# Patient Record
Sex: MALE | Race: WHITE | NOT HISPANIC OR LATINO | Employment: FULL TIME | URBAN - METROPOLITAN AREA
[De-identification: names, ages, dates, MRNs, and addresses within clinical notes are randomized per-mention and may not be internally consistent; named-entity substitution may affect disease eponyms.]

---

## 2017-06-21 ENCOUNTER — LAB (OUTPATIENT)
Dept: LAB | Facility: HOSPITAL | Age: 52
End: 2017-06-21

## 2017-06-21 ENCOUNTER — HOSPITAL ENCOUNTER (OUTPATIENT)
Dept: CT IMAGING | Facility: HOSPITAL | Age: 52
Discharge: HOME | End: 2017-06-21
Admitting: INTERNAL MEDICINE

## 2017-06-21 ENCOUNTER — TRANSCRIBE ORDERS (OUTPATIENT)
Dept: ADMINISTRATIVE | Facility: HOSPITAL | Age: 52
End: 2017-06-21

## 2017-06-21 ENCOUNTER — TRANSCRIBE ORDERS (OUTPATIENT)
Dept: LAB | Facility: HOSPITAL | Age: 52
End: 2017-06-21

## 2017-06-21 DIAGNOSIS — R10.9 ABDOMINAL PAIN, UNSPECIFIED LOCATION: Primary | ICD-10-CM

## 2017-06-21 DIAGNOSIS — R10.32 ABDOMINAL PAIN, LEFT LOWER QUADRANT: Primary | ICD-10-CM

## 2017-06-21 DIAGNOSIS — K57.33 DIVERTICULITIS LARGE INTESTINE W/O PERFORATION OR ABSCESS W/BLEEDING: ICD-10-CM

## 2017-06-21 DIAGNOSIS — R10.9 ABDOMINAL PAIN, UNSPECIFIED LOCATION: ICD-10-CM

## 2017-06-21 DIAGNOSIS — R10.32 ABDOMINAL PAIN, LEFT LOWER QUADRANT: ICD-10-CM

## 2017-06-21 LAB
ANION GAP SERPL CALCULATED.3IONS-SCNC: 9 MMOL/L (ref 3–11)
BASOPHILS # BLD AUTO: 0.01 10*3/MM3 (ref 0–0.2)
BASOPHILS NFR BLD AUTO: 0.1 % (ref 0–1)
BUN BLD-MCNC: 30 MG/DL (ref 9–23)
BUN/CREAT SERPL: 30 (ref 7–25)
CALCIUM SPEC-SCNC: 9.8 MG/DL (ref 8.7–10.4)
CHLORIDE SERPL-SCNC: 102 MMOL/L (ref 99–109)
CO2 SERPL-SCNC: 29 MMOL/L (ref 20–31)
CREAT BLD-MCNC: 1 MG/DL (ref 0.6–1.3)
DEPRECATED RDW RBC AUTO: 47.8 FL (ref 37–54)
EOSINOPHIL # BLD AUTO: 0.11 10*3/MM3 (ref 0.1–0.3)
EOSINOPHIL NFR BLD AUTO: 0.8 % (ref 0–3)
ERYTHROCYTE [DISTWIDTH] IN BLOOD BY AUTOMATED COUNT: 13.1 % (ref 11.3–14.5)
GFR SERPL CREATININE-BSD FRML MDRD: 78 ML/MIN/1.73
GLUCOSE BLD-MCNC: 86 MG/DL (ref 70–100)
HCT VFR BLD AUTO: 38.4 % (ref 38.9–50.9)
HGB BLD-MCNC: 12.7 G/DL (ref 13.1–17.5)
IMM GRANULOCYTES # BLD: 0.02 10*3/MM3 (ref 0–0.03)
IMM GRANULOCYTES NFR BLD: 0.1 % (ref 0–0.6)
LYMPHOCYTES # BLD AUTO: 2.55 10*3/MM3 (ref 0.6–4.8)
LYMPHOCYTES NFR BLD AUTO: 18.8 % (ref 24–44)
MCH RBC QN AUTO: 33.2 PG (ref 27–31)
MCHC RBC AUTO-ENTMCNC: 33.1 G/DL (ref 32–36)
MCV RBC AUTO: 100.3 FL (ref 80–99)
MONOCYTES # BLD AUTO: 1.02 10*3/MM3 (ref 0–1)
MONOCYTES NFR BLD AUTO: 7.5 % (ref 0–12)
NEUTROPHILS # BLD AUTO: 9.83 10*3/MM3 (ref 1.5–8.3)
NEUTROPHILS NFR BLD AUTO: 72.7 % (ref 41–71)
PLATELET # BLD AUTO: 220 10*3/MM3 (ref 150–450)
PMV BLD AUTO: 9.6 FL (ref 6–12)
POTASSIUM BLD-SCNC: 4.3 MMOL/L (ref 3.5–5.5)
RBC # BLD AUTO: 3.83 10*6/MM3 (ref 4.2–5.76)
SODIUM BLD-SCNC: 140 MMOL/L (ref 132–146)
WBC NRBC COR # BLD: 13.54 10*3/MM3 (ref 3.5–10.8)

## 2017-06-21 PROCEDURE — 36415 COLL VENOUS BLD VENIPUNCTURE: CPT

## 2017-06-21 PROCEDURE — 82565 ASSAY OF CREATININE: CPT

## 2017-06-21 PROCEDURE — 80048 BASIC METABOLIC PNL TOTAL CA: CPT | Performed by: NURSE PRACTITIONER

## 2017-06-21 PROCEDURE — 85025 COMPLETE CBC W/AUTO DIFF WBC: CPT | Performed by: NURSE PRACTITIONER

## 2017-06-21 PROCEDURE — 74177 CT ABD & PELVIS W/CONTRAST: CPT

## 2017-06-21 PROCEDURE — 0 IOPAMIDOL 61 % SOLUTION: Performed by: INTERNAL MEDICINE

## 2017-06-21 RX ADMIN — IOPAMIDOL 95 ML: 612 INJECTION, SOLUTION INTRAVENOUS at 14:15

## 2017-06-21 RX ADMIN — BARIUM SULFATE 450 ML: 21 SUSPENSION ORAL at 12:30

## 2017-06-22 LAB — CREAT BLDA-MCNC: 1 MG/DL (ref 0.6–1.3)

## 2017-07-11 ENCOUNTER — HOSPITAL ENCOUNTER (OUTPATIENT)
Facility: HOSPITAL | Age: 52
Setting detail: HOSPITAL OUTPATIENT SURGERY
Discharge: HOME | End: 2017-07-11
Attending: COLON & RECTAL SURGERY | Admitting: COLON & RECTAL SURGERY

## 2017-07-11 ENCOUNTER — ANESTHESIA (OUTPATIENT)
Dept: GASTROENTEROLOGY | Facility: HOSPITAL | Age: 52
End: 2017-07-11

## 2017-07-11 ENCOUNTER — ANESTHESIA EVENT (OUTPATIENT)
Dept: GASTROENTEROLOGY | Facility: HOSPITAL | Age: 52
End: 2017-07-11

## 2017-07-11 VITALS
SYSTOLIC BLOOD PRESSURE: 104 MMHG | OXYGEN SATURATION: 97 % | BODY MASS INDEX: 33.13 KG/M2 | WEIGHT: 250 LBS | HEIGHT: 73 IN | RESPIRATION RATE: 16 BRPM | HEART RATE: 60 BPM | TEMPERATURE: 97.6 F | DIASTOLIC BLOOD PRESSURE: 77 MMHG

## 2017-07-11 DIAGNOSIS — K57.92 DIVERTICULITIS: ICD-10-CM

## 2017-07-11 LAB — POTASSIUM BLDA-SCNC: 4.16 MMOL/L (ref 3.5–5.3)

## 2017-07-11 PROCEDURE — 88305 TISSUE EXAM BY PATHOLOGIST: CPT | Performed by: COLON & RECTAL SURGERY

## 2017-07-11 PROCEDURE — 84132 ASSAY OF SERUM POTASSIUM: CPT | Performed by: ANESTHESIOLOGY

## 2017-07-11 PROCEDURE — 25010000002 PROPOFOL 10 MG/ML EMULSION: Performed by: NURSE ANESTHETIST, CERTIFIED REGISTERED

## 2017-07-11 PROCEDURE — 93005 ELECTROCARDIOGRAM TRACING: CPT | Performed by: ANESTHESIOLOGY

## 2017-07-11 RX ORDER — LIDOCAINE HYDROCHLORIDE 10 MG/ML
INJECTION, SOLUTION INFILTRATION; PERINEURAL AS NEEDED
Status: DISCONTINUED | OUTPATIENT
Start: 2017-07-11 | End: 2017-07-11 | Stop reason: SURG

## 2017-07-11 RX ORDER — PANTOPRAZOLE SODIUM 40 MG/1
40 TABLET, DELAYED RELEASE ORAL DAILY
COMMUNITY
End: 2022-03-24 | Stop reason: SDUPTHER

## 2017-07-11 RX ORDER — PROPOFOL 10 MG/ML
VIAL (ML) INTRAVENOUS AS NEEDED
Status: DISCONTINUED | OUTPATIENT
Start: 2017-07-11 | End: 2017-07-11 | Stop reason: SURG

## 2017-07-11 RX ORDER — CETIRIZINE HYDROCHLORIDE 10 MG/1
10 TABLET ORAL DAILY PRN
COMMUNITY

## 2017-07-11 RX ORDER — SODIUM CHLORIDE, SODIUM LACTATE, POTASSIUM CHLORIDE, CALCIUM CHLORIDE 600; 310; 30; 20 MG/100ML; MG/100ML; MG/100ML; MG/100ML
9 INJECTION, SOLUTION INTRAVENOUS CONTINUOUS
Status: DISCONTINUED | OUTPATIENT
Start: 2017-07-11 | End: 2017-07-12 | Stop reason: HOSPADM

## 2017-07-11 RX ORDER — MELOXICAM 7.5 MG/1
7.5 TABLET ORAL DAILY
COMMUNITY
End: 2017-12-22

## 2017-07-11 RX ORDER — PROPOFOL 10 MG/ML
VIAL (ML) INTRAVENOUS CONTINUOUS PRN
Status: DISCONTINUED | OUTPATIENT
Start: 2017-07-11 | End: 2017-07-11 | Stop reason: SURG

## 2017-07-11 RX ORDER — GEMFIBROZIL 600 MG/1
600 TABLET, FILM COATED ORAL
COMMUNITY
End: 2022-06-14 | Stop reason: SDUPTHER

## 2017-07-11 RX ORDER — FAMOTIDINE 10 MG/ML
20 INJECTION, SOLUTION INTRAVENOUS ONCE
Status: COMPLETED | OUTPATIENT
Start: 2017-07-11 | End: 2017-07-11

## 2017-07-11 RX ORDER — LIDOCAINE HYDROCHLORIDE 10 MG/ML
0.5 INJECTION, SOLUTION EPIDURAL; INFILTRATION; INTRACAUDAL; PERINEURAL ONCE AS NEEDED
Status: DISCONTINUED | OUTPATIENT
Start: 2017-07-11 | End: 2017-07-11 | Stop reason: HOSPADM

## 2017-07-11 RX ORDER — FAMOTIDINE 20 MG/1
20 TABLET, FILM COATED ORAL ONCE
Status: CANCELLED | OUTPATIENT
Start: 2017-07-11 | End: 2017-07-11

## 2017-07-11 RX ORDER — SODIUM CHLORIDE 0.9 % (FLUSH) 0.9 %
1-10 SYRINGE (ML) INJECTION AS NEEDED
Status: DISCONTINUED | OUTPATIENT
Start: 2017-07-11 | End: 2017-07-11 | Stop reason: HOSPADM

## 2017-07-11 RX ORDER — ACETAMINOPHEN 500 MG
1000 TABLET ORAL EVERY 4 HOURS PRN
COMMUNITY

## 2017-07-11 RX ORDER — VALSARTAN 160 MG/1
160 TABLET ORAL DAILY
COMMUNITY
End: 2018-09-10 | Stop reason: ALTCHOICE

## 2017-07-11 RX ADMIN — LIDOCAINE HYDROCHLORIDE 100 MG: 10 INJECTION, SOLUTION INFILTRATION; PERINEURAL at 10:44

## 2017-07-11 RX ADMIN — FAMOTIDINE 20 MG: 10 INJECTION, SOLUTION INTRAVENOUS at 09:26

## 2017-07-11 RX ADMIN — PROPOFOL 160 MCG/KG/MIN: 10 INJECTION, EMULSION INTRAVENOUS at 10:44

## 2017-07-11 RX ADMIN — SODIUM CHLORIDE, POTASSIUM CHLORIDE, SODIUM LACTATE AND CALCIUM CHLORIDE: 600; 310; 30; 20 INJECTION, SOLUTION INTRAVENOUS at 10:44

## 2017-07-11 RX ADMIN — PROPOFOL 100 MG: 10 INJECTION, EMULSION INTRAVENOUS at 10:44

## 2017-07-11 RX ADMIN — SODIUM CHLORIDE, POTASSIUM CHLORIDE, SODIUM LACTATE AND CALCIUM CHLORIDE 9 ML/HR: 600; 310; 30; 20 INJECTION, SOLUTION INTRAVENOUS at 09:26

## 2017-07-11 NOTE — OP NOTE
[facility] ([facility])    COLONOSCOPY with cold snare polypectomy   Procedure Note    Nicholas Renee  7/11/2017    Pre-op Diagnosis: abnormal CT; history of diverticulitis; 3 episodes in 2 years    Post-op Diagnosis:   Mild sigmoid fibrosis and sigmoid diverticulosis;   Sessile 2mm polyp at the rectum, 10 cm above the dentate  PLAN TO CHECK CRP AND CBC    Anesthesia: Monitor Anesthesia Care    Staff:   Circulator: Kimberlee Quigley RN  Endo Technician: Catalina Mares    Estimated Blood Loss: *No blood loss documented*    Specimens: 2mm polyp from the rectum        Drains:none    Findings: as above     Complications: none evident      Magan Blum MD     Date: 7/11/2017  Time: 11:13 AM     PROCEDURE PERFORMED: Ileocolonoscopy with cold snare polypectomy.     PREOPERATIVE DIAGNOSIS: As above.     POSTOPERATIVE DIAGNOSIS: As above.     The procedure was reviewed in the preoperative area. History and physical exam performed. There has been some left lower quadrant mild pain, but the region is nontender on exam. The other aspects of the physical exam were without interval change.      DESCRIPTION OF PROCEDURE: The patient was brought into the endoscopy suite in the left lateral decubitus position. Anesthesia was provided by anesthesia services.   The soft tissues externally were without evidence of pathology. Digital exam revealed good sphincter pressure. The colonoscope was advanced transanally.    In the sigmoid, there was mild fibrosis and moderate sigmoid diverticulosis that became less significant in the left colon.   The greatest degree of fibrosis was from 15-30 cm.   The more proximal colon was intubated with ease. The distal ileum appeared healthy.   Copious washing was performed in the cecum. We marked the cecal time to keep track of withdraw time.   As the colonoscope was withdrawn, copious washing was performed with findings of the aforementioned diverticular changes,  particularly at 30-15 cm.   At 10 cm above the dentate line, a 2 mm polyp was removed with cold snare polypectomy and retrieved.   Ultimately, retroflexion in the distal rectum revealed internal hemorrhoids. The findings were discussed with the patient’s wife and we made plans for CRP and CBC to better understand if there is chronic inflammatory process or if the inflammation is resolved.

## 2017-07-11 NOTE — ANESTHESIA PREPROCEDURE EVALUATION
Anesthesia Evaluation     Patient summary reviewed and Nursing notes reviewed   NPO Solid Status: > 8 hours  NPO Liquid Status: > 8 hours     Airway   Mallampati: I  TM distance: >3 FB  Neck ROM: full  no difficulty expected  Dental      Pulmonary     breath sounds clear to auscultation  Cardiovascular     Rhythm: regular  Rate: normal    (+) hypertension,       Neuro/Psych  GI/Hepatic/Renal/Endo      Musculoskeletal     Abdominal    Substance History      OB/GYN          Other        ROS/Med Hx Other: Denies cardiac or pulmonary disease                                  Anesthesia Plan    ASA 2     general   total IV anesthesia  intravenous induction     Plan discussed with CRNA.

## 2017-07-11 NOTE — ANESTHESIA POSTPROCEDURE EVALUATION
Patient: Nicholas Renee    Procedure Summary     Date Anesthesia Start Anesthesia Stop Room / Location    07/11/17 1039 1104  [facility]       Procedure Diagnosis Surgeon Provider    COLONOSCOPY (N/A ) No diagnosis on file. MD Joesph Barrientos MD          Anesthesia Type: general  Last vitals  BP (!) 87/58 (07/11/17 1105)    Temp 97 °F (36.1 °C) (07/11/17 1105)    Pulse 54 (07/11/17 1105)   Resp 12 (07/11/17 1105)    SpO2 97 % (07/11/17 1105)      Post Anesthesia Care and Evaluation    Patient location during evaluation: PACU  Patient participation: complete - patient participated  Level of consciousness: awake and alert  Pain score: 0  Pain management: adequate  Airway patency: patent  Anesthetic complications: No anesthetic complications  PONV Status: none  Cardiovascular status: hemodynamically stable and acceptable  Respiratory status: nonlabored ventilation, acceptable and nasal cannula  Hydration status: acceptable

## 2017-07-11 NOTE — PLAN OF CARE
Problem: GI Endoscopy (Adult)  Goal: Signs and Symptoms of Listed Potential Problems Will be Absent or Manageable (GI Endoscopy)  Outcome: Ongoing (interventions implemented as appropriate)    07/11/17 0928   GI Endoscopy   Problems Assessed (GI Endoscopy) all   Problems Present (GI Endoscopy) none

## 2017-07-12 LAB
CYTO UR: NORMAL
LAB AP CASE REPORT: NORMAL
LAB AP CLINICAL INFORMATION: NORMAL
Lab: NORMAL
PATH REPORT.FINAL DX SPEC: NORMAL
PATH REPORT.GROSS SPEC: NORMAL

## 2017-09-06 ENCOUNTER — LAB (OUTPATIENT)
Dept: LAB | Facility: HOSPITAL | Age: 52
End: 2017-09-06

## 2017-09-06 ENCOUNTER — HOSPITAL ENCOUNTER (OUTPATIENT)
Dept: CT IMAGING | Facility: HOSPITAL | Age: 52
Discharge: HOME | End: 2017-09-06
Admitting: INTERNAL MEDICINE

## 2017-09-06 ENCOUNTER — TRANSCRIBE ORDERS (OUTPATIENT)
Dept: ADMINISTRATIVE | Facility: HOSPITAL | Age: 52
End: 2017-09-06

## 2017-09-06 DIAGNOSIS — N41.0 ACUTE PROSTATITIS: ICD-10-CM

## 2017-09-06 DIAGNOSIS — K57.33 DIVERTICULITIS OF COLON WITH HEMORRHAGE: Primary | ICD-10-CM

## 2017-09-06 DIAGNOSIS — K57.20 PERFORATED DIVERTICULUM OF LARGE INTESTINE: Primary | ICD-10-CM

## 2017-09-06 DIAGNOSIS — K57.33 DIVERTICULITIS OF COLON WITH HEMORRHAGE: ICD-10-CM

## 2017-09-06 LAB
ANION GAP SERPL CALCULATED.3IONS-SCNC: 6 MMOL/L (ref 3–11)
BASOPHILS # BLD AUTO: 0.02 10*3/MM3 (ref 0–0.2)
BASOPHILS NFR BLD AUTO: 0.3 % (ref 0–1)
BUN BLD-MCNC: 27 MG/DL (ref 9–23)
BUN/CREAT SERPL: 30 (ref 7–25)
CALCIUM SPEC-SCNC: 9.4 MG/DL (ref 8.7–10.4)
CHLORIDE SERPL-SCNC: 105 MMOL/L (ref 99–109)
CO2 SERPL-SCNC: 28 MMOL/L (ref 20–31)
CREAT BLD-MCNC: 0.9 MG/DL (ref 0.6–1.3)
CRP SERPL-MCNC: 0.13 MG/DL (ref 0–1)
DEPRECATED RDW RBC AUTO: 45 FL (ref 37–54)
EOSINOPHIL # BLD AUTO: 0.28 10*3/MM3 (ref 0–0.3)
EOSINOPHIL NFR BLD AUTO: 4.5 % (ref 0–3)
ERYTHROCYTE [DISTWIDTH] IN BLOOD BY AUTOMATED COUNT: 12.9 % (ref 11.3–14.5)
ERYTHROCYTE [SEDIMENTATION RATE] IN BLOOD: 18 MM/HR (ref 0–20)
GFR SERPL CREATININE-BSD FRML MDRD: 88 ML/MIN/1.73
GLUCOSE BLD-MCNC: 80 MG/DL (ref 70–100)
HCT VFR BLD AUTO: 40 % (ref 38.9–50.9)
HGB BLD-MCNC: 13.6 G/DL (ref 13.1–17.5)
IMM GRANULOCYTES # BLD: 0.02 10*3/MM3 (ref 0–0.03)
IMM GRANULOCYTES NFR BLD: 0.3 % (ref 0–0.6)
LYMPHOCYTES # BLD AUTO: 2.34 10*3/MM3 (ref 0.6–4.8)
LYMPHOCYTES NFR BLD AUTO: 37.8 % (ref 24–44)
MCH RBC QN AUTO: 32.8 PG (ref 27–31)
MCHC RBC AUTO-ENTMCNC: 34 G/DL (ref 32–36)
MCV RBC AUTO: 96.4 FL (ref 80–99)
MONOCYTES # BLD AUTO: 0.57 10*3/MM3 (ref 0–1)
MONOCYTES NFR BLD AUTO: 9.2 % (ref 0–12)
NEUTROPHILS # BLD AUTO: 2.96 10*3/MM3 (ref 1.5–8.3)
NEUTROPHILS NFR BLD AUTO: 47.9 % (ref 41–71)
PLATELET # BLD AUTO: 228 10*3/MM3 (ref 150–450)
PMV BLD AUTO: 9.4 FL (ref 6–12)
POTASSIUM BLD-SCNC: 4.2 MMOL/L (ref 3.5–5.5)
RBC # BLD AUTO: 4.15 10*6/MM3 (ref 4.2–5.76)
SODIUM BLD-SCNC: 139 MMOL/L (ref 132–146)
WBC NRBC COR # BLD: 6.19 10*3/MM3 (ref 3.5–10.8)

## 2017-09-06 PROCEDURE — 80048 BASIC METABOLIC PNL TOTAL CA: CPT | Performed by: NURSE PRACTITIONER

## 2017-09-06 PROCEDURE — 85025 COMPLETE CBC W/AUTO DIFF WBC: CPT | Performed by: NURSE PRACTITIONER

## 2017-09-06 PROCEDURE — 85652 RBC SED RATE AUTOMATED: CPT | Performed by: NURSE PRACTITIONER

## 2017-09-06 PROCEDURE — 0 DIATRIZOATE MEGLUMINE & SODIUM PER 1 ML: Performed by: INTERNAL MEDICINE

## 2017-09-06 PROCEDURE — 36415 COLL VENOUS BLD VENIPUNCTURE: CPT

## 2017-09-06 PROCEDURE — 0 IOPAMIDOL 61 % SOLUTION: Performed by: INTERNAL MEDICINE

## 2017-09-06 PROCEDURE — 74177 CT ABD & PELVIS W/CONTRAST: CPT

## 2017-09-06 PROCEDURE — 86140 C-REACTIVE PROTEIN: CPT | Performed by: NURSE PRACTITIONER

## 2017-09-06 PROCEDURE — 82565 ASSAY OF CREATININE: CPT

## 2017-09-06 RX ADMIN — DIATRIZOATE MEGLUMINE AND DIATRIZOATE SODIUM 15 ML: 660; 100 LIQUID ORAL; RECTAL at 12:15

## 2017-09-06 RX ADMIN — IOPAMIDOL 100 ML: 612 INJECTION, SOLUTION INTRAVENOUS at 13:30

## 2017-09-07 LAB — CREAT BLDA-MCNC: 0.9 MG/DL (ref 0.6–1.3)

## 2017-12-21 ENCOUNTER — TELEPHONE (OUTPATIENT)
Dept: ORTHOPEDIC SURGERY | Facility: CLINIC | Age: 52
End: 2017-12-21

## 2017-12-22 RX ORDER — MELOXICAM 15 MG/1
TABLET ORAL
Qty: 90 TABLET | Refills: 2 | Status: SHIPPED | OUTPATIENT
Start: 2017-12-22 | End: 2018-09-26 | Stop reason: SDUPTHER

## 2017-12-22 NOTE — TELEPHONE ENCOUNTER
Patient called back, Kenyon spoke to him and let him know that the refill was at his pharmacy and to call his PCP to see if they would like him to get labs drawn. Patient understood and will call back with any further problems or questions.     Nneka

## 2018-01-15 ENCOUNTER — OFFICE VISIT (OUTPATIENT)
Dept: ORTHOPEDIC SURGERY | Facility: CLINIC | Age: 53
End: 2018-01-15

## 2018-01-15 VITALS
SYSTOLIC BLOOD PRESSURE: 136 MMHG | HEART RATE: 82 BPM | DIASTOLIC BLOOD PRESSURE: 95 MMHG | HEIGHT: 73 IN | BODY MASS INDEX: 33.57 KG/M2 | WEIGHT: 253.31 LBS

## 2018-01-15 DIAGNOSIS — M17.0 PRIMARY OSTEOARTHRITIS OF BOTH KNEES: Primary | ICD-10-CM

## 2018-01-15 PROCEDURE — 99213 OFFICE O/P EST LOW 20 MIN: CPT | Performed by: ORTHOPAEDIC SURGERY

## 2018-01-15 RX ORDER — ALUMINUM ZIRCONIUM OCTACHLOROHYDREX GLY 16 G/100G
4 GEL TOPICAL
Refills: 1 | COMMUNITY
Start: 2017-10-30 | End: 2019-06-28

## 2018-01-15 NOTE — PROGRESS NOTES
"    [facility] [place] [facility] [place]    Subjective     Chief Complaint   Patient presents with   • Follow-up     Bilateral knees - 5 month f/u        HPI    Nicholas Renee is a 52 y.o. male. He presents today for bilateral knee pain.  He has a known history of arthritis, with moderate to severe pain.  Right knee bothers him more than the left.  Pain is aching, so she was swelling, grinding and stiffness.  It worsens with walking stairs.  He has had good response with steroid injections in the past.  He is not interested in surgical intervention at this time.       There is no problem list on file for this patient.    Past Medical History:   Diagnosis Date   • Esophagitis    • Hypertension    • Osteoarthritis of both knees       Past Surgical History:   Procedure Laterality Date   • ABDOMINAL SURGERY     • COLONOSCOPY N/A 7/11/2017    Procedure: COLONOSCOPY;  Surgeon: Magan Blum MD;  Location: [facility] ENDOSCOPY;  Service:    • FOOT SURGERY     • KNEE SURGERY Bilateral     x 5      Family History   Problem Relation Age of Onset   • Diabetes Father    • Heart disease Father    • Hypertension Father    • Heart attack Father    • Rheum arthritis Other    • No Known Problems Mother      Social History     Social History   • Marital status:      Spouse name: N/A   • Number of children: N/A   • Years of education: N/A     Occupational History   • Not on file.     Social History Main Topics   • Smoking status: Former Smoker     Types: Cigarettes     Quit date: 7/11/2003   • Smokeless tobacco: Current User     Types: Snuff   • Alcohol use Yes      Comment: socially, \"rarely\"   • Drug use: Defer   • Sexual activity: Defer     Other Topics Concern   • Not on file     Social History Narrative      Current Outpatient Prescriptions on File Prior to Visit   Medication Sig Dispense Refill   • acetaminophen (TYLENOL) 500 MG tablet Take 1,000 mg by mouth 2 (Two) Times a Day.     • cetirizine (zyrTEC) 10 MG tablet Take 10 " "mg by mouth Daily.     • gemfibrozil (LOPID) 600 MG tablet Take 600 mg by mouth 2 (Two) Times a Day Before Meals.     • meloxicam (MOBIC) 15 MG tablet 1 PO Daily with food. 90 tablet 2   • Multiple Vitamins-Minerals (MULTIVITAMIN ADULT PO) Take 1 tablet by mouth Daily.     • pantoprazole (PROTONIX) 40 MG EC tablet Take 40 mg by mouth Daily.     • valsartan (DIOVAN) 160 MG tablet Take 160 mg by mouth Daily.     • Probiotic Product (ALIGN PO) Take 1 tablet by mouth Daily.       No current facility-administered medications on file prior to visit.       Allergies   Allergen Reactions   • Sulfa Antibiotics Hives        Review of Systems   Constitutional: Negative.  Negative for chills and fever.   HENT: Positive for sinus pressure and sneezing.    Eyes: Positive for itching.   Respiratory: Negative.    Cardiovascular: Negative.    Gastrointestinal: Negative.    Endocrine: Negative.    Genitourinary: Negative.    Musculoskeletal: Positive for arthralgias (Bilateral knee pain), back pain and joint swelling.   Skin: Negative.    Allergic/Immunologic: Negative.    Neurological: Negative.    Hematological: Negative.    Psychiatric/Behavioral: Negative.         Objective      Physical Exam  /95  Pulse 82  Ht 185 cm (72.84\")  Wt 115 kg (253 lb 4.9 oz)  BMI 33.57 kg/m2    Body mass index is 33.57 kg/(m^2).    General:   Mental Status:  Alert   Appearance: Cooperative, in no acute distress   Build and Nutrition:Overweight  male   Orientation: Alert and oriented to person, place and time   Posture: Normal   Gait: Normal    Integument:   Right knee: no skin lesions, no rash, no ecchymosis   Left knee: no skin lesions, no rash, no ecchymosis    Lower Extremities:   Right Knee:    Tenderness:  Medial and lateral joint line tenderness    Effusion:  2+    Swelling:  None    Crepitus:  Positive    Atrophy:  None    Range of motion:  Extension: 5°       Flexion: 120°  Instability:  No varus laxity, no valgus laxity, negative " anterior drawer  Deformities:  Varus   Left Knee:    Tenderness:  None    Effusion:  1+    Swelling:  None    Crepitus: Positive    Atrophy:  None    Range of motion:  Extension: 0°       Flexion: 120°  Instability:  No varus laxity, no valgus laxity, negative anterior drawer  Deformities:  None      Imaging/Studies  Outside radiographs reviewed, which showed arthritic changes in both knees, right greater than left, with signs of ACL reconstruction on the right.  Last radiographs were January 2017      Assessment and Plan     Nicholas was seen today for follow-up.    Diagnoses and all orders for this visit:    Primary osteoarthritis of both knees        I reviewed my findings with patient today.  He has advanced bilateral knee arthritis.  He would like to have injections today.  Please see my procedure notes for details.  I will see him back in 4 months, but sooner for any problems.  Long-term, he is a candidate for knee replacement surgery.      Of note, he had 90% pain relief in the right knee, and 80% relief in the left knee prior to leaving the office today.      Return in about 4 months (around 5/15/2018).      Medical Decision Making  Management Options : prescription/IM medicine  Data/Risk: independent visualization of imaging, lab tests, or EMG/NCV      Gage Britt MD  01/15/18  10:18 AM

## 2018-01-17 PROCEDURE — 20610 DRAIN/INJ JOINT/BURSA W/O US: CPT | Performed by: ORTHOPAEDIC SURGERY

## 2018-01-17 RX ORDER — LIDOCAINE HYDROCHLORIDE 10 MG/ML
4 INJECTION, SOLUTION INFILTRATION; PERINEURAL
Status: COMPLETED | OUTPATIENT
Start: 2018-01-17 | End: 2018-01-17

## 2018-01-17 RX ORDER — TRIAMCINOLONE ACETONIDE 40 MG/ML
40 INJECTION, SUSPENSION INTRA-ARTICULAR; INTRAMUSCULAR
Status: COMPLETED | OUTPATIENT
Start: 2018-01-17 | End: 2018-01-17

## 2018-01-17 RX ADMIN — LIDOCAINE HYDROCHLORIDE 4 ML: 10 INJECTION, SOLUTION INFILTRATION; PERINEURAL at 10:06

## 2018-01-17 RX ADMIN — TRIAMCINOLONE ACETONIDE 40 MG: 40 INJECTION, SUSPENSION INTRA-ARTICULAR; INTRAMUSCULAR at 10:06

## 2018-01-17 NOTE — PROGRESS NOTES
Procedure   Large Joint Arthrocentesis  Date/Time: 1/17/2018 10:06 AM  Consent given by: patient  Site marked: site marked  Timeout: Immediately prior to procedure a time out was called to verify the correct patient, procedure, equipment, support staff and site/side marked as required   Supporting Documentation  Indications: pain   Procedure Details  Location: knee - R knee  Preparation: Patient was prepped and draped in the usual sterile fashion  Needle size: 22 G  Approach: anterolateral  Medications administered: 4 mL lidocaine 1 %; 40 mg triamcinolone acetonide 40 MG/ML  Patient tolerance: patient tolerated the procedure well with no immediate complications

## 2018-01-19 NOTE — PROGRESS NOTES
Procedure   Large Joint Arthrocentesis  Date/Time: 1/19/2018 3:09 PM  Consent given by: patient  Site marked: site marked  Timeout: Immediately prior to procedure a time out was called to verify the correct patient, procedure, equipment, support staff and site/side marked as required   Supporting Documentation  Indications: pain   Procedure Details  Location: knee - L knee  Preparation: Patient was prepped and draped in the usual sterile fashion  Needle size: 22 G  Approach: anterolateral  Medications administered: 4 mL lidocaine 1 %; 40 mg triamcinolone acetonide 40 MG/ML  Patient tolerance: patient tolerated the procedure well with no immediate complications

## 2018-05-02 ENCOUNTER — OFFICE VISIT (OUTPATIENT)
Dept: ORTHOPEDIC SURGERY | Facility: CLINIC | Age: 53
End: 2018-05-02

## 2018-05-02 VITALS
HEIGHT: 73 IN | SYSTOLIC BLOOD PRESSURE: 140 MMHG | WEIGHT: 251.32 LBS | DIASTOLIC BLOOD PRESSURE: 92 MMHG | BODY MASS INDEX: 33.31 KG/M2 | HEART RATE: 76 BPM

## 2018-05-02 DIAGNOSIS — M17.11 PRIMARY OSTEOARTHRITIS OF RIGHT KNEE: Primary | ICD-10-CM

## 2018-05-02 DIAGNOSIS — M17.12 PRIMARY OSTEOARTHRITIS OF LEFT KNEE: ICD-10-CM

## 2018-05-02 PROCEDURE — 20610 DRAIN/INJ JOINT/BURSA W/O US: CPT | Performed by: ORTHOPAEDIC SURGERY

## 2018-05-02 PROCEDURE — 99214 OFFICE O/P EST MOD 30 MIN: CPT | Performed by: ORTHOPAEDIC SURGERY

## 2018-05-02 RX ORDER — TRIAMCINOLONE ACETONIDE 40 MG/ML
40 INJECTION, SUSPENSION INTRA-ARTICULAR; INTRAMUSCULAR
Status: COMPLETED | OUTPATIENT
Start: 2018-05-02 | End: 2018-05-02

## 2018-05-02 RX ORDER — ROPIVACAINE HYDROCHLORIDE 5 MG/ML
4 INJECTION, SOLUTION EPIDURAL; INFILTRATION; PERINEURAL
Status: COMPLETED | OUTPATIENT
Start: 2018-05-02 | End: 2018-05-02

## 2018-05-02 RX ORDER — DOXYCYCLINE HYCLATE 100 MG/1
100 CAPSULE ORAL 2 TIMES DAILY
Refills: 0 | COMMUNITY
Start: 2018-04-26 | End: 2019-06-28

## 2018-05-02 RX ADMIN — TRIAMCINOLONE ACETONIDE 40 MG: 40 INJECTION, SUSPENSION INTRA-ARTICULAR; INTRAMUSCULAR at 09:19

## 2018-05-02 RX ADMIN — TRIAMCINOLONE ACETONIDE 40 MG: 40 INJECTION, SUSPENSION INTRA-ARTICULAR; INTRAMUSCULAR at 09:18

## 2018-05-02 RX ADMIN — ROPIVACAINE HYDROCHLORIDE 4 ML: 5 INJECTION, SOLUTION EPIDURAL; INFILTRATION; PERINEURAL at 09:18

## 2018-05-02 RX ADMIN — ROPIVACAINE HYDROCHLORIDE 4 ML: 5 INJECTION, SOLUTION EPIDURAL; INFILTRATION; PERINEURAL at 09:19

## 2018-05-02 NOTE — PROGRESS NOTES
Procedure   Large Joint Arthrocentesis  Date/Time: 5/2/2018 9:18 AM  Consent given by: patient  Site marked: site marked  Timeout: Immediately prior to procedure a time out was called to verify the correct patient, procedure, equipment, support staff and site/side marked as required   Supporting Documentation  Indications: pain   Procedure Details  Location: knee - L knee  Preparation: Patient was prepped and draped in the usual sterile fashion  Needle size: 22 G  Approach: anterolateral  Medications administered: 4 mL ropivacaine 0.5 %; 40 mg triamcinolone acetonide 40 MG/ML  Patient tolerance: patient tolerated the procedure well with no immediate complications

## 2018-05-02 NOTE — PROGRESS NOTES
"    [facility] [place] [facility] [place]    Subjective     Chief Complaint   Patient presents with   • Left Knee - Follow-up     4 month follow up   • Right Knee - Follow-up     4 month follow up        HPI    Nicholas Renee is a 53 y.o. male. He presents today for evaluation of bilateral knee pain.  Pain is been present for several months, following no recent injury.  Pain is persistent, moderate in severity, aching in quality, associated with swelling, popping, grinding and stiffness.  It worsens with climbing stairs.      There is no problem list on file for this patient.    Past Medical History:   Diagnosis Date   • Esophagitis    • Hypertension    • Osteoarthritis of both knees       Past Surgical History:   Procedure Laterality Date   • ABDOMINAL SURGERY     • COLONOSCOPY N/A 7/11/2017    Procedure: COLONOSCOPY;  Surgeon: Magan Blum MD;  Location: [facility] ENDOSCOPY;  Service:    • FOOT SURGERY     • KNEE SURGERY Bilateral     x 5      Family History   Problem Relation Age of Onset   • Diabetes Father    • Heart disease Father    • Hypertension Father    • Heart attack Father    • Rheum arthritis Other    • No Known Problems Mother      Social History     Social History   • Marital status:      Spouse name: N/A   • Number of children: N/A   • Years of education: N/A     Occupational History   • Not on file.     Social History Main Topics   • Smoking status: Former Smoker     Types: Cigarettes     Quit date: 7/11/2003   • Smokeless tobacco: Current User     Types: Snuff   • Alcohol use Yes      Comment: socially, \"rarely\"   • Drug use: Unknown   • Sexual activity: Defer     Other Topics Concern   • Not on file     Social History Narrative   • No narrative on file      Current Outpatient Prescriptions on File Prior to Visit   Medication Sig Dispense Refill   • acetaminophen (TYLENOL) 500 MG tablet Take 1,000 mg by mouth 2 (Two) Times a Day.     • cetirizine (zyrTEC) 10 MG tablet Take 10 mg by mouth Daily. "     • gemfibrozil (LOPID) 600 MG tablet Take 600 mg by mouth 2 (Two) Times a Day Before Meals.     • meloxicam (MOBIC) 15 MG tablet 1 PO Daily with food. 90 tablet 2   • Multiple Vitamins-Minerals (MULTIVITAMIN ADULT PO) Take 1 tablet by mouth Daily.     • pantoprazole (PROTONIX) 40 MG EC tablet Take 40 mg by mouth Daily.     • Probiotic Product (ALIGN PO) Take 1 tablet by mouth Daily.     • Probiotic Product (ALIGN) capsule Take 4 capsules by mouth.  1   • valsartan (DIOVAN) 160 MG tablet Take 160 mg by mouth Daily.       No current facility-administered medications on file prior to visit.       Allergies   Allergen Reactions   • Sulfa Antibiotics Hives        Review of Systems   Constitutional: Positive for activity change. Negative for appetite change, chills, diaphoresis, fatigue, fever and unexpected weight change.   HENT: Positive for sinus pressure and sneezing. Negative for congestion, dental problem, drooling, ear discharge, ear pain, facial swelling, hearing loss, mouth sores, nosebleeds, postnasal drip, rhinorrhea, sore throat, tinnitus, trouble swallowing and voice change.    Eyes: Negative for photophobia, pain, discharge, redness, itching and visual disturbance.   Respiratory: Negative for apnea, cough, choking, chest tightness, shortness of breath, wheezing and stridor.    Cardiovascular: Negative for chest pain, palpitations and leg swelling.   Gastrointestinal: Negative for abdominal distention, abdominal pain, anal bleeding, blood in stool, constipation, diarrhea, nausea, rectal pain and vomiting.   Endocrine: Negative for cold intolerance, heat intolerance, polydipsia, polyphagia and polyuria.   Genitourinary: Negative for decreased urine volume, difficulty urinating, dysuria, enuresis, flank pain, frequency, genital sores, hematuria and urgency.   Musculoskeletal: Positive for arthralgias and joint swelling. Negative for back pain, gait problem, myalgias, neck pain and neck stiffness.   Skin:  "Negative for color change, pallor, rash and wound.   Allergic/Immunologic: Negative for environmental allergies, food allergies and immunocompromised state.   Neurological: Negative for dizziness, tremors, seizures, syncope, facial asymmetry, speech difficulty, weakness, light-headedness, numbness and headaches.   Hematological: Negative for adenopathy. Does not bruise/bleed easily.   Psychiatric/Behavioral: Negative for agitation, behavioral problems, confusion, decreased concentration, dysphoric mood, hallucinations, self-injury, sleep disturbance and suicidal ideas. The patient is not nervous/anxious and is not hyperactive.         Objective      Physical Exam  /92   Pulse 76   Ht 185 cm (72.84\")   Wt 114 kg (251 lb 5.2 oz)   BMI 33.31 kg/m²     Body mass index is 33.31 kg/m².    General:   Mental Status:  Alert   Appearance: Cooperative, in no acute distress   Build and Nutrition: Overweight male   Orientation: Alert and oriented to person, place and time   Posture: Normal   Gait: Normal    Integument:   Right knee: no skin lesions, no rash, no ecchymosis   Left knee: no skin lesions, no rash, no ecchymosis    Lower Extremities:   Right Knee:    Tenderness:  None    Effusion:  2+    Swelling:  None    Crepitus:  Positive    Atrophy:  None    Range of motion:  Extension: 0°       Flexion: 120°  Instability:  No varus laxity, no valgus laxity, negative anterior drawer  Deformities:  None   Left Knee:    Tenderness:  None    Effusion:  1+    Swelling:  None    Crepitus: Positive    Atrophy:  None    Range of motion:  Extension: 0°       Flexion: 120°  Instability:  No varus laxity, no valgus laxity, negative anterior drawer  Deformities:  None      Imaging/Studies  Imaging Results (last 24 hours)     Procedure Component Value Units Date/Time    XR Knee 4+ View Bilateral [[id number]] Resulted:  05/02/18 0910     Updated:  05/02/18 0914    Narrative:       Right Knee Radiographs  Indication: right knee " pain  Views: Standing AP's and skiers of both knees, with lateral and sunrise   views of the right knee    Comparison: no prior studies available    Findings:   Advanced arthritic changes are seen, with bone-on-bone contact in a   tricompartmental fashion, with retained screws consistent with his   previous ACL reconstruction.    Left Knee Radiographs  Indication: left knee pain  Views: Standing AP's and skiers of both knees, with lateral and sunrise   views of the left knee    Comparison: no prior studies available    Findings:   Advanced arthritic changes are seen, with bone-on-bone contact and   tricompartmental fashion.            Assessment and Plan     Nicholas was seen today for follow-up and follow-up.    Diagnoses and all orders for this visit:    Primary osteoarthritis of right knee  -     XR Knee 4+ View Bilateral  -     Large Joint Arthrocentesis    Primary osteoarthritis of left knee  -     XR Knee 4+ View Bilateral  -     Large Joint Arthrocentesis        I reviewed my findings with patient today.  He has advanced bilateral knee arthritis, but is not interested in surgical management at this time.  He would prefer injections today, and these were provided.  We also aspirated the right knee.    Of note, he had about 80% relief in both his knees following the injections.  I did obtain 40 cc of clear straw-colored fluid off of the right knee in particular.    Return in about 4 months (around 9/2/2018).      Medical Decision Making  Management Options : prescription/IM medicine  Data/Risk: radiology tests and independent visualization of imaging, lab tests, or EMG/NCV      Gage Britt MD  05/02/18  9:36 AM

## 2018-05-02 NOTE — PROGRESS NOTES
Procedure   Large Joint Arthrocentesis  Date/Time: 5/2/2018 9:19 AM  Consent given by: patient  Site marked: site marked  Timeout: Immediately prior to procedure a time out was called to verify the correct patient, procedure, equipment, support staff and site/side marked as required   Supporting Documentation  Indications: pain   Procedure Details  Location: knee - R knee  Preparation: Patient was prepped and draped in the usual sterile fashion  Needle size: 22 G  Approach: anterolateral  Medications administered: 4 mL ropivacaine 0.5 %; 40 mg triamcinolone acetonide 40 MG/ML  Aspirate amount: 40 mL  Aspirate: serous and yellow  Patient tolerance: patient tolerated the procedure well with no immediate complications

## 2018-09-10 ENCOUNTER — OFFICE VISIT (OUTPATIENT)
Dept: ORTHOPEDIC SURGERY | Facility: CLINIC | Age: 53
End: 2018-09-10

## 2018-09-10 VITALS — OXYGEN SATURATION: 98 % | BODY MASS INDEX: 32.72 KG/M2 | HEIGHT: 73 IN | HEART RATE: 75 BPM | WEIGHT: 246.91 LBS

## 2018-09-10 DIAGNOSIS — M17.11 PRIMARY OSTEOARTHRITIS OF RIGHT KNEE: Primary | ICD-10-CM

## 2018-09-10 DIAGNOSIS — M17.12 PRIMARY OSTEOARTHRITIS OF LEFT KNEE: ICD-10-CM

## 2018-09-10 PROCEDURE — 20610 DRAIN/INJ JOINT/BURSA W/O US: CPT | Performed by: ORTHOPAEDIC SURGERY

## 2018-09-10 RX ORDER — TRIAMCINOLONE ACETONIDE 40 MG/ML
40 INJECTION, SUSPENSION INTRA-ARTICULAR; INTRAMUSCULAR
Status: COMPLETED | OUTPATIENT
Start: 2018-09-10 | End: 2018-09-10

## 2018-09-10 RX ORDER — ROPIVACAINE HYDROCHLORIDE 5 MG/ML
4 INJECTION, SOLUTION EPIDURAL; INFILTRATION; PERINEURAL
Status: COMPLETED | OUTPATIENT
Start: 2018-09-10 | End: 2018-09-10

## 2018-09-10 RX ORDER — LOSARTAN POTASSIUM 100 MG/1
100 TABLET ORAL DAILY
Refills: 0 | COMMUNITY
Start: 2018-07-19 | End: 2022-03-24 | Stop reason: SDUPTHER

## 2018-09-10 RX ADMIN — TRIAMCINOLONE ACETONIDE 40 MG: 40 INJECTION, SUSPENSION INTRA-ARTICULAR; INTRAMUSCULAR at 10:02

## 2018-09-10 RX ADMIN — TRIAMCINOLONE ACETONIDE 40 MG: 40 INJECTION, SUSPENSION INTRA-ARTICULAR; INTRAMUSCULAR at 10:03

## 2018-09-10 RX ADMIN — ROPIVACAINE HYDROCHLORIDE 4 ML: 5 INJECTION, SOLUTION EPIDURAL; INFILTRATION; PERINEURAL at 10:02

## 2018-09-10 RX ADMIN — ROPIVACAINE HYDROCHLORIDE 4 ML: 5 INJECTION, SOLUTION EPIDURAL; INFILTRATION; PERINEURAL at 10:03

## 2018-09-10 NOTE — PROGRESS NOTES
Procedure   Large Joint Arthrocentesis  Date/Time: 9/10/2018 10:02 AM  Consent given by: patient  Site marked: site marked  Timeout: Immediately prior to procedure a time out was called to verify the correct patient, procedure, equipment, support staff and site/side marked as required   Supporting Documentation  Indications: pain   Procedure Details  Location: knee - R knee  Preparation: Patient was prepped and draped in the usual sterile fashion  Needle size: 22 G  Approach: anterolateral  Medications administered: 4 mL ropivacaine 0.5 %; 40 mg triamcinolone acetonide 40 MG/ML  Aspirate amount: 55 mL  Aspirate: yellow and serous  Patient tolerance: patient tolerated the procedure well with no immediate complications    Large Joint Arthrocentesis  Date/Time: 9/10/2018 10:03 AM  Consent given by: patient  Site marked: site marked  Timeout: Immediately prior to procedure a time out was called to verify the correct patient, procedure, equipment, support staff and site/side marked as required   Supporting Documentation  Indications: pain   Procedure Details  Location: knee - L knee  Preparation: Patient was prepped and draped in the usual sterile fashion  Needle size: 22 G  Approach: anterolateral  Medications administered: 4 mL ropivacaine 0.5 %; 40 mg triamcinolone acetonide 40 MG/ML  Patient tolerance: patient tolerated the procedure well with no immediate complications

## 2018-09-10 NOTE — PROGRESS NOTES
"    [facility] [place] [facility] Note    Subjective     Chief Complaint   Patient presents with   • Left Knee - Follow-up     Primary Osteoarthritis of Both Knees; Bilateral Knee Cortisone Injections given 5/2/18  4 month f/u   • Right Knee - Follow-up     Primary Osteoarthritis of Both Knees; Bilateral Knee Cortisone Injections given 5/2/18  4 month f/u        JOSHUA    Nicholas Renee is a 53 y.o. male.  Follows up today for both his knees.  Both knees are bothering him, with 7 out of 10 pain.  Pain is been present for at least 10 years, following no recent injury.  His pain is associated with swelling, popping, grinding and stiffness.  He is not yet interested in surgical intervention.  He is taking Mobic.  He would like to have an injection today.      There is no problem list on file for this patient.    Past Medical History:   Diagnosis Date   • Esophagitis    • Hypertension    • Osteoarthritis of both knees       Past Surgical History:   Procedure Laterality Date   • ABDOMINAL SURGERY     • COLONOSCOPY N/A 7/11/2017    Procedure: COLONOSCOPY;  Surgeon: Magan Blum MD;  Location: [facility] ENDOSCOPY;  Service:    • FOOT SURGERY     • KNEE SURGERY Bilateral     x 5      Family History   Problem Relation Age of Onset   • Diabetes Father    • Heart disease Father    • Hypertension Father    • Heart attack Father    • Rheum arthritis Other    • No Known Problems Mother      Social History     Social History   • Marital status:      Spouse name: N/A   • Number of children: N/A   • Years of education: N/A     Occupational History   • Not on file.     Social History Main Topics   • Smoking status: Former Smoker     Types: Cigarettes     Quit date: 7/11/2003   • Smokeless tobacco: Current User     Types: Snuff   • Alcohol use Yes      Comment: socially, \"rarely\"   • Drug use: Unknown   • Sexual activity: Defer     Other Topics Concern   • Not on file     Social History Narrative   • No narrative on file    "   Current Outpatient Prescriptions on File Prior to Visit   Medication Sig Dispense Refill   • acetaminophen (TYLENOL) 500 MG tablet Take 1,000 mg by mouth 2 (Two) Times a Day.     • cetirizine (zyrTEC) 10 MG tablet Take 10 mg by mouth Daily.     • doxycycline (VIBRAMYCIN) 100 MG capsule Take 100 mg by mouth 2 (Two) Times a Day.  0   • gemfibrozil (LOPID) 600 MG tablet Take 600 mg by mouth 2 (Two) Times a Day Before Meals.     • HYDROMET 5-1.5 MG/5ML syrup take 1 to 2 teaspoonfuls by mouth at bedtime  0   • meloxicam (MOBIC) 15 MG tablet 1 PO Daily with food. 90 tablet 2   • Multiple Vitamins-Minerals (MULTIVITAMIN ADULT PO) Take 1 tablet by mouth Daily.     • pantoprazole (PROTONIX) 40 MG EC tablet Take 40 mg by mouth Daily.     • Probiotic Product (ALIGN PO) Take 1 tablet by mouth Daily.     • Probiotic Product (ALIGN) capsule Take 4 capsules by mouth.  1   • [DISCONTINUED] valsartan (DIOVAN) 160 MG tablet Take 160 mg by mouth Daily.       No current facility-administered medications on file prior to visit.       Allergies   Allergen Reactions   • Sulfa Antibiotics Hives and Rash        Review of Systems   Constitutional: Negative for activity change, appetite change, chills, diaphoresis, fatigue, fever and unexpected weight change.   HENT: Negative for congestion, dental problem, drooling, ear discharge, ear pain, facial swelling, hearing loss, mouth sores, nosebleeds, postnasal drip, rhinorrhea, sinus pressure, sneezing, sore throat, tinnitus, trouble swallowing and voice change.    Eyes: Negative for photophobia, pain, discharge, redness, itching and visual disturbance.   Respiratory: Negative for apnea, cough, choking, chest tightness, shortness of breath, wheezing and stridor.    Cardiovascular: Negative for chest pain, palpitations and leg swelling.   Gastrointestinal: Negative for abdominal distention, abdominal pain, anal bleeding, blood in stool, constipation, diarrhea, nausea, rectal pain and vomiting.  "  Endocrine: Negative for cold intolerance, heat intolerance, polydipsia, polyphagia and polyuria.   Genitourinary: Negative for decreased urine volume, difficulty urinating, dysuria, enuresis, flank pain, frequency, genital sores, hematuria and urgency.   Musculoskeletal: Positive for joint swelling. Negative for arthralgias, back pain, gait problem, myalgias, neck pain and neck stiffness.   Skin: Negative for color change, pallor, rash and wound.   Allergic/Immunologic: Positive for environmental allergies. Negative for food allergies and immunocompromised state.   Neurological: Negative for dizziness, tremors, seizures, syncope, facial asymmetry, speech difficulty, weakness, light-headedness, numbness and headaches.   Hematological: Negative for adenopathy. Does not bruise/bleed easily.   Psychiatric/Behavioral: Negative for agitation, behavioral problems, confusion, decreased concentration, dysphoric mood, hallucinations, self-injury, sleep disturbance and suicidal ideas. The patient is not nervous/anxious and is not hyperactive.         Objective      Physical Exam  Pulse 75   Ht 185 cm (72.84\")   Wt 112 kg (246 lb 14.6 oz)   SpO2 98%   BMI 32.72 kg/m²     Body mass index is 32.72 kg/m².    General:   Mental Status:  Alert   Appearance: Cooperative, in no acute distress   Build and Nutrition: Overweight male   Orientation: Alert and oriented to person, place and time   Posture: Normal   Gait: Normal    Integument:   Right knee: Old well-healed wound   Left knee: no skin lesions, no rash, no ecchymosis    Lower Extremities:   Right Knee:    Tenderness:  Mild medial and lateral joint line tenderness    Effusion:  2+    Swelling:  None    Crepitus:  Positive    Atrophy:  None    Range of motion:  Extension: 0°       Flexion: 120°  Instability:  No varus laxity, no valgus laxity, negative anterior drawer  Deformities:  Varus   Left Knee:    Tenderness:  Mild medial and lateral joint line tenderness    Effusion: "  Trace    Swelling:  None    Crepitus: Positive    Atrophy:  None    Range of motion:  Extension: 0°       Flexion: 120°  Instability:  No varus laxity, no valgus laxity, negative anterior drawer  Deformities:  None          Assessment and Plan     Nicholas was seen today for follow-up and follow-up.    Diagnoses and all orders for this visit:    Primary osteoarthritis of right knee  -     Large Joint Arthrocentesis    Primary osteoarthritis of left knee  -     Large Joint Arthrocentesis        I reviewed my findings with patient today.  Both knees are bothering him.  We will aspirate the right knee and inject, and provide an injection for the left knee.  He is not yet interested in surgical intervention.  I will see him back in 4 months, but sooner for problems.  He is considering surgery perhaps early next year.    Of note, he had 85% relief just a few minutes following the injections.  I did obtained 55 cc of thick clear straw-colored fluid off of the right knee.    Return in about 4 months (around 1/10/2019).      Medical Decision Making  Management Options : prescription/IM medicine      Gage Britt MD  09/10/18  10:16 AM

## 2018-09-26 ENCOUNTER — TELEPHONE (OUTPATIENT)
Dept: ORTHOPEDIC SURGERY | Facility: CLINIC | Age: 53
End: 2018-09-26

## 2018-09-26 RX ORDER — MELOXICAM 15 MG/1
TABLET ORAL
Qty: 90 TABLET | Refills: 0 | Status: SHIPPED | OUTPATIENT
Start: 2018-09-26 | End: 2022-05-06 | Stop reason: SDUPTHER

## 2018-12-26 ENCOUNTER — TELEPHONE (OUTPATIENT)
Dept: ORTHOPEDIC SURGERY | Facility: CLINIC | Age: 53
End: 2018-12-26

## 2019-01-21 ENCOUNTER — OFFICE VISIT (OUTPATIENT)
Dept: ORTHOPEDIC SURGERY | Facility: CLINIC | Age: 54
End: 2019-01-21

## 2019-01-21 VITALS — OXYGEN SATURATION: 99 % | HEIGHT: 73 IN | HEART RATE: 98 BPM | WEIGHT: 252.21 LBS | BODY MASS INDEX: 33.43 KG/M2

## 2019-01-21 DIAGNOSIS — M17.12 PRIMARY OSTEOARTHRITIS OF LEFT KNEE: ICD-10-CM

## 2019-01-21 DIAGNOSIS — M17.11 PRIMARY OSTEOARTHRITIS OF RIGHT KNEE: Primary | ICD-10-CM

## 2019-01-21 PROCEDURE — 20610 DRAIN/INJ JOINT/BURSA W/O US: CPT | Performed by: ORTHOPAEDIC SURGERY

## 2019-01-21 RX ORDER — ROPIVACAINE HYDROCHLORIDE 5 MG/ML
4 INJECTION, SOLUTION EPIDURAL; INFILTRATION; PERINEURAL
Status: COMPLETED | OUTPATIENT
Start: 2019-01-21 | End: 2019-01-21

## 2019-01-21 RX ORDER — TRIAMCINOLONE ACETONIDE 40 MG/ML
40 INJECTION, SUSPENSION INTRA-ARTICULAR; INTRAMUSCULAR
Status: COMPLETED | OUTPATIENT
Start: 2019-01-21 | End: 2019-01-21

## 2019-01-21 RX ADMIN — TRIAMCINOLONE ACETONIDE 40 MG: 40 INJECTION, SUSPENSION INTRA-ARTICULAR; INTRAMUSCULAR at 09:33

## 2019-01-21 RX ADMIN — ROPIVACAINE HYDROCHLORIDE 4 ML: 5 INJECTION, SOLUTION EPIDURAL; INFILTRATION; PERINEURAL at 09:33

## 2019-01-21 NOTE — PROGRESS NOTES
"    [facility] [place] [facility] Note    Subjective     Chief Complaint   Patient presents with   • Follow-up     4 months - bilateral knees        HPI    Nicholas Renee is a 53 y.o. male.  He follows up today for both his knees.  He continues to have pain in both knees, 8 out of 10.  The pain is associated with swelling, grinding and stiffness.  It worsens with climbing stairs.  He does use meloxicam.  He is not interested in surgical intervention at this time.  He would prefer aspirations and injections today.      There is no problem list on file for this patient.    Past Medical History:   Diagnosis Date   • Esophagitis    • Hypertension    • Osteoarthritis of both knees       Past Surgical History:   Procedure Laterality Date   • ABDOMINAL SURGERY     • COLONOSCOPY N/A 7/11/2017    Procedure: COLONOSCOPY;  Surgeon: Magan Blum MD;  Location: [facility] ENDOSCOPY;  Service:    • FOOT SURGERY     • KNEE SURGERY Bilateral     x 5      Family History   Problem Relation Age of Onset   • Diabetes Father    • Heart disease Father    • Hypertension Father    • Heart attack Father    • Rheum arthritis Other    • No Known Problems Mother      Social History     Socioeconomic History   • Marital status:      Spouse name: Not on file   • Number of children: Not on file   • Years of education: Not on file   • Highest education level: Not on file   Social Needs   • Financial resource strain: Not on file   • Food insecurity - worry: Not on file   • Food insecurity - inability: Not on file   • Transportation needs - medical: Not on file   • Transportation needs - non-medical: Not on file   Occupational History   • Not on file   Tobacco Use   • Smoking status: Former Smoker     Types: Cigarettes     Last attempt to quit: 7/11/2003     Years since quitting: 15.5   • Smokeless tobacco: Current User     Types: Snuff   Substance and Sexual Activity   • Alcohol use: Yes     Comment: socially, \"rarely\"   • Drug use: Defer   • " Sexual activity: Defer   Other Topics Concern   • Not on file   Social History Narrative   • Not on file      Current Outpatient Medications on File Prior to Visit   Medication Sig Dispense Refill   • acetaminophen (TYLENOL) 500 MG tablet Take 1,000 mg by mouth 2 (Two) Times a Day.     • cetirizine (zyrTEC) 10 MG tablet Take 10 mg by mouth Daily.     • doxycycline (VIBRAMYCIN) 100 MG capsule Take 100 mg by mouth 2 (Two) Times a Day.  0   • gemfibrozil (LOPID) 600 MG tablet Take 600 mg by mouth 2 (Two) Times a Day Before Meals.     • HYDROMET 5-1.5 MG/5ML syrup take 1 to 2 teaspoonfuls by mouth at bedtime  0   • losartan (COZAAR) 100 MG tablet Take 100 mg by mouth Daily.  0   • meloxicam (MOBIC) 15 MG tablet 1 PO Daily with food as needed 90 tablet 0   • Multiple Vitamins-Minerals (MULTIVITAMIN ADULT PO) Take 1 tablet by mouth Daily.     • pantoprazole (PROTONIX) 40 MG EC tablet Take 40 mg by mouth Daily.     • Probiotic Product (ALIGN PO) Take 1 tablet by mouth Daily.     • Probiotic Product (ALIGN) capsule Take 4 capsules by mouth.  1     No current facility-administered medications on file prior to visit.       Allergies   Allergen Reactions   • Sulfa Antibiotics Hives and Rash        Review of Systems   Constitutional: Negative for activity change, appetite change, chills, diaphoresis, fatigue, fever and unexpected weight change.   HENT: Negative for congestion, dental problem, drooling, ear discharge, ear pain, facial swelling, hearing loss, mouth sores, nosebleeds, postnasal drip, rhinorrhea, sinus pressure, sneezing, sore throat, tinnitus, trouble swallowing and voice change.    Eyes: Negative for photophobia, pain, discharge, redness, itching and visual disturbance.   Respiratory: Negative for apnea, cough, choking, chest tightness, shortness of breath, wheezing and stridor.    Cardiovascular: Negative for chest pain, palpitations and leg swelling.   Gastrointestinal: Negative for abdominal distention,  "abdominal pain, anal bleeding, blood in stool, constipation, diarrhea, nausea, rectal pain and vomiting.   Endocrine: Negative for cold intolerance, heat intolerance, polydipsia, polyphagia and polyuria.   Genitourinary: Negative for decreased urine volume, difficulty urinating, dysuria, enuresis, flank pain, frequency, genital sores, hematuria and urgency.   Musculoskeletal: Positive for arthralgias. Negative for back pain, gait problem, joint swelling, myalgias, neck pain and neck stiffness.   Skin: Negative for color change, pallor, rash and wound.   Allergic/Immunologic: Negative for environmental allergies, food allergies and immunocompromised state.   Neurological: Negative for dizziness, tremors, seizures, syncope, facial asymmetry, speech difficulty, weakness, light-headedness, numbness and headaches.   Hematological: Negative for adenopathy. Does not bruise/bleed easily.   Psychiatric/Behavioral: Negative for agitation, behavioral problems, confusion, decreased concentration, dysphoric mood, hallucinations, self-injury, sleep disturbance and suicidal ideas. The patient is not nervous/anxious and is not hyperactive.         Objective      Physical Exam  Pulse 98   Ht 185 cm (72.84\")   Wt 114 kg (252 lb 3.3 oz)   SpO2 99%   BMI 33.43 kg/m²     Body mass index is 33.43 kg/m².    General:   Mental Status:  Alert   Appearance: Cooperative, in no acute distress   Build and Nutrition: Well-nourished and well developed male   Orientation: Alert and oriented to person, place and time   Posture: Normal   Gait: Normal    Integument:              Right knee: Old well-healed wound              Left knee: no skin lesions, no rash, no ecchymosis     Lower Extremities:              Right Knee:                          Tenderness:    Mild medial and lateral joint line tenderness                          Effusion:          2+                          Swelling:          None                          Crepitus:          " Positive                          Atrophy:           None                          Range of motion:        Extension:       0°                                                              Flexion:           120°  Instability:        No varus laxity, no valgus laxity, negative anterior drawer  Deformities:     Varus              Left Knee:                          Tenderness:     none                          Effusion:           1+                          Swelling:          None                          Crepitus:          Positive                          Atrophy:           None                          Range of motion:        Extension:       0°                                                              Flexion:           120°  Instability:        No varus laxity, no valgus laxity, negative anterior drawer  Deformities:     None      Assessment and Plan     Nicholas was seen today for follow-up.    Diagnoses and all orders for this visit:    Primary osteoarthritis of right knee  -     Large Joint Arthrocentesis: R knee    Primary osteoarthritis of left knee  -     Large Joint Arthrocentesis: L knee        I reviewed my findings with patient today.  He is interested in aspirations and injections today.  These were provided, and I will see him back in 4 months.  I will see him back sooner for any problems.  He is not yet interested in surgical intervention.    Of note, he had 80% relief just a few minutes following the aspiration and injections.  I did obtained 40 cc off of the right knee, and 23 cc of the left knee, with clear straw-colored fluid on both knees.    Return in about 4 months (around 5/21/2019).      Medical Decision Making  Management Options : prescription/IM medicine      Gage Britt MD  01/21/19  10:08 AM

## 2019-01-21 NOTE — PROGRESS NOTES
Procedure   Large Joint Arthrocentesis: R knee  Date/Time: 1/21/2019 9:33 AM  Consent given by: patient  Site marked: site marked  Timeout: Immediately prior to procedure a time out was called to verify the correct patient, procedure, equipment, support staff and site/side marked as required   Supporting Documentation  Indications: pain   Procedure Details  Location: knee - R knee  Preparation: Patient was prepped and draped in the usual sterile fashion  Needle size: 18 G  Approach: anterolateral  Medications administered: 4 mL ropivacaine 0.5 %; 40 mg triamcinolone acetonide 40 MG/ML  Aspirate amount: 23 mL  Aspirate: yellow and serous  Patient tolerance: patient tolerated the procedure well with no immediate complications    Large Joint Arthrocentesis: L knee  Date/Time: 1/21/2019 9:33 AM  Consent given by: patient  Site marked: site marked  Timeout: Immediately prior to procedure a time out was called to verify the correct patient, procedure, equipment, support staff and site/side marked as required   Supporting Documentation  Indications: pain   Procedure Details  Location: knee - L knee  Preparation: Patient was prepped and draped in the usual sterile fashion  Needle size: 18 G  Approach: anterolateral  Medications administered: 4 mL ropivacaine 0.5 %; 40 mg triamcinolone acetonide 40 MG/ML  Aspirate amount: 40 mL  Aspirate: yellow and serous  Patient tolerance: patient tolerated the procedure well with no immediate complications

## 2019-03-05 ENCOUNTER — TRANSCRIBE ORDERS (OUTPATIENT)
Dept: ADMINISTRATIVE | Facility: HOSPITAL | Age: 54
End: 2019-03-05

## 2019-03-05 DIAGNOSIS — I80.02 THROMBOPHLEBITIS OF LEG, LEFT, SUPERFICIAL: Primary | ICD-10-CM

## 2019-03-08 ENCOUNTER — HOSPITAL ENCOUNTER (OUTPATIENT)
Dept: CARDIOLOGY | Facility: HOSPITAL | Age: 54
Discharge: HOME | End: 2019-03-08
Admitting: INTERNAL MEDICINE

## 2019-03-08 DIAGNOSIS — I80.02 THROMBOPHLEBITIS OF LEG, LEFT, SUPERFICIAL: ICD-10-CM

## 2019-03-08 LAB
BH CV ECHO MEAS - BSA(HAYCOCK): 2.4 M^2
BH CV ECHO MEAS - BSA: 2.4 M^2
BH CV ECHO MEAS - BZI_BMI: 34.2 KILOGRAMS/M^2
BH CV ECHO MEAS - BZI_METRIC_HEIGHT: 182.9 CM
BH CV ECHO MEAS - BZI_METRIC_WEIGHT: 114.3 KG
BH CV LOWER VASCULAR LEFT COMMON FEMORAL AUGMENT: NORMAL
BH CV LOWER VASCULAR LEFT COMMON FEMORAL COMPRESS: NORMAL
BH CV LOWER VASCULAR LEFT COMMON FEMORAL PHASIC: NORMAL
BH CV LOWER VASCULAR LEFT COMMON FEMORAL SPONT: NORMAL
BH CV LOWER VASCULAR LEFT DISTAL FEMORAL COMPRESS: NORMAL
BH CV LOWER VASCULAR LEFT GASTRONEMIUS COMPRESS: NORMAL
BH CV LOWER VASCULAR LEFT GREATER SAPH AK COMPRESS: NORMAL
BH CV LOWER VASCULAR LEFT GREATER SAPH BK COMPRESS: NORMAL
BH CV LOWER VASCULAR LEFT LESSER SAPH COMPRESS: NORMAL
BH CV LOWER VASCULAR LEFT MID FEMORAL AUGMENT: NORMAL
BH CV LOWER VASCULAR LEFT MID FEMORAL COMPRESS: NORMAL
BH CV LOWER VASCULAR LEFT MID FEMORAL PHASIC: NORMAL
BH CV LOWER VASCULAR LEFT MID FEMORAL SPONT: NORMAL
BH CV LOWER VASCULAR LEFT PERONEAL COMPRESS: NORMAL
BH CV LOWER VASCULAR LEFT POPLITEAL AUGMENT: NORMAL
BH CV LOWER VASCULAR LEFT POPLITEAL COMPETENT: NORMAL
BH CV LOWER VASCULAR LEFT POPLITEAL COMPRESS: NORMAL
BH CV LOWER VASCULAR LEFT POPLITEAL PHASIC: NORMAL
BH CV LOWER VASCULAR LEFT POPLITEAL SPONT: NORMAL
BH CV LOWER VASCULAR LEFT POSTERIOR TIBIAL COMPRESS: NORMAL
BH CV LOWER VASCULAR LEFT PROFUNDA FEMORAL AUGMENT: NORMAL
BH CV LOWER VASCULAR LEFT PROFUNDA FEMORAL COMPRESS: NORMAL
BH CV LOWER VASCULAR LEFT PROFUNDA FEMORAL PHASIC: NORMAL
BH CV LOWER VASCULAR LEFT PROFUNDA FEMORAL SPONT: NORMAL
BH CV LOWER VASCULAR LEFT PROXIMAL FEMORAL COMPRESS: NORMAL
BH CV LOWER VASCULAR LEFT SAPHENOFEMORAL JUNCTION AUGMENT: NORMAL
BH CV LOWER VASCULAR LEFT SAPHENOFEMORAL JUNCTION COMPRESS: NORMAL
BH CV LOWER VASCULAR LEFT SAPHENOFEMORAL JUNCTION PHASIC: NORMAL
BH CV LOWER VASCULAR LEFT SAPHENOFEMORAL JUNCTION SPONT: NORMAL
BH CV LOWER VASCULAR RIGHT COMMON FEMORAL AUGMENT: NORMAL
BH CV LOWER VASCULAR RIGHT COMMON FEMORAL COMPRESS: NORMAL
BH CV LOWER VASCULAR RIGHT COMMON FEMORAL PHASIC: NORMAL
BH CV LOWER VASCULAR RIGHT COMMON FEMORAL SPONT: NORMAL
BH CV POP FLUID COLLECT LEFT: 1

## 2019-03-08 PROCEDURE — 93971 EXTREMITY STUDY: CPT

## 2019-03-08 PROCEDURE — 93971 EXTREMITY STUDY: CPT | Performed by: INTERNAL MEDICINE

## 2019-03-11 ENCOUNTER — TRANSCRIBE ORDERS (OUTPATIENT)
Dept: ADMINISTRATIVE | Facility: HOSPITAL | Age: 54
End: 2019-03-11

## 2019-03-11 ENCOUNTER — HOSPITAL ENCOUNTER (OUTPATIENT)
Dept: GENERAL RADIOLOGY | Facility: HOSPITAL | Age: 54
Discharge: HOME | End: 2019-03-11
Admitting: INTERNAL MEDICINE

## 2019-03-11 DIAGNOSIS — J11.1 FLU: ICD-10-CM

## 2019-03-11 DIAGNOSIS — J40 BRONCHITIS: Primary | ICD-10-CM

## 2019-03-11 PROCEDURE — 71046 X-RAY EXAM CHEST 2 VIEWS: CPT

## 2019-05-29 ENCOUNTER — OFFICE VISIT (OUTPATIENT)
Dept: ORTHOPEDIC SURGERY | Facility: CLINIC | Age: 54
End: 2019-05-29

## 2019-05-29 VITALS — OXYGEN SATURATION: 98 % | HEART RATE: 82 BPM | WEIGHT: 258.38 LBS | BODY MASS INDEX: 34.24 KG/M2 | HEIGHT: 73 IN

## 2019-05-29 DIAGNOSIS — M17.12 PRIMARY OSTEOARTHRITIS OF LEFT KNEE: ICD-10-CM

## 2019-05-29 DIAGNOSIS — M17.11 PRIMARY OSTEOARTHRITIS OF RIGHT KNEE: Primary | ICD-10-CM

## 2019-05-29 PROCEDURE — 20610 DRAIN/INJ JOINT/BURSA W/O US: CPT | Performed by: ORTHOPAEDIC SURGERY

## 2019-05-29 RX ORDER — TRIAMCINOLONE ACETONIDE 40 MG/ML
40 INJECTION, SUSPENSION INTRA-ARTICULAR; INTRAMUSCULAR
Status: COMPLETED | OUTPATIENT
Start: 2019-05-29 | End: 2019-05-29

## 2019-05-29 RX ORDER — ROPIVACAINE HYDROCHLORIDE 5 MG/ML
4 INJECTION, SOLUTION EPIDURAL; INFILTRATION; PERINEURAL
Status: COMPLETED | OUTPATIENT
Start: 2019-05-29 | End: 2019-05-29

## 2019-05-29 RX ADMIN — ROPIVACAINE HYDROCHLORIDE 4 ML: 5 INJECTION, SOLUTION EPIDURAL; INFILTRATION; PERINEURAL at 09:59

## 2019-05-29 RX ADMIN — TRIAMCINOLONE ACETONIDE 40 MG: 40 INJECTION, SUSPENSION INTRA-ARTICULAR; INTRAMUSCULAR at 09:59

## 2019-05-29 NOTE — PROGRESS NOTES
"    [facility] [place] [facility] Note    Subjective     Chief Complaint   Patient presents with   • Follow-up     4 MONTH FOLLOW UP, BILAT KNEE. LAST INJECTION AND ASPIRATION GIVEN ON 1/21/19        HPI    Nicholas Renee is a 54 y.o. male.  He follows up today for both of his knees.  He is not yet interested in surgical intervention, and prefers aspiration and injections, which have provided relief.  He has been dealing with pain in both knees for 5 years.  He has a known history of severely advanced arthritis.      There is no problem list on file for this patient.    Past Medical History:   Diagnosis Date   • Esophagitis    • Hypertension    • Osteoarthritis of both knees       Past Surgical History:   Procedure Laterality Date   • ABDOMINAL SURGERY     • COLONOSCOPY N/A 7/11/2017    Procedure: COLONOSCOPY;  Surgeon: Magan Blum MD;  Location: [facility] ENDOSCOPY;  Service:    • FOOT SURGERY     • KNEE SURGERY Bilateral     x 5      Family History   Problem Relation Age of Onset   • Diabetes Father    • Heart disease Father    • Hypertension Father    • Heart attack Father    • Rheum arthritis Other    • No Known Problems Mother      Social History     Socioeconomic History   • Marital status:      Spouse name: Not on file   • Number of children: Not on file   • Years of education: Not on file   • Highest education level: Not on file   Tobacco Use   • Smoking status: Former Smoker     Types: Cigarettes     Last attempt to quit: 7/11/2003     Years since quitting: 15.8   • Smokeless tobacco: Current User     Types: Snuff   Substance and Sexual Activity   • Alcohol use: Yes     Comment: socially, \"rarely\"   • Drug use: Defer   • Sexual activity: Defer      Current Outpatient Medications on File Prior to Visit   Medication Sig Dispense Refill   • acetaminophen (TYLENOL) 500 MG tablet Take 1,000 mg by mouth 2 (Two) Times a Day.     • cetirizine (zyrTEC) 10 MG tablet Take 10 mg by mouth Daily.     • doxycycline " "(VIBRAMYCIN) 100 MG capsule Take 100 mg by mouth 2 (Two) Times a Day.  0   • gemfibrozil (LOPID) 600 MG tablet Take 600 mg by mouth 2 (Two) Times a Day Before Meals.     • HYDROMET 5-1.5 MG/5ML syrup take 1 to 2 teaspoonfuls by mouth at bedtime  0   • losartan (COZAAR) 100 MG tablet Take 100 mg by mouth Daily.  0   • meloxicam (MOBIC) 15 MG tablet 1 PO Daily with food as needed 90 tablet 0   • Multiple Vitamins-Minerals (MULTIVITAMIN ADULT PO) Take 1 tablet by mouth Daily.     • pantoprazole (PROTONIX) 40 MG EC tablet Take 40 mg by mouth Daily.     • Probiotic Product (ALIGN PO) Take 1 tablet by mouth Daily.     • Probiotic Product (ALIGN) capsule Take 4 capsules by mouth.  1     No current facility-administered medications on file prior to visit.       Allergies   Allergen Reactions   • Sulfa Antibiotics Hives and Rash        Review of Systems   Constitutional: Positive for activity change.   HENT: Negative.    Eyes: Negative.    Respiratory: Negative.    Cardiovascular: Negative.    Gastrointestinal: Positive for abdominal pain.   Endocrine: Negative.    Genitourinary: Negative.    Musculoskeletal: Positive for joint swelling.   Skin: Negative.    Allergic/Immunologic: Positive for environmental allergies.   Neurological: Negative.    Hematological: Negative.    Psychiatric/Behavioral: Negative.         Objective      Physical Exam  Pulse 82   Ht 185 cm (72.84\")   Wt 117 kg (258 lb 6.1 oz)   SpO2 98%   BMI 34.24 kg/m²     Body mass index is 34.24 kg/m².    General:   Mental Status:  Alert   Appearance: Cooperative, in no acute distress   Build and Nutrition: Overweight male   Orientation: Alert and oriented to person, place and time   Posture: Normal   Gait: Normal    Integument:              Right knee: Old well-healed wound              Left knee: no skin lesions, no rash, no ecchymosis     Lower Extremities:              Right " Knee:                          Tenderness:    None                          Effusion:          2+                          Swelling:          None                          Crepitus:          Positive                          Atrophy:           None                          Range of motion:        Extension:       0°                                                              Flexion:           120°  Instability:        No varus laxity, no valgus laxity, negative anterior drawer  Deformities:     Varus              Left Knee:                          Tenderness:     None                          Effusion:           1+                          Swelling:          None                          Crepitus:          Positive                          Atrophy:           None                          Range of motion:        Extension:       0°                                                              Flexion:           120°  Instability:        No varus laxity, no valgus laxity, negative anterior drawer  Deformities:     None      Assessment and Plan     Nicholas was seen today for follow-up.    Diagnoses and all orders for this visit:    Primary osteoarthritis of right knee  -     Large Joint Arthrocentesis: R knee    Primary osteoarthritis of left knee  -     Large Joint Arthrocentesis: L knee        1. Primary osteoarthritis of right knee    2. Primary osteoarthritis of left knee        I reviewed my findings with the patient today.  He is not interested in surgical intervention, and would prefer aspiration and injections today.  Please see my procedure notes for details.  I will see him back in 4 months, but sooner for any problems.  Of note, he did have 80% relief of his symptoms just a few minutes following the aspiration and injections.    Return in about 4 months (around 9/29/2019).      Medical Decision Making  Management Options : prescription/IM medicine      Gage Britt MD  05/29/19  1:00 PM

## 2019-05-29 NOTE — PROGRESS NOTES
Procedure   Large Joint Arthrocentesis: R knee  Date/Time: 5/29/2019 9:59 AM  Consent given by: patient  Site marked: site marked  Timeout: Immediately prior to procedure a time out was called to verify the correct patient, procedure, equipment, support staff and site/side marked as required   Supporting Documentation  Indications: pain   Procedure Details  Location: knee - R knee  Preparation: Patient was prepped and draped in the usual sterile fashion  Needle size: 22 G  Medications administered: 4 mL ropivacaine 0.5 %; 40 mg triamcinolone acetonide 40 MG/ML  Aspirate amount: 55 mL  Aspirate: clear and yellow  Patient tolerance: patient tolerated the procedure well with no immediate complications    Large Joint Arthrocentesis: L knee  Date/Time: 5/29/2019 9:59 AM  Consent given by: patient  Site marked: site marked  Timeout: Immediately prior to procedure a time out was called to verify the correct patient, procedure, equipment, support staff and site/side marked as required   Supporting Documentation  Indications: pain   Procedure Details  Location: knee - L knee  Preparation: Patient was prepped and draped in the usual sterile fashion  Needle size: 22 G  Medications administered: 4 mL ropivacaine 0.5 %; 40 mg triamcinolone acetonide 40 MG/ML  Aspirate amount: 25 mL  Aspirate: clear and yellow  Patient tolerance: patient tolerated the procedure well with no immediate complications

## 2019-06-03 ENCOUNTER — TRANSCRIBE ORDERS (OUTPATIENT)
Dept: ADMINISTRATIVE | Facility: HOSPITAL | Age: 54
End: 2019-06-03

## 2019-06-03 DIAGNOSIS — R10.32 ABDOMINAL PAIN, LEFT LOWER QUADRANT: Primary | ICD-10-CM

## 2019-06-04 ENCOUNTER — HOSPITAL ENCOUNTER (OUTPATIENT)
Dept: CT IMAGING | Facility: HOSPITAL | Age: 54
Discharge: HOME | End: 2019-06-04
Admitting: INTERNAL MEDICINE

## 2019-06-04 DIAGNOSIS — R10.32 ABDOMINAL PAIN, LEFT LOWER QUADRANT: ICD-10-CM

## 2019-06-04 LAB — CREAT BLDA-MCNC: 1 MG/DL (ref 0.6–1.3)

## 2019-06-04 PROCEDURE — 74177 CT ABD & PELVIS W/CONTRAST: CPT

## 2019-06-04 PROCEDURE — 82565 ASSAY OF CREATININE: CPT

## 2019-06-04 PROCEDURE — 25010000002 IOPAMIDOL 61 % SOLUTION: Performed by: INTERNAL MEDICINE

## 2019-06-04 RX ADMIN — IOPAMIDOL 100 ML: 612 INJECTION, SOLUTION INTRAVENOUS at 12:43

## 2019-06-13 ENCOUNTER — TRANSCRIBE ORDERS (OUTPATIENT)
Dept: ADMINISTRATIVE | Facility: HOSPITAL | Age: 54
End: 2019-06-13

## 2019-06-13 ENCOUNTER — TRANSCRIBE ORDERS (OUTPATIENT)
Dept: LAB | Facility: HOSPITAL | Age: 54
End: 2019-06-13

## 2019-06-13 ENCOUNTER — LAB (OUTPATIENT)
Dept: LAB | Facility: HOSPITAL | Age: 54
End: 2019-06-13

## 2019-06-13 DIAGNOSIS — K57.32 DIVERTICULITIS OF LARGE INTESTINE, UNSPECIFIED BLEEDING STATUS, UNSPECIFIED COMPLICATION STATUS: ICD-10-CM

## 2019-06-13 DIAGNOSIS — K57.20 PERFORATED DIVERTICULUM OF LARGE INTESTINE: Primary | ICD-10-CM

## 2019-06-13 DIAGNOSIS — K57.20 PERFORATED DIVERTICULUM OF LARGE INTESTINE: ICD-10-CM

## 2019-06-13 DIAGNOSIS — Z87.19 PERSONAL HISTORY OF DIGESTIVE DISEASE: ICD-10-CM

## 2019-06-13 LAB
ALBUMIN SERPL-MCNC: 4.4 G/DL (ref 3.5–5.2)
ALBUMIN/GLOB SERPL: 1.5 G/DL
ALP SERPL-CCNC: 81 U/L (ref 39–117)
ALT SERPL W P-5'-P-CCNC: 15 U/L (ref 1–41)
ANION GAP SERPL CALCULATED.3IONS-SCNC: 12 MMOL/L
AST SERPL-CCNC: 15 U/L (ref 1–40)
BILIRUB SERPL-MCNC: 0.3 MG/DL (ref 0.2–1.2)
BUN BLD-MCNC: 20 MG/DL (ref 6–20)
BUN/CREAT SERPL: 24.1 (ref 7–25)
CALCIUM SPEC-SCNC: 9.4 MG/DL (ref 8.6–10.5)
CHLORIDE SERPL-SCNC: 102 MMOL/L (ref 98–107)
CO2 SERPL-SCNC: 28 MMOL/L (ref 22–29)
CREAT BLD-MCNC: 0.83 MG/DL (ref 0.76–1.27)
DEPRECATED RDW RBC AUTO: 43.8 FL (ref 37–54)
ERYTHROCYTE [DISTWIDTH] IN BLOOD BY AUTOMATED COUNT: 12.5 % (ref 12.3–15.4)
GFR SERPL CREATININE-BSD FRML MDRD: 96 ML/MIN/1.73
GLOBULIN UR ELPH-MCNC: 2.9 GM/DL
GLUCOSE BLD-MCNC: 84 MG/DL (ref 65–99)
HCT VFR BLD AUTO: 39.3 % (ref 37.5–51)
HGB BLD-MCNC: 13.2 G/DL (ref 13–17.7)
MCH RBC QN AUTO: 32.4 PG (ref 26.6–33)
MCHC RBC AUTO-ENTMCNC: 33.6 G/DL (ref 31.5–35.7)
MCV RBC AUTO: 96.6 FL (ref 79–97)
PLATELET # BLD AUTO: 275 10*3/MM3 (ref 140–450)
PMV BLD AUTO: 9.1 FL (ref 6–12)
POTASSIUM BLD-SCNC: 4.1 MMOL/L (ref 3.5–5.2)
PROT SERPL-MCNC: 7.3 G/DL (ref 6–8.5)
RBC # BLD AUTO: 4.07 10*6/MM3 (ref 4.14–5.8)
SODIUM BLD-SCNC: 142 MMOL/L (ref 136–145)
WBC NRBC COR # BLD: 6.63 10*3/MM3 (ref 3.4–10.8)

## 2019-06-13 PROCEDURE — 80053 COMPREHEN METABOLIC PANEL: CPT

## 2019-06-13 PROCEDURE — 85027 COMPLETE CBC AUTOMATED: CPT

## 2019-06-13 PROCEDURE — 36415 COLL VENOUS BLD VENIPUNCTURE: CPT

## 2019-06-14 ENCOUNTER — HOSPITAL ENCOUNTER (OUTPATIENT)
Dept: INFUSION THERAPY | Facility: HOSPITAL | Age: 54
Discharge: HOME | End: 2019-06-14
Admitting: INTERNAL MEDICINE

## 2019-06-14 VITALS
HEIGHT: 72 IN | OXYGEN SATURATION: 98 % | SYSTOLIC BLOOD PRESSURE: 137 MMHG | WEIGHT: 260 LBS | RESPIRATION RATE: 18 BRPM | HEART RATE: 78 BPM | BODY MASS INDEX: 35.21 KG/M2 | TEMPERATURE: 97.8 F | DIASTOLIC BLOOD PRESSURE: 93 MMHG

## 2019-06-14 DIAGNOSIS — K57.20 PERFORATED DIVERTICULUM OF LARGE INTESTINE: ICD-10-CM

## 2019-06-14 PROCEDURE — C1894 INTRO/SHEATH, NON-LASER: HCPCS

## 2019-06-14 PROCEDURE — C1751 CATH, INF, PER/CENT/MIDLINE: HCPCS

## 2019-06-14 RX ORDER — SODIUM CHLORIDE 0.9 % (FLUSH) 0.9 %
10 SYRINGE (ML) INJECTION AS NEEDED
Status: DISCONTINUED | OUTPATIENT
Start: 2019-06-14 | End: 2019-06-16 | Stop reason: HOSPADM

## 2019-06-14 RX ORDER — SODIUM CHLORIDE 0.9 % (FLUSH) 0.9 %
10 SYRINGE (ML) INJECTION EVERY 12 HOURS SCHEDULED
Status: DISCONTINUED | OUTPATIENT
Start: 2019-06-14 | End: 2019-06-16 | Stop reason: HOSPADM

## 2019-06-14 NOTE — PROGRESS NOTES
PICC placed without difficulty. Patient tolerated well. VSS. To ID office for teaching. RUE dressing dry and intact. MED history reviewed

## 2019-06-14 NOTE — ADDENDUM NOTE
Encounter addended by: Lexi Fernandez RN on: 6/14/2019 4:18 PM   Actions taken: Flowsheet accepted, Sign clinical note, Medication List reviewed

## 2019-06-19 ENCOUNTER — TRANSCRIBE ORDERS (OUTPATIENT)
Dept: ADMINISTRATIVE | Facility: HOSPITAL | Age: 54
End: 2019-06-19

## 2019-06-19 DIAGNOSIS — K57.20 PERFORATED DIVERTICULUM OF LARGE INTESTINE: Primary | ICD-10-CM

## 2019-06-24 ENCOUNTER — HOSPITAL ENCOUNTER (OUTPATIENT)
Dept: CT IMAGING | Facility: HOSPITAL | Age: 54
Discharge: HOME | End: 2019-06-24
Admitting: INTERNAL MEDICINE

## 2019-06-24 DIAGNOSIS — K57.20 PERFORATED DIVERTICULUM OF LARGE INTESTINE: ICD-10-CM

## 2019-06-24 PROCEDURE — 74176 CT ABD & PELVIS W/O CONTRAST: CPT

## 2019-06-24 PROCEDURE — 0 DIATRIZOATE MEGLUMINE & SODIUM PER 1 ML: Performed by: INTERNAL MEDICINE

## 2019-06-24 RX ADMIN — DIATRIZOATE MEGLUMINE AND DIATRIZOATE SODIUM 15 ML: 660; 100 LIQUID ORAL; RECTAL at 13:00

## 2019-06-26 ENCOUNTER — TRANSCRIBE ORDERS (OUTPATIENT)
Dept: LAB | Facility: HOSPITAL | Age: 54
End: 2019-06-26

## 2019-06-26 ENCOUNTER — LAB (OUTPATIENT)
Dept: LAB | Facility: HOSPITAL | Age: 54
End: 2019-06-26

## 2019-06-26 DIAGNOSIS — K57.32 DIVERTICULITIS OF LARGE INTESTINE, UNSPECIFIED BLEEDING STATUS, UNSPECIFIED COMPLICATION STATUS: ICD-10-CM

## 2019-06-26 DIAGNOSIS — Z87.19 PERSONAL HISTORY OF DIGESTIVE DISEASE: ICD-10-CM

## 2019-06-26 DIAGNOSIS — K57.20 PERFORATED DIVERTICULUM OF LARGE INTESTINE: ICD-10-CM

## 2019-06-26 DIAGNOSIS — K57.20 PERFORATED DIVERTICULUM OF LARGE INTESTINE: Primary | ICD-10-CM

## 2019-06-26 LAB
ALBUMIN SERPL-MCNC: 3.9 G/DL (ref 3.5–5.2)
ALBUMIN/GLOB SERPL: 1.3 G/DL
ALP SERPL-CCNC: 109 U/L (ref 39–117)
ALT SERPL W P-5'-P-CCNC: 14 U/L (ref 1–41)
ANION GAP SERPL CALCULATED.3IONS-SCNC: 9 MMOL/L (ref 5–15)
AST SERPL-CCNC: 19 U/L (ref 1–40)
BASOPHILS # BLD AUTO: 0.03 10*3/MM3 (ref 0–0.2)
BASOPHILS NFR BLD AUTO: 0.4 % (ref 0–1.5)
BILIRUB SERPL-MCNC: 0.6 MG/DL (ref 0.2–1.2)
BUN BLD-MCNC: 27 MG/DL (ref 6–20)
BUN/CREAT SERPL: 31.8 (ref 7–25)
CALCIUM SPEC-SCNC: 9.2 MG/DL (ref 8.6–10.5)
CHLORIDE SERPL-SCNC: 102 MMOL/L (ref 98–107)
CO2 SERPL-SCNC: 30 MMOL/L (ref 22–29)
CREAT BLD-MCNC: 0.85 MG/DL (ref 0.76–1.27)
CRP SERPL-MCNC: 0.2 MG/DL (ref 0–0.5)
DEPRECATED RDW RBC AUTO: 45.5 FL (ref 37–54)
EOSINOPHIL # BLD AUTO: 0.27 10*3/MM3 (ref 0–0.4)
EOSINOPHIL NFR BLD AUTO: 3.8 % (ref 0.3–6.2)
ERYTHROCYTE [DISTWIDTH] IN BLOOD BY AUTOMATED COUNT: 12.7 % (ref 12.3–15.4)
ERYTHROCYTE [SEDIMENTATION RATE] IN BLOOD: 8 MM/HR (ref 0–20)
GFR SERPL CREATININE-BSD FRML MDRD: 93 ML/MIN/1.73
GLOBULIN UR ELPH-MCNC: 3.1 GM/DL
GLUCOSE BLD-MCNC: 104 MG/DL (ref 65–99)
HCT VFR BLD AUTO: 43.4 % (ref 37.5–51)
HGB BLD-MCNC: 14.3 G/DL (ref 13–17.7)
IMM GRANULOCYTES # BLD AUTO: 0.02 10*3/MM3 (ref 0–0.05)
IMM GRANULOCYTES NFR BLD AUTO: 0.3 % (ref 0–0.5)
LYMPHOCYTES # BLD AUTO: 2.43 10*3/MM3 (ref 0.7–3.1)
LYMPHOCYTES NFR BLD AUTO: 34.4 % (ref 19.6–45.3)
MCH RBC QN AUTO: 32.2 PG (ref 26.6–33)
MCHC RBC AUTO-ENTMCNC: 32.9 G/DL (ref 31.5–35.7)
MCV RBC AUTO: 97.7 FL (ref 79–97)
MONOCYTES # BLD AUTO: 0.67 10*3/MM3 (ref 0.1–0.9)
MONOCYTES NFR BLD AUTO: 9.5 % (ref 5–12)
NEUTROPHILS # BLD AUTO: 3.64 10*3/MM3 (ref 1.7–7)
NEUTROPHILS NFR BLD AUTO: 51.6 % (ref 42.7–76)
NRBC BLD AUTO-RTO: 0 /100 WBC (ref 0–0.2)
PLATELET # BLD AUTO: 272 10*3/MM3 (ref 140–450)
PMV BLD AUTO: 9.1 FL (ref 6–12)
POTASSIUM BLD-SCNC: 4.6 MMOL/L (ref 3.5–5.2)
PROT SERPL-MCNC: 7 G/DL (ref 6–8.5)
RBC # BLD AUTO: 4.44 10*6/MM3 (ref 4.14–5.8)
SODIUM BLD-SCNC: 141 MMOL/L (ref 136–145)
WBC NRBC COR # BLD: 7.06 10*3/MM3 (ref 3.4–10.8)

## 2019-06-26 PROCEDURE — 85652 RBC SED RATE AUTOMATED: CPT

## 2019-06-26 PROCEDURE — 85025 COMPLETE CBC W/AUTO DIFF WBC: CPT

## 2019-06-26 PROCEDURE — 80053 COMPREHEN METABOLIC PANEL: CPT

## 2019-06-26 PROCEDURE — 86140 C-REACTIVE PROTEIN: CPT

## 2019-06-26 PROCEDURE — 36415 COLL VENOUS BLD VENIPUNCTURE: CPT

## 2019-06-28 ENCOUNTER — APPOINTMENT (OUTPATIENT)
Dept: PREADMISSION TESTING | Facility: HOSPITAL | Age: 54
End: 2019-06-28

## 2019-06-28 VITALS — WEIGHT: 249.56 LBS | BODY MASS INDEX: 33.08 KG/M2 | HEIGHT: 73 IN

## 2019-06-28 PROCEDURE — 93005 ELECTROCARDIOGRAM TRACING: CPT

## 2019-06-28 PROCEDURE — 93010 ELECTROCARDIOGRAM REPORT: CPT | Performed by: INTERNAL MEDICINE

## 2019-07-01 ENCOUNTER — ANESTHESIA EVENT (OUTPATIENT)
Dept: PERIOP | Facility: HOSPITAL | Age: 54
End: 2019-07-01

## 2019-07-01 RX ORDER — FAMOTIDINE 10 MG/ML
20 INJECTION, SOLUTION INTRAVENOUS ONCE
Status: CANCELLED | OUTPATIENT
Start: 2019-07-01 | End: 2019-07-01

## 2019-07-02 ENCOUNTER — ANESTHESIA (OUTPATIENT)
Dept: PERIOP | Facility: HOSPITAL | Age: 54
End: 2019-07-02

## 2019-07-02 ENCOUNTER — HOSPITAL ENCOUNTER (INPATIENT)
Facility: HOSPITAL | Age: 54
LOS: 2 days | Discharge: HOME | End: 2019-07-04
Attending: COLON & RECTAL SURGERY | Admitting: COLON & RECTAL SURGERY

## 2019-07-02 DIAGNOSIS — K57.92 DIVERTICULITIS: ICD-10-CM

## 2019-07-02 DIAGNOSIS — Z74.09 IMPAIRED FUNCTIONAL MOBILITY, BALANCE, GAIT, AND ENDURANCE: Primary | ICD-10-CM

## 2019-07-02 PROBLEM — I10 HTN (HYPERTENSION): Status: ACTIVE | Noted: 2019-07-02

## 2019-07-02 PROBLEM — Z90.49 S/P COLON RESECTION: Status: ACTIVE | Noted: 2019-07-02

## 2019-07-02 PROBLEM — R73.03 PREDIABETES: Status: ACTIVE | Noted: 2019-07-02

## 2019-07-02 LAB
ABO GROUP BLD: NORMAL
ABO GROUP BLD: NORMAL
BLD GP AB SCN SERPL QL: NEGATIVE
GLUCOSE BLDC GLUCOMTR-MCNC: 166 MG/DL (ref 70–130)
GLUCOSE BLDC GLUCOMTR-MCNC: 175 MG/DL (ref 70–130)
HBA1C MFR BLD: 5.8 % (ref 4.8–5.6)
POTASSIUM BLD-SCNC: 4.3 MMOL/L (ref 3.5–5.2)
RH BLD: POSITIVE
RH BLD: POSITIVE
T&S EXPIRATION DATE: NORMAL

## 2019-07-02 PROCEDURE — 82962 GLUCOSE BLOOD TEST: CPT

## 2019-07-02 PROCEDURE — 86900 BLOOD TYPING SEROLOGIC ABO: CPT

## 2019-07-02 PROCEDURE — 25010000002 KETOROLAC TROMETHAMINE PER 15 MG: Performed by: COLON & RECTAL SURGERY

## 2019-07-02 PROCEDURE — 25010000002 PROPOFOL 1000 MG/ML EMULSION: Performed by: NURSE ANESTHETIST, CERTIFIED REGISTERED

## 2019-07-02 PROCEDURE — 25010000002 NEOSTIGMINE 10 MG/10ML SOLUTION: Performed by: NURSE ANESTHETIST, CERTIFIED REGISTERED

## 2019-07-02 PROCEDURE — 83036 HEMOGLOBIN GLYCOSYLATED A1C: CPT | Performed by: COLON & RECTAL SURGERY

## 2019-07-02 PROCEDURE — 0DTN0ZZ RESECTION OF SIGMOID COLON, OPEN APPROACH: ICD-10-PCS | Performed by: COLON & RECTAL SURGERY

## 2019-07-02 PROCEDURE — 25010000002 DEXAMETHASONE SODIUM PHOSPHATE 10 MG/ML SOLUTION: Performed by: ANESTHESIOLOGY

## 2019-07-02 PROCEDURE — 25010000002 ONDANSETRON PER 1 MG: Performed by: COLON & RECTAL SURGERY

## 2019-07-02 PROCEDURE — 86850 RBC ANTIBODY SCREEN: CPT | Performed by: COLON & RECTAL SURGERY

## 2019-07-02 PROCEDURE — 94799 UNLISTED PULMONARY SVC/PX: CPT

## 2019-07-02 PROCEDURE — 63710000001 INSULIN LISPRO (HUMAN) PER 5 UNITS: Performed by: NURSE PRACTITIONER

## 2019-07-02 PROCEDURE — 25010000002 BUPRENORPHINE PER 0.1 MG: Performed by: ANESTHESIOLOGY

## 2019-07-02 PROCEDURE — 88307 TISSUE EXAM BY PATHOLOGIST: CPT | Performed by: COLON & RECTAL SURGERY

## 2019-07-02 PROCEDURE — 84132 ASSAY OF SERUM POTASSIUM: CPT | Performed by: ANESTHESIOLOGY

## 2019-07-02 PROCEDURE — 86900 BLOOD TYPING SEROLOGIC ABO: CPT | Performed by: COLON & RECTAL SURGERY

## 2019-07-02 PROCEDURE — 25010000002 DEXAMETHASONE PER 1 MG: Performed by: NURSE ANESTHETIST, CERTIFIED REGISTERED

## 2019-07-02 PROCEDURE — 25010000002 HEPARIN (PORCINE) PER 1000 UNITS: Performed by: COLON & RECTAL SURGERY

## 2019-07-02 PROCEDURE — 86901 BLOOD TYPING SEROLOGIC RH(D): CPT | Performed by: COLON & RECTAL SURGERY

## 2019-07-02 PROCEDURE — 25010000002 ONDANSETRON PER 1 MG: Performed by: NURSE ANESTHETIST, CERTIFIED REGISTERED

## 2019-07-02 PROCEDURE — 25010000002 PROPOFOL 10 MG/ML EMULSION: Performed by: NURSE ANESTHETIST, CERTIFIED REGISTERED

## 2019-07-02 PROCEDURE — 86901 BLOOD TYPING SEROLOGIC RH(D): CPT

## 2019-07-02 DEVICE — THE ECHELON, ECHELON ENDOPATH™ AND ECHELON FLEX™ FAMILIES OF ENDOSCOPIC LINEAR CUTTERS AND RELOADS ARE STERILE, SINGLE PATIENT USE INSTRUMENTS THAT SIMULTANEOUSLY CUT AND STAPLE TISSUE. THERE ARE SIX STAGGERED ROWS OF STAPLES, THREE ON EITHER SIDE OF THE CUT LINE. THE 45 MM INSTRUMENTS HAVE A STAPLE LINE THATIS APPROXIMATELY 45 MM LONG AND A CUT LINE THAT IS APPROXIMATELY 42 MM LONG. THE SHAFT CAN ROTATE FREELY IN BOTH DIRECTIONS AND AN ARTICULATION MECHANISM ON ARTICULATING INSTRUMENTS ENABLES BENDING THE DISTAL PORTIONOF THE SHAFT TO FACILITATE LATERAL ACCESS OF THE OPERATIVE SITE.THE INSTRUMENTS ARE SHIPPED WITHOUT A RELOAD AND MUST BE LOADED PRIOR TO USE. A STAPLE RETAINING CAP ON THE RELOAD PROTECTS THE STAPLE LEG POINTS DURING SHIPPING AND TRANSPORTATION. THE INSTRUMENTS’ LOCK-OUT FEATURE IS DESIGNED TO PREVENT A USED RELOAD FROM BEING REFIRED.
Type: IMPLANTABLE DEVICE | Site: ABDOMEN | Status: FUNCTIONAL
Brand: ECHELON ENDOPATH

## 2019-07-02 RX ORDER — ACETAMINOPHEN 500 MG
1000 TABLET ORAL ONCE
Status: COMPLETED | OUTPATIENT
Start: 2019-07-02 | End: 2019-07-02

## 2019-07-02 RX ORDER — PROMETHAZINE HYDROCHLORIDE 25 MG/1
25 SUPPOSITORY RECTAL ONCE AS NEEDED
Status: DISCONTINUED | OUTPATIENT
Start: 2019-07-02 | End: 2019-07-02 | Stop reason: HOSPADM

## 2019-07-02 RX ORDER — NEOSTIGMINE METHYLSULFATE 1 MG/ML
INJECTION, SOLUTION INTRAVENOUS AS NEEDED
Status: DISCONTINUED | OUTPATIENT
Start: 2019-07-02 | End: 2019-07-02 | Stop reason: SURG

## 2019-07-02 RX ORDER — NALOXONE HCL 0.4 MG/ML
0.4 VIAL (ML) INJECTION
Status: DISCONTINUED | OUTPATIENT
Start: 2019-07-02 | End: 2019-07-04 | Stop reason: HOSPADM

## 2019-07-02 RX ORDER — LIDOCAINE HYDROCHLORIDE 10 MG/ML
0.5 INJECTION, SOLUTION EPIDURAL; INFILTRATION; INTRACAUDAL; PERINEURAL ONCE AS NEEDED
Status: COMPLETED | OUTPATIENT
Start: 2019-07-02 | End: 2019-07-02

## 2019-07-02 RX ORDER — SODIUM CHLORIDE, SODIUM LACTATE, POTASSIUM CHLORIDE, CALCIUM CHLORIDE 600; 310; 30; 20 MG/100ML; MG/100ML; MG/100ML; MG/100ML
9 INJECTION, SOLUTION INTRAVENOUS CONTINUOUS
Status: DISCONTINUED | OUTPATIENT
Start: 2019-07-02 | End: 2019-07-03

## 2019-07-02 RX ORDER — SODIUM CHLORIDE 9 MG/ML
INJECTION, SOLUTION INTRAVENOUS AS NEEDED
Status: DISCONTINUED | OUTPATIENT
Start: 2019-07-02 | End: 2019-07-02 | Stop reason: HOSPADM

## 2019-07-02 RX ORDER — SODIUM CHLORIDE 0.9 % (FLUSH) 0.9 %
3 SYRINGE (ML) INJECTION EVERY 12 HOURS SCHEDULED
Status: DISCONTINUED | OUTPATIENT
Start: 2019-07-02 | End: 2019-07-02 | Stop reason: HOSPADM

## 2019-07-02 RX ORDER — GLYCOPYRROLATE 0.2 MG/ML
INJECTION INTRAMUSCULAR; INTRAVENOUS AS NEEDED
Status: DISCONTINUED | OUTPATIENT
Start: 2019-07-02 | End: 2019-07-02 | Stop reason: SURG

## 2019-07-02 RX ORDER — LOSARTAN POTASSIUM 25 MG/1
100 TABLET ORAL DAILY
Status: DISCONTINUED | OUTPATIENT
Start: 2019-07-02 | End: 2019-07-04 | Stop reason: HOSPADM

## 2019-07-02 RX ORDER — PROMETHAZINE HYDROCHLORIDE 25 MG/ML
6.25 INJECTION, SOLUTION INTRAMUSCULAR; INTRAVENOUS ONCE AS NEEDED
Status: DISCONTINUED | OUTPATIENT
Start: 2019-07-02 | End: 2019-07-02 | Stop reason: HOSPADM

## 2019-07-02 RX ORDER — KETOROLAC TROMETHAMINE 15 MG/ML
15 INJECTION, SOLUTION INTRAMUSCULAR; INTRAVENOUS EVERY 6 HOURS
Status: DISCONTINUED | OUTPATIENT
Start: 2019-07-02 | End: 2019-07-04 | Stop reason: HOSPADM

## 2019-07-02 RX ORDER — ONDANSETRON 2 MG/ML
4 INJECTION INTRAMUSCULAR; INTRAVENOUS EVERY 6 HOURS PRN
Status: DISCONTINUED | OUTPATIENT
Start: 2019-07-02 | End: 2019-07-04 | Stop reason: HOSPADM

## 2019-07-02 RX ORDER — HEPARIN SODIUM 5000 [USP'U]/ML
5000 INJECTION, SOLUTION INTRAVENOUS; SUBCUTANEOUS EVERY 8 HOURS SCHEDULED
Status: DISCONTINUED | OUTPATIENT
Start: 2019-07-03 | End: 2019-07-04 | Stop reason: HOSPADM

## 2019-07-02 RX ORDER — HYDROCODONE BITARTRATE AND ACETAMINOPHEN 7.5; 325 MG/1; MG/1
1 TABLET ORAL EVERY 4 HOURS PRN
Status: DISCONTINUED | OUTPATIENT
Start: 2019-07-02 | End: 2019-07-04 | Stop reason: HOSPADM

## 2019-07-02 RX ORDER — ONDANSETRON 2 MG/ML
INJECTION INTRAMUSCULAR; INTRAVENOUS AS NEEDED
Status: DISCONTINUED | OUTPATIENT
Start: 2019-07-02 | End: 2019-07-02 | Stop reason: SURG

## 2019-07-02 RX ORDER — ONDANSETRON 2 MG/ML
4 INJECTION INTRAMUSCULAR; INTRAVENOUS EVERY 6 HOURS PRN
Status: DISCONTINUED | OUTPATIENT
Start: 2019-07-02 | End: 2019-07-03

## 2019-07-02 RX ORDER — PROMETHAZINE HYDROCHLORIDE 25 MG/1
25 TABLET ORAL ONCE AS NEEDED
Status: DISCONTINUED | OUTPATIENT
Start: 2019-07-02 | End: 2019-07-02 | Stop reason: HOSPADM

## 2019-07-02 RX ORDER — BUPRENORPHINE HYDROCHLORIDE 0.32 MG/ML
INJECTION INTRAMUSCULAR; INTRAVENOUS
Status: COMPLETED | OUTPATIENT
Start: 2019-07-02 | End: 2019-07-02

## 2019-07-02 RX ORDER — GABAPENTIN 300 MG/1
300 CAPSULE ORAL 3 TIMES DAILY
Status: DISCONTINUED | OUTPATIENT
Start: 2019-07-02 | End: 2019-07-04 | Stop reason: HOSPADM

## 2019-07-02 RX ORDER — MELOXICAM 15 MG/1
15 TABLET ORAL ONCE
Status: COMPLETED | OUTPATIENT
Start: 2019-07-02 | End: 2019-07-02

## 2019-07-02 RX ORDER — DEXTROSE, SODIUM CHLORIDE, AND POTASSIUM CHLORIDE 5; .45; .15 G/100ML; G/100ML; G/100ML
100 INJECTION INTRAVENOUS CONTINUOUS
Status: DISCONTINUED | OUTPATIENT
Start: 2019-07-02 | End: 2019-07-03

## 2019-07-02 RX ORDER — ALVIMOPAN 12 MG/1
12 CAPSULE ORAL 2 TIMES DAILY
Status: DISCONTINUED | OUTPATIENT
Start: 2019-07-02 | End: 2019-07-04

## 2019-07-02 RX ORDER — PREGABALIN 75 MG/1
75 CAPSULE ORAL ONCE
Status: COMPLETED | OUTPATIENT
Start: 2019-07-02 | End: 2019-07-02

## 2019-07-02 RX ORDER — LABETALOL HYDROCHLORIDE 5 MG/ML
10 INJECTION, SOLUTION INTRAVENOUS EVERY 4 HOURS PRN
Status: DISCONTINUED | OUTPATIENT
Start: 2019-07-02 | End: 2019-07-04 | Stop reason: HOSPADM

## 2019-07-02 RX ORDER — PROPOFOL 10 MG/ML
VIAL (ML) INTRAVENOUS AS NEEDED
Status: DISCONTINUED | OUTPATIENT
Start: 2019-07-02 | End: 2019-07-02 | Stop reason: SURG

## 2019-07-02 RX ORDER — DIAZEPAM 5 MG/1
5 TABLET ORAL EVERY 6 HOURS PRN
Status: DISCONTINUED | OUTPATIENT
Start: 2019-07-02 | End: 2019-07-04 | Stop reason: HOSPADM

## 2019-07-02 RX ORDER — ALVIMOPAN 12 MG/1
12 CAPSULE ORAL ONCE
Status: COMPLETED | OUTPATIENT
Start: 2019-07-02 | End: 2019-07-02

## 2019-07-02 RX ORDER — ACETAMINOPHEN 325 MG/1
650 TABLET ORAL EVERY 4 HOURS PRN
Status: DISCONTINUED | OUTPATIENT
Start: 2019-07-02 | End: 2019-07-04 | Stop reason: HOSPADM

## 2019-07-02 RX ORDER — BUPIVACAINE HYDROCHLORIDE 2.5 MG/ML
INJECTION, SOLUTION EPIDURAL; INFILTRATION; INTRACAUDAL
Status: COMPLETED | OUTPATIENT
Start: 2019-07-02 | End: 2019-07-02

## 2019-07-02 RX ORDER — ONDANSETRON 4 MG/1
4 TABLET, FILM COATED ORAL EVERY 8 HOURS PRN
COMMUNITY
End: 2021-08-31

## 2019-07-02 RX ORDER — SODIUM CHLORIDE 0.9 % (FLUSH) 0.9 %
3-10 SYRINGE (ML) INJECTION AS NEEDED
Status: DISCONTINUED | OUTPATIENT
Start: 2019-07-02 | End: 2019-07-02 | Stop reason: HOSPADM

## 2019-07-02 RX ORDER — LIDOCAINE HYDROCHLORIDE 10 MG/ML
INJECTION, SOLUTION EPIDURAL; INFILTRATION; INTRACAUDAL; PERINEURAL AS NEEDED
Status: DISCONTINUED | OUTPATIENT
Start: 2019-07-02 | End: 2019-07-02 | Stop reason: SURG

## 2019-07-02 RX ORDER — NICOTINE POLACRILEX 4 MG
15 LOZENGE BUCCAL
Status: DISCONTINUED | OUTPATIENT
Start: 2019-07-02 | End: 2019-07-04 | Stop reason: HOSPADM

## 2019-07-02 RX ORDER — FENTANYL CITRATE 50 UG/ML
50 INJECTION, SOLUTION INTRAMUSCULAR; INTRAVENOUS
Status: DISCONTINUED | OUTPATIENT
Start: 2019-07-02 | End: 2019-07-02 | Stop reason: HOSPADM

## 2019-07-02 RX ORDER — DEXAMETHASONE SODIUM PHOSPHATE 10 MG/ML
INJECTION, SOLUTION INTRAMUSCULAR; INTRAVENOUS
Status: COMPLETED | OUTPATIENT
Start: 2019-07-02 | End: 2019-07-02

## 2019-07-02 RX ORDER — PANTOPRAZOLE SODIUM 40 MG/1
40 TABLET, DELAYED RELEASE ORAL DAILY
Status: DISCONTINUED | OUTPATIENT
Start: 2019-07-02 | End: 2019-07-04 | Stop reason: HOSPADM

## 2019-07-02 RX ORDER — HEPARIN SODIUM 5000 [USP'U]/ML
5000 INJECTION, SOLUTION INTRAVENOUS; SUBCUTANEOUS ONCE
Status: COMPLETED | OUTPATIENT
Start: 2019-07-02 | End: 2019-07-02

## 2019-07-02 RX ORDER — HYDROMORPHONE HYDROCHLORIDE 1 MG/ML
0.5 INJECTION, SOLUTION INTRAMUSCULAR; INTRAVENOUS; SUBCUTANEOUS
Status: DISCONTINUED | OUTPATIENT
Start: 2019-07-02 | End: 2019-07-04 | Stop reason: HOSPADM

## 2019-07-02 RX ORDER — MEPERIDINE HYDROCHLORIDE 25 MG/ML
12.5 INJECTION INTRAMUSCULAR; INTRAVENOUS; SUBCUTANEOUS
Status: DISCONTINUED | OUTPATIENT
Start: 2019-07-02 | End: 2019-07-02 | Stop reason: HOSPADM

## 2019-07-02 RX ORDER — FAMOTIDINE 20 MG/1
20 TABLET, FILM COATED ORAL ONCE
Status: COMPLETED | OUTPATIENT
Start: 2019-07-02 | End: 2019-07-02

## 2019-07-02 RX ORDER — DEXAMETHASONE SODIUM PHOSPHATE 4 MG/ML
INJECTION, SOLUTION INTRA-ARTICULAR; INTRALESIONAL; INTRAMUSCULAR; INTRAVENOUS; SOFT TISSUE AS NEEDED
Status: DISCONTINUED | OUTPATIENT
Start: 2019-07-02 | End: 2019-07-02 | Stop reason: SURG

## 2019-07-02 RX ORDER — DEXTROSE MONOHYDRATE 25 G/50ML
25 INJECTION, SOLUTION INTRAVENOUS
Status: DISCONTINUED | OUTPATIENT
Start: 2019-07-02 | End: 2019-07-04 | Stop reason: HOSPADM

## 2019-07-02 RX ORDER — ATRACURIUM BESYLATE 10 MG/ML
INJECTION, SOLUTION INTRAVENOUS AS NEEDED
Status: DISCONTINUED | OUTPATIENT
Start: 2019-07-02 | End: 2019-07-02 | Stop reason: SURG

## 2019-07-02 RX ORDER — HYDROMORPHONE HYDROCHLORIDE 1 MG/ML
0.5 INJECTION, SOLUTION INTRAMUSCULAR; INTRAVENOUS; SUBCUTANEOUS
Status: DISCONTINUED | OUTPATIENT
Start: 2019-07-02 | End: 2019-07-02 | Stop reason: HOSPADM

## 2019-07-02 RX ORDER — IBUPROFEN 400 MG/1
400 TABLET ORAL EVERY 6 HOURS PRN
Status: DISCONTINUED | OUTPATIENT
Start: 2019-07-02 | End: 2019-07-04 | Stop reason: HOSPADM

## 2019-07-02 RX ORDER — SCOLOPAMINE TRANSDERMAL SYSTEM 1 MG/1
1 PATCH, EXTENDED RELEASE TRANSDERMAL CONTINUOUS
Status: DISCONTINUED | OUTPATIENT
Start: 2019-07-02 | End: 2019-07-04 | Stop reason: HOSPADM

## 2019-07-02 RX ADMIN — LIDOCAINE HYDROCHLORIDE 50 MG: 10 INJECTION, SOLUTION EPIDURAL; INFILTRATION; INTRACAUDAL; PERINEURAL at 12:08

## 2019-07-02 RX ADMIN — SODIUM CHLORIDE, POTASSIUM CHLORIDE, SODIUM LACTATE AND CALCIUM CHLORIDE 9 ML/HR: 600; 310; 30; 20 INJECTION, SOLUTION INTRAVENOUS at 08:55

## 2019-07-02 RX ADMIN — LIDOCAINE HYDROCHLORIDE 0.5 ML: 10 INJECTION, SOLUTION EPIDURAL; INFILTRATION; INTRACAUDAL; PERINEURAL at 08:55

## 2019-07-02 RX ADMIN — BUPIVACAINE HYDROCHLORIDE 60 ML: 2.5 INJECTION, SOLUTION EPIDURAL; INFILTRATION; INTRACAUDAL; PERINEURAL at 12:09

## 2019-07-02 RX ADMIN — DEXAMETHASONE SODIUM PHOSPHATE 4 MG: 10 INJECTION INTRAMUSCULAR; INTRAVENOUS at 12:09

## 2019-07-02 RX ADMIN — ACETAMINOPHEN 1000 MG: 500 TABLET ORAL at 08:50

## 2019-07-02 RX ADMIN — KETOROLAC TROMETHAMINE 15 MG: 15 INJECTION, SOLUTION INTRAMUSCULAR; INTRAVENOUS at 17:11

## 2019-07-02 RX ADMIN — ONDANSETRON 4 MG: 2 INJECTION INTRAMUSCULAR; INTRAVENOUS at 16:06

## 2019-07-02 RX ADMIN — GABAPENTIN 300 MG: 300 CAPSULE ORAL at 17:11

## 2019-07-02 RX ADMIN — INSULIN LISPRO 2 UNITS: 100 INJECTION, SOLUTION INTRAVENOUS; SUBCUTANEOUS at 17:49

## 2019-07-02 RX ADMIN — GLYCOPYRROLATE 0.4 MG: 0.2 INJECTION, SOLUTION INTRAMUSCULAR; INTRAVENOUS at 14:50

## 2019-07-02 RX ADMIN — FAMOTIDINE 20 MG: 20 TABLET ORAL at 08:50

## 2019-07-02 RX ADMIN — DEXAMETHASONE SODIUM PHOSPHATE 8 MG: 4 INJECTION, SOLUTION INTRAMUSCULAR; INTRAVENOUS at 12:13

## 2019-07-02 RX ADMIN — PROPOFOL 25 MCG/KG/MIN: 10 INJECTION, EMULSION INTRAVENOUS at 12:13

## 2019-07-02 RX ADMIN — LOSARTAN POTASSIUM 100 MG: 25 TABLET, FILM COATED ORAL at 17:11

## 2019-07-02 RX ADMIN — PREGABALIN 75 MG: 75 CAPSULE ORAL at 08:50

## 2019-07-02 RX ADMIN — PANTOPRAZOLE SODIUM 40 MG: 40 TABLET, DELAYED RELEASE ORAL at 17:10

## 2019-07-02 RX ADMIN — ATRACURIUM BESYLATE 50 MG: 10 INJECTION, SOLUTION INTRAVENOUS at 12:08

## 2019-07-02 RX ADMIN — ONDANSETRON 4 MG: 2 INJECTION INTRAMUSCULAR; INTRAVENOUS at 14:50

## 2019-07-02 RX ADMIN — ALVIMOPAN 12 MG: 12 CAPSULE ORAL at 20:50

## 2019-07-02 RX ADMIN — PROPOFOL 150 MG: 10 INJECTION, EMULSION INTRAVENOUS at 12:08

## 2019-07-02 RX ADMIN — MELOXICAM 15 MG: 15 TABLET ORAL at 08:50

## 2019-07-02 RX ADMIN — ALVIMOPAN 12 MG: 12 CAPSULE ORAL at 09:17

## 2019-07-02 RX ADMIN — INSULIN LISPRO 2 UNITS: 100 INJECTION, SOLUTION INTRAVENOUS; SUBCUTANEOUS at 20:51

## 2019-07-02 RX ADMIN — HEPARIN SODIUM 5000 UNITS: 5000 INJECTION INTRAVENOUS; SUBCUTANEOUS at 08:50

## 2019-07-02 RX ADMIN — NEOSTIGMINE METHYLSULFATE 4 MG: 1 INJECTION, SOLUTION INTRAVENOUS at 14:50

## 2019-07-02 RX ADMIN — POTASSIUM CHLORIDE, DEXTROSE MONOHYDRATE AND SODIUM CHLORIDE 100 ML/HR: 150; 5; 450 INJECTION, SOLUTION INTRAVENOUS at 16:05

## 2019-07-02 RX ADMIN — BUPRENORPHINE HYDROCHLORIDE 0.3 MG: 0.32 INJECTION INTRAMUSCULAR; INTRAVENOUS at 12:09

## 2019-07-02 RX ADMIN — SCOPALAMINE 1 PATCH: 1 PATCH, EXTENDED RELEASE TRANSDERMAL at 08:50

## 2019-07-02 RX ADMIN — GABAPENTIN 300 MG: 300 CAPSULE ORAL at 20:50

## 2019-07-02 NOTE — ANESTHESIA PREPROCEDURE EVALUATION
Anesthesia Evaluation     Patient summary reviewed and Nursing notes reviewed   NPO Solid Status: > 8 hours  NPO Liquid Status: > 8 hours           Airway   Mallampati: I  TM distance: >3 FB  Neck ROM: full  No difficulty expected  Dental      Pulmonary     breath sounds clear to auscultation  Cardiovascular     ECG reviewed  Rhythm: regular  Rate: normal    (+) hypertension,       Neuro/Psych  GI/Hepatic/Renal/Endo    (+)  GERD,      Musculoskeletal     Abdominal    Substance History      OB/GYN          Other   (+) arthritis                   Anesthesia Plan    ASA 2     general with block     intravenous induction   Anesthetic plan, all risks, benefits, and alternatives have been provided, discussed and informed consent has been obtained with: patient.    Plan discussed with CRNA.

## 2019-07-02 NOTE — ANESTHESIA PROCEDURE NOTES
Airway  Urgency: elective    Airway not difficult    General Information and Staff    Patient location during procedure: OR  CRNA: Jasmina Vargas CRNA    Indications and Patient Condition  Indications for airway management: airway protection    Preoxygenated: yes  MILS not maintained throughout  Mask difficulty assessment: 1 - vent by mask    Final Airway Details  Final airway type: endotracheal airway      Successful airway: ETT  Cuffed: yes   Successful intubation technique: direct laryngoscopy  Endotracheal tube insertion site: oral  Blade: Elizabeth  Blade size: 3  ETT size (mm): 7.5  Cormack-Lehane Classification: grade I - full view of glottis  Placement verified by: chest auscultation and capnometry   Cuff volume (mL): 10  Measured from: lips  ETT to lips (cm): 20  Number of attempts at approach: 1    Additional Comments  Negative epigastric sounds, Breath sound equal bilaterally with symmetric chest rise and fall

## 2019-07-02 NOTE — ANESTHESIA PROCEDURE NOTES
Peripheral Block      Patient location during procedure: OR  Reason for block: at surgeon's request and post-op pain management  Performed by  CRNA: Susanna Amezquita CRNA  Assisted by: Catalina Vasquez RN  Preanesthetic Checklist  Completed: patient identified, site marked, surgical consent, pre-op evaluation, timeout performed, IV checked, risks and benefits discussed and monitors and equipment checked  Prep:  Pt Position: supine  Sterile barriers:cap, gloves, sterile barriers and mask  Prep: ChloraPrep  Patient monitoring: blood pressure monitoring, continuous pulse oximetry and EKG  Procedure  Sedation:yes  Performed under: general  Guidance:ultrasound guided  Images:still images obtained    Laterality:Bilateral  Block Type:TAP  Injection Technique:single-shot  Needle Type:short-bevel and echogenic  Needle Gauge:20 G      Medications Used: bupivacaine PF (MARCAINE) 0.25 % injection, 60 mL  dexamethasone sodium phosphate injection, 4 mg  buprenorphine (BUPRENEX) injection, 0.3 mg  Med admintered at 7/2/2019 12:09 PM      Medications  Comment:Block Injection:  LA dose divided between Right and Left block       Adjuncts:  Decadron 4mg PSF, Buprenex 0.3mg (Per total volume of LA)    Post Assessment  Injection Assessment: negative aspiration for heme, incremental injection and no paresthesia on injection  Patient Tolerance:comfortable throughout block  Complications:no  Additional Notes      Under Ultrasound guidance, a BBraun 4inch 360 degree needle was advanced with Normal Saline hydro dissection of tissue.  The Internal Oblique and Transversus Abdominus muscles where visualized.  At or before the aponeurosis of Internal Oblique, local anesthetic spread was visualized in the Transversus Abdominus Plane. Injection was made incrementally with aspiration every 5 mls.  There was no  intravascular injection,  injection pressure was normal, there was no neural injection, and the procedure was completed without difficulty.   Thank You.

## 2019-07-02 NOTE — ANESTHESIA POSTPROCEDURE EVALUATION
Patient: Nicholas Renee    Procedure Summary     Date:  07/02/19 [facility]    Anesthesia Start:  1204 Anesthesia Stop:  1502    Procedure:  COLON RESECTION LAPAROSCOPIC LOW ANTERIOR (N/A Abdomen) Diagnosis:      Surgeon:  Magan Blum MD Provider:  Liborio Ashton MD    Anesthesia Type:  general with block ASA Status:  2          Anesthesia Type: general with block  Last vitals  BP   128/75 (07/02/19 1457)   Temp   97.8 °F (36.6 °C) (07/02/19 1457)   Pulse   75 (07/02/19 1457)   Resp   14 (07/02/19 1457)     SpO2   93 % (07/02/19 1457)     Post Anesthesia Care and Evaluation    Patient location during evaluation: PACU  Patient participation: waiting for patient participation  Level of consciousness: awake  Pain score: 0  Pain management: adequate  Airway patency: patent  Anesthetic complications: No anesthetic complications  PONV Status: none  Cardiovascular status: hemodynamically stable and acceptable  Respiratory status: nonlabored ventilation, acceptable and nasal cannula  Hydration status: acceptable

## 2019-07-03 PROBLEM — G89.18 ACUTE POSTOPERATIVE PAIN: Status: ACTIVE | Noted: 2019-07-03

## 2019-07-03 PROBLEM — D62 ACUTE BLOOD LOSS ANEMIA: Status: ACTIVE | Noted: 2019-07-03

## 2019-07-03 LAB
ANION GAP SERPL CALCULATED.3IONS-SCNC: 13 MMOL/L (ref 5–15)
BASOPHILS # BLD AUTO: 0.01 10*3/MM3 (ref 0–0.2)
BASOPHILS NFR BLD AUTO: 0.1 % (ref 0–1.5)
BUN BLD-MCNC: 16 MG/DL (ref 6–20)
BUN/CREAT SERPL: 19.3 (ref 7–25)
CALCIUM SPEC-SCNC: 8.5 MG/DL (ref 8.6–10.5)
CHLORIDE SERPL-SCNC: 100 MMOL/L (ref 98–107)
CO2 SERPL-SCNC: 23 MMOL/L (ref 22–29)
CREAT BLD-MCNC: 0.83 MG/DL (ref 0.76–1.27)
DEPRECATED RDW RBC AUTO: 44.1 FL (ref 37–54)
EOSINOPHIL # BLD AUTO: 0 10*3/MM3 (ref 0–0.4)
EOSINOPHIL NFR BLD AUTO: 0 % (ref 0.3–6.2)
ERYTHROCYTE [DISTWIDTH] IN BLOOD BY AUTOMATED COUNT: 12.3 % (ref 12.3–15.4)
GFR SERPL CREATININE-BSD FRML MDRD: 96 ML/MIN/1.73
GLUCOSE BLD-MCNC: 115 MG/DL (ref 65–99)
GLUCOSE BLDC GLUCOMTR-MCNC: 109 MG/DL (ref 70–130)
GLUCOSE BLDC GLUCOMTR-MCNC: 112 MG/DL (ref 70–130)
GLUCOSE BLDC GLUCOMTR-MCNC: 91 MG/DL (ref 70–130)
GLUCOSE BLDC GLUCOMTR-MCNC: 91 MG/DL (ref 70–130)
HCT VFR BLD AUTO: 36.8 % (ref 37.5–51)
HGB BLD-MCNC: 12.3 G/DL (ref 13–17.7)
IMM GRANULOCYTES # BLD AUTO: 0.04 10*3/MM3 (ref 0–0.05)
IMM GRANULOCYTES NFR BLD AUTO: 0.4 % (ref 0–0.5)
LYMPHOCYTES # BLD AUTO: 1.28 10*3/MM3 (ref 0.7–3.1)
LYMPHOCYTES NFR BLD AUTO: 12.3 % (ref 19.6–45.3)
MCH RBC QN AUTO: 32.5 PG (ref 26.6–33)
MCHC RBC AUTO-ENTMCNC: 33.4 G/DL (ref 31.5–35.7)
MCV RBC AUTO: 97.1 FL (ref 79–97)
MONOCYTES # BLD AUTO: 0.79 10*3/MM3 (ref 0.1–0.9)
MONOCYTES NFR BLD AUTO: 7.6 % (ref 5–12)
NEUTROPHILS # BLD AUTO: 8.26 10*3/MM3 (ref 1.7–7)
NEUTROPHILS NFR BLD AUTO: 79.6 % (ref 42.7–76)
NRBC BLD AUTO-RTO: 0 /100 WBC (ref 0–0.2)
PLATELET # BLD AUTO: 221 10*3/MM3 (ref 140–450)
PMV BLD AUTO: 9.8 FL (ref 6–12)
POTASSIUM BLD-SCNC: 4.5 MMOL/L (ref 3.5–5.2)
RBC # BLD AUTO: 3.79 10*6/MM3 (ref 4.14–5.8)
SODIUM BLD-SCNC: 136 MMOL/L (ref 136–145)
WBC NRBC COR # BLD: 10.38 10*3/MM3 (ref 3.4–10.8)

## 2019-07-03 PROCEDURE — 97161 PT EVAL LOW COMPLEX 20 MIN: CPT | Performed by: PHYSICAL THERAPIST

## 2019-07-03 PROCEDURE — 82962 GLUCOSE BLOOD TEST: CPT

## 2019-07-03 PROCEDURE — 25010000002 HEPARIN (PORCINE) PER 1000 UNITS: Performed by: COLON & RECTAL SURGERY

## 2019-07-03 PROCEDURE — 85025 COMPLETE CBC W/AUTO DIFF WBC: CPT | Performed by: COLON & RECTAL SURGERY

## 2019-07-03 PROCEDURE — 25010000002 KETOROLAC TROMETHAMINE PER 15 MG: Performed by: COLON & RECTAL SURGERY

## 2019-07-03 PROCEDURE — 80048 BASIC METABOLIC PNL TOTAL CA: CPT | Performed by: COLON & RECTAL SURGERY

## 2019-07-03 RX ADMIN — HEPARIN SODIUM 5000 UNITS: 5000 INJECTION INTRAVENOUS; SUBCUTANEOUS at 14:57

## 2019-07-03 RX ADMIN — HEPARIN SODIUM 5000 UNITS: 5000 INJECTION INTRAVENOUS; SUBCUTANEOUS at 05:42

## 2019-07-03 RX ADMIN — ALVIMOPAN 12 MG: 12 CAPSULE ORAL at 08:23

## 2019-07-03 RX ADMIN — KETOROLAC TROMETHAMINE 15 MG: 15 INJECTION, SOLUTION INTRAMUSCULAR; INTRAVENOUS at 12:13

## 2019-07-03 RX ADMIN — KETOROLAC TROMETHAMINE 15 MG: 15 INJECTION, SOLUTION INTRAMUSCULAR; INTRAVENOUS at 00:14

## 2019-07-03 RX ADMIN — KETOROLAC TROMETHAMINE 15 MG: 15 INJECTION, SOLUTION INTRAMUSCULAR; INTRAVENOUS at 23:07

## 2019-07-03 RX ADMIN — GABAPENTIN 300 MG: 300 CAPSULE ORAL at 15:00

## 2019-07-03 RX ADMIN — LOSARTAN POTASSIUM 100 MG: 25 TABLET, FILM COATED ORAL at 08:23

## 2019-07-03 RX ADMIN — ACETAMINOPHEN 650 MG: 325 TABLET, FILM COATED ORAL at 20:33

## 2019-07-03 RX ADMIN — POTASSIUM CHLORIDE, DEXTROSE MONOHYDRATE AND SODIUM CHLORIDE 100 ML/HR: 150; 5; 450 INJECTION, SOLUTION INTRAVENOUS at 02:02

## 2019-07-03 RX ADMIN — KETOROLAC TROMETHAMINE 15 MG: 15 INJECTION, SOLUTION INTRAMUSCULAR; INTRAVENOUS at 17:12

## 2019-07-03 RX ADMIN — GABAPENTIN 300 MG: 300 CAPSULE ORAL at 20:33

## 2019-07-03 RX ADMIN — GABAPENTIN 300 MG: 300 CAPSULE ORAL at 08:23

## 2019-07-03 RX ADMIN — PANTOPRAZOLE SODIUM 40 MG: 40 TABLET, DELAYED RELEASE ORAL at 08:22

## 2019-07-03 RX ADMIN — KETOROLAC TROMETHAMINE 15 MG: 15 INJECTION, SOLUTION INTRAMUSCULAR; INTRAVENOUS at 05:42

## 2019-07-04 VITALS
SYSTOLIC BLOOD PRESSURE: 115 MMHG | RESPIRATION RATE: 18 BRPM | BODY MASS INDEX: 33.53 KG/M2 | HEART RATE: 71 BPM | TEMPERATURE: 99.3 F | OXYGEN SATURATION: 95 % | HEIGHT: 73 IN | WEIGHT: 253 LBS | DIASTOLIC BLOOD PRESSURE: 81 MMHG

## 2019-07-04 LAB — GLUCOSE BLDC GLUCOMTR-MCNC: 92 MG/DL (ref 70–130)

## 2019-07-04 PROCEDURE — 82962 GLUCOSE BLOOD TEST: CPT

## 2019-07-04 PROCEDURE — 25010000002 KETOROLAC TROMETHAMINE PER 15 MG: Performed by: COLON & RECTAL SURGERY

## 2019-07-04 RX ORDER — HYDROCODONE BITARTRATE AND ACETAMINOPHEN 7.5; 325 MG/1; MG/1
1 TABLET ORAL EVERY 4 HOURS PRN
Start: 2019-07-04 | End: 2019-07-12

## 2019-07-04 RX ADMIN — KETOROLAC TROMETHAMINE 15 MG: 15 INJECTION, SOLUTION INTRAMUSCULAR; INTRAVENOUS at 05:18

## 2019-07-04 RX ADMIN — LOSARTAN POTASSIUM 100 MG: 25 TABLET, FILM COATED ORAL at 08:32

## 2019-07-04 RX ADMIN — PANTOPRAZOLE SODIUM 40 MG: 40 TABLET, DELAYED RELEASE ORAL at 08:32

## 2019-07-04 RX ADMIN — GABAPENTIN 300 MG: 300 CAPSULE ORAL at 08:32

## 2019-07-05 LAB
CYTO UR: NORMAL
LAB AP CASE REPORT: NORMAL
LAB AP CLINICAL INFORMATION: NORMAL
PATH REPORT.FINAL DX SPEC: NORMAL
PATH REPORT.GROSS SPEC: NORMAL

## 2019-07-10 ENCOUNTER — NURSE TRIAGE (OUTPATIENT)
Dept: CALL CENTER | Facility: HOSPITAL | Age: 54
End: 2019-07-10

## 2019-07-10 NOTE — TELEPHONE ENCOUNTER
Caller has blood in stools, mainly on the tissue, no rey blood noted, will be speaking  With the surgeon, has had off and on for 3 days    Reason for Disposition  • Other post-op symptom or question    Additional Information  • Negative: Sounds like a life-threatening emergency to the triager  • Negative: Chest pain  • Negative: Difficulty breathing  • Negative: Surgical incision symptoms and questions  • Negative: [1] Discomfort (pain, burning or stinging) when passing urine AND [2] male  • Negative: [1] Discomfort (pain, burning or stinging) when passing urine AND [2] female  • Negative: Constipation  • Negative: New or worsening leg (calf, thigh) pain  • Negative: New or worsening leg swelling  • Negative: Dizziness is severe, or persists > 24 hours after surgery  • Negative: Pain, redness, swelling, or pus at IV Site  • Negative: Symptoms arising from use of a urinary catheter (Avila or Coude)  • Negative: Cast problems or questions  • Negative: Medication question  • Negative: [1] Widespread rash AND [2] bright red, sunburn-like  • Negative: [1] SEVERE headache AND [2] after spinal (epidural) anesthesia  • Negative: [1] Vomiting AND [2] persists > 4 hours  • Negative: [1] Vomiting AND [2] abdomen looks much more swollen than usual  • Negative: [1] Drinking very little AND [2] dehydration suspected (e.g., no urine > 12 hours, very dry mouth, very lightheaded)  • Negative: Patient sounds very sick or weak to the triager  • Negative: Sounds like a serious complication to the triager  • Negative: Fever > 100.5 F (38.1 C)  • Negative: [1] SEVERE post-op pain (e.g., excruciating, pain scale 8-10) AND [2] not controlled with pain medications  • Negative: [1] Caller has URGENT question AND [2] triager unable to answer question  • Negative: [1] Headache AND [2] after spinal (epidural) anesthesia AND [3] not severe  • Negative: Fever present > 3 days (72 hours)  • Negative: [1] MILD-MODERATE post-op pain (e.g., pain  "scale 1-7) AND [2] not controlled with pain medications  • Negative: [1] Caller has NON-URGENT question AND [2] triager unable to answer question  • Negative: General activity, questions about  • Negative: Resuming driving, questions about  • Negative: Resuming sexual relations, questions about  • Negative: Getting the incision wet, questions about  • Negative: [1] Vomiting AND [2] present < 4 hours    Answer Assessment - Initial Assessment Questions  1. SYMPTOM: \"What's the main symptom you're concerned about?\" (e.g., pain, fever, vomiting)      Blood in stool  2. ONSET: \"When did ________  start?\"      3 days ago  3. SURGERY: \"What surgery was performed?\"      Colon resection  4. DATE of SURGERY: \"When was surgery performed?\"       07/2  5. ANESTHESIA: \" What type of anesthesia did you have?\" (e.g., general, spinal, epidural, local)      general  6. PAIN: \"Is there any pain?\" If so, ask: \"How bad is it?\"  (Scale 1-10; or mild, moderate, severe)      no  7. FEVER: \"Do you have a fever?\" If so, ask: \"What is your temperature, how was it measured, and when did it start?\"      no  8. VOMITING: \"Is there any vomiting?\" If yes, ask: \"How many times?\"      no  9. BLEEDING: \"Is there any bleeding?\" If so, ask: \"How much?\" and \"Where?\"      no  10. OTHER SYMPTOMS: \"Do you have any other symptoms?\" (e.g., drainage from wound, painful urination, constipation)        no    Protocols used: POST-OP SYMPTOMS AND QUESTIONS-ADULT-AH      "

## 2019-09-30 ENCOUNTER — OFFICE VISIT (OUTPATIENT)
Dept: ORTHOPEDIC SURGERY | Facility: CLINIC | Age: 54
End: 2019-09-30

## 2019-09-30 VITALS — OXYGEN SATURATION: 98 % | WEIGHT: 254 LBS | HEIGHT: 73 IN | BODY MASS INDEX: 33.66 KG/M2 | HEART RATE: 84 BPM

## 2019-09-30 DIAGNOSIS — M17.0 PRIMARY OSTEOARTHRITIS OF BOTH KNEES: Primary | ICD-10-CM

## 2019-09-30 PROCEDURE — 99213 OFFICE O/P EST LOW 20 MIN: CPT | Performed by: ORTHOPAEDIC SURGERY

## 2019-09-30 PROCEDURE — 20610 DRAIN/INJ JOINT/BURSA W/O US: CPT | Performed by: ORTHOPAEDIC SURGERY

## 2019-09-30 RX ORDER — ROPIVACAINE HYDROCHLORIDE 5 MG/ML
4 INJECTION, SOLUTION EPIDURAL; INFILTRATION; PERINEURAL
Status: COMPLETED | OUTPATIENT
Start: 2019-09-30 | End: 2019-09-30

## 2019-09-30 RX ORDER — TRIAMCINOLONE ACETONIDE 40 MG/ML
40 INJECTION, SUSPENSION INTRA-ARTICULAR; INTRAMUSCULAR
Status: COMPLETED | OUTPATIENT
Start: 2019-09-30 | End: 2019-09-30

## 2019-09-30 RX ADMIN — TRIAMCINOLONE ACETONIDE 40 MG: 40 INJECTION, SUSPENSION INTRA-ARTICULAR; INTRAMUSCULAR at 10:21

## 2019-09-30 RX ADMIN — ROPIVACAINE HYDROCHLORIDE 4 ML: 5 INJECTION, SOLUTION EPIDURAL; INFILTRATION; PERINEURAL at 10:21

## 2019-09-30 RX ADMIN — TRIAMCINOLONE ACETONIDE 40 MG: 40 INJECTION, SUSPENSION INTRA-ARTICULAR; INTRAMUSCULAR at 10:20

## 2019-09-30 RX ADMIN — ROPIVACAINE HYDROCHLORIDE 4 ML: 5 INJECTION, SOLUTION EPIDURAL; INFILTRATION; PERINEURAL at 10:20

## 2019-09-30 NOTE — PROGRESS NOTES
[facility] [place]    Subjective     Chief Complaint   Patient presents with   • Follow-up     4 MONTH FOLLOW UP, BILAT KNEE. LAST INJECTION AND ASPIRATION GIVEN ON 5/29/19        HPI    Nicholas Renee is a 54 y.o. male.  He follows up today for both of his knees.  He is had pain for several years, following no recent injury.  Previous surgery on the right knee in the past.  Left knee bothers him more than the right today.  He just had surgery for diverticulitis in the past few months, and had to take a fair amount of time off from work, and is not ready to proceed with knee replacement surgery at this time.  Pain is 6 out of 10, which is dull and achy, worse with climbing stairs, associated with swelling, grinding and stiffness.      Patient Active Problem List   Diagnosis   • HTN (hypertension)   • Prediabetes   • Diverticulitis   • S/P low anterior colon resection   • Acute blood loss anemia, mild, asymptomatic   • Acute postoperative pain     Past Medical History:   Diagnosis Date   • C. difficile diarrhea    • Diverticulitis    • Esophagitis    • GERD (gastroesophageal reflux disease)    • Hypertension    • Osteoarthritis of both knees    • Wears glasses       Past Surgical History:   Procedure Laterality Date   • ABDOMINAL SURGERY      INFANT (6wk)- STOMACH BLOCKAGE    • COLON RESECTION N/A 7/2/2019    Procedure: COLON RESECTION LAPAROSCOPIC LOW ANTERIOR;  Surgeon: Magan Blum MD;  Location:  [facility];  Service: General   • COLONOSCOPY N/A 7/11/2017    Procedure: COLONOSCOPY;  Surgeon: Magan Blum MD;  Location:  [facility];  Service:    • ENDOSCOPY     • FOOT SURGERY      BILAT    • KNEE SURGERY Bilateral     x 5   • PERIPHERALLY INSERTED CENTRAL CATHETER INSERTION  06/13/2019    RIGHT ARM       Family History   Problem Relation Age of Onset   • Diabetes Father    • Heart disease Father    • Hypertension Father    • Heart attack Father    • Rheum arthritis Other    • No  "Known Problems Mother      Social History     Socioeconomic History   • Marital status:      Spouse name: Not on file   • Number of children: Not on file   • Years of education: Not on file   • Highest education level: Not on file   Tobacco Use   • Smoking status: Former Smoker     Packs/day: 0.50     Years: 20.00     Pack years: 10.00     Types: Cigarettes     Last attempt to quit: 2003     Years since quittin.2   • Smokeless tobacco: Current User     Types: Snuff   Substance and Sexual Activity   • Alcohol use: Yes     Comment: socially, \"rarely\"   • Drug use: No   • Sexual activity: Defer      Current Outpatient Medications on File Prior to Visit   Medication Sig Dispense Refill   • acetaminophen (TYLENOL) 500 MG tablet Take 1,000 mg by mouth Every 4 (Four) Hours As Needed.     • cetirizine (zyrTEC) 10 MG tablet Take 10 mg by mouth Daily As Needed.     • gemfibrozil (LOPID) 600 MG tablet Take 600 mg by mouth 2 (Two) Times a Day Before Meals.     • losartan (COZAAR) 100 MG tablet Take 100 mg by mouth Daily.  0   • meloxicam (MOBIC) 15 MG tablet 1 PO Daily with food as needed (Patient taking differently: 1 PO Daily with food as needed- HASNT HAD IN 3 WEEKS) 90 tablet 0   • Multiple Vitamins-Minerals (MULTIVITAMIN ADULT PO) Take 1 tablet by mouth Daily.     • NON FORMULARY Daily. KEFER- 8 OZ - DRINKABLE PROBIOTIC     • ondansetron (ZOFRAN) 4 MG tablet Take 4 mg by mouth Every 8 (Eight) Hours As Needed for Nausea or Vomiting.     • pantoprazole (PROTONIX) 40 MG EC tablet Take 40 mg by mouth Daily.       No current facility-administered medications on file prior to visit.       Allergies   Allergen Reactions   • Levaquin [Levofloxacin] Myalgia   • Sulfa Antibiotics Hives and Rash        Review of Systems   Constitutional: Negative.    HENT: Positive for dental problem, mouth sores and sneezing.    Eyes: Negative.    Respiratory: Negative.    Cardiovascular: Positive for leg swelling. " "  Gastrointestinal: Negative.    Endocrine: Negative.    Genitourinary: Negative.    Musculoskeletal: Positive for arthralgias and joint swelling.   Skin: Negative.    Allergic/Immunologic: Positive for environmental allergies.   Neurological: Negative.    Hematological: Negative.    Psychiatric/Behavioral: Negative.         Objective      Physical Exam  Pulse 84   Ht 185.4 cm (72.99\")   Wt 115 kg (254 lb)   SpO2 98%   BMI 33.52 kg/m²     Body mass index is 33.52 kg/m².    General:   Mental Status:  Alert   Appearance: Cooperative, in no acute distress   Build and Nutrition: Overweight male   Orientation: Alert and oriented to person, place and time   Posture: Normal   Gait: Mild limp on both lower extremities    Integument:   Right knee: Well-healed previous incisions   Left knee: no skin lesions, no rash, no ecchymosis    Lower Extremities:   Right Knee:    Tenderness:  Medial and lateral joint line tenderness    Effusion:  2+    Swelling:  None    Crepitus:  Positive    Atrophy:  None    Range of motion:  Extension: 0°       Flexion: 120°  Instability:  No varus laxity, no valgus laxity, positive anterior drawer  Deformities:  Valgus   Left Knee:    Tenderness:  None    Effusion:  1+    Swelling:  None    Crepitus: Positive    Atrophy:  None    Range of motion:  Extension: 0°       Flexion: 120°  Instability:  No varus laxity, no valgus laxity, positive anterior drawer  Deformities:  Valgus      Imaging/Studies      Imaging Results (last 24 hours)     Procedure Component Value Units Date/Time    XR Knee 4+ View Bilateral [[id number]] Resulted:  09/30/19 1000     Updated:  09/30/19 1001    Narrative:       Right Knee Radiographs  Indication: right knee pain  Views: Standing AP's and skiers of both knees, with lateral and sunrise   views of the right knee    Comparison: 5/2/2018    Findings:   Advanced arthritic changes, with retained hardware from previous ACL   reconstruction, with a bone contact and " tricompartmental fashion, valgus   alignment, with no acute bony abnormalities.  No significant changes   compared to previous films.    Left Knee Radiographs  Indication: left knee pain  Views: Standing AP's and skiers of both knees, with lateral and sunrise   views of the left knee    Comparison: 5/2/2018    Findings:   Advanced arthritic changes, with bone-on-bone contact and tricompartmental   fashion, valgus alignment with no acute bony abnormalities.  No   significant changes compared to previous films.          Assessment and Plan     Nicholas was seen today for follow-up.    Diagnoses and all orders for this visit:    Primary osteoarthritis of both knees  -     XR Knee 4+ View Bilateral  -     Large Joint Arthrocentesis: R knee  -     Large Joint Arthrocentesis: L knee        1. Primary osteoarthritis of both knees        I reviewed my findings with the patient today.  He has bilateral knee arthritis, which is significant and advanced, and he is a candidate for knee replacement surgery.  However, he recently had surgery for diverticulitis, and cannot take off work at the current time.  He would prefer aspirations and injections today, and these were provided.  I will see him back in 4 months, but sooner for any problems.    Of note, he had 80% relief in both of the knees, just a few minutes following the aspirations and injections.  I did obtain 58 cc of clear straw-colored fluid off of the right knee, and 19 cc of clear straw-colored fluid off the left knee.    Return in about 4 months (around 1/30/2020).      Medical Decision Making  Management Options : prescription/IM medicine  Data/Risk: radiology tests and independent visualization of imaging, lab tests, or EMG/NCV      Gage Britt MD  09/30/19  10:40 AM

## 2019-09-30 NOTE — PROGRESS NOTES
Procedure   Large Joint Arthrocentesis: R knee  Date/Time: 9/30/2019 10:20 AM  Consent given by: patient  Site marked: site marked  Timeout: Immediately prior to procedure a time out was called to verify the correct patient, procedure, equipment, support staff and site/side marked as required   Supporting Documentation  Indications: pain   Procedure Details  Location: knee - R knee  Preparation: Patient was prepped and draped in the usual sterile fashion  Needle size: 18 G  Approach: anterolateral  Medications administered: 4 mL ropivacaine 0.5 %; 40 mg triamcinolone acetonide 40 MG/ML  Aspirate amount: 58 mL  Aspirate: clear and yellow  Patient tolerance: patient tolerated the procedure well with no immediate complications    Large Joint Arthrocentesis: L knee  Date/Time: 9/30/2019 10:21 AM  Consent given by: patient  Site marked: site marked  Timeout: Immediately prior to procedure a time out was called to verify the correct patient, procedure, equipment, support staff and site/side marked as required   Supporting Documentation  Indications: pain   Procedure Details  Location: knee - L knee  Preparation: Patient was prepped and draped in the usual sterile fashion  Needle size: 18 G  Approach: anterolateral  Medications administered: 4 mL ropivacaine 0.5 %; 40 mg triamcinolone acetonide 40 MG/ML  Aspirate amount: 19 mL  Aspirate: clear and yellow  Patient tolerance: patient tolerated the procedure well with no immediate complications

## 2020-02-03 ENCOUNTER — OFFICE VISIT (OUTPATIENT)
Dept: ORTHOPEDIC SURGERY | Facility: CLINIC | Age: 55
End: 2020-02-03

## 2020-02-03 VITALS — WEIGHT: 264.6 LBS | BODY MASS INDEX: 35.07 KG/M2 | HEIGHT: 73 IN | HEART RATE: 86 BPM | OXYGEN SATURATION: 99 %

## 2020-02-03 DIAGNOSIS — M17.11 PRIMARY OSTEOARTHRITIS OF RIGHT KNEE: Primary | ICD-10-CM

## 2020-02-03 DIAGNOSIS — M17.12 PRIMARY OSTEOARTHRITIS OF LEFT KNEE: ICD-10-CM

## 2020-02-03 PROCEDURE — 20610 DRAIN/INJ JOINT/BURSA W/O US: CPT | Performed by: ORTHOPAEDIC SURGERY

## 2020-02-03 PROCEDURE — 99213 OFFICE O/P EST LOW 20 MIN: CPT | Performed by: ORTHOPAEDIC SURGERY

## 2020-02-03 RX ORDER — ROPIVACAINE HYDROCHLORIDE 5 MG/ML
4 INJECTION, SOLUTION EPIDURAL; INFILTRATION; PERINEURAL
Status: COMPLETED | OUTPATIENT
Start: 2020-02-03 | End: 2020-02-03

## 2020-02-03 RX ORDER — MELOXICAM 7.5 MG/1
15 TABLET ORAL ONCE
Status: CANCELLED | OUTPATIENT
Start: 2020-02-03 | End: 2020-02-03

## 2020-02-03 RX ORDER — ACETAMINOPHEN 325 MG/1
1000 TABLET ORAL ONCE
Status: CANCELLED | OUTPATIENT
Start: 2020-02-03 | End: 2020-02-03

## 2020-02-03 RX ORDER — TRIAMCINOLONE ACETONIDE 40 MG/ML
40 INJECTION, SUSPENSION INTRA-ARTICULAR; INTRAMUSCULAR
Status: COMPLETED | OUTPATIENT
Start: 2020-02-03 | End: 2020-02-03

## 2020-02-03 RX ORDER — PREGABALIN 150 MG/1
150 CAPSULE ORAL ONCE
Status: CANCELLED | OUTPATIENT
Start: 2020-02-03 | End: 2020-02-03

## 2020-02-03 RX ADMIN — TRIAMCINOLONE ACETONIDE 40 MG: 40 INJECTION, SUSPENSION INTRA-ARTICULAR; INTRAMUSCULAR at 10:18

## 2020-02-03 RX ADMIN — ROPIVACAINE HYDROCHLORIDE 4 ML: 5 INJECTION, SOLUTION EPIDURAL; INFILTRATION; PERINEURAL at 10:18

## 2020-02-03 NOTE — PROGRESS NOTES
Procedure   Large Joint Arthrocentesis: L knee  Date/Time: 2/3/2020 10:18 AM  Consent given by: patient  Site marked: site marked  Timeout: Immediately prior to procedure a time out was called to verify the correct patient, procedure, equipment, support staff and site/side marked as required   Supporting Documentation  Indications: pain   Procedure Details  Location: knee - L knee  Preparation: Patient was prepped and draped in the usual sterile fashion  Needle size: 22 G  Approach: anterolateral  Medications administered: 4 mL ropivacaine 0.5 %; 40 mg triamcinolone acetonide 40 MG/ML  Patient tolerance: patient tolerated the procedure well with no immediate complications

## 2020-02-03 NOTE — PROGRESS NOTES
[facility]    Subjective     Chief Complaint   Patient presents with   • Follow-up     4 months- Primary osteoarthritis of both knees, injections last given 09/30/2019        HPI    Nicholas Renee is a 54 y.o. male.  He follows up today for his knees.  Right knee bothers him more than left.  He would like to consider doing surgery on the right knee at this time.  He would like an injection for his left knee.    Right knee has been an ongoing problem for the at least the past 4 years, with pain, swelling, popping, grinding and stiffness.  Is worse with climbing stairs and pain at night.  Previous injections have provided brief relief.  Previous history of ligamentous reconstructive surgery in the past.  Pain is currently 8 out of 10.  No history of clots nor clotting disorders.      Patient Active Problem List   Diagnosis   • HTN (hypertension)   • Prediabetes   • Diverticulitis   • S/P low anterior colon resection   • Acute blood loss anemia, mild, asymptomatic   • Acute postoperative pain     Past Medical History:   Diagnosis Date   • C. difficile diarrhea    • Diverticulitis    • Esophagitis    • GERD (gastroesophageal reflux disease)    • Hypertension    • Osteoarthritis of both knees    • Wears glasses       Past Surgical History:   Procedure Laterality Date   • ABDOMINAL SURGERY      INFANT (6wk)- STOMACH BLOCKAGE    • COLON RESECTION N/A 7/2/2019    Procedure: COLON RESECTION LAPAROSCOPIC LOW ANTERIOR;  Surgeon: Magan Blum MD;  Location:  [facility];  Service: General   • COLONOSCOPY N/A 7/11/2017    Procedure: COLONOSCOPY;  Surgeon: Magan Blum MD;  Location:  [facility];  Service:    • ENDOSCOPY     • FOOT SURGERY      BILAT    • KNEE SURGERY Bilateral     x 5   • PERIPHERALLY INSERTED CENTRAL CATHETER INSERTION  06/13/2019    RIGHT ARM       Family History   Problem Relation Age of Onset   • Diabetes Father    • Heart disease Father    • Hypertension Father    •  "Heart attack Father    • Rheum arthritis Other    • No Known Problems Mother      Social History     Socioeconomic History   • Marital status:      Spouse name: Not on file   • Number of children: Not on file   • Years of education: Not on file   • Highest education level: Not on file   Tobacco Use   • Smoking status: Former Smoker     Packs/day: 0.50     Years: 20.00     Pack years: 10.00     Types: Cigarettes     Last attempt to quit: 2003     Years since quittin.5   • Smokeless tobacco: Current User     Types: Snuff   Substance and Sexual Activity   • Alcohol use: Yes     Comment: socially, \"rarely\"   • Drug use: No   • Sexual activity: Defer      Current Outpatient Medications on File Prior to Visit   Medication Sig Dispense Refill   • acetaminophen (TYLENOL) 500 MG tablet Take 1,000 mg by mouth Every 4 (Four) Hours As Needed.     • cetirizine (zyrTEC) 10 MG tablet Take 10 mg by mouth Daily As Needed.     • gemfibrozil (LOPID) 600 MG tablet Take 600 mg by mouth 2 (Two) Times a Day Before Meals.     • losartan (COZAAR) 100 MG tablet Take 100 mg by mouth Daily.  0   • meloxicam (MOBIC) 15 MG tablet 1 PO Daily with food as needed (Patient taking differently: 1 PO Daily with food as needed- HASNT HAD IN 3 WEEKS) 90 tablet 0   • Multiple Vitamins-Minerals (MULTIVITAMIN ADULT PO) Take 1 tablet by mouth Daily.     • NON FORMULARY Daily. KEFER- 8 OZ - DRINKABLE PROBIOTIC     • ondansetron (ZOFRAN) 4 MG tablet Take 4 mg by mouth Every 8 (Eight) Hours As Needed for Nausea or Vomiting.     • pantoprazole (PROTONIX) 40 MG EC tablet Take 40 mg by mouth Daily.       No current facility-administered medications on file prior to visit.       Allergies   Allergen Reactions   • Levaquin [Levofloxacin] Myalgia   • Sulfa Antibiotics Hives and Rash        Review of Systems   Constitutional: Negative for activity change, appetite change, chills, diaphoresis, fatigue, fever and unexpected weight change.   HENT: " "Positive for congestion. Negative for dental problem, drooling, ear discharge, ear pain, facial swelling, hearing loss, mouth sores, nosebleeds, postnasal drip, rhinorrhea, sinus pressure, sneezing, sore throat, tinnitus, trouble swallowing and voice change.    Eyes: Positive for discharge and itching. Negative for photophobia, pain, redness and visual disturbance.   Respiratory: Negative for apnea, cough, choking, chest tightness, shortness of breath, wheezing and stridor.    Cardiovascular: Negative for chest pain, palpitations and leg swelling.   Gastrointestinal: Negative for abdominal distention, abdominal pain, anal bleeding, blood in stool, constipation, diarrhea, nausea, rectal pain and vomiting.   Endocrine: Negative for cold intolerance, heat intolerance, polydipsia, polyphagia and polyuria.   Genitourinary: Positive for testicular pain. Negative for decreased urine volume, difficulty urinating, dysuria, enuresis, flank pain, frequency, genital sores, hematuria and urgency.   Musculoskeletal: Positive for arthralgias and joint swelling. Negative for back pain, gait problem, myalgias, neck pain and neck stiffness.   Skin: Negative for color change, pallor, rash and wound.   Allergic/Immunologic: Positive for environmental allergies. Negative for food allergies and immunocompromised state.   Neurological: Negative for dizziness, tremors, seizures, syncope, facial asymmetry, speech difficulty, weakness, light-headedness, numbness and headaches.   Hematological: Negative for adenopathy. Does not bruise/bleed easily.   Psychiatric/Behavioral: Negative for agitation, behavioral problems, confusion, decreased concentration, dysphoric mood, hallucinations, self-injury, sleep disturbance and suicidal ideas. The patient is not nervous/anxious and is not hyperactive.         Objective      Physical Exam  Pulse 86   Ht 185.4 cm (72.99\")   Wt 120 kg (264 lb 9.6 oz)   SpO2 99%   BMI 34.92 kg/m²     Body mass index " is 34.92 kg/m².    General:   Mental Status:  Alert   Appearance: Cooperative, in no acute distress   Build and Nutrition: Overweight male   Orientation: Alert and oriented to person, place and time   Posture: Normal   Gait: Normal    Integument:              Right knee: Well-healed previous incisions              Left knee: no skin lesions, no rash, no ecchymosis     Lower Extremities:              Right Knee:                          Tenderness:    Medial and lateral joint line tenderness                          Effusion:          2+                          Swelling:          None                          Crepitus:          Positive                          Atrophy:           None                          Range of motion:        Extension:       0°                                                              Flexion:           120°  Instability:        No varus laxity, no valgus laxity, positive anterior drawer  Deformities:     Valgus              Left Knee:                          Tenderness:    None                          Effusion:          1+                          Swelling:          None                          Crepitus:          Positive                          Atrophy:           None                          Range of motion:        Extension:       0°                                                              Flexion:           120°  Instability:        No varus laxity, no valgus laxity, positive anterior drawer  Deformities:     Valgus      Assessment and Plan     Nicholas was seen today for follow-up.    Diagnoses and all orders for this visit:    Primary osteoarthritis of right knee  -     Case Request; Standing  -     Instructions on coughing, deep breathing, and incentive spirometry.; Future  -     CBC and Differential; Future  -     Basic metabolic panel; Future  -     Protime-INR; Future  -     APTT; Future  -     Hemoglobin A1c; Future  -     Sedimentation rate; Future  -     C-reactive  protein; Future  -     Case Request    Primary osteoarthritis of left knee  -     Large Joint Arthrocentesis: L knee    Other orders  -     Follow Anesthesia Guidelines / Standing Orders; Future  -     Obtain informed consent  -     Provide instructions to patient regarding NPO status  -     Chlorhexidine Skin Prep - Educate and Review With Patient; Future  -     Provide Patient With ERAS Hydration Instructions  -     Provide Patient With Enhanced Recovery Booklet(s) or Handout  -     Provide Instructions/Handout For Benzoyl Peroxide 5% Wash If Having Shoulder/Arm Surgery (If Prescribed)  -     Provide Instructions/Handout For Bactroban And Chlorhexidine Shower (If Prescribed)  -     Perform A Memory Screening On All Hip/Knee Replacement Patients >Or Equal To 65 Years Or Older  -     Complete A PROMIS And HOOS Or KOOS Survey If Having Hip Or Knee Replacement  -     Provide Patient With Carbo Loading Instructions  -     Provide Patient With ERAS Booklet(s)/Handout  -     Chlorhexidine Gluconate 4 % solution; Shower with hibiclens solution as directed for 5 days prior to surgery  -     mupirocin (BACTROBAN NASAL) 2 % nasal ointment; Apply into the nostril(s) as directed by provider 2 (Two) Times a Day.        1. Primary osteoarthritis of right knee    2. Primary osteoarthritis of left knee        I reviewed my findings with the patient today.  His right knee has progressed to the point where he would like to consider knee replacement surgery.  He has exhausted conservative treatment options.  He is a good candidate.  Please see my counseling note for details.    Regarding his left knee, he would like to have an injection, and this was provided.    Of note, he had 80% relief just few minutes following the injection today.    Surgical Counseling     I have informed the patient of the diagnosis and the prognosis.  Exhaustive conservative treatment modalities have not resulted in long term pain relief.  The symptoms have  progressed to the point of daily pain and inability to perform activities of daily living without significant pain. The patient has reached the point of desiring to proceed with total knee arthroplasty after discussing the risks, benefits and alternatives to the procedure.  The surgical procedure itself was discussed in detail. Risks of the procedure were discussed, which included but are not limited to, bleeding, infection, damage to blood vessels and nerves, incomplete pain relief, loosening of the prosthesis, deep infection, need for further surgery, loss of limb, deep venous thrombosis, pulmonary embolus, death, heart attack, stroke, kidney failure, liver failure, and anesthetic complications.  In addition, the potential for deep infection developing in the future was discussed, which could require further surgery.  The knee would have to be re-opened, debrided, and potentially remove the prosthesis, which may or may not be replaced in the future.  Also, the possibility for loosening of the prosthesis has been mentioned.  If the prosthesis loosened, a revision arthroplasty could be performed, with results that are not as predictable compared to the original procedure.  The typical rehabilitative course has also been discussed, and full recovery may take up to a year to see the maximum benefit.  The importance of patient cooperation in the rehabilitative efforts has also been discussed.  No guarantees whatsoever were given.  The patient understands the potential risks versus the benefits and desires to proceed with total knee arthroplasty at a mutually convenient time.    Return for For surgery as planned.        Medical Decision Making  Management Options : prescription/IM medicine and major surgery with risk factors      Gage Britt MD  02/03/20  10:36 AM

## 2020-05-05 ENCOUNTER — TELEPHONE (OUTPATIENT)
Dept: ORTHOPEDIC SURGERY | Facility: CLINIC | Age: 55
End: 2020-05-05

## 2020-05-05 NOTE — TELEPHONE ENCOUNTER
CALLED PATIENT CONCERNING SCHEDULING TKA. HE HAS AN APPT W/DR JOHNSON 6/8/20 FOR AN INJECTION. HE WOULD LIKE TO WAIT UNTIL MARCH OF 2021 TO RESCHEDULE SX.

## 2020-06-08 ENCOUNTER — OFFICE VISIT (OUTPATIENT)
Dept: ORTHOPEDIC SURGERY | Facility: CLINIC | Age: 55
End: 2020-06-08

## 2020-06-08 VITALS — HEART RATE: 69 BPM | HEIGHT: 73 IN | BODY MASS INDEX: 33.13 KG/M2 | WEIGHT: 250 LBS | OXYGEN SATURATION: 98 %

## 2020-06-08 DIAGNOSIS — M17.11 PRIMARY OSTEOARTHRITIS OF RIGHT KNEE: Primary | ICD-10-CM

## 2020-06-08 DIAGNOSIS — M17.12 PRIMARY OSTEOARTHRITIS OF LEFT KNEE: ICD-10-CM

## 2020-06-08 PROCEDURE — 20610 DRAIN/INJ JOINT/BURSA W/O US: CPT | Performed by: ORTHOPAEDIC SURGERY

## 2020-06-08 RX ORDER — ROPIVACAINE HYDROCHLORIDE 5 MG/ML
4 INJECTION, SOLUTION EPIDURAL; INFILTRATION; PERINEURAL
Status: COMPLETED | OUTPATIENT
Start: 2020-06-08 | End: 2020-06-08

## 2020-06-08 RX ORDER — TRIAMCINOLONE ACETONIDE 40 MG/ML
40 INJECTION, SUSPENSION INTRA-ARTICULAR; INTRAMUSCULAR
Status: COMPLETED | OUTPATIENT
Start: 2020-06-08 | End: 2020-06-08

## 2020-06-08 RX ADMIN — TRIAMCINOLONE ACETONIDE 40 MG: 40 INJECTION, SUSPENSION INTRA-ARTICULAR; INTRAMUSCULAR at 09:24

## 2020-06-08 RX ADMIN — ROPIVACAINE HYDROCHLORIDE 4 ML: 5 INJECTION, SOLUTION EPIDURAL; INFILTRATION; PERINEURAL at 09:24

## 2020-06-08 NOTE — PROGRESS NOTES
[facility]    Subjective     Chief Complaint   Patient presents with   • Follow-up     4 month follow up; Primary osteoarthritis of both knees-last cortisone injection given to the left knee 2/3/20        HPI    It has been 4  month(s) since Mr. Renee's last visit. He returns to clinic today for follow-up of bilateral knee arthritis. He rates his pain a 8/10 on the pain scale. Previous/current treatments: NSAIDS and steroid injection (last injection 2/3/20 for left knee, 9/30/19 for right knee). Current symptoms: pain, swelling, popping, grinding, stiffness and giving way/buckling. The pain is worse with climbing stairs, working and leisure; resting and pain medication and/or NSAID improve the pain. Overall, he is doing the same.  He has decided to postpone surgery till next year, and would like to have his knees injected today.    I have reviewed the following portions of the patient's history:History of Present Illness     Patient Active Problem List   Diagnosis   • HTN (hypertension)   • Prediabetes   • Diverticulitis   • S/P low anterior colon resection   • Acute blood loss anemia, mild, asymptomatic   • Acute postoperative pain   • Primary osteoarthritis of right knee     Past Medical History:   Diagnosis Date   • C. difficile diarrhea    • Diverticulitis    • Esophagitis    • GERD (gastroesophageal reflux disease)    • Hypertension    • Osteoarthritis of both knees    • Wears glasses       Past Surgical History:   Procedure Laterality Date   • ABDOMINAL SURGERY      INFANT (6wk)- STOMACH BLOCKAGE    • COLON RESECTION N/A 7/2/2019    Procedure: COLON RESECTION LAPAROSCOPIC LOW ANTERIOR;  Surgeon: Magan Blum MD;  Location:  [facility];  Service: General   • COLONOSCOPY N/A 7/11/2017    Procedure: COLONOSCOPY;  Surgeon: Magan Blum MD;  Location:  [facility];  Service:    • ENDOSCOPY     • FOOT SURGERY      BILAT    • KNEE SURGERY Bilateral     x 5   • PERIPHERALLY INSERTED  "CENTRAL CATHETER INSERTION  2019    RIGHT ARM       Family History   Problem Relation Age of Onset   • Diabetes Father    • Heart disease Father    • Hypertension Father    • Heart attack Father    • Rheum arthritis Other    • No Known Problems Mother      Social History     Socioeconomic History   • Marital status:      Spouse name: Not on file   • Number of children: Not on file   • Years of education: Not on file   • Highest education level: Not on file   Tobacco Use   • Smoking status: Former Smoker     Packs/day: 0.50     Years: 20.00     Pack years: 10.00     Types: Cigarettes     Last attempt to quit: 2003     Years since quittin.9   • Smokeless tobacco: Current User     Types: Snuff   Substance and Sexual Activity   • Alcohol use: Yes     Comment: socially, \"rarely\"   • Drug use: No   • Sexual activity: Defer      Current Outpatient Medications on File Prior to Visit   Medication Sig Dispense Refill   • acetaminophen (TYLENOL) 500 MG tablet Take 1,000 mg by mouth Every 4 (Four) Hours As Needed.     • cetirizine (zyrTEC) 10 MG tablet Take 10 mg by mouth Daily As Needed.     • Chlorhexidine Gluconate 4 % solution Shower with solution as directed for 5 days prior to surgery 237 mL 0   • gemfibrozil (LOPID) 600 MG tablet Take 600 mg by mouth 2 (Two) Times a Day Before Meals.     • losartan (COZAAR) 100 MG tablet Take 100 mg by mouth Daily.  0   • meloxicam (MOBIC) 15 MG tablet 1 PO Daily with food as needed (Patient taking differently: 1 PO Daily with food as needed- HASNT HAD IN 3 WEEKS) 90 tablet 0   • Multiple Vitamins-Minerals (MULTIVITAMIN ADULT PO) Take 1 tablet by mouth Daily.     • mupirocin (BACTROBAN NASAL) 2 % nasal ointment Apply into the nostril(s) as directed by provider 2 (Two) Times a Day. 22 g 0   • NON FORMULARY Daily. KEFER- 8 OZ - DRINKABLE PROBIOTIC     • ondansetron (ZOFRAN) 4 MG tablet Take 4 mg by mouth Every 8 (Eight) Hours As Needed for Nausea or Vomiting.     • " "pantoprazole (PROTONIX) 40 MG EC tablet Take 40 mg by mouth Daily.       No current facility-administered medications on file prior to visit.       Allergies   Allergen Reactions   • Levaquin [Levofloxacin] Myalgia   • Sulfa Antibiotics Hives and Rash        Review of Systems   Constitutional: Negative.    HENT: Negative.    Eyes: Negative.    Respiratory: Negative.    Cardiovascular: Negative.    Gastrointestinal: Negative.    Endocrine: Negative.    Genitourinary: Negative.    Musculoskeletal: Positive for arthralgias.   Skin: Negative.    Allergic/Immunologic: Negative.    Neurological: Negative.    Hematological: Negative.    Psychiatric/Behavioral: Negative.         Objective      Physical Exam  Pulse 69   Ht 185.4 cm (72.99\")   Wt 113 kg (250 lb)   SpO2 98%   BMI 32.99 kg/m²     Body mass index is 32.99 kg/m².    General:   Mental Status:  Alert   Appearance: Cooperative, in no acute distress   Build and Nutrition: Overweight male   Orientation: Alert and oriented to person, place and time   Posture: Normal   Gait: Normal    Integument:              Right knee: Well-healed previous incisions              Left knee: no skin lesions, no rash, no ecchymosis     Lower Extremities:              Right Knee:                          Tenderness:    Medial and lateral joint line tenderness                          Effusion:          2+                          Swelling:          None                          Crepitus:          Positive                          Atrophy:           None                          Range of motion:        Extension:       0°                                                              Flexion:           120°  Instability:        No varus laxity, no valgus laxity, positive anterior drawer  Deformities:     Valgus              Left Knee:                          Tenderness:    None                          Effusion:          Trace                          Swelling: "          None                          Crepitus:          Positive                          Atrophy:           None                          Range of motion:        Extension:       0°                                                              Flexion:           120°  Instability:        No varus laxity, no valgus laxity, positive anterior drawer  Deformities:     Valgus         Assessment and Plan     Nicholas was seen today for follow-up.    Diagnoses and all orders for this visit:    Primary osteoarthritis of right knee    Primary osteoarthritis of left knee    Other orders  -     Large Joint Arthrocentesis  -     Large Joint Arthrocentesis        1. Primary osteoarthritis of right knee    2. Primary osteoarthritis of left knee        I reviewed my findings with the patient today.  He has decided to postpone surgery till next March, and would like to have his knees injected today.  He has an effusion on his right knee, we will aspirate that and inject that knee, and inject the left knee today.  I will see him back in 4 months, but sooner for any problems.    Of note, he had 85% improvement just a few minutes following the injections today.  I did obtain 60 cc of clear straw-colored fluid from the right knee before injecting.    Return in about 4 months (around 10/8/2020).    Medical Decision Making  Management Options : prescription/IM medicine      aGge Britt MD  06/08/20  09:44    Dragon disclaimer:  Much of this encounter note is an electronic transcription/translation of spoken language to printed text. The electronic translation of spoken language may permit erroneous, or at times, nonsensical words or phrases to be inadvertently transcribed; Although I have reviewed the note for such errors, some may still exist.

## 2020-06-08 NOTE — PROGRESS NOTES
Procedure   Large Joint Arthrocentesis: L knee  Date/Time: 6/8/2020 9:24 AM  Consent given by: patient  Site marked: site marked  Timeout: Immediately prior to procedure a time out was called to verify the correct patient, procedure, equipment, support staff and site/side marked as required   Supporting Documentation  Indications: pain   Procedure Details  Location: knee - L knee  Preparation: Patient was prepped and draped in the usual sterile fashion  Needle size: 22 G  Approach: anterolateral  Medications administered: 4 mL ropivacaine 0.5 %; 40 mg triamcinolone acetonide 40 MG/ML  Patient tolerance: patient tolerated the procedure well with no immediate complications    Large Joint Arthrocentesis: R knee  Date/Time: 6/8/2020 9:24 AM  Consent given by: patient  Site marked: site marked  Timeout: Immediately prior to procedure a time out was called to verify the correct patient, procedure, equipment, support staff and site/side marked as required   Supporting Documentation  Indications: pain   Procedure Details  Location: knee - R knee  Preparation: Patient was prepped and draped in the usual sterile fashion  Needle size: 22 G  Approach: anterolateral  Medications administered: 4 mL ropivacaine 0.5 %; 40 mg triamcinolone acetonide 40 MG/ML  Aspirate amount: 60 mL  Aspirate: clear and yellow  Patient tolerance: patient tolerated the procedure well with no immediate complications

## 2020-10-19 ENCOUNTER — OFFICE VISIT (OUTPATIENT)
Dept: ORTHOPEDIC SURGERY | Facility: CLINIC | Age: 55
End: 2020-10-19

## 2020-10-19 VITALS — WEIGHT: 254 LBS | HEART RATE: 77 BPM | HEIGHT: 73 IN | OXYGEN SATURATION: 98 % | BODY MASS INDEX: 33.66 KG/M2

## 2020-10-19 DIAGNOSIS — M17.0 PRIMARY OSTEOARTHRITIS OF BOTH KNEES: Primary | ICD-10-CM

## 2020-10-19 PROCEDURE — 20610 DRAIN/INJ JOINT/BURSA W/O US: CPT | Performed by: ORTHOPAEDIC SURGERY

## 2020-10-19 RX ORDER — ROPIVACAINE HYDROCHLORIDE 5 MG/ML
4 INJECTION, SOLUTION EPIDURAL; INFILTRATION; PERINEURAL
Status: COMPLETED | OUTPATIENT
Start: 2020-10-19 | End: 2020-10-19

## 2020-10-19 RX ORDER — TRIAMCINOLONE ACETONIDE 40 MG/ML
40 INJECTION, SUSPENSION INTRA-ARTICULAR; INTRAMUSCULAR
Status: COMPLETED | OUTPATIENT
Start: 2020-10-19 | End: 2020-10-19

## 2020-10-19 RX ADMIN — TRIAMCINOLONE ACETONIDE 40 MG: 40 INJECTION, SUSPENSION INTRA-ARTICULAR; INTRAMUSCULAR at 09:54

## 2020-10-19 RX ADMIN — ROPIVACAINE HYDROCHLORIDE 4 ML: 5 INJECTION, SOLUTION EPIDURAL; INFILTRATION; PERINEURAL at 09:54

## 2020-10-19 NOTE — PROGRESS NOTES
[facility] [place] [facility] Note    Subjective     Chief Complaint   Patient presents with   • Follow-up     4 months follow up Primary osteoarthritis of both knees        HPI    It has been 4  month(s) since Mr. Renee's last visit. He returns to clinic today for follow-up of bilateral knee arthritis. He rates his pain a 8/10 on the pain scale. Previous/current treatments: NSAIDS. Current symptoms: same as prior visit. The pain is worse with walking, working and leisure; resting and pain medication and/or NSAID improve the pain. Overall, he is doing the same.  Last injections provided relief.  He is still thinking about waiting for surgery until March next year.    I have reviewed the following portions of the patient's history and agree with: History of Present Illness and Review of Systems    Patient Active Problem List   Diagnosis   • HTN (hypertension)   • Prediabetes   • Diverticulitis   • S/P low anterior colon resection   • Acute blood loss anemia, mild, asymptomatic   • Acute postoperative pain   • Primary osteoarthritis of right knee     Past Medical History:   Diagnosis Date   • C. difficile diarrhea    • Diverticulitis    • Esophagitis    • GERD (gastroesophageal reflux disease)    • Hypertension    • Osteoarthritis of both knees    • Wears glasses       Past Surgical History:   Procedure Laterality Date   • ABDOMINAL SURGERY      INFANT (6wk)- STOMACH BLOCKAGE    • COLON RESECTION N/A 7/2/2019    Procedure: COLON RESECTION LAPAROSCOPIC LOW ANTERIOR;  Surgeon: Magan Blum MD;  Location: [facility] OR;  Service: General   • COLONOSCOPY N/A 7/11/2017    Procedure: COLONOSCOPY;  Surgeon: Magan Blum MD;  Location: [facility] ENDOSCOPY;  Service:    • ENDOSCOPY     • FOOT SURGERY      BILAT    • KNEE SURGERY Bilateral     x 5   • PERIPHERALLY INSERTED CENTRAL CATHETER INSERTION  06/13/2019    RIGHT ARM       Family History   Problem Relation Age of Onset   • Diabetes Father    • Heart disease Father     "  • Hypertension Father    • Heart attack Father    • Rheum arthritis Other    • No Known Problems Mother      Social History     Socioeconomic History   • Marital status:      Spouse name: Not on file   • Number of children: Not on file   • Years of education: Not on file   • Highest education level: Not on file   Tobacco Use   • Smoking status: Former Smoker     Packs/day: 0.50     Years: 20.00     Pack years: 10.00     Types: Cigarettes     Quit date: 2003     Years since quittin.2   • Smokeless tobacco: Current User     Types: Snuff   Substance and Sexual Activity   • Alcohol use: Yes     Comment: socially, \"rarely\"   • Drug use: No   • Sexual activity: Defer      Current Outpatient Medications on File Prior to Visit   Medication Sig Dispense Refill   • acetaminophen (TYLENOL) 500 MG tablet Take 1,000 mg by mouth Every 4 (Four) Hours As Needed.     • cetirizine (zyrTEC) 10 MG tablet Take 10 mg by mouth Daily As Needed.     • Chlorhexidine Gluconate 4 % solution Shower with solution as directed for 5 days prior to surgery 237 mL 0   • gemfibrozil (LOPID) 600 MG tablet Take 600 mg by mouth 2 (Two) Times a Day Before Meals.     • losartan (COZAAR) 100 MG tablet Take 100 mg by mouth Daily.  0   • meloxicam (MOBIC) 15 MG tablet 1 PO Daily with food as needed (Patient taking differently: 1 PO Daily with food as needed- HASNT HAD IN 3 WEEKS) 90 tablet 0   • Multiple Vitamins-Minerals (MULTIVITAMIN ADULT PO) Take 1 tablet by mouth Daily.     • mupirocin (BACTROBAN NASAL) 2 % nasal ointment Apply into the nostril(s) as directed by provider 2 (Two) Times a Day. 22 g 0   • NON FORMULARY Daily. KEFER- 8 OZ - DRINKABLE PROBIOTIC     • ondansetron (ZOFRAN) 4 MG tablet Take 4 mg by mouth Every 8 (Eight) Hours As Needed for Nausea or Vomiting.     • pantoprazole (PROTONIX) 40 MG EC tablet Take 40 mg by mouth Daily.       No current facility-administered medications on file prior to visit.       Allergies " "  Allergen Reactions   • Levaquin [Levofloxacin] Myalgia   • Sulfa Antibiotics Hives and Rash        Review of Systems   Constitutional: Negative.    HENT: Negative.    Eyes: Negative.    Respiratory: Negative.    Cardiovascular: Negative.    Gastrointestinal: Negative.    Endocrine: Negative.    Genitourinary: Negative.    Musculoskeletal: Positive for arthralgias.   Skin: Negative.    Allergic/Immunologic: Negative.    Neurological: Negative.    Hematological: Negative.    Psychiatric/Behavioral: Negative.         Objective      Physical Exam  Pulse 77   Ht 185.4 cm (72.99\")   Wt 115 kg (254 lb)   SpO2 98%   BMI 33.52 kg/m²     Body mass index is 33.52 kg/m².    General:   Mental Status:  Alert   Appearance: Cooperative, in no acute distress   Build and Nutrition: Overweight male   Orientation: Alert and oriented to person, place and time   Posture: Normal   Gait: Normal    Integument:              Right knee: Well-healed previous incisions              Left knee: no skin lesions, no rash, no ecchymosis     Lower Extremities:              Right Knee:                          Tenderness:    None                          Effusion:          2+                          Swelling:          None                          Crepitus:          Positive                          Atrophy:           None                          Range of motion:        Extension:       0°                                                              Flexion:           120°  Instability:        No varus laxity, no valgus laxity, positive anterior drawer  Deformities:     Valgus              Left Knee:                          Tenderness:    None                          Effusion:          Trace                          Swelling:          None                          Crepitus:          Positive                          Atrophy:           None                          Range of motion: "        Extension:       0°                                                              Flexion:           120°  Instability:        No varus laxity, no valgus laxity, positive anterior drawer  Deformities:     Valgus      Assessment and Plan     Diagnoses and all orders for this visit:    1. Primary osteoarthritis of both knees (Primary)  -     Large Joint Arthrocentesis: R knee  -     Large Joint Arthrocentesis: L knee    Other orders  -     Cancel: Nerve Block        1. Primary osteoarthritis of both knees        I reviewed my findings with the patient today.  He is delaying surgery until next year, around March timeframe.  He would like injections today, these were provided.  Aspiration injection was performed on the right knee, and injection of the left knee.  I will see him back in 3 months, but sooner for any problems.    Procedure Note:  The potential benefits of performing a therapeutic bilateral knee joint injections, as well as potential risks (including, but not limited to infection, swelling, pain, bleeding, bruising, nerve/blood vessel damage, skin color changes, transient elevation in blood glucose levels, and fat atrophy) were discussed with the patient.  After informed consent, timeout procedure was performed, and the skin on the right and left knees was prepped with chlorhexidine soap and alcohol, after which ethyl chloride was applied to the skin at the injection site.  47 cc of thick clear straw-colored fluid was aspirated from the right knee.  1ml of Kenalog 40mg/ml mixed with 4ml 0.5% ropivacaine plain was injected into the knee joints.  The patient tolerated the procedures well, experiencing 80% improvement in the right knee and 80% improvement in the left knee a few minutes following the injections.T here were no complications.  Band-Aids were applied to the injection sites. Post-procedural instructions were given to the patient and/or their caregiver.      Return in about 3 months (around  1/19/2021).    Medical Decision Making  Management Options : prescription/IM medicine        Gage Britt MD  10/19/20  10:15 EDT    Dragon disclaimer:  Much of this encounter note is an electronic transcription/translation of spoken language to printed text. The electronic translation of spoken language may permit erroneous, or at times, nonsensical words or phrases to be inadvertently transcribed; Although I have reviewed the note for such errors, some may still exist.

## 2020-10-19 NOTE — PROGRESS NOTES
Procedure   Large Joint Arthrocentesis: R knee  Date/Time: 10/19/2020 9:54 AM  Consent given by: patient  Site marked: site marked  Timeout: Immediately prior to procedure a time out was called to verify the correct patient, procedure, equipment, support staff and site/side marked as required   Supporting Documentation  Indications: pain   Procedure Details  Location: knee - R knee  Preparation: Patient was prepped and draped in the usual sterile fashion  Needle size: 18 G  Medications administered: 4 mL ropivacaine 0.5 %; 40 mg triamcinolone acetonide 40 MG/ML  Aspirate amount: 47 mL  Aspirate: clear and yellow  Patient tolerance: patient tolerated the procedure well with no immediate complications    Large Joint Arthrocentesis: L knee  Date/Time: 10/19/2020 9:54 AM  Consent given by: patient  Site marked: site marked  Timeout: Immediately prior to procedure a time out was called to verify the correct patient, procedure, equipment, support staff and site/side marked as required   Supporting Documentation  Indications: pain   Procedure Details  Location: knee - L knee  Preparation: Patient was prepped and draped in the usual sterile fashion  Needle size: 22 G  Approach: anterolateral  Medications administered: 4 mL ropivacaine 0.5 %; 40 mg triamcinolone acetonide 40 MG/ML  Patient tolerance: patient tolerated the procedure well with no immediate complications

## 2021-01-20 ENCOUNTER — OFFICE VISIT (OUTPATIENT)
Dept: ORTHOPEDIC SURGERY | Facility: CLINIC | Age: 56
End: 2021-01-20

## 2021-01-20 VITALS — HEART RATE: 91 BPM | BODY MASS INDEX: 33.27 KG/M2 | OXYGEN SATURATION: 98 % | WEIGHT: 251 LBS | HEIGHT: 73 IN

## 2021-01-20 DIAGNOSIS — M17.12 PRIMARY OSTEOARTHRITIS OF LEFT KNEE: ICD-10-CM

## 2021-01-20 DIAGNOSIS — M17.11 PRIMARY OSTEOARTHRITIS OF RIGHT KNEE: Primary | ICD-10-CM

## 2021-01-20 PROCEDURE — 99213 OFFICE O/P EST LOW 20 MIN: CPT | Performed by: ORTHOPAEDIC SURGERY

## 2021-01-20 RX ORDER — PREGABALIN 150 MG/1
150 CAPSULE ORAL ONCE
Status: CANCELLED | OUTPATIENT
Start: 2021-01-20 | End: 2021-01-20

## 2021-01-20 RX ORDER — ACETAMINOPHEN 325 MG/1
1000 TABLET ORAL ONCE
Status: CANCELLED | OUTPATIENT
Start: 2021-01-20 | End: 2021-01-20

## 2021-01-20 RX ORDER — MELOXICAM 7.5 MG/1
15 TABLET ORAL ONCE
Status: CANCELLED | OUTPATIENT
Start: 2021-01-20 | End: 2021-01-20

## 2021-01-20 NOTE — PROGRESS NOTES
[facility] [place] [place]    Subjective     Chief Complaint   Patient presents with   • Follow-up     3 months follow up Primary osteoarthritis of both knees         HPI    It has been 3  month(s) since Mr. Renee's last visit. He returns to clinic today for follow-up of bilateral knee arthritis. He rates his pain a 8/10 on the pain scale. Previous/current treatments: NSAIDS. Current symptoms: pain, swelling, grinding, stiffness and giving way/buckling. The pain is worse with walking, climbing stairs, working and leisure; pain medication and/or NSAID improve the pain. Overall, he is doing the same.  He would like to proceed with right total knee arthroplasty surgery at this time.  He has exhausted conservative treatment options.  No history of clots nor clotting disorders.    I have reviewed the following portions of the patient's history and agree with: History of Present Illness and Review of Systems    Patient Active Problem List   Diagnosis   • HTN (hypertension)   • Prediabetes   • Diverticulitis   • S/P low anterior colon resection   • Acute blood loss anemia, mild, asymptomatic   • Acute postoperative pain   • Primary osteoarthritis of right knee     Past Medical History:   Diagnosis Date   • C. difficile diarrhea    • Diverticulitis    • Esophagitis    • GERD (gastroesophageal reflux disease)    • Hypertension    • Osteoarthritis of both knees    • Wears glasses       Past Surgical History:   Procedure Laterality Date   • ABDOMINAL SURGERY      INFANT (6wk)- STOMACH BLOCKAGE    • COLON RESECTION N/A 7/2/2019    Procedure: COLON RESECTION LAPAROSCOPIC LOW ANTERIOR;  Surgeon: Magan Blum MD;  Location:  [facility] OR;  Service: General   • COLONOSCOPY N/A 7/11/2017    Procedure: COLONOSCOPY;  Surgeon: Magan Blum MD;  Location:  [facility] ENDOSCOPY;  Service:    • ENDOSCOPY     • FOOT SURGERY      BILAT    • KNEE SURGERY Bilateral     x 5   • PERIPHERALLY INSERTED CENTRAL CATHETER INSERTION   "2019    RIGHT ARM       Family History   Problem Relation Age of Onset   • Diabetes Father    • Heart disease Father    • Hypertension Father    • Heart attack Father    • Rheum arthritis Other    • No Known Problems Mother      Social History     Socioeconomic History   • Marital status:      Spouse name: Not on file   • Number of children: Not on file   • Years of education: Not on file   • Highest education level: Not on file   Tobacco Use   • Smoking status: Former Smoker     Packs/day: 0.50     Years: 20.00     Pack years: 10.00     Types: Cigarettes     Quit date: 2003     Years since quittin.5   • Smokeless tobacco: Current User     Types: Snuff   Substance and Sexual Activity   • Alcohol use: Yes     Comment: socially, \"rarely\"   • Drug use: No   • Sexual activity: Defer      Current Outpatient Medications on File Prior to Visit   Medication Sig Dispense Refill   • acetaminophen (TYLENOL) 500 MG tablet Take 1,000 mg by mouth Every 4 (Four) Hours As Needed.     • cetirizine (zyrTEC) 10 MG tablet Take 10 mg by mouth Daily As Needed.     • Chlorhexidine Gluconate 4 % solution Shower with solution as directed for 5 days prior to surgery 237 mL 0   • FIBER PO Take  by mouth.     • gemfibrozil (LOPID) 600 MG tablet Take 600 mg by mouth 2 (Two) Times a Day Before Meals.     • losartan (COZAAR) 100 MG tablet Take 100 mg by mouth Daily.  0   • meloxicam (MOBIC) 15 MG tablet 1 PO Daily with food as needed (Patient taking differently: 1 PO Daily with food as needed- HASNT HAD IN 3 WEEKS) 90 tablet 0   • Multiple Vitamins-Minerals (MULTIVITAMIN ADULT PO) Take 1 tablet by mouth Daily.     • mupirocin (BACTROBAN NASAL) 2 % nasal ointment Apply into the nostril(s) as directed by provider 2 (Two) Times a Day. 22 g 0   • NON FORMULARY Daily. KEFER- 8 OZ - DRINKABLE PROBIOTIC     • ondansetron (ZOFRAN) 4 MG tablet Take 4 mg by mouth Every 8 (Eight) Hours As Needed for Nausea or Vomiting.     • " "pantoprazole (PROTONIX) 40 MG EC tablet Take 40 mg by mouth Daily.     • Probiotic Product (PROBIOTIC PO) Take  by mouth.       No current facility-administered medications on file prior to visit.       Allergies   Allergen Reactions   • Levaquin [Levofloxacin] Myalgia   • Sulfa Antibiotics Hives and Rash        Review of Systems   Constitutional: Negative.    HENT: Negative.    Eyes: Negative.    Respiratory: Negative.    Cardiovascular: Negative.    Gastrointestinal: Negative.    Endocrine: Negative.    Genitourinary: Negative.    Musculoskeletal: Positive for arthralgias.   Skin: Negative.    Allergic/Immunologic: Negative.    Neurological: Negative.    Hematological: Negative.    Psychiatric/Behavioral: Negative.         Objective      Physical Exam  Pulse 91   Ht 185.4 cm (72.99\")   Wt 114 kg (251 lb)   SpO2 98%   BMI 33.12 kg/m²     Body mass index is 33.12 kg/m².    General:   Mental Status:  Alert   Appearance: Cooperative, in no acute distress   Build and Nutrition: Overweight male   Orientation: Alert and oriented to person, place and time   Posture: Normal   Gait: Normal    Integument:              Right knee: Well-healed previous incisions              Left knee: no skin lesions, no rash, no ecchymosis     Lower Extremities:              Right Knee:                          Tenderness:    Medial and lateral joint line tenderness                          Effusion:          2+                          Swelling:          None                          Crepitus:          Positive                          Atrophy:           None                          Range of motion:        Extension:       0°                                                              Flexion:           120°  Instability:        No varus laxity, no valgus laxity, positive anterior drawer  Deformities:     Valgus              Left Knee:                          Tenderness:    Medial and lateral joint line " tenderness                          Effusion:          Trace                          Swelling:          None                          Crepitus:          Positive                          Atrophy:           None                          Range of motion:        Extension:       0°                                                              Flexion:           120°  Instability:        No varus laxity, no valgus laxity, positive anterior drawer  Deformities:     Valgus    Imaging/Studies  Imaging Results (Last 24 Hours)     Procedure Component Value Units Date/Time    XR Knee 4+ View Bilateral [[id number]] Resulted: 01/20/21 1000     Updated: 01/20/21 1002    Narrative:      Right Knee Radiographs  Indication: right knee pain  Views: Standing AP's and skiers of both knees, with lateral and sunrise   views of the right knee    Comparison: 9/30/2019    Findings:   Advanced arthritic changes, previous evidence of ACL reconstruction, with   retained hardware, with no acute bony abnormalities.  Mild worsening   compared to previous films.    Left Knee Radiographs  Indication: left knee pain  Views: Standing AP's and skiers of both knees, with lateral and sunrise   views of the left knee    Comparison: 9/30/2019    Findings:   Advanced arthritic changes, tricompartmental osteophytes, no acute bony   abnormalities.  Relatively unchanged from the previous films.            Assessment and Plan     Diagnoses and all orders for this visit:    1. Primary osteoarthritis of right knee (Primary)  -     XR Knee 4+ View Bilateral  -     Case Request; Standing  -     Instructions on coughing, deep breathing, and incentive spirometry.; Future  -     CBC and Differential; Future  -     Basic metabolic panel; Future  -     Protime-INR; Future  -     APTT; Future  -     Hemoglobin A1c; Future  -     Sedimentation rate; Future  -     C-reactive protein; Future  -     Tranexamic Acid 1,000 mg in sodium chloride 0.9 % 100 mL  -     Tranexamic  Acid 1,000 mg in sodium chloride 0.9 % 100 mL  -     ceFAZolin (ANCEF) 2 g in sodium chloride 0.9 % 100 mL IVPB  -     acetaminophen (TYLENOL) tablet 975 mg  -     meloxicam (MOBIC) tablet 15 mg  -     pregabalin (LYRICA) capsule 150 mg  -     mupirocin (BACTROBAN) 2 % nasal ointment 1 application  -     Case Request    2. Primary osteoarthritis of left knee  -     XR Knee 4+ View Bilateral    Other orders  -     Outpatient In A Bed; Standing  -     Follow Anesthesia Guidelines / Standing Orders; Future  -     Obtain informed consent  -     Provide instructions to patient regarding NPO status  -     Chlorhexidine Skin Prep - Educate and Review With Patient; Future  -     Provide Patient With ERAS Hydration Instructions  -     Provide Patient With Enhanced Recovery Booklet(s) or Handout  -     Provide Instructions/Handout For Benzoyl Peroxide 5% Wash If Having Shoulder/Arm Surgery (If Prescribed)  -     Provide Instructions/Handout For Bactroban And Chlorhexidine Shower (If Prescribed)  -     Perform A Memory Screening On All Hip/Knee Replacement Patients >Or Equal To 65 Years Or Older  -     Complete A PROMIS And HOOS Or KOOS Survey If Having Hip Or Knee Replacement  -     Follow Anesthesia Guidelines / Standing Orders; Standing  -     Nerve Block; Standing  -     Verify NPO Status; Standing  -     Verify The Time Patient Completed ERAS Hydration Drink; Standing  -     SCD (sequential compression device)- to be placed on patient in Pre-op; Standing  -     POC Glucose Once; Standing  -     Clip operative site; Standing  -     Obtain informed consent (if not collected inpatient or PAT); Standing  -     Notify Physician - Standard; Standing  -     Provide Patient With Carbo Loading Instructions  -     Provide Patient With ERAS Booklet(s)/Handout  -     mupirocin (Bactroban Nasal) 2 % nasal ointment; into the nostril(s) as directed by provider 2 (Two) Times a Day.  Dispense: 22 g; Refill: 0  -     chlorhexidine  (HIBICLENS) 4 % external liquid; Apply  topically to the appropriate area as directed Daily. Shower with hibiclens solution as directed for 5 days prior to surgery  Dispense: 236 mL; Refill: 0        1. Primary osteoarthritis of right knee    2. Primary osteoarthritis of left knee        I reviewed my findings with the patient today.  He has reached a point where he would like to proceed with right total knee arthroplasty surgery.  He has exhausted conservative treatment options.  He was actually scheduled last year, but canceled because of COVID-19.  Please see my counseling note for details.  He would like to proceed in March of this year.    Surgical Counseling     I have informed the patient of the diagnosis and the prognosis.  Exhaustive conservative treatment modalities have not resulted in long term pain relief.  The symptoms have progressed to the point of daily pain and inability to perform activities of daily living without significant pain. The patient has reached the point of desiring to proceed with total knee arthroplasty after discussing the risks, benefits and alternatives to the procedure.  The surgical procedure itself was discussed in detail. Risks of the procedure were discussed, which included but are not limited to, bleeding, infection, damage to blood vessels and nerves, incomplete pain relief, loosening of the prosthesis (early or late), deep infection (early or late), need for further surgery, loss of limb, deep venous thrombosis, pulmonary embolus, death, heart attack, stroke, kidney failure, liver failure, and anesthetic complications.  In addition, the potential for deep infection developing in the future was discussed, which could require further surgery.  The knee would have to be re-opened, debrided, and potentially remove the prosthesis, which may or may not be replaced in the future.  Also, the possibility for loosening of the prosthesis has been mentioned.  If the prosthesis  loosened, a revision arthroplasty could be performed, with results that are not as predictable compared to the original procedure.  The typical rehabilitative course has also been discussed, and full recovery may take up to a year to see the maximum benefit.  The importance of patient cooperation in the rehabilitative efforts has also been discussed.  No guarantees were given.  The patient understands the potential risks versus the benefits and desires to proceed with total knee arthroplasty at a mutually convenient time.    Return for surgery.    Medical Decision Making  Management Options : major surgery with risk factors  Data/Risk: radiology tests and independent visualization of imaging, lab tests, or EMG/NCV    Gage Britt MD  01/20/21  10:11 BESS Pandey disclaimer:  Much of this encounter note is an electronic transcription/translation of spoken language to printed text. The electronic translation of spoken language may permit erroneous, or at times, nonsensical words or phrases to be inadvertently transcribed; Although I have reviewed the note for such errors, some may still exist.

## 2021-02-25 ENCOUNTER — APPOINTMENT (OUTPATIENT)
Dept: PREADMISSION TESTING | Facility: HOSPITAL | Age: 56
End: 2021-02-25

## 2021-03-09 ENCOUNTER — APPOINTMENT (OUTPATIENT)
Dept: PREADMISSION TESTING | Facility: HOSPITAL | Age: 56
End: 2021-03-09

## 2021-08-31 ENCOUNTER — OFFICE VISIT (OUTPATIENT)
Dept: ORTHOPEDIC SURGERY | Facility: CLINIC | Age: 56
End: 2021-08-31

## 2021-08-31 VITALS
SYSTOLIC BLOOD PRESSURE: 155 MMHG | WEIGHT: 254.2 LBS | HEIGHT: 73 IN | HEART RATE: 120 BPM | DIASTOLIC BLOOD PRESSURE: 103 MMHG | BODY MASS INDEX: 33.69 KG/M2

## 2021-08-31 DIAGNOSIS — M25.512 LEFT SHOULDER PAIN, UNSPECIFIED CHRONICITY: Primary | ICD-10-CM

## 2021-08-31 DIAGNOSIS — IMO0002 DISORDER OF ROTATOR CUFF SYNDROME OF LEFT SHOULDER AND ALLIED DISORDER: ICD-10-CM

## 2021-08-31 DIAGNOSIS — M54.2 CERVICALGIA: ICD-10-CM

## 2021-08-31 PROCEDURE — 99214 OFFICE O/P EST MOD 30 MIN: CPT | Performed by: ORTHOPAEDIC SURGERY

## 2021-08-31 NOTE — PROGRESS NOTES
[facility] [place] [facility] Note        Subjective     Pain of the Left Shoulder      HPI    Nicholas Renee is a 56 y.o. male who presents with new problem of: left shoulder pain.  Onset: atraumatic and gradual in nature. The issue has been ongoing for 5 month(s). Pain is a 6/10 on the pain scale. Pain is described as burning. Associated symptoms include pain. The pain is worse with walking, working and leisure; resting, heat and pain medication and/or NSAID improve the pain. Previous treatments have included: NSAIDS, physical therapy and oral steroids.    I have reviewed the following portions of the patient's history:History of Present Illness and review of systems.        Patient here today for new problem day regarding his left shoulder.  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  Since that time he had posterior shoulder pain.  He has burning sensation to the pain.  He has been to a chiropractor, NEYMAR KIRKLAND in [place], and has received oral steroids.  None of these have helped him.  He tried anti-inflammatories as well.  He is right-hand dominant.    Past Medical History:   Diagnosis Date   • C. difficile diarrhea    • Diverticulitis    • Esophagitis    • GERD (gastroesophageal reflux disease)    • Hypertension    • Osteoarthritis of both knees    • Wears glasses       Past Surgical History:   Procedure Laterality Date   • ABDOMINAL SURGERY      INFANT (6wk)- STOMACH BLOCKAGE    • COLON RESECTION N/A 7/2/2019    Procedure: COLON RESECTION LAPAROSCOPIC LOW ANTERIOR;  Surgeon: Magan Blum MD;  Location:  [facility];  Service: General   • COLONOSCOPY N/A 7/11/2017    Procedure: COLONOSCOPY;  Surgeon: Magan Blum MD;  Location:  [facility];  Service:    • ENDOSCOPY     • FOOT SURGERY      BILAT    • KNEE SURGERY Bilateral     x 5   • PERIPHERALLY INSERTED CENTRAL CATHETER INSERTION  06/13/2019    RIGHT ARM       Family History   Problem  "Relation Age of Onset   • Diabetes Father    • Heart disease Father    • Hypertension Father    • Heart attack Father    • Rheum arthritis Other    • No Known Problems Mother      Social History     Socioeconomic History   • Marital status:      Spouse name: Not on file   • Number of children: Not on file   • Years of education: Not on file   • Highest education level: Not on file   Tobacco Use   • Smoking status: Former Smoker     Packs/day: 0.50     Years: 20.00     Pack years: 10.00     Types: Cigarettes     Quit date: 2003     Years since quittin.1   • Smokeless tobacco: Current User     Types: Snuff   Substance and Sexual Activity   • Alcohol use: Yes     Comment: socially, \"rarely\"   • Drug use: No   • Sexual activity: Defer      Current Outpatient Medications on File Prior to Visit   Medication Sig Dispense Refill   • acetaminophen (TYLENOL) 500 MG tablet Take 1,000 mg by mouth Every 4 (Four) Hours As Needed.     • cetirizine (zyrTEC) 10 MG tablet Take 10 mg by mouth Daily As Needed.     • FIBER PO Take  by mouth.     • gemfibrozil (LOPID) 600 MG tablet Take 600 mg by mouth 2 (Two) Times a Day Before Meals.     • losartan (COZAAR) 100 MG tablet Take 100 mg by mouth Daily.  0   • meloxicam (MOBIC) 15 MG tablet 1 PO Daily with food as needed (Patient taking differently: 1 PO Daily with food as needed- HASNT HAD IN 3 WEEKS) 90 tablet 0   • Multiple Vitamins-Minerals (MULTIVITAMIN ADULT PO) Take 1 tablet by mouth Daily.     • pantoprazole (PROTONIX) 40 MG EC tablet Take 40 mg by mouth Daily.     • Probiotic Product (PROBIOTIC PO) Take  by mouth.     • [DISCONTINUED] Chlorhexidine Gluconate 4 % solution Shower with solution as directed for 5 days prior to surgery 237 mL 0   • [DISCONTINUED] Chlorhexidine Gluconate 4 % solution Shower with hibiclens solution as directed for 5 days prior to surgery 237 mL 0   • [DISCONTINUED] mupirocin (BACTROBAN NASAL) 2 % nasal ointment Apply into the nostril(s) " "as directed by provider 2 (Two) Times a Day. 22 g 0   • [DISCONTINUED] mupirocin (BACTROBAN) 2 % ointment Apply into the nostril(s) as directed by provider 2 (Two) Times a Day. 22 g 0   • [DISCONTINUED] NON FORMULARY Daily. KEFER- 8 OZ - DRINKABLE PROBIOTIC     • [DISCONTINUED] ondansetron (ZOFRAN) 4 MG tablet Take 4 mg by mouth Every 8 (Eight) Hours As Needed for Nausea or Vomiting.       No current facility-administered medications on file prior to visit.      Allergies   Allergen Reactions   • Levaquin [Levofloxacin] Myalgia   • Sulfa Antibiotics Hives and Rash          Review of Systems   Constitutional: Negative.    HENT: Negative.    Eyes: Negative.    Respiratory: Negative.    Cardiovascular: Negative.    Gastrointestinal: Negative.    Endocrine: Negative.    Genitourinary: Negative.    Musculoskeletal: Positive for arthralgias.   Skin: Negative.    Allergic/Immunologic: Negative.    Neurological: Negative.    Hematological: Negative.    Psychiatric/Behavioral: Negative.         I reviewed the patient's chief complaint, history of present illness, review of systems, past medical history, surgical history, family history, social history, medications and allergy list.        Objective      Physical Exam  BP (!) 155/103   Pulse 120   Ht 185.4 cm (72.99\")   Wt 115 kg (254 lb 3.2 oz)   BMI 33.54 kg/m²     Body mass index is 33.54 kg/m².    General  Mental Status - alert  General Appearance - cooperative, well groomed, not in acute distress  Orientation - Oriented X3  Build & Nutrition - well developed and well nourished  Posture - normal posture  Gait - normal gait       Ortho Exam  Musculoskeletal   Upper Extremity   Left Shoulder     Inspection and Palpation:     Medial Border Scapular Tenderness-none    AC Joint Tenderness -minimal    Sensation is normal    Examination reveals no ecchymosis.        Strength and Tone:    Supraspinatus - 5/5 without pain    External Rotators-5/5 without pain    Infraspinatus " - 5/5    Subscapularis - 5/5 without pain    Deltoid - 5/5     Range of Motion      Left Shoulder:    Internal Rotation: ROM - L4    External Rotation: AROM - 60 degrees    Elevation through flexion: AROM - 140 degrees        Impingement   Left shoulder    Louie-Rafa impingement test negative    Neer impingement test negative     Functional Testing   Left shoulder    AC crossover adduction test negative    Speeds test negative    Uppercut test negative    O'Briens test negative    Drop arm sign negative    Apprehension relocation negative        Imaging/Studies  Imaging Results (Last 24 Hours)     Procedure Component Value Units Date/Time    XR Shoulder 2+ View Left [[id number]] Resulted: 08/31/21 0847     Updated: 08/31/21 0849    Narrative:      Left Shoulder X-Ray    Indication: Pain    Study:  AP, axillary lateral, and scapular Y views    Comparison: None    Findings:  No acute fractures are visualized  No bony lesions are visualized.  Normal soft tissue appearance  AC joint: Moderate joint space narrowing  Glenohumeral joint: Mild joint space narrowing  Acromion type: 2      Impression:    No acute bony abnormalities noted  Type II acromion  Moderate AC joint space narrowing              Assessment    Assessment:  1. Left shoulder pain, unspecified chronicity    2. Cervicalgia    3. Disorder of rotator cuff syndrome of left shoulder and allied disorder        Plan:  1. Continue over-the-counter medication as needed for discomfort  2. Cervicalgia and left shoulder pain--we had a lengthy discussion regarding treatment options and alternatives.  At this point, he is failed fairly significant course of nonoperative treatment modalities.  I recommended that he get an MRI of his cervical spine and left shoulder.  I will see him back to review the studies.  My sense is that he has more of a cervical spine issue than a shoulder issue but we will let the MRI results dictate next steps in treatment.        Bryce  Gustavo Souza MD  08/31/21  09:06 EDT    Dragon disclaimer:  Much of this encounter note is an electronic transcription/translation of spoken language to printed text. The electronic translation of spoken language may permit erroneous, or at times, nonsensical words or phrases to be inadvertently transcribed; Although I have reviewed the note for such errors, some may still exist.

## 2021-09-21 ENCOUNTER — TELEPHONE (OUTPATIENT)
Dept: ORTHOPEDIC SURGERY | Facility: CLINIC | Age: 56
End: 2021-09-21

## 2021-09-21 NOTE — TELEPHONE ENCOUNTER
Caller: HOUSTON SIMON    Relationship: SELF    Best call back number: [phone]    What form or medical record are you requesting:   INSURANCE IS ASKING FOR A CALL TO GET A MORE DETAILED REPORT ON WHY THE MRI IS NEEDED.      PT REPORTS INSURANCE ENDS ON THE 30TH OF SEPT AND HE WANTS TO GET THIS TAKEN CARE WHILE HE HAS COVERAGE     MRI IS SCHEDULED FOR THE EVENING OF Monday THE 27       Additional notes: [phone]  MEMBER ID # [id number]

## 2021-09-27 ENCOUNTER — HOSPITAL ENCOUNTER (OUTPATIENT)
Dept: MRI IMAGING | Facility: HOSPITAL | Age: 56
Discharge: HOME | End: 2021-09-27

## 2021-09-27 DIAGNOSIS — M54.2 CERVICALGIA: ICD-10-CM

## 2021-09-27 DIAGNOSIS — IMO0002 DISORDER OF ROTATOR CUFF SYNDROME OF LEFT SHOULDER AND ALLIED DISORDER: ICD-10-CM

## 2021-09-27 DIAGNOSIS — M25.512 LEFT SHOULDER PAIN, UNSPECIFIED CHRONICITY: ICD-10-CM

## 2021-09-27 PROCEDURE — 73221 MRI JOINT UPR EXTREM W/O DYE: CPT

## 2021-09-27 PROCEDURE — 72141 MRI NECK SPINE W/O DYE: CPT

## 2021-09-28 ENCOUNTER — OFFICE VISIT (OUTPATIENT)
Dept: ORTHOPEDIC SURGERY | Facility: CLINIC | Age: 56
End: 2021-09-28

## 2021-09-28 VITALS
BODY MASS INDEX: 33.6 KG/M2 | SYSTOLIC BLOOD PRESSURE: 155 MMHG | HEIGHT: 73 IN | HEART RATE: 76 BPM | WEIGHT: 253.53 LBS | DIASTOLIC BLOOD PRESSURE: 101 MMHG

## 2021-09-28 DIAGNOSIS — M75.112 INCOMPLETE TEAR OF LEFT ROTATOR CUFF, UNSPECIFIED WHETHER TRAUMATIC: ICD-10-CM

## 2021-09-28 DIAGNOSIS — M43.02 CERVICAL SPONDYLOLYSIS: ICD-10-CM

## 2021-09-28 DIAGNOSIS — M54.2 CERVICALGIA: ICD-10-CM

## 2021-09-28 DIAGNOSIS — M25.512 LEFT SHOULDER PAIN, UNSPECIFIED CHRONICITY: Primary | ICD-10-CM

## 2021-09-28 DIAGNOSIS — M19.012 ARTHRITIS OF LEFT ACROMIOCLAVICULAR JOINT: ICD-10-CM

## 2021-09-28 PROCEDURE — 20610 DRAIN/INJ JOINT/BURSA W/O US: CPT | Performed by: ORTHOPAEDIC SURGERY

## 2021-09-28 PROCEDURE — 99214 OFFICE O/P EST MOD 30 MIN: CPT | Performed by: ORTHOPAEDIC SURGERY

## 2021-09-28 RX ORDER — TRIAMCINOLONE ACETONIDE 40 MG/ML
40 INJECTION, SUSPENSION INTRA-ARTICULAR; INTRAMUSCULAR
Status: COMPLETED | OUTPATIENT
Start: 2021-09-28 | End: 2021-09-28

## 2021-09-28 RX ORDER — LIDOCAINE HYDROCHLORIDE 10 MG/ML
3 INJECTION, SOLUTION EPIDURAL; INFILTRATION; INTRACAUDAL; PERINEURAL
Status: COMPLETED | OUTPATIENT
Start: 2021-09-28 | End: 2021-09-28

## 2021-09-28 RX ADMIN — TRIAMCINOLONE ACETONIDE 40 MG: 40 INJECTION, SUSPENSION INTRA-ARTICULAR; INTRAMUSCULAR at 11:29

## 2021-09-28 RX ADMIN — LIDOCAINE HYDROCHLORIDE 3 ML: 10 INJECTION, SOLUTION EPIDURAL; INFILTRATION; INTRACAUDAL; PERINEURAL at 11:29

## 2021-09-28 NOTE — PROGRESS NOTES
"    [facility] [place] [facility] Note        Subjective     CC: Follow-up of the Left Shoulder (4 week f/u post MRI 09/27/21)      JOSHUA Renee is a 56 y.o. male.  Patient returns for follow-up of his left shoulder MRI and cervical spine MRI.  He continues to have scapular pain primarily.  He has sharp electrical type pain when he rotates his neck in a certain direction.    Overall, patient's symptoms are as above.    ROS:    Constiutional:Pt denies fever, chills, nausea, or vomiting.  MSK:as above        Objective      Past Medical History  Past Medical History:   Diagnosis Date   • C. difficile diarrhea    • Diverticulitis    • Esophagitis    • GERD (gastroesophageal reflux disease)    • Hypertension    • Osteoarthritis of both knees    • Wears glasses          Physical Exam  BP (!) 155/101   Pulse 76   Ht 185.4 cm (72.99\")   Wt 115 kg (253 lb 8.5 oz)   BMI 33.46 kg/m²     Body mass index is 33.46 kg/m².    Patient is well nourished and well developed.        Ortho Exam  Musculoskeletal   Upper Extremity   Left Shoulder       Strength and Tone:    Supraspinatus -5-5    External Rotators-5/5    Infraspinatus - 5/5    Subscapularis - 5/5    Deltoid - 5/5     Range of Motion      Left Shoulder:    Internal Rotation: ROM - L4    External Rotation: AROM - 70 degrees    Elevation through flexion: AROM - 140 degrees     AC joint:  non tender to palpation    AC joint:  negative crossover          Imaging/Labs/EMG Reviewed:  Imaging Results (Last 24 Hours)     ** No results found for the last 24 hours. **        MRI Cervical Spine Without Contrast  Narrative: EXAMINATION: MRI CERVICAL SPINE WO CONTRAST-     INDICATION: Neck pain, chronic, degenerative changes on x-ray;  M54.2-Cervicalgia.      TECHNIQUE: Multiplanar MRI of the cervical spine without intravenous  contrast.     COMPARISON: None.     FINDINGS: Sagittal datasets reveal normal cervical lordotic curvature  and alignment without focal " subluxation or listhesis. Vertebral body  heights are preserved without compression deformity or fracture. No  aggressive bone marrow signal findings. Cervicomedullary junction is  widely patent. Grossly normal caliber and contour of the cervical cord  and spinal canal. No paraspinal soft tissue findings of acuity.     Level by level details for spinal canal and neuroforaminal patency as  below:     C2-C3: Mild disc osteophyte complex with left lateralizing features of  predominance along with mild right and mild to moderate left  uncovertebral spurring and hypertrophy producing mild left paracentral  spinal canal stenosis with mild left neuroforaminal stenosis.     C3-C4: Mild to moderate disc osteophyte complex with mild right and mild  to moderate left uncovertebral spurring and hypertrophy producing mild  anterior thecal sac effacement with mild to moderate left neuroforaminal  stenosis.     C4-C5: Mild to moderate disc osteophyte complex with mild right and  moderate left uncovertebral spurring and hypertrophy producing mild  anterior thecal sac effacement and mild spinal canal stenosis with mild  to moderate left neuroforaminal stenosis.     C5-C6: Moderate to large disc osteophyte complex with right and far  right lateralizing features of predominance along with moderate  uncovertebral spurring and hypertrophy producing moderate spinal canal  stenosis greatest in the right lateral recess region and left  paracentral region where there is minimal contact and minimal  indentation of the left hemicord without cord edema combined with  uncovertebral spurring and hypertrophy overall moderate involvement  producing moderate to severe right and moderate left neuroforaminal  stenoses.     C6-C7: Mild to moderate disc osteophyte complex with mild anterior  thecal sac effacement along with mild bilateral neuroforaminal stenoses  from combined mild to moderate uncovertebral spurring and hypertrophy  fairly symmetric.      C7-T1: No stenosis.     Impression: Multilevel spondylitic changes of the cervical spine  greatest at the C5-C6 level where there is a moderate to large disc  osteophyte complex with right and far right lateralizing features of  overall predominance however a left paracentral area of protrusion also  exists producing combination of right lateral recess moderate spinal  canal stenosis and moderate left paracentral spinal canal stenosis with  contact and minimal indentation of the left hemicord without cord edema  combined with uncovertebral spurring/hypertrophy to produce moderate to  severe right and moderate left neuroforaminal stenoses.     D:  09/27/2021  E:  09/28/2021         This report was finalized on 9/28/2021 9:48 AM by Dr. Gavino Pastrana.     MRI Shoulder Left Without Contrast  Narrative: EXAMINATION: MRI SHOULDER LEFT WO CONTRAST-     INDICATION: M25.512-Pain in left shoulder; M75.102-Unspecified rotator  cuff tear or rupture of left shoulder, not specified as traumatic.      TECHNIQUE: Multiplanar MRI of the shoulder without intravenous contrast.     COMPARISON: Shoulder x-ray dated 08/31/2021.     FINDINGS: Osseous structures demonstrate moderate to severe DJD of the  AC joint which demonstrates inflammatory findings including fluid signal  and edema greatest along the superior aspect along with minimal  osteophytosis and edema additionally noted along the inferior aspect  with subacromial/subdeltoid fluid. Along with degenerative changes of  the greater tuberosity humerus without evidence for aggressive bone  marrow signal findings or acute fracture. Moderate DJD of the  glenohumeral joint with joint space narrowing.     ROTATOR CUFF: Increased signal with minimal fraying in the midportion  articular-sided fibers supraspinatus of partial thickness tearing and/or  tendinopathy without retraction. Infraspinatus has minimal partial  thickness tearing within the superior fibers including fraying  present  without retraction. Subscapularis within normal limits. Long head of the  biceps well seated within the bicipital groove. Teres minor intact.  Minimal fluid signal within the rotator interval. Mild to moderate  inferior capsular thickening.     Impression: 1. Moderate to severe DJD of the AC joint with surrounding soft tissue  edema and inflammatory findings greatest along the superior aspect.  2. Partial thickness tearing midportion articular-sided supraspinatus  distal fibers and superior fibers infraspinatus without retraction.  3. Minimal fluid signal in the rotator interval along with mild to  moderate inferior capsular thickening.     D:  09/27/2021  E:  09/28/2021         This report was finalized on 9/28/2021 9:48 AM by Dr. Gavino Pastrana.         We reviewed images and report of the MRI as above.  Patient appears to have an articular sided partial-thickness rotator cuff tear of the supraspinatus.  Biceps is at home.  He has severe AC joint arthritis.  He has a type III acromion.  With regards to his cervical spine, he does appear to have significant disease at C5-6 that affects more the right than left side of the neuroforamen.    Assessment    Assessment:  1. Left shoulder pain, unspecified chronicity    2. Cervicalgia    3. Arthritis of left acromioclavicular joint    4. Incomplete tear of left rotator cuff, unspecified whether traumatic    5. Cervical spondylolysis        Plan:  1. Recommend over the counter anti-inflammatories for pain and/or swelling  2. Chronic left shoulder pain with partial-thickness articular sided rotator cuff tear less than 50% involvement.  Severe AC joint arthritis.  Patient symptoms are scapular in nature.  Diagnostic and therapeutic injection into the subacromial space will be given today.  We will refer him to neurosurgery for evaluation.  He has failed a course of therapy for his neck.    Procedure Note:    I discussed with the patient the potential benefits of  performing a therapeutic injections as well as potential risks including but not limited to infection, swelling, pain, bleeding, bruising, nerve/vessel damage, skin color changes, transient elevation in blood glucose levels, and fat atrophy. After informed consent and after the areas were prepped with chlorhexadine soap, ethyl chloride was used to numb the skin. Via the posterolateral approach, 3mL of 1% lidocaine followed by 40mg of Kenalog were each injected into the subacromial space of the right and left shoulder. The patient tolerated the procedure well. There were no complications. A sterile dressing was placed over the injection sites.        Bryce Souza MD  09/28/21  13:08 EDT      Dragon disclaimer:  Much of this encounter note is an electronic transcription/translation of spoken language to printed text. The electronic translation of spoken language may permit erroneous, or at times, nonsensical words or phrases to be inadvertently transcribed; Although I have reviewed the note for such errors, some may still exist.

## 2021-09-28 NOTE — PROGRESS NOTES
Procedure   Large Joint Arthrocentesis: L subacromial bursa  Date/Time: 9/28/2021 11:29 AM  Consent given by: patient  Site marked: site marked  Timeout: Immediately prior to procedure a time out was called to verify the correct patient, procedure, equipment, support staff and site/side marked as required   Supporting Documentation  Indications: pain   Procedure Details  Location: shoulder - L subacromial bursa  Preparation: Patient was prepped and draped in the usual sterile fashion  Needle size: 25 G  Approach: posterior  Medications administered: 40 mg triamcinolone acetonide 40 MG/ML; 3 mL lidocaine PF 1% 1 %  Patient tolerance: patient tolerated the procedure well with no immediate complications

## 2021-10-29 ENCOUNTER — TRANSCRIBE ORDERS (OUTPATIENT)
Dept: ADMINISTRATIVE | Facility: HOSPITAL | Age: 56
End: 2021-10-29

## 2021-10-29 DIAGNOSIS — R22.1 LOCALIZED SWELLING, MASS AND LUMP, NECK: Primary | ICD-10-CM

## 2021-11-04 ENCOUNTER — HOSPITAL ENCOUNTER (OUTPATIENT)
Dept: CT IMAGING | Facility: HOSPITAL | Age: 56
Discharge: HOME | End: 2021-11-04
Admitting: INTERNAL MEDICINE

## 2021-11-04 DIAGNOSIS — R22.1 LOCALIZED SWELLING, MASS AND LUMP, NECK: ICD-10-CM

## 2021-11-04 PROCEDURE — 82565 ASSAY OF CREATININE: CPT

## 2021-11-04 PROCEDURE — 70491 CT SOFT TISSUE NECK W/DYE: CPT

## 2021-11-04 PROCEDURE — 25010000002 IOPAMIDOL 61 % SOLUTION: Performed by: INTERNAL MEDICINE

## 2021-11-04 RX ADMIN — IOPAMIDOL 75 ML: 612 INJECTION, SOLUTION INTRAVENOUS at 15:42

## 2021-11-07 LAB — CREAT BLDA-MCNC: 0.9 MG/DL (ref 0.6–1.3)

## 2021-11-15 ENCOUNTER — TRANSCRIBE ORDERS (OUTPATIENT)
Dept: ADMINISTRATIVE | Facility: HOSPITAL | Age: 56
End: 2021-11-15

## 2021-11-15 ENCOUNTER — TRANSCRIBE ORDERS (OUTPATIENT)
Dept: LAB | Facility: HOSPITAL | Age: 56
End: 2021-11-15

## 2021-11-15 ENCOUNTER — LAB (OUTPATIENT)
Dept: LAB | Facility: HOSPITAL | Age: 56
End: 2021-11-15

## 2021-11-15 DIAGNOSIS — Z11.59 ENCOUNTER FOR SCREENING FOR VIRAL DISEASE: Primary | ICD-10-CM

## 2021-11-15 DIAGNOSIS — Z01.818 PREOP TESTING: Primary | ICD-10-CM

## 2021-11-15 DIAGNOSIS — C10.9 MALIGNANT NEOPLASM OROPHARYNX (HCC): Primary | ICD-10-CM

## 2021-11-15 DIAGNOSIS — Z01.818 PREOP TESTING: ICD-10-CM

## 2021-11-15 LAB
ANION GAP SERPL CALCULATED.3IONS-SCNC: 9 MMOL/L (ref 5–15)
BUN SERPL-MCNC: 23 MG/DL (ref 6–20)
BUN/CREAT SERPL: 27.1 (ref 7–25)
CALCIUM SPEC-SCNC: 9.2 MG/DL (ref 8.6–10.5)
CHLORIDE SERPL-SCNC: 104 MMOL/L (ref 98–107)
CO2 SERPL-SCNC: 26 MMOL/L (ref 22–29)
CREAT SERPL-MCNC: 0.85 MG/DL (ref 0.76–1.27)
GFR SERPL CREATININE-BSD FRML MDRD: 93 ML/MIN/1.73
GLUCOSE SERPL-MCNC: 102 MG/DL (ref 65–99)
HCT VFR BLD AUTO: 37.5 % (ref 37.5–51)
HGB BLD-MCNC: 13 G/DL (ref 13–17.7)
POTASSIUM SERPL-SCNC: 4 MMOL/L (ref 3.5–5.2)
SODIUM SERPL-SCNC: 139 MMOL/L (ref 136–145)

## 2021-11-15 PROCEDURE — 80048 BASIC METABOLIC PNL TOTAL CA: CPT

## 2021-11-15 PROCEDURE — 36415 COLL VENOUS BLD VENIPUNCTURE: CPT

## 2021-11-15 PROCEDURE — 85014 HEMATOCRIT: CPT

## 2021-11-15 PROCEDURE — 85018 HEMOGLOBIN: CPT

## 2021-11-18 ENCOUNTER — APPOINTMENT (OUTPATIENT)
Dept: CT IMAGING | Facility: HOSPITAL | Age: 56
End: 2021-11-18

## 2021-11-21 ENCOUNTER — APPOINTMENT (OUTPATIENT)
Dept: PREADMISSION TESTING | Facility: HOSPITAL | Age: 56
End: 2021-11-21

## 2021-11-21 ENCOUNTER — LAB (OUTPATIENT)
Dept: PREADMISSION TESTING | Facility: HOSPITAL | Age: 56
End: 2021-11-21

## 2021-11-21 DIAGNOSIS — Z11.59 ENCOUNTER FOR SCREENING FOR VIRAL DISEASE: ICD-10-CM

## 2021-11-21 LAB — SARS-COV-2 RNA PNL SPEC NAA+PROBE: NOT DETECTED

## 2021-11-21 PROCEDURE — C9803 HOPD COVID-19 SPEC COLLECT: HCPCS

## 2021-11-21 PROCEDURE — U0004 COV-19 TEST NON-CDC HGH THRU: HCPCS

## 2021-11-24 ENCOUNTER — LAB REQUISITION (OUTPATIENT)
Dept: LAB | Facility: HOSPITAL | Age: 56
End: 2021-11-24

## 2021-11-24 DIAGNOSIS — C10.9 MALIGNANT NEOPLASM OF OROPHARYNX, UNSPECIFIED (HCC): ICD-10-CM

## 2021-11-24 PROCEDURE — 88331 PATH CONSLTJ SURG 1 BLK 1SPC: CPT | Performed by: OTOLARYNGOLOGY

## 2021-11-24 PROCEDURE — 88342 IMHCHEM/IMCYTCHM 1ST ANTB: CPT | Performed by: OTOLARYNGOLOGY

## 2021-11-24 PROCEDURE — 88341 IMHCHEM/IMCYTCHM EA ADD ANTB: CPT | Performed by: OTOLARYNGOLOGY

## 2021-11-24 PROCEDURE — 88305 TISSUE EXAM BY PATHOLOGIST: CPT | Performed by: OTOLARYNGOLOGY

## 2021-11-29 LAB
CYTO UR: NORMAL
LAB AP CASE REPORT: NORMAL
LAB AP CLINICAL INFORMATION: NORMAL
LAB AP SPECIAL STAINS: NORMAL
Lab: NORMAL
PATH REPORT.FINAL DX SPEC: NORMAL
PATH REPORT.GROSS SPEC: NORMAL

## 2021-12-01 ENCOUNTER — CONSULT (OUTPATIENT)
Dept: ONCOLOGY | Facility: CLINIC | Age: 56
End: 2021-12-01

## 2021-12-01 ENCOUNTER — RESEARCH ENCOUNTER (OUTPATIENT)
Dept: OTHER | Facility: OTHER | Age: 56
End: 2021-12-01

## 2021-12-01 ENCOUNTER — LAB (OUTPATIENT)
Dept: LAB | Facility: HOSPITAL | Age: 56
End: 2021-12-01

## 2021-12-01 VITALS
WEIGHT: 264 LBS | TEMPERATURE: 97 F | HEIGHT: 73 IN | HEART RATE: 77 BPM | DIASTOLIC BLOOD PRESSURE: 90 MMHG | RESPIRATION RATE: 16 BRPM | BODY MASS INDEX: 34.99 KG/M2 | SYSTOLIC BLOOD PRESSURE: 148 MMHG

## 2021-12-01 DIAGNOSIS — C10.9 OROPHARYNX CANCER (HCC): Primary | ICD-10-CM

## 2021-12-01 DIAGNOSIS — C10.9 OROPHARYNX CANCER (HCC): ICD-10-CM

## 2021-12-01 LAB
ALBUMIN SERPL-MCNC: 4.6 G/DL (ref 3.5–5.2)
ALBUMIN/GLOB SERPL: 1.6 G/DL
ALP SERPL-CCNC: 77 U/L (ref 39–117)
ALT SERPL W P-5'-P-CCNC: 15 U/L (ref 1–41)
ANION GAP SERPL CALCULATED.3IONS-SCNC: 11 MMOL/L (ref 5–15)
AST SERPL-CCNC: 15 U/L (ref 1–40)
BILIRUB SERPL-MCNC: 0.3 MG/DL (ref 0–1.2)
BUN SERPL-MCNC: 24 MG/DL (ref 6–20)
BUN/CREAT SERPL: 23.3 (ref 7–25)
CALCIUM SPEC-SCNC: 9.1 MG/DL (ref 8.6–10.5)
CHLORIDE SERPL-SCNC: 102 MMOL/L (ref 98–107)
CO2 SERPL-SCNC: 25 MMOL/L (ref 22–29)
CREAT SERPL-MCNC: 1.03 MG/DL (ref 0.76–1.27)
ERYTHROCYTE [DISTWIDTH] IN BLOOD BY AUTOMATED COUNT: 12.6 % (ref 12.3–15.4)
GFR SERPL CREATININE-BSD FRML MDRD: 74 ML/MIN/1.73
GLOBULIN UR ELPH-MCNC: 2.9 GM/DL
GLUCOSE SERPL-MCNC: 103 MG/DL (ref 65–99)
HCT VFR BLD AUTO: 40.3 % (ref 37.5–51)
HGB BLD-MCNC: 13.2 G/DL (ref 13–17.7)
LYMPHOCYTES # BLD AUTO: 3 10*3/MM3 (ref 0.7–3.1)
LYMPHOCYTES NFR BLD AUTO: 42.8 % (ref 19.6–45.3)
MCH RBC QN AUTO: 32.5 PG (ref 26.6–33)
MCHC RBC AUTO-ENTMCNC: 32.9 G/DL (ref 31.5–35.7)
MCV RBC AUTO: 98.6 FL (ref 79–97)
MONOCYTES # BLD AUTO: 0.6 10*3/MM3 (ref 0.1–0.9)
MONOCYTES NFR BLD AUTO: 8.4 % (ref 5–12)
NEUTROPHILS NFR BLD AUTO: 3.5 10*3/MM3 (ref 1.7–7)
NEUTROPHILS NFR BLD AUTO: 48.8 % (ref 42.7–76)
PLATELET # BLD AUTO: 270 10*3/MM3 (ref 140–450)
PMV BLD AUTO: 6.4 FL (ref 6–12)
POTASSIUM SERPL-SCNC: 4 MMOL/L (ref 3.5–5.2)
PROT SERPL-MCNC: 7.5 G/DL (ref 6–8.5)
RBC # BLD AUTO: 4.08 10*6/MM3 (ref 4.14–5.8)
SODIUM SERPL-SCNC: 138 MMOL/L (ref 136–145)
WBC NRBC COR # BLD: 7.1 10*3/MM3 (ref 3.4–10.8)

## 2021-12-01 PROCEDURE — 80053 COMPREHEN METABOLIC PANEL: CPT

## 2021-12-01 PROCEDURE — 99205 OFFICE O/P NEW HI 60 MIN: CPT | Performed by: INTERNAL MEDICINE

## 2021-12-01 PROCEDURE — 85025 COMPLETE CBC W/AUTO DIFF WBC: CPT

## 2021-12-01 PROCEDURE — 36415 COLL VENOUS BLD VENIPUNCTURE: CPT

## 2021-12-01 RX ORDER — ALUMINUM ZIRCONIUM OCTACHLOROHYDREX GLY 16 G/100G
GEL TOPICAL
COMMUNITY
End: 2023-03-14

## 2021-12-01 NOTE — RESEARCH
Patient Name:  Nicholas Renee  YOB: 1965  Patient Age:  56 y.o.  Patient's Sex:  male    Date of Service:  12/01/2021    Provider:                       RESEARCH NOTE                  I met with   to review the  Ascend II study at the request of  I reviewed the study in detail with the patient. Specifically, I reviewed all 8 elements of the ICF including the purpose, risks and benefits. Patient asked questions all which were answered appropriately at this time. Patient agreed to participate and signed the ICF. Patient made selections re: study related communication. A copy of the signed ICF was provided to the patient. The patient knows to contact me with any further. questions regarding the study.  After patient signed the consent we collected research blood and I provided patient with the gift card for participating.

## 2021-12-02 ENCOUNTER — HOSPITAL ENCOUNTER (OUTPATIENT)
Dept: PET IMAGING | Facility: HOSPITAL | Age: 56
Discharge: HOME | End: 2021-12-02

## 2021-12-02 ENCOUNTER — OFFICE VISIT (OUTPATIENT)
Dept: RADIATION ONCOLOGY | Facility: HOSPITAL | Age: 56
End: 2021-12-02

## 2021-12-02 ENCOUNTER — HOSPITAL ENCOUNTER (OUTPATIENT)
Dept: RADIATION ONCOLOGY | Facility: HOSPITAL | Age: 56
Setting detail: RADIATION/ONCOLOGY SERIES
Discharge: HOME | End: 2021-12-02

## 2021-12-02 VITALS
BODY MASS INDEX: 34.88 KG/M2 | OXYGEN SATURATION: 96 % | DIASTOLIC BLOOD PRESSURE: 84 MMHG | WEIGHT: 263.2 LBS | HEART RATE: 80 BPM | RESPIRATION RATE: 18 BRPM | SYSTOLIC BLOOD PRESSURE: 140 MMHG | TEMPERATURE: 97.3 F | HEIGHT: 73 IN

## 2021-12-02 DIAGNOSIS — C10.9 MALIGNANT NEOPLASM OROPHARYNX (HCC): ICD-10-CM

## 2021-12-02 DIAGNOSIS — C10.9 OROPHARYNX CANCER (HCC): ICD-10-CM

## 2021-12-02 LAB — GLUCOSE BLDC GLUCOMTR-MCNC: 106 MG/DL (ref 70–130)

## 2021-12-02 PROCEDURE — G0463 HOSPITAL OUTPT CLINIC VISIT: HCPCS | Performed by: RADIOLOGY

## 2021-12-02 PROCEDURE — 82962 GLUCOSE BLOOD TEST: CPT

## 2021-12-02 PROCEDURE — A9552 F18 FDG: HCPCS | Performed by: OTOLARYNGOLOGY

## 2021-12-02 PROCEDURE — 0 FLUDEOXYGLUCOSE F18 SOLUTION: Performed by: OTOLARYNGOLOGY

## 2021-12-02 PROCEDURE — 78815 PET IMAGE W/CT SKULL-THIGH: CPT

## 2021-12-02 RX ORDER — ONDANSETRON 4 MG/1
TABLET, ORALLY DISINTEGRATING ORAL
COMMUNITY
End: 2021-12-13

## 2021-12-02 RX ADMIN — FLUDEOXYGLUCOSE F18 1 DOSE: 300 INJECTION INTRAVENOUS at 08:02

## 2021-12-02 NOTE — PROGRESS NOTES
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  I am seeing them in outpatient consultation regarding a diagnosis of base of tongue cancer.     BRIEF HISTORY:  The patient is a very pleasant 56 y.o. male  with past medical history significant for 10-pack-year smoking, he quit in , as well as a history of GERD and hypertension, who states that he first noticed a left sided neck lymph node a little over a month ago that was nontender, but prompted concern.  He was seen that day by his primary care physician who referred him to Dr. Manuel and on a more thorough examination he was found to have a 3 x 2.5 cm mass in the left lingual tonsil extending into the tongue base.  A biopsy of the left-sided neck mass on 2021 was consistent with a squamous cell carcinoma.  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  A full laryngoscopy was performed at the time of that procedure confirming that the only site of involvement was limited to the left tonsil and tongue base.  He has since underwent a PET/CT for staging which confirms a residual 1.5cm area in the left tongue base compatible with his primary tumor.  He has no other evidence of other regional or distant disease.  He has been staged as a T2N1M0.  From a symptomatic standpoint, he reports only the palpable mass and some mild tenderness in his throat after his procedure.  He denies any difficulty chewing or swallowing, and he also denies any choking.  He denies any  "constitutional symptoms.  He denies any weight loss.    Allergy:   Allergies   Allergen Reactions   • Levaquin [Levofloxacin] Myalgia   • Sulfa Antibiotics Hives and Rash       Social History:   Social History     Socioeconomic History   • Marital status:    Tobacco Use   • Smoking status: Former Smoker     Packs/day: 0.50     Years: 20.00     Pack years: 10.00     Types: Cigarettes     Quit date: 2003     Years since quittin.4   • Smokeless tobacco: Current User     Types: Snuff   Vaping Use   • Vaping Use: Never used   Substance and Sexual Activity   • Alcohol use: Yes     Comment: socially, \"rarely\"   • Drug use: No   • Sexual activity: Defer       Past Medical History:   Past Medical History:   Diagnosis Date   • C. difficile diarrhea    • Diverticulitis    • Esophagitis    • GERD (gastroesophageal reflux disease)    • Hypertension    • Oropharynx cancer (HCC) 2021   • Osteoarthritis of both knees    • Wears glasses        Family History: family history includes Diabetes in his father; Heart attack in his father; Heart disease in his father; Hypertension in his father; No Known Problems in his mother; Rheum arthritis in an other family member.     Surgical History:   Past Surgical History:   Procedure Laterality Date   • ABDOMINAL SURGERY      INFANT (6wk)- STOMACH BLOCKAGE    • COLON RESECTION N/A 2019    Procedure: COLON RESECTION LAPAROSCOPIC LOW ANTERIOR;  Surgeon: Magan Blum MD;  Location: [facility] OR;  Service: General   • COLONOSCOPY N/A 2017    Procedure: COLONOSCOPY;  Surgeon: Magan Blum MD;  Location: [facility] ENDOSCOPY;  Service:    • ENDOSCOPY     • FOOT SURGERY      BILAT    • KNEE SURGERY Bilateral     x 5   • PERIPHERALLY INSERTED CENTRAL CATHETER INSERTION  2019    RIGHT ARM         Review of Systems:   Review of Systems   All other systems reviewed and are negative.          Objective   VITAL SIGNS:   Vitals:    21 1302   BP: 140/84   Pulse: 80 " "  Resp: 18   Temp: 97.3 °F (36.3 °C)   TempSrc: Temporal   SpO2: 96%   Weight: 119 kg (263 lb 3.2 oz)   Height: 185.4 cm (73\")   PainSc: 0-No pain        Karnofsky score: 90       Physical Exam:   Physical Exam  Vitals and nursing note reviewed.   Constitutional:       General: He is not in acute distress.     Appearance: He is well-developed.   HENT:      Head: Normocephalic and atraumatic.      Mouth/Throat:      Comments: Fairly good dentition with evidence of previous crowns and fillings.  There are no grossly evident dental caries or areas of significant enamel erosion.  He has enlargement of his bilateral tonsils, and some postsurgical changes in the left glossotonsillar sulcus.  On manual palpation, there is fullness and induration along the most lateral portions of the left tongue base.  Tongue does protrude midline and there is no evidence of trismus.  The remainder of the oral cavity and oropharynx is normal.  Eyes:      Conjunctiva/sclera: Conjunctivae normal.      Pupils: Pupils are equal, round, and reactive to light.   Neck:      Comments: 2.5 cm left level 2 lymph node anteriorly which is readily mobile and nontender.  Cardiovascular:      Rate and Rhythm: Normal rate and regular rhythm.      Heart sounds: No murmur heard.  No friction rub.   Pulmonary:      Effort: Pulmonary effort is normal.      Breath sounds: Normal breath sounds. No wheezing.   Abdominal:      General: Bowel sounds are normal. There is no distension.      Palpations: Abdomen is soft. There is no mass.      Tenderness: There is no abdominal tenderness.   Musculoskeletal:         General: Normal range of motion.      Cervical back: Normal range of motion and neck supple.   Lymphadenopathy:      Cervical: No cervical adenopathy.   Skin:     General: Skin is warm and dry.   Neurological:      Mental Status: He is alert and oriented to person, place, and time.   Psychiatric:         Behavior: Behavior normal.         Thought Content: " Thought content normal.         Judgment: Judgment normal.     IMAGING  I have personally reviewed the relevant imaging studies, as follows:  CT Soft Tissue Neck With Contrast    Result Date: 11/5/2021  Centrally necrotic or cystic pathologic 3 cm left level IIb cervical lymph node concerning for metastatic involvement with likely primary lesion noted involving the left base of tongue, difficult to discretely measure, however appearing to extend inferiorly along the preepiglottic space. Recommend otolaryngology consultation and direct laryngoscopy.   This report was finalized on 11/5/2021 12:50 PM by John Jaramillo.      MRI Cervical Spine Without Contrast    Result Date: 9/28/2021  Multilevel spondylitic changes of the cervical spine greatest at the C5-C6 level where there is a moderate to large disc osteophyte complex with right and far right lateralizing features of overall predominance however a left paracentral area of protrusion also exists producing combination of right lateral recess moderate spinal canal stenosis and moderate left paracentral spinal canal stenosis with contact and minimal indentation of the left hemicord without cord edema combined with uncovertebral spurring/hypertrophy to produce moderate to severe right and moderate left neuroforaminal stenoses.  D:  09/27/2021 E:  09/28/2021    This report was finalized on 9/28/2021 9:48 AM by Dr. Gavino Pastrana.      MRI Shoulder Left Without Contrast    Result Date: 9/28/2021  1. Moderate to severe DJD of the AC joint with surrounding soft tissue edema and inflammatory findings greatest along the superior aspect. 2. Partial thickness tearing midportion articular-sided supraspinatus distal fibers and superior fibers infraspinatus without retraction. 3. Minimal fluid signal in the rotator interval along with mild to moderate inferior capsular thickening.  D:  09/27/2021 E:  09/28/2021    This report was finalized on 9/28/2021 9:48 AM by Dr. Mancia  Key.      NM PET/CT Skull Base to Mid Thigh    Result Date: 12/2/2021  The 3.5 cm left level 2 cervical lymph node demonstrates expected hypermetabolism, maximum SUV 3.8 compatible with metastatic involvement. The primary lesion is not well identified, likely related to reported interval excision. There is otherwise no evidence of additional contralateral hypermetabolic lymphadenopathy or distant metastasis elsewhere.  This report was finalized on 12/2/2021 10:01 AM by John Jaramillo.      PATHOLOGY  Left neck fine-needle aspirate of lymph node 11/8/2021:  Positive for squamous cell carcinoma.  Not enough tissue for p16 testing.    Excision of mass of the left lingual tonsil and tongue base 11/24/2021:  Squamous of carcinoma, p16 positive         The following portions of the patient's history were reviewed and updated as appropriate: allergies, current medications, past family history, past medical history, past social history, past surgical history and problem list.    Assessment:   Assessment  Mr. Renee is a 56 year old male with a clinical 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  He has completed staging that reveals the lymph node in the left neck to be the only other site of involvement.  I spent greater than 60 minutes today with the patient and his spouse, with more than 50% in direct face-to-face time discussing his diagnosis, reviewing relevant imaging, as well as discussing their treatment course with a full explanation of the potential side effects and toxicities.  He has already been evaluated by Dr. Buck with medical oncology who is planning to administer concurrent cisplatin with a projected start date of December 16.  We discussed the typical radiation treatment course which will target a dose of 70 Gray in 35 fractions, but considering this is p16 positive, should he demonstrate a very good early response to treatment we may be able to de-escalate the radiation  dosed to approximately 60 Hart.  We discussed all the potential side effects including dry mouth, sore swallowing, mucosa reactions, skin reactions, as well as more long-term potential complications such as osteoradionecrosis of the mandible.  I recommended to him that he be reevaluated by his dentist early next week to ensure that if there is any additional dental procedures that would need to be performed, that this can be completed in a timely fashion.  I do not anticipate based on his exam that he will require any dental extractions, but he may potentially require a recapping of one of his crowns.  After a full explanation of the risks and benefits, he was agreeable and signed informed consent.  I have scheduled him to return to my clinic tomorrow in order to undergo any radiation set up and simulation session, and I will aim to have his treatments ready to begin during the week of December 16, 2021.  Prior to beginning treatments, we will also send him to get a full swallow evaluation and he will be watched closely by our oncology dietitian    RECOMMENDATIONS:    Plan for Chemoradiation - 70 Gy in 35 fractions using IMRT    Follow Up:   No follow-ups on file.  Diagnoses and all orders for this visit:    1. Oropharynx cancer (HCC)  -     Ambulatory Referral to Speech Therapy for Head/Neck Cancer Services  -     Ambulatory Referral to OP ONC Nutrition Services  -     FL Video Swallow With Speech; Future    Thank you for allowing me to participate in the care of this individual.    Sincerely,       Brandt Zambrano MD

## 2021-12-03 ENCOUNTER — HOSPITAL ENCOUNTER (OUTPATIENT)
Dept: RADIATION ONCOLOGY | Facility: HOSPITAL | Age: 56
Discharge: HOME | End: 2021-12-03

## 2021-12-03 PROCEDURE — 77332 RADIATION TREATMENT AID(S): CPT | Performed by: RADIOLOGY

## 2021-12-03 PROCEDURE — 77290 THER RAD SIMULAJ FIELD CPLX: CPT | Performed by: RADIOLOGY

## 2021-12-03 PROCEDURE — 77334 RADIATION TREATMENT AID(S): CPT | Performed by: RADIOLOGY

## 2021-12-07 ENCOUNTER — TRANSCRIBE ORDERS (OUTPATIENT)
Dept: PREADMISSION TESTING | Facility: HOSPITAL | Age: 56
End: 2021-12-07

## 2021-12-07 DIAGNOSIS — Z11.59 ENCOUNTER FOR SPECIAL SCREENING EXAMINATION FOR VIRAL DISEASE: Primary | ICD-10-CM

## 2021-12-08 DIAGNOSIS — C10.9 OROPHARYNX CANCER (HCC): Primary | ICD-10-CM

## 2021-12-10 ENCOUNTER — LAB (OUTPATIENT)
Dept: PREADMISSION TESTING | Facility: HOSPITAL | Age: 56
End: 2021-12-10

## 2021-12-10 DIAGNOSIS — C10.9 OROPHARYNX CANCER (HCC): ICD-10-CM

## 2021-12-10 PROCEDURE — C9803 HOPD COVID-19 SPEC COLLECT: HCPCS

## 2021-12-10 PROCEDURE — U0004 COV-19 TEST NON-CDC HGH THRU: HCPCS

## 2021-12-11 LAB — SARS-COV-2 RNA PNL SPEC NAA+PROBE: NOT DETECTED

## 2021-12-12 ENCOUNTER — APPOINTMENT (OUTPATIENT)
Dept: PREADMISSION TESTING | Facility: HOSPITAL | Age: 56
End: 2021-12-12

## 2021-12-12 DIAGNOSIS — Z11.59 ENCOUNTER FOR SPECIAL SCREENING EXAMINATION FOR VIRAL DISEASE: ICD-10-CM

## 2021-12-12 LAB — SARS-COV-2 RNA PNL SPEC NAA+PROBE: NOT DETECTED

## 2021-12-12 PROCEDURE — C9803 HOPD COVID-19 SPEC COLLECT: HCPCS

## 2021-12-12 PROCEDURE — U0004 COV-19 TEST NON-CDC HGH THRU: HCPCS

## 2021-12-13 ENCOUNTER — HOSPITAL ENCOUNTER (OUTPATIENT)
Dept: GENERAL RADIOLOGY | Facility: HOSPITAL | Age: 56
Discharge: HOME | End: 2021-12-13

## 2021-12-13 ENCOUNTER — NURSE NAVIGATOR (OUTPATIENT)
Dept: ONCOLOGY | Facility: CLINIC | Age: 56
End: 2021-12-13

## 2021-12-13 ENCOUNTER — TRANSCRIBE ORDERS (OUTPATIENT)
Dept: GENERAL RADIOLOGY | Facility: HOSPITAL | Age: 56
End: 2021-12-13

## 2021-12-13 ENCOUNTER — OFFICE VISIT (OUTPATIENT)
Dept: ONCOLOGY | Facility: CLINIC | Age: 56
End: 2021-12-13

## 2021-12-13 ENCOUNTER — HOSPITAL ENCOUNTER (OUTPATIENT)
Dept: ONCOLOGY | Facility: HOSPITAL | Age: 56
Discharge: HOME | End: 2021-12-13

## 2021-12-13 ENCOUNTER — EDUCATION (OUTPATIENT)
Dept: ONCOLOGY | Facility: HOSPITAL | Age: 56
End: 2021-12-13

## 2021-12-13 ENCOUNTER — LAB (OUTPATIENT)
Dept: LAB | Facility: HOSPITAL | Age: 56
End: 2021-12-13

## 2021-12-13 VITALS
OXYGEN SATURATION: 96 % | SYSTOLIC BLOOD PRESSURE: 144 MMHG | HEART RATE: 64 BPM | WEIGHT: 266 LBS | BODY MASS INDEX: 35.25 KG/M2 | HEIGHT: 73 IN | DIASTOLIC BLOOD PRESSURE: 96 MMHG | RESPIRATION RATE: 18 BRPM | TEMPERATURE: 97.3 F

## 2021-12-13 DIAGNOSIS — R13.12 OROPHARYNGEAL DYSPHAGIA: Primary | ICD-10-CM

## 2021-12-13 DIAGNOSIS — C10.9 OROPHARYNGEAL CARCINOMA (HCC): Primary | ICD-10-CM

## 2021-12-13 DIAGNOSIS — C10.9 OROPHARYNX CANCER (HCC): ICD-10-CM

## 2021-12-13 DIAGNOSIS — C10.9 OROPHARYNX CANCER (HCC): Primary | ICD-10-CM

## 2021-12-13 LAB
ALBUMIN SERPL-MCNC: 4.8 G/DL (ref 3.5–5.2)
ALBUMIN/GLOB SERPL: 1.7 G/DL
ALP SERPL-CCNC: 74 U/L (ref 39–117)
ALT SERPL W P-5'-P-CCNC: 19 U/L (ref 1–41)
ANION GAP SERPL CALCULATED.3IONS-SCNC: 10 MMOL/L (ref 5–15)
AST SERPL-CCNC: 20 U/L (ref 1–40)
BILIRUB SERPL-MCNC: 0.5 MG/DL (ref 0–1.2)
BUN SERPL-MCNC: 21 MG/DL (ref 6–20)
BUN/CREAT SERPL: 23.1 (ref 7–25)
CALCIUM SPEC-SCNC: 10.2 MG/DL (ref 8.6–10.5)
CHLORIDE SERPL-SCNC: 102 MMOL/L (ref 98–107)
CO2 SERPL-SCNC: 27 MMOL/L (ref 22–29)
CREAT SERPL-MCNC: 0.91 MG/DL (ref 0.76–1.27)
ERYTHROCYTE [DISTWIDTH] IN BLOOD BY AUTOMATED COUNT: 12.9 % (ref 12.3–15.4)
GFR SERPL CREATININE-BSD FRML MDRD: 86 ML/MIN/1.73
GLOBULIN UR ELPH-MCNC: 2.8 GM/DL
GLUCOSE SERPL-MCNC: 76 MG/DL (ref 65–99)
HCT VFR BLD AUTO: 39.4 % (ref 37.5–51)
HGB BLD-MCNC: 13.1 G/DL (ref 13–17.7)
LYMPHOCYTES # BLD AUTO: 3.2 10*3/MM3 (ref 0.7–3.1)
LYMPHOCYTES NFR BLD AUTO: 47.6 % (ref 19.6–45.3)
MAGNESIUM SERPL-MCNC: 1.8 MG/DL (ref 1.6–2.6)
MCH RBC QN AUTO: 32.6 PG (ref 26.6–33)
MCHC RBC AUTO-ENTMCNC: 33.2 G/DL (ref 31.5–35.7)
MCV RBC AUTO: 98 FL (ref 79–97)
MONOCYTES # BLD AUTO: 0.6 10*3/MM3 (ref 0.1–0.9)
MONOCYTES NFR BLD AUTO: 8.7 % (ref 5–12)
NEUTROPHILS NFR BLD AUTO: 3 10*3/MM3 (ref 1.7–7)
NEUTROPHILS NFR BLD AUTO: 43.7 % (ref 42.7–76)
PLATELET # BLD AUTO: 314 10*3/MM3 (ref 140–450)
PMV BLD AUTO: 6.5 FL (ref 6–12)
POTASSIUM SERPL-SCNC: 4.4 MMOL/L (ref 3.5–5.2)
PROT SERPL-MCNC: 7.6 G/DL (ref 6–8.5)
RBC # BLD AUTO: 4.02 10*6/MM3 (ref 4.14–5.8)
SODIUM SERPL-SCNC: 139 MMOL/L (ref 136–145)
WBC NRBC COR # BLD: 6.8 10*3/MM3 (ref 3.4–10.8)

## 2021-12-13 PROCEDURE — 85025 COMPLETE CBC W/AUTO DIFF WBC: CPT

## 2021-12-13 PROCEDURE — 99215 OFFICE O/P EST HI 40 MIN: CPT | Performed by: NURSE PRACTITIONER

## 2021-12-13 PROCEDURE — 83735 ASSAY OF MAGNESIUM: CPT

## 2021-12-13 PROCEDURE — G0463 HOSPITAL OUTPT CLINIC VISIT: HCPCS

## 2021-12-13 PROCEDURE — 92611 MOTION FLUOROSCOPY/SWALLOW: CPT

## 2021-12-13 PROCEDURE — 36415 COLL VENOUS BLD VENIPUNCTURE: CPT

## 2021-12-13 PROCEDURE — 80053 COMPREHEN METABOLIC PANEL: CPT

## 2021-12-13 PROCEDURE — 74230 X-RAY XM SWLNG FUNCJ C+: CPT

## 2021-12-13 RX ORDER — LIDOCAINE AND PRILOCAINE 25; 25 MG/G; MG/G
1 CREAM TOPICAL AS NEEDED
Qty: 30 G | Refills: 3 | Status: SHIPPED | OUTPATIENT
Start: 2021-12-13 | End: 2022-05-06

## 2021-12-13 RX ORDER — OLANZAPINE 5 MG/1
5 TABLET ORAL ONCE
Status: CANCELLED | OUTPATIENT
Start: 2021-12-23 | End: 2021-12-16

## 2021-12-13 RX ORDER — PALONOSETRON 0.05 MG/ML
0.25 INJECTION, SOLUTION INTRAVENOUS ONCE
Status: CANCELLED | OUTPATIENT
Start: 2021-12-23

## 2021-12-13 RX ORDER — OLANZAPINE 5 MG/1
5 TABLET ORAL NIGHTLY
Qty: 3 TABLET | Refills: 5 | Status: SHIPPED | OUTPATIENT
Start: 2021-12-13 | End: 2022-05-06

## 2021-12-13 RX ORDER — SODIUM CHLORIDE 9 MG/ML
250 INJECTION, SOLUTION INTRAVENOUS ONCE
Status: CANCELLED | OUTPATIENT
Start: 2021-12-23

## 2021-12-13 RX ORDER — ONDANSETRON HYDROCHLORIDE 8 MG/1
8 TABLET, FILM COATED ORAL 3 TIMES DAILY PRN
Qty: 30 TABLET | Refills: 5 | Status: SHIPPED | OUTPATIENT
Start: 2021-12-13 | End: 2022-05-06

## 2021-12-13 RX ADMIN — BARIUM SULFATE 20 ML: 400 PASTE ORAL at 14:16

## 2021-12-13 RX ADMIN — BARIUM SULFATE 100 ML: 0.81 POWDER, FOR SUSPENSION ORAL at 14:16

## 2021-12-13 NOTE — PROGRESS NOTES
"CHEMOTHERAPY PREPARATION    Nicholas Renee  [id number]  1965    Subjective   Chief Complaint: Treatment Preparation and Needs Assessment    History of present illness:  Nicholas Renee is a 56 y.o. year old male who is here today for chemotherapy preparation and needs assessment. The patient has been diagnosed with oropharynx cancer and is scheduled to begin treatment with cisplatin and concurrent radiation.     Oncology History:    Oncology/Hematology History   Oropharynx cancer (HCC)   12/1/2021 Initial Diagnosis    Oropharynx cancer (HCC)     12/16/2021 -  Chemotherapy    OP HEAD & NECK CISplatin (Weekly) + XRT         The current medication list and allergy list were reviewed and reconciled.     Past Medical History, Past Surgical History, Social History, Family History have been reviewed and are without significant changes except as mentioned.    Review of Systems   Constitutional: Negative.    HENT: Negative.    Eyes: Negative.    Respiratory: Negative.    Cardiovascular: Negative.    Gastrointestinal: Negative.    Endocrine: Negative.    Genitourinary: Negative.    Musculoskeletal: Negative.    Skin: Negative.    Allergic/Immunologic: Negative.    Neurological: Negative.    Hematological: Negative.    Psychiatric/Behavioral: Negative.        Objective   Physical Exam  Vital Signs: /96   Pulse 64   Temp 97.3 °F (36.3 °C)   Resp 18   Ht 185.4 cm (73\")   Wt 121 kg (266 lb)   SpO2 96%   BMI 35.09 kg/m²   Vitals:    12/13/21 1051   PainSc: 0-No pain           General Appearance:  alert, cooperative, no apparent distress and appears stated age   Neurologic/Psychiatric: A&O x 3, gait steady, appropriate affect   HEENT:  Normocephalic, without obvious abnormality, mucous membranes moist   Lungs:   Clear to auscultation bilaterally; respirations regular, even, and unlabored bilaterally   Heart:  Regular rate and rhythm, no murmurs appreciated   Extremities: Normal, atraumatic; no clubbing, " cyanosis, or edema    Skin: No rashes, lesions, or abnormal coloration noted     ECOG Performance Status: 0 - Asymptomatic            NEEDS ASSESSMENTS    Genetics  The patient's new diagnosis and family history have been reviewed for genetic counseling needs. A genetic referral is not recommended.       Psychosocial  The patient has completed a PHQ-9 Depression Screening and the Distress Thermometer (DT) today.   PHQ-9 Total Score:0  . PHQ-9 results show 0 (No Depression). The patient scored their distress today as 0 on a scale of 0-10 with 0 being no distress and 10 being extreme distress.   Problems marked by the patient as being an issue for them within the last week include no problems.   Results were reviewed along with psychosocial resources offered by our cancer center. Our oncology social worker will be flagged for a DT score of 4 or above, and a same day call will be made for a score of 9 or 10. A mental health referral is not recommended at this time. The patient is not accepting of a referral to [facility].   Copies of patient's questionnaires will be scanned into EMR for details and further reference.    Barriers to care  A barriers form was also completed by the patient today. We discussed services offered by our facility to help him have adequate access to care. The patient was given the name and card for our Oncology Social Worker, Tiara Harvey. Based upon barriers assessment today, the patient will not require a follow-up call from the  to further discuss needs.   A copy of the barriers form will also be scanned into EMR for details and further reference.     VAD Assessment  The patient and I discussed planned intervenous chemotherapy as well as other IV treatments that are often needed throughout the course of treatment. These may include, but are not limited to blood transfusions, antibiotics, and IV hydration. The vasculature does not appear to be adequate for multiple  "peripheral IVs throughout their treatment course. Discussed risks and benefits of VADs. The patient would like to pursue Port-A-Cath insertion prior to initiation of treatment. Port placement scheduled for this week.     Advance Care Planning   ACP discussion was held with the patient during this visit. Patient does not have an advance directive, information provided.  The patient and I discussed advanced care planning, \"Conversations that Matter\".   This service was offered, free of charge, for development of advance directives with a certified ACP facilitator.  The patient does not have an up-to-date advanced directive. This document is not on file with our office. The patient is not interested in an appointment with one of our facilitators to create or update their advanced directives.         Palliative Care  The patient and I discussed palliative care services. Palliative care is not the same as Hospice care. This is specialized medical care for people living with serious illness with the goal of improving quality of life for the patient and their family. [facility] has partnered with [facility] to offer our patients outpatient palliative care early along with their treatment to assist in coordination of care, symptom management, pain management, and medical decision making.  Oncology criteria for palliative care referral is not met at this time. The patient is not interested in a palliative care consultation.     Additional Referral needs  none      CHEMOTHERAPY EDUCATION    Booklets Given: Chemotherapy and You [x]  Eating Hints [x]    Sexuality/Fertility Books []      Chemotherapy/Biotherapy Education Sheets: (list all that apply)  nausea management, acid reflux management, diarrhea management, Cancer resourse contacts information, skin and mouth care and vaccination information                                                                                                                          "                                        Chemotherapy Regimen:   Treatment Plans     Name Type Plan Dates Plan Provider         Active    OP HEAD & NECK CISplatin (Weekly) + XRT ONCOLOGY TREATMENT  12/15/2021 - Present Georgiana Buck MD                    DETAILED CHEMOTHERAPY TEACHING COMPLETED BY PHARMACY. CHEMOTHERAPY CONSENT COMPLETED BY PHARMACY. SEE PHARMACY EDUCATION FOR DOCUMENTATION.     Chemotherapy education comprehension reviewed. Questions answered and additional information discussed on topics including:  Anemia, Thrombocytopenia, Neutropenia, Nutrition and appetite changes and Nausea & vomiting        Assessment and Plan:    Diagnoses and all orders for this visit:    1. Oropharynx cancer (HCC) (Primary)  -     lidocaine-prilocaine (EMLA) 2.5-2.5 % cream; Apply 1 application topically to the appropriate area as directed As Needed (45-60 minutes prior to port access.  Cover with saran/plastic wrap.).  Dispense: 30 g; Refill: 3  -     OLANZapine (zyPREXA) 5 MG tablet; Take 1 tablet by mouth Every Night. Take on days 2, 3 and 4 after chemotherapy.  Dispense: 3 tablet; Refill: 5  -     ondansetron (ZOFRAN) 8 MG tablet; Take 1 tablet by mouth 3 (Three) Times a Day As Needed for Nausea or Vomiting.  Dispense: 30 tablet; Refill: 5  -     CBC and Differential; Future  -     Comprehensive metabolic panel; Future  -     Magnesium; Future  -     Lab Appointment Request; Future  -     Clinic Appointment Request; Future  -     Infusion Appointment Request 7; Future          I spent 40 minutes caring for Nicholas on this date of service. This time includes time spent by me in the following activities: preparing for the visit, reviewing tests, obtaining and/or reviewing a separately obtained history, performing a medically appropriate examination and/or evaluation, counseling and educating the patient/family/caregiver, ordering medications, tests, or procedures and documenting information in the medical record.     The  patient and I have reviewed their new cancer diagnosis and scheduled treatment plan. Needs assessment was completed including genetics, psychosocial needs, barriers to care, VAD evaluation, advanced care planning, and palliative care services. Referrals have been ordered as appropriate based upon our evaluation and patient desires.     Chemotherapy teaching was also completed today as documented above. Adequate time was given to answer all questions to his satisfaction. Patient and family are aware of their care team members and contact information if they have questions or problems throughout the treatment course. Needs assessments and education has been completed. The patient is adequately prepared to begin treatment as scheduled.       Della Kay, APRN  12/13/2021

## 2021-12-13 NOTE — PLAN OF CARE
Outpatient Infusion • [address] [facility] • [address] • [place] [id number] • [phone]      CHEMOTHERAPY EDUCATION SHEET    NAME:  Nicholas Renee      : 1965           DATE: 21    Booklets Given: Chemotherapy and You []  Eating Hints []    Sexuality/Fertility Books []     Chemotherapy/Biotherapy Education Sheets: (list all that apply)  Cisplatin, diarrhea                                                                                                                                                                Chemotherapy Regimen:  Cisplatin IV q7days x7 cycles    TOPICS EDUCATION PROVIDED EDUCATION REINFORCED COMMENTS   ANEMIA:  role of RBC, cause, s/s, ways to manage, role of transfusion [x] [] Reviewed the role of RBC and the use of transfusions if hemoglobin decreases too much.  Patient to notify us if they experience shortness of breath, dizziness, or palpitations.  Also let patient know they could feel more tired than usual and to try to stay active, but rest if they need to.   THROMBOCYTOPENIA:  role of platelet, cause, s/s, ways to prevent bleeding, things to avoid, when to seek help [x] [] Reviewed the role of platelets in blood clotting and when to call clinic (bloody nose that bleeds for 5 mins despite pressure, a cut that won't stop bleeding despite pressure, gums that bleed excessively with brushing or flossing). Recommended using an electric razor, soft bristle toothbrush, and blowing your nose gently.   NEUTROPENIA:  role of WBC, cause, infection precautions, s/s of infection, when to call MD [x] []  Reviewed the role of WBC, good infection prevention practices, and when to call the clinic (temperature 100.4F, sore throat, burning urination, etc)  Vaccines: COVID plus booster, flu   NUTRITION & APPETITE CHANGES:  importance of maintaining healthy diet & weight, ways to manage to improve intake, dietary consult, exercise regimen [x] [] Discussed the potential for decreased  appetite with cisplatin, and recommended eating smaller, more frequent meals throughout the day if experiencing loss of appetite. Also informed patient that a metallic taste has been associated with cisplatin, and recommended flavoring water and using plastic utensils if experiencing metallic taste.    DIARRHEA:  causes, s/s of dehydration, ways to manage, dietary changes, when to call MD [x] [] Provided supplementary handout with instructions for use of loperamide and other OTC therapies to manage diarrhea.  Instructed to call us if medications aren't working.   CONSTIPATION:  causes, ways to manage, dietary changes, when to call MD [] []    NAUSEA & VOMITING:  cause, use of antiemetics, dietary changes, when to call MD [x] [] Premeds: olanzapine, Aloxi, Emend, dexamethasone  PRN meds: Zofran    Instructed the patient to take a dose of the PRN medication at the first onset of nausea and if it's not working to call us for additional medications.  Also provided non-drug measures to mitigate nausea.   MOUTH SORES:  causes, oral care, ways to manage [] []    ALOPECIA:  cause, ways to manage, resources [x] [] Although uncommon with cisplatin, educated on the potential for hair loss or hair thinning. Informed patient that he could request a prescription for a wig if desired.   INFERTILITY & SEXUALITY:  causes, fertility preservation options, sexuality changes, ways to manage, importance of birth control [] []    NERVOUS SYSTEM CHANGES:  causes, s/s, neuropathies, cognitive changes, ways to manage [x] [] Discussed peripheral neuropathy and its associated signs/symptoms. Instructed patient to call if experiencing symptoms that become more frequent or worsen.   PAIN:  causes, ways to manage [] [] ????   SKIN & NAIL CHANGES:  cause, s/s, ways to manage [] []    ORGAN TOXICITIES:  cause, s/s, need for diagnostic tests, labs, when to notify MD [x] [] Discussed kidney toxicity and the need for pre- and post-hydration with  cisplatin. Informed patient that we would be monitoring renal function with labs, and instructed patient to call if experiencing peripheral edema or a 5 pound weight gain in one week. Also discussed ototoxicity from cisplatin and instructed patient to call if experiencing persistent ringing in the ears or hearing loss.    SURVIVORSHIP:  distress, distress assessment, secondary malignancies, early/late effects, follow-up, social issues, social support [] []    HOME CARE:  use of spill kits, storing of PO chemo, how to manage bodily fluids [x] [] To prevent exposure to others, recommended patient to close the toilet lid and flush twice after using the bathroom.    MISCELLANEOUS:  drug interactions, administration, vesicant, et [x] [] Discussed electrolyte abnormalities (decreased magnesium, calcium, potassium) and informed patient that these would be monitored with labs at each visit. Also informed patient that we would monitor for increased LFTs, but instructed patient to call if experiencing any RUQ abdominal pain, dark or brown urine, or yellowing of the skin or whites of the eyes.     Referrals:    N/A    Notes:   Discussed aforementioned material with patient here in clinic. All questions and concerns addressed. Provided patient with the pharmacists contact information and instructions to call should additional questions arise. Obtained consent today at this visit. Patient was given a personalized monthly treatment calendar of tentative treatment cycles, as well as an education sheet over cisplatin. Ensured patient understanding of how to take supportive care home medication olanzapine with this regimen. Patient is prescribed to take olanzapine 5 mg (1 tab) by mouth every evening on days 2, 3, and 4 of each cycle.     Thank you,  Chema Angelo  PharmD Candidate 2022 12/13/2021  10:40 EST

## 2021-12-13 NOTE — PROGRESS NOTES
Discussed patient's radiation and chemotherapy plans. Radiation plan is not ready yet and he will be kept informed of the progress. Patient understands and is agreeable with this plan. He will call with any concerns. Will follow up with patient.

## 2021-12-13 NOTE — MBS/VFSS/FEES
Outpatient Speech Language Pathology   Adult Swallow Initial Evaluation   Ena   Modified Barium Swallow Study (MBS)     Patient Name: Nicholas Renee  : 1965  MRN: [id number]  Today's Date: 2021         Visit Date: 2021   Patient Active Problem List   Diagnosis   • HTN (hypertension)   • Prediabetes   • Diverticulitis   • S/P low anterior colon resection   • Acute blood loss anemia, mild, asymptomatic   • Acute postoperative pain   • Primary osteoarthritis of right knee   • Oropharynx cancer (HCC)        Past Medical History:   Diagnosis Date   • C. difficile diarrhea    • Diverticulitis    • Esophagitis    • GERD (gastroesophageal reflux disease)    • Hypertension    • Oropharynx cancer (HCC) 2021   • Osteoarthritis of both knees    • Wears glasses         Past Surgical History:   Procedure Laterality Date   • ABDOMINAL SURGERY      INFANT (6wk)- STOMACH BLOCKAGE    • COLON RESECTION N/A 2019    Procedure: COLON RESECTION LAPAROSCOPIC LOW ANTERIOR;  Surgeon: Magan Blum MD;  Location:  [facility];  Service: General   • COLONOSCOPY N/A 2017    Procedure: COLONOSCOPY;  Surgeon: Magan Blum MD;  Location:  [facility];  Service:    • ENDOSCOPY     • FOOT SURGERY      BILAT    • KNEE SURGERY Bilateral     x 5   • PERIPHERALLY INSERTED CENTRAL CATHETER INSERTION  2019    RIGHT ARM          Visit Dx:     ICD-10-CM ICD-9-CM   1. Oropharyngeal dysphagia  R13.12 787.22   2. Oropharynx cancer (HCC)  C10.9 146.9                SLP Adult Swallow Evaluation     Row Name 21 1355       Rehab Evaluation    Document Type evaluation  -RD    Subjective Information no complaints  -RD    Patient Observations alert; cooperative; agree to therapy  -RD    Patient/Family/Caregiver Comments/Observations no family present  -RD    Patient Effort excellent  -RD       General Information    Patient Profile Reviewed yes  -RD    Pertinent History Of Current Problem Pt presents for  OP MBS for baseline swallowing study prior to H/N radiation. PMH significant for BOT & L tonsilar SCC (oropharynx cancer), osteoarthritis, GERD, esophagitis, diverticulitis. Pt reports no significant swallowing issues at this time other than occasional reflux.  -RD    Current Method of Nutrition regular textures; thin liquids  -RD    Precautions/Limitations, Vision WFL with corrective lenses; for purposes of eval  -RD    Precautions/Limitations, Hearing WFL; for purposes of eval  -RD    Prior Level of Function-Communication WFL  -RD    Prior Level of Function-Swallowing no diet consistency restrictions; esophageal concerns  -RD    Plans/Goals Discussed with patient; agreed upon  -RD    Barriers to Rehab none identified  -RD    Patient's Goals for Discharge patient did not state  -RD       Pain    Additional Documentation Pain Scale: Numbers Pre/Post-Treatment (Group)  -RD       Pain Scale: Numbers Pre/Post-Treatment    Pretreatment Pain Rating 0/10 - no pain  -RD    Posttreatment Pain Rating 0/10 - no pain  -RD       MBS/VFSS    Utensils Used spoon; cup; straw  -RD    Consistencies Trialed thin liquids; pudding thick; regular textures  -RD       MBS/VFSS Interpretation    Oral Phase, Comment Minimal oral dysphagia. Adequate mastication & oral clearance of regular solids. Premature spillage of thin liquids 2' reduced back of tongue control (although current anatomy d/t oropharyngeal cancer may be impacting as well). No other significant oral residue or weakness identified.  -RD       Initiation of Pharyngeal Swallow    Initiation of Pharyngeal Swallow bolus in pyriform sinuses  -RD    Rosenbek's Scale thin:; 2--->level 2; pudding/puree:; regular textures:; 1--->level 1  -RD    Pharyngeal Phase, Comment Minimal pharyngeal dysphagia. Penetration during the swallow w/ thin liquids 2' delayed initiation. Penetration was high & cleared upon completion of the swallow. No aspiration w/ any trial or consistency during exam,  even when pushed w/ multiple large consecutive drinks of thin liquids. Mild base of tongue (BOT) residue 2' reduced BOT retraction. Pt able to clear residue w/ spontaneous subsequent swallow. No other significant pharyngeal residue or weakness identified. Education provided on radiation effects & swallowing. Pt plans to f/u w/ OP SLP for prophylactic dysphagia treatment.  -RD       Esophageal Phase    Esophageal Phase other (see comments)  no immediate retrograde flow observed  -RD       Clinical Impression    SLP Swallowing Diagnosis oral dysphagia; pharyngeal dysphagia; other (see comments)  minimal  -RD    Functional Impact no impact on function  -RD    Rehab Potential/Prognosis, Swallowing good, to achieve stated therapy goals  -RD    Swallow Criteria for Skilled Therapeutic Interventions Met demonstrates skilled criteria  -RD       Recommendations    Therapy Frequency (Swallow) evaluation only  -RD    Predicted Duration Therapy Intervention (Days) 2 weeks; 4 weeks; other (see comments)  w/ OP SLP for prophylactic dysphagia treatment  -RD    SLP Diet Recommendation regular textures; thin liquids  -RD    Recommended Precautions and Strategies upright posture during/after eating; general aspiration precautions; reflux precautions  -RD    Oral Care Recommendations Oral Care BID/PRN  -RD    SLP Rec. for Method of Medication Administration meds whole; with thin liquids; with pudding or applesauce; as tolerated  -RD    Monitor for Signs of Aspiration yes; notify SLP if any concerns  -RD    Anticipated Discharge Disposition (SLP) home with OP services  -RD          User Key  (r) = Recorded By, (t) = Taken By, (c) = Cosigned By    Initials Name Provider Type    RD Heydi Rios MS CCC-SLP Speech and Language Pathologist                               OP SLP Education     Row Name 12/13/21 3046       Education    Barriers to Learning No barriers identified  -RD    Assessed Learning needs; Learning motivation;  Learning preferences; Learning readiness  -RD    Learning Motivation Strong  -RD    Learning Method Explanation; Teach back  -RD    Teaching Response Verbalized understanding  -RD          User Key  (r) = Recorded By, (t) = Taken By, (c) = Cosigned By    Initials Name Effective Dates    Heydi Naqvi MS CCC-SLP 06/16/21 -                          Time Calculation:   SLP Start Time: 1355  Untimed Charges  SLP Eval/Re-eval : ST Motion Fluoro Eval Swallow - 35032  32458-[facility] Fluoro Eval Swallow Minutes: 53  Total Minutes  Untimed Charges Total Minutes: 53   Total Minutes: 53    Therapy Charges for Today     Code Description Service Date Service Provider Modifiers Qty    98029658068 HC ST MOTION FLUORO EVAL SWALLOW 4 12/13/2021 Heydi Rios MS CCC-SLP GN 1            Patient was not wearing a face mask and did not exhibit coughing during this therapy encounter.  Procedure performed was aerosolizing, involved close contact (within 6 feet for at least 15 minutes or longer), and did not involve contact with infectious secretions or specimens.  Therapist used appropriate personal protective equipment including gloves, standard procedure mask and eye protection.  Appropriate PPE was worn during the entire therapy session.  Hand hygiene was completed before and after therapy session.         Heydi Rios MS CCC-SLP  12/13/2021

## 2021-12-14 ENCOUNTER — HOSPITAL ENCOUNTER (OUTPATIENT)
Dept: GENERAL RADIOLOGY | Facility: HOSPITAL | Age: 56
Discharge: HOME | End: 2021-12-14

## 2021-12-14 DIAGNOSIS — C10.9 OROPHARYNGEAL CARCINOMA (HCC): ICD-10-CM

## 2021-12-14 PROCEDURE — 71045 X-RAY EXAM CHEST 1 VIEW: CPT

## 2021-12-15 ENCOUNTER — DOCUMENTATION (OUTPATIENT)
Dept: NUTRITION | Facility: HOSPITAL | Age: 56
End: 2021-12-15

## 2021-12-15 PROCEDURE — 77301 RADIOTHERAPY DOSE PLAN IMRT: CPT | Performed by: RADIOLOGY

## 2021-12-15 NOTE — PROGRESS NOTES
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    Surgery:  Fine-needle aspiration of the left neck mass     Chemotherapy:  OP HEAD & NECK CISplatin (Weekly) + XRT - start date 21    Radiation:  Target a dose of 70 Gray in 35 fractions, but considering this is p16 positive, should he demonstrate a very good early response to treatment we may be able to de-escalate the radiation dosed to approximately 60 Hart    Patient Active Problem List   Diagnosis   • HTN (hypertension)   • Prediabetes   • Diverticulitis   • S/P low anterior colon resection   • Acute blood loss anemia, mild, asymptomatic   • Acute postoperative pain   • Primary osteoarthritis of right knee   • Oropharynx cancer (HCC)       Food / Nutrition Related History       Hydration Status     Goal:  3 liters + per day    Enteral Feeding   NA    Anthropometric Measurements     Height:    Ht Readings from Last 1 Encounters:   21 185.4 cm (73\")       Weight:    Wt Readings from Last 1 Encounters:   21 121 kg (266 lb)       BMI: 35.09 / Obese    Weight Change: No weight loss noted / 15 lbs weight gain past 14 months    Review of Lab Data (Time Frame - 1 month / 2 month)   Reviewed 12/15/21    Medication Review   Reviewed noting probiotic, protonix, MVI, fiber supplements    Nutrition Focused Physical Findings       Nutrition Impact Symptoms   Pt denies    Physical Activity      Normal with no limitations    Current Nutritional Intake     Oral diet:  Regular    Malnutrition Risk Assessment     Recent weight loss over the past 6 months:  0 = No    How much weight loss:      Eating poorly because of a decreased appetite:  0 = No    Malnutrition Screening " Score:     MST = 0 or 1 Patient not at risk for malnutrition however patient is at potential with diagnosis H&N cancer    Nutrition Diagnosis     Problem    Etiology    Signs / Symptoms      Nutrition Intervention   Chemo start scheduled for 12/23/21; completion of radiation treatment plan pending.  Will follow closely as patient begins treatment.    Goal       Monitoring / Evaluation

## 2021-12-16 ENCOUNTER — APPOINTMENT (OUTPATIENT)
Dept: ONCOLOGY | Facility: HOSPITAL | Age: 56
End: 2021-12-16

## 2021-12-16 PROCEDURE — 77300 RADIATION THERAPY DOSE PLAN: CPT | Performed by: RADIOLOGY

## 2021-12-16 PROCEDURE — 77338 DESIGN MLC DEVICE FOR IMRT: CPT | Performed by: RADIOLOGY

## 2021-12-21 ENCOUNTER — HOSPITAL ENCOUNTER (OUTPATIENT)
Dept: RADIATION ONCOLOGY | Facility: HOSPITAL | Age: 56
Discharge: HOME | End: 2021-12-21

## 2021-12-21 ENCOUNTER — HOSPITAL ENCOUNTER (OUTPATIENT)
Dept: SPEECH THERAPY | Facility: HOSPITAL | Age: 56
Setting detail: THERAPIES SERIES
Discharge: HOME | End: 2021-12-21

## 2021-12-21 ENCOUNTER — DOCUMENTATION (OUTPATIENT)
Dept: NUTRITION | Facility: HOSPITAL | Age: 56
End: 2021-12-21

## 2021-12-21 VITALS — BODY MASS INDEX: 35.74 KG/M2 | WEIGHT: 270.9 LBS

## 2021-12-21 DIAGNOSIS — C10.9 OROPHARYNX CANCER (HCC): Primary | ICD-10-CM

## 2021-12-21 PROCEDURE — 92610 EVALUATE SWALLOWING FUNCTION: CPT

## 2021-12-21 NOTE — THERAPY EVALUATION
Outpatient Speech Language Pathology   Adult Swallow Initial Evaluation   [place]     Patient Name: Nicholas Renee  : 1965  MRN: [id number]  Today's Date: 2021         Visit Date: 2021   Patient Active Problem List   Diagnosis   • HTN (hypertension)   • Prediabetes   • Diverticulitis   • S/P low anterior colon resection   • Acute blood loss anemia, mild, asymptomatic   • Acute postoperative pain   • Primary osteoarthritis of right knee   • Oropharynx cancer (HCC)        Past Medical History:   Diagnosis Date   • C. difficile diarrhea    • Diverticulitis    • Esophagitis    • GERD (gastroesophageal reflux disease)    • Hypertension    • Oropharynx cancer (HCC) 2021   • Osteoarthritis of both knees    • Wears glasses         Past Surgical History:   Procedure Laterality Date   • ABDOMINAL SURGERY      INFANT (6wk)- STOMACH BLOCKAGE    • COLON RESECTION N/A 2019    Procedure: COLON RESECTION LAPAROSCOPIC LOW ANTERIOR;  Surgeon: Magan Blum MD;  Location: [facility] OR;  Service: General   • COLONOSCOPY N/A 2017    Procedure: COLONOSCOPY;  Surgeon: Magan Blum MD;  Location: [facility] ENDOSCOPY;  Service:    • ENDOSCOPY     • FOOT SURGERY      BILAT    • KNEE SURGERY Bilateral     x 5   • PERIPHERALLY INSERTED CENTRAL CATHETER INSERTION  2019    RIGHT ARM          Visit Dx:     ICD-10-CM ICD-9-CM   1. Oropharynx cancer (HCC)  C10.9 146.9            OP SLP Assessment/Plan - 21 0800        SLP Assessment    Functional Problems Swallowing  -HG    Impact on Function: Swallowing Risk of aspiration; Risk of pneumonia  -HG    Clinical Impression: Swallowing Minimal:; oral phase dysphagia  -HG    Functional Problems Comment Pt reports no difficulty at this time.  -HG    Clinical Impression Comments MASA score of 200/200.  -HG    Please refer to paper survey for additional self-reported information Yes  -HG    Please refer to items scanned into chart for additional diagnostic  informaiton and handouts as provided by clinician Yes  -HG    SLP Diagnosis Minimal oral phase dysphagia  -HG    Prognosis Excellent (comment)  -HG    Patient/caregiver participated in establishment of treatment plan and goals Yes  -HG    Patient would benefit from skilled therapy intervention Yes  -HG       SLP Plan    Frequency 1x/week every 2 weeks  -HG    Duration 12 weeks  -HG    Planned CPT's? SLP CLINICAL SWALLOW EVAL: 15009; SLP SWALLOW THERAPY: 83381  -HG    Expected Duration of Therapy Session (SLP Eval) 30  -HG    Plan Comments Initiate Prophylactic Swallowing tx.  -HG          User Key  (r) = Recorded By, (t) = Taken By, (c) = Cosigned By    Initials Name Provider Type     Ariella Anthony MS CCC-SLP Speech and Language Pathologist                 SLP Adult Swallow Evaluation     Row Name 12/21/21 0800       Rehab Evaluation    Document Type evaluation  -HG    Subjective Information no complaints  -HG    Patient Observations alert; cooperative; agree to therapy  -HG    Patient Effort excellent  -HG    Symptoms Noted During/After Treatment none  -HG       General Information    Patient Profile Reviewed yes  -HG    Pertinent History Of Current Problem Pt is a 56 year old male with recent dx of 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 Pt will undergo chemo and radiation tx. Pt with a  recent MBS on 12/13/21 with recs of regular/thin diet. Please see formal report for more details.  -HG    Current Method of Nutrition regular textures; thin liquids  -HG    Precautions/Limitations, Vision WFL with corrective lenses  -HG    Precautions/Limitations, Hearing WFL; for purposes of eval  -HG    Prior Level of Function-Communication WFL  -HG    Prior Level of Function-Swallowing no diet consistency restrictions  -HG    Plans/Goals Discussed with patient  -HG    Barriers to Rehab none identified  -HG       Oral Motor Structure and Function    Dentition Assessment natural, present and  adequate  -HG    Secretion Management WNL/WFL  -HG    Mucosal Quality moist, healthy  -HG    Volitional Swallow WFL  -HG    Volitional Cough WFL  -HG       Oral Musculature and Cranial Nerve Assessment    Oral Motor General Assessment WFL  -HG       General Eating/Swallowing Observations    Respiratory Support Currently in Use room air  -HG    Eating/Swallowing Skills self-fed  -HG    Positioning During Eating upright in chair  -HG    Utensils Used spoon; cup; straw  -HG    Consistencies Trialed regular textures; mixed consistency; pureed; thin liquids  -HG       Respiratory    Respiratory Status WFL; room air  -HG       Clinical Swallow Eval    Oral Prep Phase WFL  -HG    Oral Transit WFL  -HG    Oral Residue WFL  -HG    Pharyngeal Phase no overt signs/symptoms of pharyngeal impairment  -HG    Esophageal Phase unremarkable  -HG    Clinical Swallow Evaluation Summary CSE completed this date. Pt with no reports of swallowing difficulty at this time. Pt tolerated all consistencies with no s/s of aspiration: water via cup and straw, applesauce, mixed fruit and dot cracker. Recent MBS on 12/13/21 exhibited Minimal pharyngeal dysphagia. Penetration during the swallow w/ thin liquids 2' delayed initiation. Penetration was high & cleared upon completion of the swallow. No aspiration w/ any trial or consistency during exam, even when pushed w/ multiple large consecutive drinks of thin liquids. Mild base of tongue (BOT) residue 2' reduced BOT retraction. Please see formal report for more details.  -          User Key  (r) = Recorded By, (t) = Taken By, (c) = Cosigned By    Initials Name Provider Type     Ariella Anthony MS CCC-SLP Speech and Language Pathologist                               OP SLP Education     Row Name 12/21/21 0800       Education    Barriers to Learning No barriers identified  -    Education Provided Described results of evaluation; Patient expressed understanding of evaluation; Patient  participated in establishing goals and treatment plan; Patient demonstrated recommended strategies; Patient requires further education on strategies, risks  -    Assessed Learning needs; Learning motivation; Learning preferences; Learning readiness  -    Learning Motivation Strong  -    Learning Method Explanation; Demonstration; Teach back; Written materials  -    Teaching Response Verbalized understanding; Demonstrated understanding; Reinforcement needed  -    Education Comments Education for swallowing exercises. Demonstration with teachback and handout provided.  -          User Key  (r) = Recorded By, (t) = Taken By, (c) = Cosigned By    Initials Name Effective Dates     Ariella Anthony MS CCC-SLP 06/16/21 -                SLP OP Goals     Row Name 12/21/21 0800          Goal Type Needed    Goal Type Needed Dysphagia; Other Adult Goals  -            Subjective Comments    Subjective Comments Pt alert, cooperative and without complaint.  -            Dysphagia Goals    Patient will safely consume the recommended diet without complications such as aspiration pneumonia regular/thin  -HG     Status: Patient will safely consume the recommended diet without complications such as aspiration pneumonia New  -HG     Status: Patient will improve oral skills to enhance safety and increase eating efficiency by increasing accuracy of back of tongue control Discontinued  -HG     Patient will increase laryngeal elevation to reduce residue that might fall into airway by completing Mendelsohn maneuver; super-supraglottic swallow; with cues; falsetto/pitch glide  -HG     Status: Patient will increase laryngeal elevation to reduce residue that might fall into airway by completing New  -HG     Patient will increase closure of larynx to keep food from falling into the airway by completing super-supraglottic swallow; with cues  -HG     Status: Patient will increase closure of larynx to keep food from falling into  the airway by completing New  -HG     Patient will increase strength of tongue base and posterior pharyngeal walls to reduce residue that might fall into airway by completing effotful swallow; Maya (tongue hold); with cues  -HG     Status: Patient will increase strength of tongue base and posterior pharyngeal walls to reduce residue that might fall into airway by completing New  -HG     Patient will improve hyolaryngeal elevation via completing Ulisses (head lift) sustained lift (comment #/duration of lifts); repetitive lift (comment #/duration of lifts); with cues  CTAR  -HG     Status: Patient will improve hyolaryngeal elevation via completing Ulisses (head lift) New  -HG            Other Goals    Other Adult Goal- 1 Pt will complete stretches of the strap muscles to improve range of motion and flexiblity.  -HG     Status: Other Adult Goal- 1 New  -HG            SLP Time Calculation    SLP Goal Re-Cert Due Date 01/20/22  -           User Key  (r) = Recorded By, (t) = Taken By, (c) = Cosigned By    Initials Name Provider Type     Ariella Anthony MS CCC-SLP Speech and Language Pathologist                       Time Calculation:   SLP Start Time: 0800  Untimed Charges  34545-FJ Eval Oral Pharyng Swallow Minutes: 45  Total Minutes  Untimed Charges Total Minutes: 45   Total Minutes: 45    Therapy Charges for Today     Code Description Service Date Service Provider Modifiers Qty    [id number] HC ST EVAL ORAL PHARYNG SWALLOW 3 12/21/2021 Ariella Anthony MS CCC-SLP GN 1                   Ariella Anthony MS CCC-SLP  12/21/2021

## 2021-12-21 NOTE — PROGRESS NOTES
ONC Nutrition    Diagnosis: Stage 1 p16+ squamous cell carcinoma left tonsil and tonsil base / > 3 cm cervical LN     Surgery:  Fine-needle aspiration of the left neck mass      Chemotherapy:  OP HEAD & NECK CISplatin (Weekly) + XRT - start date 12/23/21     Radiation:  Target a dose of 70 Gray in 35 fractions, but considering this is p16 positive, should he demonstrate a very good early response to treatment we may be able to de-escalate the radiation dosed to approximately 60 Hart / port films today    Weight 270.9 lbs    Initial consultation with patient during RAD ONC status checks.  Post operatively, patient has returned to baseline with oral food intake and hydration.  He denies swallowing difficulties at this time, eating all consistencies and textures without issues.  He states that his appetite has increased significantly since he stopped tobacco dipping in October, as evidenced by 15-20 lbs weight gain.    Discussed the importance of nutrition with diagnosis with focus on healthy nutrient dense choices, high calorie to prevent additional weight loss, high protein for repletion / preservation of LBM and adequate hydration. Encouraged small frequent meals and snacks with inclusion of protein at each.      Discussed what to expect with progression of treatment and how that would affect his oral intake and indication for modification of consistencies and textures.  Discussed indication for placement of PEG in the event he is unable to sustain nutritional and hydration needs with oral intake.      Discussed mouth care and possible side effects of dry mouth, taste changes.  Encouraged patient to use the baking soda, salt and water mouth rinses numerous times during the day / written patient education provided.  Discussed retail products available for dry mouth and types of toothpaste that may improve mouth hygiene with progression of treatment.    Will continue to follow.

## 2021-12-22 ENCOUNTER — HOSPITAL ENCOUNTER (OUTPATIENT)
Dept: RADIATION ONCOLOGY | Facility: HOSPITAL | Age: 56
Discharge: HOME | End: 2021-12-22

## 2021-12-22 PROCEDURE — 77386: CPT | Performed by: RADIOLOGY

## 2021-12-23 ENCOUNTER — HOSPITAL ENCOUNTER (OUTPATIENT)
Dept: RADIATION ONCOLOGY | Facility: HOSPITAL | Age: 56
Discharge: HOME | End: 2021-12-23

## 2021-12-23 ENCOUNTER — HOSPITAL ENCOUNTER (OUTPATIENT)
Dept: ONCOLOGY | Facility: HOSPITAL | Age: 56
Setting detail: INFUSION SERIES
Discharge: HOME | End: 2021-12-23

## 2021-12-23 ENCOUNTER — OFFICE VISIT (OUTPATIENT)
Dept: ONCOLOGY | Facility: CLINIC | Age: 56
End: 2021-12-23

## 2021-12-23 VITALS
SYSTOLIC BLOOD PRESSURE: 135 MMHG | DIASTOLIC BLOOD PRESSURE: 84 MMHG | HEART RATE: 82 BPM | WEIGHT: 265 LBS | RESPIRATION RATE: 18 BRPM | TEMPERATURE: 97.7 F | BODY MASS INDEX: 35.12 KG/M2 | HEIGHT: 73 IN | OXYGEN SATURATION: 98 %

## 2021-12-23 DIAGNOSIS — C10.9 OROPHARYNX CANCER (HCC): Primary | ICD-10-CM

## 2021-12-23 LAB
ANION GAP SERPL CALCULATED.3IONS-SCNC: 11 MMOL/L (ref 5–15)
BUN SERPL-MCNC: 25 MG/DL (ref 6–20)
BUN/CREAT SERPL: 32.5 (ref 7–25)
CALCIUM SPEC-SCNC: 9.4 MG/DL (ref 8.6–10.5)
CHLORIDE SERPL-SCNC: 101 MMOL/L (ref 98–107)
CO2 SERPL-SCNC: 26 MMOL/L (ref 22–29)
CREAT BLDA-MCNC: 0.8 MG/DL (ref 0.6–1.3)
CREAT SERPL-MCNC: 0.77 MG/DL (ref 0.76–1.27)
ERYTHROCYTE [DISTWIDTH] IN BLOOD BY AUTOMATED COUNT: 12.9 % (ref 12.3–15.4)
GFR SERPL CREATININE-BSD FRML MDRD: 104 ML/MIN/1.73
GLUCOSE SERPL-MCNC: 93 MG/DL (ref 65–99)
HCT VFR BLD AUTO: 35.8 % (ref 37.5–51)
HGB BLD-MCNC: 12.2 G/DL (ref 13–17.7)
LYMPHOCYTES # BLD AUTO: 2 10*3/MM3 (ref 0.7–3.1)
LYMPHOCYTES NFR BLD AUTO: 40.3 % (ref 19.6–45.3)
MAGNESIUM SERPL-MCNC: 2.1 MG/DL (ref 1.6–2.6)
MCH RBC QN AUTO: 33.4 PG (ref 26.6–33)
MCHC RBC AUTO-ENTMCNC: 34.2 G/DL (ref 31.5–35.7)
MCV RBC AUTO: 97.6 FL (ref 79–97)
MONOCYTES # BLD AUTO: 0.4 10*3/MM3 (ref 0.1–0.9)
MONOCYTES NFR BLD AUTO: 8.8 % (ref 5–12)
NEUTROPHILS NFR BLD AUTO: 2.5 10*3/MM3 (ref 1.7–7)
NEUTROPHILS NFR BLD AUTO: 50.9 % (ref 42.7–76)
PLATELET # BLD AUTO: 243 10*3/MM3 (ref 140–450)
PMV BLD AUTO: 6.4 FL (ref 6–12)
POTASSIUM SERPL-SCNC: 4.2 MMOL/L (ref 3.5–5.2)
RBC # BLD AUTO: 3.67 10*6/MM3 (ref 4.14–5.8)
SODIUM SERPL-SCNC: 138 MMOL/L (ref 136–145)
WBC NRBC COR # BLD: 5 10*3/MM3 (ref 3.4–10.8)

## 2021-12-23 PROCEDURE — 77386: CPT | Performed by: RADIOLOGY

## 2021-12-23 PROCEDURE — 96360 HYDRATION IV INFUSION INIT: CPT

## 2021-12-23 PROCEDURE — 77336 RADIATION PHYSICS CONSULT: CPT | Performed by: RADIOLOGY

## 2021-12-23 PROCEDURE — 25010000002 MAGNESIUM SULFATE PER 500 MG OF MAGNESIUM: Performed by: NURSE PRACTITIONER

## 2021-12-23 PROCEDURE — 99213 OFFICE O/P EST LOW 20 MIN: CPT | Performed by: INTERNAL MEDICINE

## 2021-12-23 PROCEDURE — 25010000002 DEXAMETHASONE SODIUM PHOSPHATE 100 MG/10ML SOLUTION: Performed by: NURSE PRACTITIONER

## 2021-12-23 PROCEDURE — 83735 ASSAY OF MAGNESIUM: CPT | Performed by: INTERNAL MEDICINE

## 2021-12-23 PROCEDURE — 80048 BASIC METABOLIC PNL TOTAL CA: CPT | Performed by: INTERNAL MEDICINE

## 2021-12-23 PROCEDURE — 25010000002 POTASSIUM CHLORIDE PER 2 MEQ OF POTASSIUM: Performed by: NURSE PRACTITIONER

## 2021-12-23 PROCEDURE — 96367 TX/PROPH/DG ADDL SEQ IV INF: CPT

## 2021-12-23 PROCEDURE — 25010000002 CISPLATIN PER 50 MG: Performed by: NURSE PRACTITIONER

## 2021-12-23 PROCEDURE — 25010000002 HEPARIN LOCK FLUSH PER 10 UNITS: Performed by: INTERNAL MEDICINE

## 2021-12-23 PROCEDURE — 25010000002 FOSAPREPITANT PER 1 MG: Performed by: NURSE PRACTITIONER

## 2021-12-23 PROCEDURE — 96415 CHEMO IV INFUSION ADDL HR: CPT

## 2021-12-23 PROCEDURE — 96413 CHEMO IV INFUSION 1 HR: CPT

## 2021-12-23 PROCEDURE — 96374 THER/PROPH/DIAG INJ IV PUSH: CPT

## 2021-12-23 PROCEDURE — 96375 TX/PRO/DX INJ NEW DRUG ADDON: CPT

## 2021-12-23 PROCEDURE — 85025 COMPLETE CBC W/AUTO DIFF WBC: CPT | Performed by: INTERNAL MEDICINE

## 2021-12-23 PROCEDURE — 25010000002 PALONOSETRON 0.25 MG/5ML SOLUTION PREFILLED SYRINGE: Performed by: NURSE PRACTITIONER

## 2021-12-23 PROCEDURE — 82565 ASSAY OF CREATININE: CPT

## 2021-12-23 PROCEDURE — 36591 DRAW BLOOD OFF VENOUS DEVICE: CPT

## 2021-12-23 PROCEDURE — 96361 HYDRATE IV INFUSION ADD-ON: CPT

## 2021-12-23 RX ORDER — SODIUM CHLORIDE 9 MG/ML
250 INJECTION, SOLUTION INTRAVENOUS ONCE
Status: COMPLETED | OUTPATIENT
Start: 2021-12-23 | End: 2021-12-23

## 2021-12-23 RX ORDER — HEPARIN SODIUM (PORCINE) LOCK FLUSH IV SOLN 100 UNIT/ML 100 UNIT/ML
500 SOLUTION INTRAVENOUS ONCE
Status: COMPLETED | OUTPATIENT
Start: 2021-12-23 | End: 2021-12-23

## 2021-12-23 RX ORDER — OLANZAPINE 5 MG/1
5 TABLET ORAL ONCE
Status: COMPLETED | OUTPATIENT
Start: 2021-12-23 | End: 2021-12-23

## 2021-12-23 RX ORDER — SODIUM CHLORIDE 9 MG/ML
250 INJECTION, SOLUTION INTRAVENOUS ONCE
Status: CANCELLED | OUTPATIENT
Start: 2021-12-30

## 2021-12-23 RX ORDER — OLANZAPINE 5 MG/1
5 TABLET ORAL ONCE
Status: CANCELLED | OUTPATIENT
Start: 2021-12-30 | End: 2021-12-30

## 2021-12-23 RX ORDER — PALONOSETRON 0.05 MG/ML
0.25 INJECTION, SOLUTION INTRAVENOUS ONCE
Status: COMPLETED | OUTPATIENT
Start: 2021-12-23 | End: 2021-12-23

## 2021-12-23 RX ORDER — PALONOSETRON 0.05 MG/ML
0.25 INJECTION, SOLUTION INTRAVENOUS ONCE
Status: CANCELLED | OUTPATIENT
Start: 2021-12-30

## 2021-12-23 RX ADMIN — SODIUM CHLORIDE 150 MG: 9 INJECTION, SOLUTION INTRAVENOUS at 09:20

## 2021-12-23 RX ADMIN — SODIUM CHLORIDE 250 ML: 9 INJECTION, SOLUTION INTRAVENOUS at 10:43

## 2021-12-23 RX ADMIN — OLANZAPINE 5 MG: 5 TABLET, FILM COATED ORAL at 09:24

## 2021-12-23 RX ADMIN — PALONOSETRON 0.25 MG: 0.25 INJECTION, SOLUTION INTRAVENOUS at 09:44

## 2021-12-23 RX ADMIN — MAGNESIUM SULFATE HEPTAHYDRATE 500 ML: 500 INJECTION, SOLUTION INTRAMUSCULAR; INTRAVENOUS at 09:45

## 2021-12-23 RX ADMIN — MAGNESIUM SULFATE HEPTAHYDRATE 500 ML: 500 INJECTION, SOLUTION INTRAMUSCULAR; INTRAVENOUS at 12:01

## 2021-12-23 RX ADMIN — DEXAMETHASONE SODIUM PHOSPHATE 12 MG: 10 INJECTION, SOLUTION INTRAMUSCULAR; INTRAVENOUS at 09:24

## 2021-12-23 RX ADMIN — HEPARIN SODIUM (PORCINE) LOCK FLUSH IV SOLN 100 UNIT/ML 500 UNITS: 100 SOLUTION at 13:06

## 2021-12-23 RX ADMIN — CISPLATIN 96 MG: 1 INJECTION INTRAVENOUS at 10:48

## 2021-12-23 NOTE — PROGRESS NOTES
"      PROBLEM LIST:  1. [id number] Squamous cell carcinoma of the left base of tongue  A) CT neck on 11/4/2021 showed a 3.5 cystic lymph node level 2 on the left, and irregularity at the left base of tongue.  Biopsy of left neck mass on 11/8/2021 showed squamous cell carcinoma.  P16 stain inconclusive.  Repeat biopsy done 11/24/21 - excision of left lingual tonsil, showed SCC, p16+  2. Hypertension  3. GERd  4. Hyperlipidemia    Subjective     CHIEF COMPLAINT: p16+ oropharynx cancer    HISTORY OF PRESENT ILLNESS:   Nicholas Renee returns for follow-up.   He has started radiation this week and will receive his first dose of chemotherapy today.  He is feeling well.  No trouble swallowing and and no pain.      Objective      /84   Pulse 82   Temp 97.7 °F (36.5 °C) (Temporal)   Resp 18   Ht 185.4 cm (72.99\")   Wt 120 kg (265 lb)   SpO2 98%   BMI 34.97 kg/m²      Performance Status:  ECOG score: 0           General: well appearing male in no acute distress  Neuro: alert and oriented  HEENT: sclerae anicteric  Lymphatics: Left cervical lymph node, firm and movable, approximately 3 cm  Cardiovascular: regular rate and rhythm, no murmurs  Lungs: clear to auscultation bilaterally  Abdomen: soft, nontender, nondistended.  No palpable organomegaly  Extremities: no lower extremity edema  Skin: no rashes, lesions, bruising, or petechiae  Psych: mood and affect appropriate      RECENT LABS:  Lab Results   Component Value Date    WBC 6.80 12/13/2021    HGB 13.1 12/13/2021    HCT 39.4 12/13/2021    MCV 98.0 (H) 12/13/2021     12/13/2021       Lab Results   Component Value Date    GLUCOSE 76 12/13/2021    BUN 21 (H) 12/13/2021    CREATININE 0.91 12/13/2021    EGFRIFNONA 86 12/13/2021    BCR 23.1 12/13/2021    K 4.4 12/13/2021    CO2 27.0 12/13/2021    CALCIUM 10.2 12/13/2021    ALBUMIN 4.80 12/13/2021    AST 20 12/13/2021    ALT 19 12/13/2021                   Assessment/Plan   Nicholas Renee is a 56 y.o. male " with a cT1-2N1M0 p16+ SCC of the left base of tongue, with > 3 cm cervical LN.    We will proceed with chemoradiation with weekly cisplatin.  We reviewed nausea medications.  He should not hesitate to call with any uncontrolled symptoms.  We discussed the importance of hydrating well over the next 48 hours.    We will see him weekly during chemoradiation.    F/u 1 week.                        Georgiana Buck MD  [facility] Hematology and Oncology    12/23/2021          CC:

## 2021-12-24 PROCEDURE — 77386: CPT | Performed by: RADIOLOGY

## 2021-12-27 ENCOUNTER — HOSPITAL ENCOUNTER (OUTPATIENT)
Dept: RADIATION ONCOLOGY | Facility: HOSPITAL | Age: 56
Discharge: HOME | End: 2021-12-27

## 2021-12-27 VITALS — WEIGHT: 268.3 LBS | BODY MASS INDEX: 35.41 KG/M2

## 2021-12-27 PROCEDURE — 77386: CPT | Performed by: RADIOLOGY

## 2021-12-28 ENCOUNTER — HOSPITAL ENCOUNTER (OUTPATIENT)
Dept: RADIATION ONCOLOGY | Facility: HOSPITAL | Age: 56
Discharge: HOME | End: 2021-12-28

## 2021-12-28 PROCEDURE — 77386: CPT | Performed by: RADIOLOGY

## 2021-12-29 ENCOUNTER — HOSPITAL ENCOUNTER (OUTPATIENT)
Dept: RADIATION ONCOLOGY | Facility: HOSPITAL | Age: 56
Discharge: HOME | End: 2021-12-29

## 2021-12-29 PROCEDURE — 77386: CPT | Performed by: RADIOLOGY

## 2021-12-30 ENCOUNTER — OFFICE VISIT (OUTPATIENT)
Dept: ONCOLOGY | Facility: CLINIC | Age: 56
End: 2021-12-30

## 2021-12-30 ENCOUNTER — TELEPHONE (OUTPATIENT)
Dept: ONCOLOGY | Facility: CLINIC | Age: 56
End: 2021-12-30

## 2021-12-30 ENCOUNTER — HOSPITAL ENCOUNTER (OUTPATIENT)
Dept: RADIATION ONCOLOGY | Facility: HOSPITAL | Age: 56
Discharge: HOME | End: 2021-12-30

## 2021-12-30 ENCOUNTER — HOSPITAL ENCOUNTER (OUTPATIENT)
Dept: ONCOLOGY | Facility: HOSPITAL | Age: 56
Setting detail: INFUSION SERIES
Discharge: HOME | End: 2021-12-30

## 2021-12-30 VITALS
WEIGHT: 264 LBS | HEART RATE: 94 BPM | RESPIRATION RATE: 16 BRPM | DIASTOLIC BLOOD PRESSURE: 90 MMHG | BODY MASS INDEX: 34.99 KG/M2 | SYSTOLIC BLOOD PRESSURE: 137 MMHG | TEMPERATURE: 98.2 F | HEIGHT: 73 IN

## 2021-12-30 DIAGNOSIS — K12.30 MUCOSITIS: Primary | ICD-10-CM

## 2021-12-30 DIAGNOSIS — C10.9 OROPHARYNX CANCER (HCC): ICD-10-CM

## 2021-12-30 DIAGNOSIS — C10.9 OROPHARYNX CANCER (HCC): Primary | ICD-10-CM

## 2021-12-30 LAB
ALBUMIN SERPL-MCNC: 4.3 G/DL (ref 3.5–5.2)
ALBUMIN/GLOB SERPL: 1.5 G/DL
ALP SERPL-CCNC: 78 U/L (ref 39–117)
ALT SERPL W P-5'-P-CCNC: 17 U/L (ref 1–41)
ANION GAP SERPL CALCULATED.3IONS-SCNC: 11 MMOL/L (ref 5–15)
AST SERPL-CCNC: 16 U/L (ref 1–40)
BILIRUB SERPL-MCNC: 0.4 MG/DL (ref 0–1.2)
BUN SERPL-MCNC: 33 MG/DL (ref 6–20)
BUN/CREAT SERPL: 33.3 (ref 7–25)
CALCIUM SPEC-SCNC: 9.7 MG/DL (ref 8.6–10.5)
CHLORIDE SERPL-SCNC: 101 MMOL/L (ref 98–107)
CO2 SERPL-SCNC: 26 MMOL/L (ref 22–29)
CREAT BLDA-MCNC: 1 MG/DL (ref 0.6–1.3)
CREAT SERPL-MCNC: 0.99 MG/DL (ref 0.76–1.27)
ERYTHROCYTE [DISTWIDTH] IN BLOOD BY AUTOMATED COUNT: 12.9 % (ref 12.3–15.4)
GFR SERPL CREATININE-BSD FRML MDRD: 78 ML/MIN/1.73
GLOBULIN UR ELPH-MCNC: 2.9 GM/DL
GLUCOSE SERPL-MCNC: 110 MG/DL (ref 65–99)
HCT VFR BLD AUTO: 36.9 % (ref 37.5–51)
HGB BLD-MCNC: 12.4 G/DL (ref 13–17.7)
LYMPHOCYTES # BLD AUTO: 1.6 10*3/MM3 (ref 0.7–3.1)
LYMPHOCYTES NFR BLD AUTO: 31.5 % (ref 19.6–45.3)
MAGNESIUM SERPL-MCNC: 1.7 MG/DL (ref 1.6–2.6)
MCH RBC QN AUTO: 32.8 PG (ref 26.6–33)
MCHC RBC AUTO-ENTMCNC: 33.5 G/DL (ref 31.5–35.7)
MCV RBC AUTO: 97.9 FL (ref 79–97)
MONOCYTES # BLD AUTO: 0.1 10*3/MM3 (ref 0.1–0.9)
MONOCYTES NFR BLD AUTO: 2.7 % (ref 5–12)
NEUTROPHILS NFR BLD AUTO: 3.4 10*3/MM3 (ref 1.7–7)
NEUTROPHILS NFR BLD AUTO: 65.8 % (ref 42.7–76)
PLATELET # BLD AUTO: 269 10*3/MM3 (ref 140–450)
PMV BLD AUTO: 6.4 FL (ref 6–12)
POTASSIUM SERPL-SCNC: 4.3 MMOL/L (ref 3.5–5.2)
PROT SERPL-MCNC: 7.2 G/DL (ref 6–8.5)
RBC # BLD AUTO: 3.77 10*6/MM3 (ref 4.14–5.8)
SODIUM SERPL-SCNC: 138 MMOL/L (ref 136–145)
WBC NRBC COR # BLD: 5.1 10*3/MM3 (ref 3.4–10.8)

## 2021-12-30 PROCEDURE — 96375 TX/PRO/DX INJ NEW DRUG ADDON: CPT

## 2021-12-30 PROCEDURE — 96368 THER/DIAG CONCURRENT INF: CPT

## 2021-12-30 PROCEDURE — 80053 COMPREHEN METABOLIC PANEL: CPT | Performed by: INTERNAL MEDICINE

## 2021-12-30 PROCEDURE — 25010000002 CISPLATIN PER 50 MG: Performed by: INTERNAL MEDICINE

## 2021-12-30 PROCEDURE — 25010000002 PALONOSETRON PER 25 MCG: Performed by: INTERNAL MEDICINE

## 2021-12-30 PROCEDURE — 82565 ASSAY OF CREATININE: CPT

## 2021-12-30 PROCEDURE — 96367 TX/PROPH/DG ADDL SEQ IV INF: CPT

## 2021-12-30 PROCEDURE — 25010000002 FOSAPREPITANT PER 1 MG: Performed by: INTERNAL MEDICINE

## 2021-12-30 PROCEDURE — 25010000002 POTASSIUM CHLORIDE PER 2 MEQ OF POTASSIUM: Performed by: INTERNAL MEDICINE

## 2021-12-30 PROCEDURE — 96366 THER/PROPH/DIAG IV INF ADDON: CPT

## 2021-12-30 PROCEDURE — 25010000002 MAGNESIUM SULFATE PER 500 MG OF MAGNESIUM: Performed by: INTERNAL MEDICINE

## 2021-12-30 PROCEDURE — 83735 ASSAY OF MAGNESIUM: CPT | Performed by: INTERNAL MEDICINE

## 2021-12-30 PROCEDURE — 85025 COMPLETE CBC W/AUTO DIFF WBC: CPT | Performed by: INTERNAL MEDICINE

## 2021-12-30 PROCEDURE — 96413 CHEMO IV INFUSION 1 HR: CPT

## 2021-12-30 PROCEDURE — 25010000002 DEXAMETHASONE SODIUM PHOSPHATE 100 MG/10ML SOLUTION: Performed by: INTERNAL MEDICINE

## 2021-12-30 PROCEDURE — 96365 THER/PROPH/DIAG IV INF INIT: CPT

## 2021-12-30 PROCEDURE — 77336 RADIATION PHYSICS CONSULT: CPT | Performed by: RADIOLOGY

## 2021-12-30 PROCEDURE — 77386: CPT | Performed by: RADIOLOGY

## 2021-12-30 PROCEDURE — 99214 OFFICE O/P EST MOD 30 MIN: CPT | Performed by: NURSE PRACTITIONER

## 2021-12-30 PROCEDURE — 25010000002 HEPARIN LOCK FLUSH PER 10 UNITS: Performed by: INTERNAL MEDICINE

## 2021-12-30 RX ORDER — PALONOSETRON 0.05 MG/ML
0.25 INJECTION, SOLUTION INTRAVENOUS ONCE
Status: COMPLETED | OUTPATIENT
Start: 2021-12-30 | End: 2021-12-30

## 2021-12-30 RX ORDER — SODIUM CHLORIDE 0.9 % (FLUSH) 0.9 %
10 SYRINGE (ML) INJECTION AS NEEDED
Status: CANCELLED | OUTPATIENT
Start: 2021-12-30

## 2021-12-30 RX ORDER — HEPARIN SODIUM (PORCINE) LOCK FLUSH IV SOLN 100 UNIT/ML 100 UNIT/ML
500 SOLUTION INTRAVENOUS AS NEEDED
Status: CANCELLED | OUTPATIENT
Start: 2021-12-30

## 2021-12-30 RX ORDER — HEPARIN SODIUM (PORCINE) LOCK FLUSH IV SOLN 100 UNIT/ML 100 UNIT/ML
500 SOLUTION INTRAVENOUS AS NEEDED
Status: DISCONTINUED | OUTPATIENT
Start: 2021-12-30 | End: 2021-12-31 | Stop reason: HOSPADM

## 2021-12-30 RX ORDER — SODIUM CHLORIDE 9 MG/ML
250 INJECTION, SOLUTION INTRAVENOUS ONCE
Status: COMPLETED | OUTPATIENT
Start: 2021-12-30 | End: 2021-12-30

## 2021-12-30 RX ORDER — OLANZAPINE 5 MG/1
5 TABLET ORAL ONCE
Status: COMPLETED | OUTPATIENT
Start: 2021-12-30 | End: 2021-12-30

## 2021-12-30 RX ADMIN — HEPARIN SODIUM (PORCINE) LOCK FLUSH IV SOLN 100 UNIT/ML 500 UNITS: 100 SOLUTION at 13:12

## 2021-12-30 RX ADMIN — MAGNESIUM SULFATE HEPTAHYDRATE 500 ML: 500 INJECTION, SOLUTION INTRAMUSCULAR; INTRAVENOUS at 12:09

## 2021-12-30 RX ADMIN — CISPLATIN 96 MG: 1 INJECTION INTRAVENOUS at 11:04

## 2021-12-30 RX ADMIN — MAGNESIUM SULFATE HEPTAHYDRATE 500 ML: 500 INJECTION, SOLUTION INTRAMUSCULAR; INTRAVENOUS at 08:51

## 2021-12-30 RX ADMIN — DEXAMETHASONE SODIUM PHOSPHATE 12 MG: 10 INJECTION, SOLUTION INTRAMUSCULAR; INTRAVENOUS at 09:56

## 2021-12-30 RX ADMIN — PALONOSETRON HYDROCHLORIDE 0.25 MG: 0.25 INJECTION, SOLUTION INTRAVENOUS at 09:55

## 2021-12-30 RX ADMIN — OLANZAPINE 5 MG: 5 TABLET, FILM COATED ORAL at 10:00

## 2021-12-30 RX ADMIN — SODIUM CHLORIDE 150 MG: 9 INJECTION, SOLUTION INTRAVENOUS at 09:56

## 2021-12-30 RX ADMIN — SODIUM CHLORIDE 250 ML: 9 INJECTION, SOLUTION INTRAVENOUS at 09:55

## 2021-12-30 NOTE — TELEPHONE ENCOUNTER
I called and told patient that he should have 5 refills on that script because that is how it was sent in.  He verbalized understanding

## 2021-12-30 NOTE — TELEPHONE ENCOUNTER
Patient calling about a refill on olanzapine 5 mg. He states he only has 3 pills left. Advised ptTami would follow up on this.

## 2021-12-30 NOTE — PROGRESS NOTES
"      PROBLEM LIST:  1. [id number] Squamous cell carcinoma of the left base of tongue  A) CT neck on 11/4/2021 showed a 3.5 cystic lymph node level 2 on the left, and irregularity at the left base of tongue.  Biopsy of left neck mass on 11/8/2021 showed squamous cell carcinoma.  P16 stain inconclusive.  Repeat biopsy done 11/24/21 - excision of left lingual tonsil, showed SCC, p16+  2. Hypertension  3. GERd  4. Hyperlipidemia    Subjective     CHIEF COMPLAINT: p16+ oropharynx cancer    HISTORY OF PRESENT ILLNESS:   Nicholas Renee returns for follow-up.  He tolerated his first dose of chemotherapy well.  He had a couple days of increased fatigue.  He denies nausea or vomiting.  No trouble swallowing but reports sore throat last evening.  He states it feels okay this morning.  He had 1 day of diarrhea but otherwise is having issues with mild constipation.  He had bowel movement yesterday but reports it was very firm.  He is taking Metamucil every evening.        Objective        12/30/2021  Day 1   Temp 98.2 °F (36.8 °C)   Pulse 94   Resp 16   /90   Height 185.4 cm (73\")   Weight 120 kg (264 lb)   BSA (Calculated - sq m) 2.42 sq meters   BMI (Calculated) 34.8        Performance Status: 1      General: well appearing male in no acute distress  Neuro: alert and oriented  HEENT: sclerae anicteric  Lymphatics: Left cervical lymph node, firm and movable, approximately 3 cm  Cardiovascular: regular rate and rhythm, no murmurs  Lungs: clear to auscultation bilaterally  Abdomen: soft, nontender, nondistended.  No palpable organomegaly  Extremities: no lower extremity edema  Skin: no rashes, lesions, bruising, or petechiae  Psych: mood and affect appropriate      RECENT LABS:  Lab Results   Component Value Date    WBC 5.10 12/30/2021    HGB 12.4 (L) 12/30/2021    HCT 36.9 (L) 12/30/2021    MCV 97.9 (H) 12/30/2021     12/30/2021       Lab Results   Component Value Date    GLUCOSE 93 12/23/2021    BUN 25 (H) " 12/23/2021    CREATININE 1.00 12/30/2021    EGFRIFNONA 104 12/23/2021    BCR 32.5 (H) 12/23/2021    K 4.2 12/23/2021    CO2 26.0 12/23/2021    CALCIUM 9.4 12/23/2021    ALBUMIN 4.80 12/13/2021    AST 20 12/13/2021    ALT 19 12/13/2021                 Assessment/Plan   Nicholas Renee is a 56 y.o. male with a cT1-2N1M0 p16+ SCC of the left base of tongue, with > 3 cm cervical LN.    We will continue with chemoradiation with weekly cisplatin.  I reviewed labs today that are stable.  He will receive cycle 2 today.  I will send a prescription for Magic mouthwash for him to begin using as needed.  I also discussed the importance of hydrating well.  I encouraged him to stop taking the Metamucil and take stool softeners daily with MiraLAX as needed.  Educated to stop stool softeners if bowels become loose.  He will notify us with any uncontrolled symptoms.      We will see him weekly during chemoradiation.    F/u 1 week.    Elena Ferris, REBEKAH  [facility] Hematology and Oncology    12/30/2021

## 2022-01-03 ENCOUNTER — HOSPITAL ENCOUNTER (OUTPATIENT)
Dept: RADIATION ONCOLOGY | Facility: HOSPITAL | Age: 57
Setting detail: RADIATION/ONCOLOGY SERIES
Discharge: HOME | End: 2022-01-03

## 2022-01-03 ENCOUNTER — HOSPITAL ENCOUNTER (OUTPATIENT)
Dept: RADIATION ONCOLOGY | Facility: HOSPITAL | Age: 57
Discharge: HOME | End: 2022-01-03

## 2022-01-03 PROCEDURE — 77387 GUIDANCE FOR RADJ TX DLVR: CPT | Performed by: RADIOLOGY

## 2022-01-03 PROCEDURE — 77386: CPT | Performed by: RADIOLOGY

## 2022-01-04 ENCOUNTER — HOSPITAL ENCOUNTER (OUTPATIENT)
Dept: RADIATION ONCOLOGY | Facility: HOSPITAL | Age: 57
Discharge: HOME | End: 2022-01-04

## 2022-01-04 VITALS — BODY MASS INDEX: 34.99 KG/M2 | WEIGHT: 265.2 LBS

## 2022-01-04 PROCEDURE — 77386: CPT | Performed by: RADIOLOGY

## 2022-01-05 ENCOUNTER — HOSPITAL ENCOUNTER (OUTPATIENT)
Dept: RADIATION ONCOLOGY | Facility: HOSPITAL | Age: 57
Discharge: HOME | End: 2022-01-05

## 2022-01-05 PROCEDURE — 77386: CPT | Performed by: RADIOLOGY

## 2022-01-06 ENCOUNTER — HOSPITAL ENCOUNTER (OUTPATIENT)
Dept: ONCOLOGY | Facility: HOSPITAL | Age: 57
Setting detail: INFUSION SERIES
Discharge: HOME | End: 2022-01-06

## 2022-01-06 ENCOUNTER — HOSPITAL ENCOUNTER (OUTPATIENT)
Dept: RADIATION ONCOLOGY | Facility: HOSPITAL | Age: 57
Discharge: HOME | End: 2022-01-06

## 2022-01-06 ENCOUNTER — OFFICE VISIT (OUTPATIENT)
Dept: ONCOLOGY | Facility: CLINIC | Age: 57
End: 2022-01-06

## 2022-01-06 VITALS
RESPIRATION RATE: 18 BRPM | TEMPERATURE: 97.5 F | WEIGHT: 260 LBS | DIASTOLIC BLOOD PRESSURE: 89 MMHG | BODY MASS INDEX: 34.46 KG/M2 | HEART RATE: 107 BPM | OXYGEN SATURATION: 96 % | SYSTOLIC BLOOD PRESSURE: 141 MMHG | HEIGHT: 73 IN

## 2022-01-06 DIAGNOSIS — C10.9 OROPHARYNX CANCER: Primary | ICD-10-CM

## 2022-01-06 DIAGNOSIS — C10.9 OROPHARYNX CANCER: ICD-10-CM

## 2022-01-06 LAB
ANION GAP SERPL CALCULATED.3IONS-SCNC: 11 MMOL/L (ref 5–15)
BUN SERPL-MCNC: 29 MG/DL (ref 6–20)
BUN/CREAT SERPL: 27.6 (ref 7–25)
CALCIUM SPEC-SCNC: 9.8 MG/DL (ref 8.6–10.5)
CHLORIDE SERPL-SCNC: 98 MMOL/L (ref 98–107)
CO2 SERPL-SCNC: 28 MMOL/L (ref 22–29)
CREAT SERPL-MCNC: 1.05 MG/DL (ref 0.76–1.27)
ERYTHROCYTE [DISTWIDTH] IN BLOOD BY AUTOMATED COUNT: 12.6 % (ref 12.3–15.4)
GFR SERPL CREATININE-BSD FRML MDRD: 73 ML/MIN/1.73
GLUCOSE SERPL-MCNC: 185 MG/DL (ref 65–99)
HCT VFR BLD AUTO: 37 % (ref 37.5–51)
HGB BLD-MCNC: 12.5 G/DL (ref 13–17.7)
LYMPHOCYTES # BLD AUTO: 1.2 10*3/MM3 (ref 0.7–3.1)
LYMPHOCYTES NFR BLD AUTO: 22 % (ref 19.6–45.3)
MAGNESIUM SERPL-MCNC: 1.8 MG/DL (ref 1.6–2.6)
MCH RBC QN AUTO: 33.3 PG (ref 26.6–33)
MCHC RBC AUTO-ENTMCNC: 33.9 G/DL (ref 31.5–35.7)
MCV RBC AUTO: 98.4 FL (ref 79–97)
MONOCYTES # BLD AUTO: 0.2 10*3/MM3 (ref 0.1–0.9)
MONOCYTES NFR BLD AUTO: 3.4 % (ref 5–12)
NEUTROPHILS NFR BLD AUTO: 4 10*3/MM3 (ref 1.7–7)
NEUTROPHILS NFR BLD AUTO: 74.6 % (ref 42.7–76)
PLATELET # BLD AUTO: 265 10*3/MM3 (ref 140–450)
PMV BLD AUTO: 6.4 FL (ref 6–12)
POTASSIUM SERPL-SCNC: 4.1 MMOL/L (ref 3.5–5.2)
RBC # BLD AUTO: 3.76 10*6/MM3 (ref 4.14–5.8)
SODIUM SERPL-SCNC: 137 MMOL/L (ref 136–145)
WBC NRBC COR # BLD: 5.4 10*3/MM3 (ref 3.4–10.8)

## 2022-01-06 PROCEDURE — 96367 TX/PROPH/DG ADDL SEQ IV INF: CPT

## 2022-01-06 PROCEDURE — 25010000002 MAGNESIUM SULFATE PER 500 MG OF MAGNESIUM: Performed by: NURSE PRACTITIONER

## 2022-01-06 PROCEDURE — 96365 THER/PROPH/DIAG IV INF INIT: CPT

## 2022-01-06 PROCEDURE — 99213 OFFICE O/P EST LOW 20 MIN: CPT | Performed by: NURSE PRACTITIONER

## 2022-01-06 PROCEDURE — 25010000002 HEPARIN LOCK FLUSH PER 10 UNITS: Performed by: INTERNAL MEDICINE

## 2022-01-06 PROCEDURE — 96366 THER/PROPH/DIAG IV INF ADDON: CPT

## 2022-01-06 PROCEDURE — 96368 THER/DIAG CONCURRENT INF: CPT

## 2022-01-06 PROCEDURE — 96413 CHEMO IV INFUSION 1 HR: CPT

## 2022-01-06 PROCEDURE — 25010000002 DEXAMETHASONE SODIUM PHOSPHATE 100 MG/10ML SOLUTION: Performed by: NURSE PRACTITIONER

## 2022-01-06 PROCEDURE — 25010000002 POTASSIUM CHLORIDE PER 2 MEQ OF POTASSIUM: Performed by: NURSE PRACTITIONER

## 2022-01-06 PROCEDURE — 25010000002 CISPLATIN PER 50 MG: Performed by: NURSE PRACTITIONER

## 2022-01-06 PROCEDURE — 80048 BASIC METABOLIC PNL TOTAL CA: CPT | Performed by: NURSE PRACTITIONER

## 2022-01-06 PROCEDURE — 25010000002 PALONOSETRON PER 25 MCG: Performed by: NURSE PRACTITIONER

## 2022-01-06 PROCEDURE — 83735 ASSAY OF MAGNESIUM: CPT | Performed by: NURSE PRACTITIONER

## 2022-01-06 PROCEDURE — 85025 COMPLETE CBC W/AUTO DIFF WBC: CPT | Performed by: NURSE PRACTITIONER

## 2022-01-06 PROCEDURE — 77386: CPT | Performed by: RADIOLOGY

## 2022-01-06 PROCEDURE — 25010000002 FOSAPREPITANT PER 1 MG: Performed by: NURSE PRACTITIONER

## 2022-01-06 PROCEDURE — 96375 TX/PRO/DX INJ NEW DRUG ADDON: CPT

## 2022-01-06 RX ORDER — SODIUM CHLORIDE 0.9 % (FLUSH) 0.9 %
10 SYRINGE (ML) INJECTION AS NEEDED
Status: CANCELLED | OUTPATIENT
Start: 2022-01-06

## 2022-01-06 RX ORDER — OLANZAPINE 5 MG/1
5 TABLET ORAL ONCE
Status: COMPLETED | OUTPATIENT
Start: 2022-01-06 | End: 2022-01-06

## 2022-01-06 RX ORDER — PALONOSETRON 0.05 MG/ML
0.25 INJECTION, SOLUTION INTRAVENOUS ONCE
Status: CANCELLED | OUTPATIENT
Start: 2022-01-20

## 2022-01-06 RX ORDER — HEPARIN SODIUM (PORCINE) LOCK FLUSH IV SOLN 100 UNIT/ML 100 UNIT/ML
500 SOLUTION INTRAVENOUS AS NEEDED
Status: DISCONTINUED | OUTPATIENT
Start: 2022-01-06 | End: 2022-01-07 | Stop reason: HOSPADM

## 2022-01-06 RX ORDER — PALONOSETRON 0.05 MG/ML
0.25 INJECTION, SOLUTION INTRAVENOUS ONCE
Status: CANCELLED | OUTPATIENT
Start: 2022-01-06

## 2022-01-06 RX ORDER — OLANZAPINE 5 MG/1
5 TABLET ORAL ONCE
Status: CANCELLED | OUTPATIENT
Start: 2022-01-06 | End: 2022-01-06

## 2022-01-06 RX ORDER — SODIUM CHLORIDE 9 MG/ML
250 INJECTION, SOLUTION INTRAVENOUS ONCE
Status: CANCELLED | OUTPATIENT
Start: 2022-01-20

## 2022-01-06 RX ORDER — PALONOSETRON 0.05 MG/ML
0.25 INJECTION, SOLUTION INTRAVENOUS ONCE
Status: COMPLETED | OUTPATIENT
Start: 2022-01-06 | End: 2022-01-06

## 2022-01-06 RX ORDER — HEPARIN SODIUM (PORCINE) LOCK FLUSH IV SOLN 100 UNIT/ML 100 UNIT/ML
500 SOLUTION INTRAVENOUS AS NEEDED
Status: CANCELLED | OUTPATIENT
Start: 2022-01-06

## 2022-01-06 RX ORDER — SODIUM CHLORIDE 9 MG/ML
250 INJECTION, SOLUTION INTRAVENOUS ONCE
Status: CANCELLED | OUTPATIENT
Start: 2022-01-06

## 2022-01-06 RX ORDER — OXYCODONE HCL 5 MG/5 ML
5 SOLUTION, ORAL ORAL EVERY 4 HOURS PRN
Qty: 450 ML | Refills: 0 | Status: SHIPPED | OUTPATIENT
Start: 2022-01-06 | End: 2022-01-27 | Stop reason: SDUPTHER

## 2022-01-06 RX ORDER — SODIUM CHLORIDE 9 MG/ML
250 INJECTION, SOLUTION INTRAVENOUS ONCE
Status: COMPLETED | OUTPATIENT
Start: 2022-01-06 | End: 2022-01-06

## 2022-01-06 RX ORDER — OLANZAPINE 5 MG/1
5 TABLET ORAL ONCE
Status: CANCELLED | OUTPATIENT
Start: 2022-01-20 | End: 2022-01-13

## 2022-01-06 RX ADMIN — SODIUM CHLORIDE 250 ML: 9 INJECTION, SOLUTION INTRAVENOUS at 09:12

## 2022-01-06 RX ADMIN — Medication 500 UNITS: at 13:30

## 2022-01-06 RX ADMIN — PALONOSETRON 0.25 MG: 0.05 INJECTION, SOLUTION INTRAVENOUS at 09:19

## 2022-01-06 RX ADMIN — POTASSIUM CHLORIDE 500 ML: 2 INJECTION, SOLUTION, CONCENTRATE INTRAVENOUS at 12:17

## 2022-01-06 RX ADMIN — SODIUM CHLORIDE 150 MG: 9 INJECTION, SOLUTION INTRAVENOUS at 09:21

## 2022-01-06 RX ADMIN — DEXAMETHASONE SODIUM PHOSPHATE 12 MG: 10 INJECTION, SOLUTION INTRAMUSCULAR; INTRAVENOUS at 10:20

## 2022-01-06 RX ADMIN — OLANZAPINE 5 MG: 5 TABLET, FILM COATED ORAL at 09:21

## 2022-01-06 RX ADMIN — POTASSIUM CHLORIDE 500 ML: 2 INJECTION, SOLUTION, CONCENTRATE INTRAVENOUS at 09:15

## 2022-01-06 RX ADMIN — CISPLATIN 96 MG: 1 INJECTION INTRAVENOUS at 11:07

## 2022-01-06 NOTE — PROGRESS NOTES
"      PROBLEM LIST:  1. [id number] Squamous cell carcinoma of the left base of tongue  A) CT neck on 11/4/2021 showed a 3.5 cystic lymph node level 2 on the left, and irregularity at the left base of tongue.  Biopsy of left neck mass on 11/8/2021 showed squamous cell carcinoma.  P16 stain inconclusive.  Repeat biopsy done 11/24/21 - excision of left lingual tonsil, showed SCC, p16+  2. Hypertension  3. GERd  4. Hyperlipidemia    Subjective     CHIEF COMPLAINT: p16+ oropharynx cancer    HISTORY OF PRESENT ILLNESS:   Nicholas Renee returns for follow-up.  He tolerated his first dose of chemotherapy well.  He had a couple days of increased fatigue.  He denies nausea or vomiting.  No trouble swallowing but reports sore throat last evening.  He states it feels okay this morning.  He had 1 day of diarrhea but otherwise is having issues with mild constipation.  He had bowel movement yesterday but reports it was very firm.  He is taking Metamucil every evening.        Objective       /89   Pulse 107   Temp 97.5 °F (36.4 °C)   Resp 18   Ht 185.4 cm (73\")   Wt 118 kg (260 lb)   SpO2 96%   BMI 34.30 kg/m²    Vitals:    01/06/22 0821   PainSc: 8  Comment: throat pain from radiation               Performance Status: 0  ECOG score: 0   General: well appearing male in no acute distress  Neuro: alert and oriented  HEENT: sclerae anicteric  Lymphatics: Left cervical lymph node, firm and movable, approximately 3 cm  Cardiovascular: regular rate and rhythm, no murmurs  Lungs: clear to auscultation bilaterally  Abdomen: soft, nontender, nondistended.  No palpable organomegaly  Extremities: no lower extremity edema  Skin: no rashes, lesions, bruising, or petechiae  Psych: mood and affect appropriate      RECENT LABS:  Lab Results   Component Value Date    WBC 5.40 01/06/2022    HGB 12.5 (L) 01/06/2022    HCT 37.0 (L) 01/06/2022    MCV 98.4 (H) 01/06/2022     01/06/2022       Lab Results   Component Value Date    " GLUCOSE 110 (H) 12/30/2021    BUN 33 (H) 12/30/2021    CREATININE 1.00 12/30/2021    EGFRIFNONA 78 12/30/2021    BCR 33.3 (H) 12/30/2021    K 4.3 12/30/2021    CO2 26.0 12/30/2021    CALCIUM 9.7 12/30/2021    ALBUMIN 4.30 12/30/2021    AST 16 12/30/2021    ALT 17 12/30/2021                 Assessment/Plan   Nicholas Renee is a 56 y.o. male with a cT1-2N1M0 p16+ SCC of the left base of tongue, with > 3 cm cervical LN.    We will continue with chemoradiation with weekly cisplatin.  Hemoglobin and white count are stable today.  CMP is pending.  Will review when available.  We will proceed with cycle 3 today of cisplatin with dose unchanged.  He has lost 10 pounds in the past 2 weeks.  I have reinforced the importance of staying well-hydrated and changing diet to soft foods.     Pain related to cancer and treatment: I will send him in a prescription for Percocet liquid 5/325 mg every 4 hours as needed for pain.  Hopefully if we can get his pain controlled he will be able to eat and continue to drink fluids.  we reviewed it will be essential for him to take MiraLAX daily as well as stool softener to avoid constipation.       We will see him weekly during chemoradiation.    F/u 1 week.      I spent 26 minutes caring for Nicholas on this date of service. This time includes time spent by me in the following activities: preparing for the visit, reviewing tests, obtaining and/or reviewing a separately obtained history, performing a medically appropriate examination and/or evaluation, counseling and educating the patient/family/caregiver, ordering medications, tests, or procedures and documenting information in the medical record          Della Kay APRN  [facility] Hematology and Oncology    1/6/2022

## 2022-01-07 ENCOUNTER — HOSPITAL ENCOUNTER (OUTPATIENT)
Dept: SPEECH THERAPY | Facility: HOSPITAL | Age: 57
Setting detail: THERAPIES SERIES
Discharge: HOME | End: 2022-01-07

## 2022-01-07 ENCOUNTER — HOSPITAL ENCOUNTER (OUTPATIENT)
Dept: RADIATION ONCOLOGY | Facility: HOSPITAL | Age: 57
Discharge: HOME | End: 2022-01-07

## 2022-01-07 DIAGNOSIS — C10.9 OROPHARYNX CANCER: Primary | ICD-10-CM

## 2022-01-07 PROCEDURE — 77386: CPT | Performed by: RADIOLOGY

## 2022-01-07 PROCEDURE — 77336 RADIATION PHYSICS CONSULT: CPT | Performed by: RADIOLOGY

## 2022-01-07 PROCEDURE — 92526 ORAL FUNCTION THERAPY: CPT

## 2022-01-07 NOTE — THERAPY TREATMENT NOTE
Outpatient Speech Language Pathology   Adult Swallow Treatment Note  [facility]     Patient Name: Nicholas Renee  : 1965  MRN: [id number]  Today's Date: 2022         Visit Date: 2022   Patient Active Problem List   Diagnosis   • HTN (hypertension)   • Prediabetes   • Diverticulitis   • S/P low anterior colon resection   • Acute blood loss anemia, mild, asymptomatic   • Acute postoperative pain   • Primary osteoarthritis of right knee   • Oropharynx cancer (HCC)        Visit Dx:    ICD-10-CM ICD-9-CM   1. Oropharynx cancer (HCC)  C10.9 146.9        OP SLP Assessment/Plan - 22 0830        SLP Plan    Plan Comments Continue with prophylactic swallowing tx.  -HG          User Key  (r) = Recorded By, (t) = Taken By, (c) = Cosigned By    Initials Name Provider Type    Ariella Whitten MS CCC-SLP Speech and Language Pathologist                                   SLP OP Goals     Row Name 22          Goal Type Needed    Goal Type Needed Dysphagia; Other Adult Goals  -HG            Subjective Comments    Subjective Comments Pt alert, cooperative, complaints of pain in his throat, burning sensation.  -HG            Dysphagia Goals    Patient will safely consume the recommended diet without complications such as aspiration pneumonia regular/thin  -HG     Status: Patient will safely consume the recommended diet without complications such as aspiration pneumonia Progressing as expected  -HG     Comments: Patient will safely consume the recommended diet without complications such as aspiration pneumonia 22: Pt tolerating a continued oral diet. Pt eating softer foods: scrambled eggs, baked potato with butter and sour cream. Pt stated he would be adding protein shakes to his diet soon. Pt has lost 10 lbs.  -HG     Status: Patient will improve oral skills to enhance safety and increase eating efficiency by increasing accuracy of back of tongue control Discontinued  -HG     Patient will  increase laryngeal elevation to reduce residue that might fall into airway by completing Mendelsohn maneuver; super-supraglottic swallow; with cues; falsetto/pitch glide  -HG     Status: Patient will increase laryngeal elevation to reduce residue that might fall into airway by completing Progressing as expected  -HG     Comments: Patient will increase laryngeal elevation to reduce residue that might fall into airway by completing 1/7/22: Pt demonstrated this date with accuracy and reports completing exercises while driving.  -HG     Patient will increase closure of larynx to keep food from falling into the airway by completing super-supraglottic swallow; with cues  -HG     Status: Patient will increase closure of larynx to keep food from falling into the airway by completing Progressing as expected  -HG     Comments: Patient will increase closure of larynx to keep food from falling into the airway by completing 1/7/22: Pt demonstrated this date with accuracy and reports completing exercises while driving.  -HG     Patient will increase strength of tongue base and posterior pharyngeal walls to reduce residue that might fall into airway by completing effotful swallow; Maya (tongue hold); with cues  -HG     Status: Patient will increase strength of tongue base and posterior pharyngeal walls to reduce residue that might fall into airway by completing Progressing as expected  -HG     Comments: Patient will increase strength of tongue base and posterior pharyngeal walls to reduce residue that might fall into airway by completing 1/7/22: Pt demonstrated this date with accuracy and reports completing exercises while driving.  -HG     Patient will improve hyolaryngeal elevation via completing Ulisses (head lift) sustained lift (comment #/duration of lifts); repetitive lift (comment #/duration of lifts); with cues  CTAR  -HG     Status: Patient will improve hyolaryngeal elevation via completing Ulisses (head lift) Progressing  as expected  -     Comments: Patient will improve hyolaryngeal elevation via completing Ulisses (head lift) 1/7/22: Pt demonstrated this date with accuracy. Pt completing the CTAR.  -HG            Other Goals    Other Adult Goal- 1 Pt will complete stretches of the strap muscles to improve range of motion and flexiblity.  -HG     Status: Other Adult Goal- 1 Progressing as expected  -     Comments: Other Adult Goal- 1 1/7/22: Pt demonstrated this date with accuracy.  -HG            SLP Time Calculation    SLP Goal Re-Cert Due Date 01/20/22  -           User Key  (r) = Recorded By, (t) = Taken By, (c) = Cosigned By    Initials Name Provider Type    Ariella Whitten MS CCC-SLP Speech and Language Pathologist               OP SLP Education     Row Name 01/07/22 0830       Education    Education Comments Education for continued completion of exercises.  -          User Key  (r) = Recorded By, (t) = Taken By, (c) = Cosigned By    Initials Name Effective Dates    Ariella Whitten MS CCC-SLP 06/16/21 -                     Time Calculation:   SLP Start Time: 0830  Untimed Charges  57036-KA Treatment Swallow Minutes: 15  Total Minutes  Untimed Charges Total Minutes: 15   Total Minutes: 15    Therapy Charges for Today     Code Description Service Date Service Provider Modifiers Qty    [id number] HC ST TREATMENT SWALLOW 1 1/7/2022 Ariella Anthony MS CCC-SLP GN 1                   Ariella Anthony MS CCC-SLP  1/7/2022

## 2022-01-10 ENCOUNTER — HOSPITAL ENCOUNTER (OUTPATIENT)
Dept: RADIATION ONCOLOGY | Facility: HOSPITAL | Age: 57
Discharge: HOME | End: 2022-01-10

## 2022-01-10 DIAGNOSIS — C10.9 OROPHARYNX CANCER: Primary | ICD-10-CM

## 2022-01-10 PROCEDURE — 77386: CPT | Performed by: RADIOLOGY

## 2022-01-11 ENCOUNTER — HOSPITAL ENCOUNTER (OUTPATIENT)
Dept: RADIATION ONCOLOGY | Facility: HOSPITAL | Age: 57
Discharge: HOME | End: 2022-01-11

## 2022-01-11 ENCOUNTER — HOSPITAL ENCOUNTER (OUTPATIENT)
Dept: ONCOLOGY | Facility: HOSPITAL | Age: 57
Discharge: HOME | End: 2022-01-11
Admitting: NURSE PRACTITIONER

## 2022-01-11 VITALS
WEIGHT: 255 LBS | TEMPERATURE: 97.3 F | RESPIRATION RATE: 16 BRPM | HEART RATE: 106 BPM | BODY MASS INDEX: 33.8 KG/M2 | DIASTOLIC BLOOD PRESSURE: 91 MMHG | SYSTOLIC BLOOD PRESSURE: 130 MMHG | HEIGHT: 73 IN

## 2022-01-11 VITALS — WEIGHT: 256 LBS | BODY MASS INDEX: 33.78 KG/M2

## 2022-01-11 DIAGNOSIS — B37.0 THRUSH: ICD-10-CM

## 2022-01-11 DIAGNOSIS — C10.9 OROPHARYNX CANCER: Primary | ICD-10-CM

## 2022-01-11 LAB
ALBUMIN SERPL-MCNC: 4.8 G/DL (ref 3.5–5.2)
ALBUMIN/GLOB SERPL: 1.5 G/DL
ALP SERPL-CCNC: 93 U/L (ref 39–117)
ALT SERPL W P-5'-P-CCNC: 17 U/L (ref 1–41)
ANION GAP SERPL CALCULATED.3IONS-SCNC: 12 MMOL/L (ref 5–15)
AST SERPL-CCNC: 19 U/L (ref 1–40)
BILIRUB SERPL-MCNC: 0.6 MG/DL (ref 0–1.2)
BUN SERPL-MCNC: 31 MG/DL (ref 6–20)
BUN/CREAT SERPL: 28.2 (ref 7–25)
CALCIUM SPEC-SCNC: 9.7 MG/DL (ref 8.6–10.5)
CHLORIDE SERPL-SCNC: 96 MMOL/L (ref 98–107)
CO2 SERPL-SCNC: 28 MMOL/L (ref 22–29)
CREAT SERPL-MCNC: 1.1 MG/DL (ref 0.76–1.27)
ERYTHROCYTE [DISTWIDTH] IN BLOOD BY AUTOMATED COUNT: 12.7 % (ref 12.3–15.4)
GFR SERPL CREATININE-BSD FRML MDRD: 69 ML/MIN/1.73
GLOBULIN UR ELPH-MCNC: 3.3 GM/DL
GLUCOSE SERPL-MCNC: 97 MG/DL (ref 65–99)
HCT VFR BLD AUTO: 34.4 % (ref 37.5–51)
HGB BLD-MCNC: 11.6 G/DL (ref 13–17.7)
LYMPHOCYTES # BLD AUTO: 1 10*3/MM3 (ref 0.7–3.1)
LYMPHOCYTES NFR BLD AUTO: 19.7 % (ref 19.6–45.3)
MAGNESIUM SERPL-MCNC: 2.2 MG/DL (ref 1.6–2.6)
MCH RBC QN AUTO: 33 PG (ref 26.6–33)
MCHC RBC AUTO-ENTMCNC: 33.7 G/DL (ref 31.5–35.7)
MCV RBC AUTO: 97.8 FL (ref 79–97)
MONOCYTES # BLD AUTO: 0.4 10*3/MM3 (ref 0.1–0.9)
MONOCYTES NFR BLD AUTO: 6.7 % (ref 5–12)
NEUTROPHILS NFR BLD AUTO: 3.9 10*3/MM3 (ref 1.7–7)
NEUTROPHILS NFR BLD AUTO: 73.6 % (ref 42.7–76)
PLATELET # BLD AUTO: 257 10*3/MM3 (ref 140–450)
PMV BLD AUTO: 6.1 FL (ref 6–12)
POTASSIUM SERPL-SCNC: 4.5 MMOL/L (ref 3.5–5.2)
PROT SERPL-MCNC: 8.1 G/DL (ref 6–8.5)
RBC # BLD AUTO: 3.51 10*6/MM3 (ref 4.14–5.8)
SODIUM SERPL-SCNC: 136 MMOL/L (ref 136–145)
WBC NRBC COR # BLD: 5.3 10*3/MM3 (ref 3.4–10.8)

## 2022-01-11 PROCEDURE — 77386: CPT | Performed by: RADIOLOGY

## 2022-01-11 PROCEDURE — 36591 DRAW BLOOD OFF VENOUS DEVICE: CPT

## 2022-01-11 PROCEDURE — 83735 ASSAY OF MAGNESIUM: CPT | Performed by: NURSE PRACTITIONER

## 2022-01-11 PROCEDURE — 85025 COMPLETE CBC W/AUTO DIFF WBC: CPT | Performed by: NURSE PRACTITIONER

## 2022-01-11 PROCEDURE — 80053 COMPREHEN METABOLIC PANEL: CPT | Performed by: INTERNAL MEDICINE

## 2022-01-11 PROCEDURE — 25010000002 HEPARIN LOCK FLUSH PER 10 UNITS: Performed by: INTERNAL MEDICINE

## 2022-01-11 RX ORDER — SODIUM CHLORIDE 0.9 % (FLUSH) 0.9 %
10 SYRINGE (ML) INJECTION AS NEEDED
Status: CANCELLED | OUTPATIENT
Start: 2022-01-11

## 2022-01-11 RX ORDER — HEPARIN SODIUM (PORCINE) LOCK FLUSH IV SOLN 100 UNIT/ML 100 UNIT/ML
500 SOLUTION INTRAVENOUS AS NEEDED
Status: DISCONTINUED | OUTPATIENT
Start: 2022-01-11 | End: 2022-01-12 | Stop reason: HOSPADM

## 2022-01-11 RX ORDER — SODIUM CHLORIDE 0.9 % (FLUSH) 0.9 %
10 SYRINGE (ML) INJECTION AS NEEDED
Status: DISCONTINUED | OUTPATIENT
Start: 2022-01-11 | End: 2022-01-12 | Stop reason: HOSPADM

## 2022-01-11 RX ORDER — FLUCONAZOLE 100 MG/1
100 TABLET ORAL DAILY
Qty: 5 TABLET | Refills: 1 | Status: SHIPPED | OUTPATIENT
Start: 2022-01-11 | End: 2022-03-23

## 2022-01-11 RX ORDER — HEPARIN SODIUM (PORCINE) LOCK FLUSH IV SOLN 100 UNIT/ML 100 UNIT/ML
500 SOLUTION INTRAVENOUS AS NEEDED
Status: CANCELLED | OUTPATIENT
Start: 2022-01-11

## 2022-01-11 RX ADMIN — Medication 10 ML: at 09:57

## 2022-01-11 RX ADMIN — Medication 500 UNITS: at 09:57

## 2022-01-12 ENCOUNTER — HOSPITAL ENCOUNTER (OUTPATIENT)
Dept: RADIATION ONCOLOGY | Facility: HOSPITAL | Age: 57
Discharge: HOME | End: 2022-01-12

## 2022-01-12 PROCEDURE — 77386: CPT | Performed by: RADIOLOGY

## 2022-01-13 ENCOUNTER — OFFICE VISIT (OUTPATIENT)
Dept: ONCOLOGY | Facility: CLINIC | Age: 57
End: 2022-01-13

## 2022-01-13 ENCOUNTER — HOSPITAL ENCOUNTER (OUTPATIENT)
Dept: ONCOLOGY | Facility: HOSPITAL | Age: 57
Setting detail: INFUSION SERIES
Discharge: HOME | End: 2022-01-13

## 2022-01-13 ENCOUNTER — HOSPITAL ENCOUNTER (OUTPATIENT)
Dept: RADIATION ONCOLOGY | Facility: HOSPITAL | Age: 57
Discharge: HOME | End: 2022-01-13

## 2022-01-13 VITALS
TEMPERATURE: 97.5 F | OXYGEN SATURATION: 94 % | RESPIRATION RATE: 18 BRPM | HEIGHT: 73 IN | SYSTOLIC BLOOD PRESSURE: 153 MMHG | DIASTOLIC BLOOD PRESSURE: 95 MMHG | HEART RATE: 88 BPM | WEIGHT: 254 LBS | BODY MASS INDEX: 33.66 KG/M2

## 2022-01-13 DIAGNOSIS — C10.9 OROPHARYNX CANCER: Primary | ICD-10-CM

## 2022-01-13 LAB
ALBUMIN SERPL-MCNC: 4.8 G/DL (ref 3.5–5.2)
ALBUMIN/GLOB SERPL: 1.5 G/DL
ALP SERPL-CCNC: 99 U/L (ref 39–117)
ALT SERPL W P-5'-P-CCNC: 15 U/L (ref 1–41)
ANION GAP SERPL CALCULATED.3IONS-SCNC: 12 MMOL/L (ref 5–15)
AST SERPL-CCNC: 18 U/L (ref 1–40)
BILIRUB SERPL-MCNC: 0.5 MG/DL (ref 0–1.2)
BUN SERPL-MCNC: 31 MG/DL (ref 6–20)
BUN/CREAT SERPL: 26.7 (ref 7–25)
CALCIUM SPEC-SCNC: 9.6 MG/DL (ref 8.6–10.5)
CHLORIDE SERPL-SCNC: 96 MMOL/L (ref 98–107)
CO2 SERPL-SCNC: 27 MMOL/L (ref 22–29)
CREAT SERPL-MCNC: 1.16 MG/DL (ref 0.76–1.27)
GFR SERPL CREATININE-BSD FRML MDRD: 65 ML/MIN/1.73
GLOBULIN UR ELPH-MCNC: 3.2 GM/DL
GLUCOSE SERPL-MCNC: 97 MG/DL (ref 65–99)
POTASSIUM SERPL-SCNC: 4.3 MMOL/L (ref 3.5–5.2)
PROT SERPL-MCNC: 8 G/DL (ref 6–8.5)
SODIUM SERPL-SCNC: 135 MMOL/L (ref 136–145)

## 2022-01-13 PROCEDURE — 99215 OFFICE O/P EST HI 40 MIN: CPT | Performed by: NURSE PRACTITIONER

## 2022-01-13 PROCEDURE — 96365 THER/PROPH/DIAG IV INF INIT: CPT

## 2022-01-13 PROCEDURE — 96374 THER/PROPH/DIAG INJ IV PUSH: CPT

## 2022-01-13 PROCEDURE — 25010000002 HEPARIN LOCK FLUSH PER 10 UNITS: Performed by: INTERNAL MEDICINE

## 2022-01-13 PROCEDURE — 77336 RADIATION PHYSICS CONSULT: CPT | Performed by: RADIOLOGY

## 2022-01-13 PROCEDURE — 25010000002 DEXAMETHASONE PER 1 MG: Performed by: NURSE PRACTITIONER

## 2022-01-13 PROCEDURE — 25010000002 ONDANSETRON PER 1 MG: Performed by: NURSE PRACTITIONER

## 2022-01-13 PROCEDURE — 96375 TX/PRO/DX INJ NEW DRUG ADDON: CPT

## 2022-01-13 PROCEDURE — 77386: CPT | Performed by: RADIOLOGY

## 2022-01-13 PROCEDURE — 80053 COMPREHEN METABOLIC PANEL: CPT | Performed by: NURSE PRACTITIONER

## 2022-01-13 PROCEDURE — 36591 DRAW BLOOD OFF VENOUS DEVICE: CPT

## 2022-01-13 RX ORDER — SODIUM CHLORIDE 9 MG/ML
1000 INJECTION, SOLUTION INTRAVENOUS CONTINUOUS
Status: DISCONTINUED | OUTPATIENT
Start: 2022-01-13 | End: 2022-01-14 | Stop reason: HOSPADM

## 2022-01-13 RX ORDER — SODIUM CHLORIDE 9 MG/ML
1000 INJECTION, SOLUTION INTRAVENOUS CONTINUOUS
Status: CANCELLED
Start: 2022-01-13

## 2022-01-13 RX ORDER — PALONOSETRON 0.05 MG/ML
0.25 INJECTION, SOLUTION INTRAVENOUS ONCE
Status: CANCELLED | OUTPATIENT
Start: 2022-01-28

## 2022-01-13 RX ORDER — ONDANSETRON 2 MG/ML
8 INJECTION INTRAMUSCULAR; INTRAVENOUS EVERY 6 HOURS PRN
Status: CANCELLED | OUTPATIENT
Start: 2022-01-20

## 2022-01-13 RX ORDER — HEPARIN SODIUM (PORCINE) LOCK FLUSH IV SOLN 100 UNIT/ML 100 UNIT/ML
500 SOLUTION INTRAVENOUS AS NEEDED
Status: CANCELLED | OUTPATIENT
Start: 2022-01-13

## 2022-01-13 RX ORDER — ONDANSETRON 2 MG/ML
8 INJECTION INTRAMUSCULAR; INTRAVENOUS EVERY 6 HOURS PRN
Status: CANCELLED
Start: 2022-01-13

## 2022-01-13 RX ORDER — SODIUM CHLORIDE 0.9 % (FLUSH) 0.9 %
10 SYRINGE (ML) INJECTION AS NEEDED
Status: CANCELLED | OUTPATIENT
Start: 2022-01-13

## 2022-01-13 RX ORDER — ONDANSETRON 2 MG/ML
8 INJECTION INTRAMUSCULAR; INTRAVENOUS EVERY 6 HOURS PRN
Status: DISCONTINUED | OUTPATIENT
Start: 2022-01-13 | End: 2022-01-14 | Stop reason: HOSPADM

## 2022-01-13 RX ORDER — SODIUM CHLORIDE 9 MG/ML
250 INJECTION, SOLUTION INTRAVENOUS ONCE
Status: CANCELLED | OUTPATIENT
Start: 2022-01-28

## 2022-01-13 RX ORDER — OLANZAPINE 5 MG/1
5 TABLET ORAL ONCE
Status: CANCELLED | OUTPATIENT
Start: 2022-01-28 | End: 2022-01-20

## 2022-01-13 RX ORDER — HEPARIN SODIUM (PORCINE) LOCK FLUSH IV SOLN 100 UNIT/ML 100 UNIT/ML
500 SOLUTION INTRAVENOUS AS NEEDED
Status: DISCONTINUED | OUTPATIENT
Start: 2022-01-13 | End: 2022-01-14 | Stop reason: HOSPADM

## 2022-01-13 RX ORDER — SODIUM CHLORIDE 9 MG/ML
1000 INJECTION, SOLUTION INTRAVENOUS CONTINUOUS
Status: CANCELLED | OUTPATIENT
Start: 2022-01-20

## 2022-01-13 RX ADMIN — SODIUM CHLORIDE 1000 ML: 9 INJECTION, SOLUTION INTRAVENOUS at 09:30

## 2022-01-13 RX ADMIN — ONDANSETRON 8 MG: 2 INJECTION INTRAMUSCULAR; INTRAVENOUS at 09:55

## 2022-01-13 RX ADMIN — DEXAMETHASONE SODIUM PHOSPHATE 8 MG: 10 INJECTION INTRAMUSCULAR; INTRAVENOUS at 09:55

## 2022-01-13 RX ADMIN — Medication 500 UNITS: at 10:29

## 2022-01-13 NOTE — PROGRESS NOTES
Pt was down in radiation and became very dizzy, BP dropped, and he felt nauseated.  Pt stated he had 4-6 episodes of diarrhea yesterday.  Treatment was held today per Dr Buck.  Pt was given IVF's, Dex 12mg, and Zofran.  Pt will return tomorrow for more fluids and we will evaluate treatment then.

## 2022-01-13 NOTE — PROGRESS NOTES
"      PROBLEM LIST:  1. 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  Biopsy of left neck mass on 11/8/2021 showed squamous cell carcinoma.  P16 stain inconclusive.  Repeat biopsy done 11/24/21 - excision of left lingual tonsil, showed SCC, p16+  B) begin weekly cisplatin with radiation 12/23/2021.  2. Hypertension  3. GERd  4. Hyperlipidemia    Subjective     CHIEF COMPLAINT: p16+ oropharynx cancer    HISTORY OF PRESENT ILLNESS:   Nicholas Renee returns for follow-up.  He has completed 3 cycles of weekly cisplatin with radiation.  He has increased fatigue and weakness.  His mouth and throat have been hurting over the past week.  He is using small amounts of the liquid oxycodone for pain.  He has lost 6 pounds in the past week.  He is no longer eating soft or solid foods,  he is just drinking liquids.  He is taking Diflucan for oral thrush.  He denies any nausea over the past week until today.  This morning he has felt nauseous and dizzy.  The dizziness is worse when going from lying to sitting or sitting to standing.  Yesterday he had 4 loose watery stools.  He did not take any Imodium.  He said he is dealt with more constipation than diarrhea and is trying to balance his bowels.        Objective       /93 Comment: Sitting  Pulse 92   Temp 97.5 °F (36.4 °C)   Resp 18   Ht 185.4 cm (73\")   Wt 115 kg (254 lb)   SpO2 96%   BMI 33.51 kg/m²    Vitals:    01/13/22 0819   PainSc: 0-No pain               Performance Status: 0  ECOG score: 0   General: well appearing male in no acute distress  Neuro: alert and oriented  HEENT: sclerae anicteric  Lymphatics: Left cervical lymph node, firm and movable, approximately 3 cm  Cardiovascular: regular rate and rhythm, no murmurs  Lungs: clear to auscultation bilaterally  Abdomen: soft, nontender, nondistended.  No palpable " organomegaly  Extremities: no lower extremity edema  Skin: no rashes, lesions, bruising, or petechiae  Psych: mood and affect appropriate      RECENT LABS:  Lab Results   Component Value Date    WBC 5.30 01/11/2022    HGB 11.6 (L) 01/11/2022    HCT 34.4 (L) 01/11/2022    MCV 97.8 (H) 01/11/2022     01/11/2022       Lab Results   Component Value Date    GLUCOSE 97 01/11/2022    BUN 31 (H) 01/11/2022    CREATININE 1.10 01/11/2022    EGFRIFNONA 69 01/11/2022    BCR 28.2 (H) 01/11/2022    K 4.5 01/11/2022    CO2 28.0 01/11/2022    CALCIUM 9.7 01/11/2022    ALBUMIN 4.80 01/11/2022    AST 19 01/11/2022    ALT 17 01/11/2022                 Assessment/Plan   Nicholas Renee is a 56 y.o. male with a cT1-2N1M0 p16+ SCC of the left base of tongue, with > 3 cm cervical LN.    We will continue with chemo radiation with cycle 4 of weekly cisplatin today.  We will treat him with an extra 1 L of normal saline and antiemetics.  If his nausea is not controlled we will delay his cisplatin until tomorrow.  He has lost 6 pounds over the past week.  He is having orthostatic hypotension with dizziness and nausea.  I will set him up for IV fluids for tomorrow, Monday, 1/17/2022 and Wednesday, 1/19/2022.  We can give him IV fluids with nausea medication and steroids daily if needed.  I have asked him to contact us.    Pain related to cancer and treatment: He is using very little oxycodone liquid 5/325.  Usually 1 dose a day.  I have encouraged him to use it every 4 hours as needed for pain.  My goal is to control his pain so that he can eat and drink fluids.  I reinforced it will be essential for him to take MiraLAX daily if he is constipated.  He will need to back off the MiraLAX if he does have diarrhea.       We will see him weekly during chemoradiation.    F/u 1 week.      I spent 41 minutes caring for Nicholas on this date of service. This time includes time spent by me in the following activities: preparing for the visit,  obtaining and/or reviewing a separately obtained history, performing a medically appropriate examination and/or evaluation, counseling and educating the patient/family/caregiver, ordering medications, tests, or procedures and documenting information in the medical record            Della Kay APRN  [facility] Hematology and Oncology    1/13/2022

## 2022-01-14 ENCOUNTER — HOSPITAL ENCOUNTER (OUTPATIENT)
Dept: RADIATION ONCOLOGY | Facility: HOSPITAL | Age: 57
Discharge: HOME | End: 2022-01-14

## 2022-01-14 ENCOUNTER — HOSPITAL ENCOUNTER (OUTPATIENT)
Dept: ONCOLOGY | Facility: HOSPITAL | Age: 57
Setting detail: INFUSION SERIES
Discharge: HOME | End: 2022-01-14

## 2022-01-14 ENCOUNTER — DOCUMENTATION (OUTPATIENT)
Dept: RADIATION ONCOLOGY | Facility: HOSPITAL | Age: 57
End: 2022-01-14

## 2022-01-14 ENCOUNTER — TELEPHONE (OUTPATIENT)
Dept: RADIATION ONCOLOGY | Facility: HOSPITAL | Age: 57
End: 2022-01-14

## 2022-01-14 VITALS
WEIGHT: 253 LBS | SYSTOLIC BLOOD PRESSURE: 147 MMHG | DIASTOLIC BLOOD PRESSURE: 88 MMHG | BODY MASS INDEX: 33.53 KG/M2 | HEIGHT: 73 IN | RESPIRATION RATE: 16 BRPM | HEART RATE: 91 BPM | TEMPERATURE: 97.5 F

## 2022-01-14 DIAGNOSIS — C10.9 OROPHARYNX CANCER: Primary | ICD-10-CM

## 2022-01-14 DIAGNOSIS — R11.2 CINV (CHEMOTHERAPY-INDUCED NAUSEA AND VOMITING): ICD-10-CM

## 2022-01-14 DIAGNOSIS — T45.1X5A CINV (CHEMOTHERAPY-INDUCED NAUSEA AND VOMITING): ICD-10-CM

## 2022-01-14 PROCEDURE — 77386: CPT | Performed by: RADIOLOGY

## 2022-01-14 PROCEDURE — 96376 TX/PRO/DX INJ SAME DRUG ADON: CPT

## 2022-01-14 PROCEDURE — 96375 TX/PRO/DX INJ NEW DRUG ADDON: CPT

## 2022-01-14 PROCEDURE — 25010000002 HEPARIN LOCK FLUSH PER 10 UNITS: Performed by: INTERNAL MEDICINE

## 2022-01-14 PROCEDURE — 96360 HYDRATION IV INFUSION INIT: CPT

## 2022-01-14 PROCEDURE — 25010000002 ONDANSETRON PER 1 MG: Performed by: INTERNAL MEDICINE

## 2022-01-14 PROCEDURE — 96361 HYDRATE IV INFUSION ADD-ON: CPT

## 2022-01-14 PROCEDURE — 96374 THER/PROPH/DIAG INJ IV PUSH: CPT

## 2022-01-14 PROCEDURE — 25010000002 DEXAMETHASONE SODIUM PHOSPHATE 100 MG/10ML SOLUTION 10 ML VIAL: Performed by: INTERNAL MEDICINE

## 2022-01-14 RX ORDER — HEPARIN SODIUM (PORCINE) LOCK FLUSH IV SOLN 100 UNIT/ML 100 UNIT/ML
500 SOLUTION INTRAVENOUS AS NEEDED
Status: CANCELLED | OUTPATIENT
Start: 2022-01-14

## 2022-01-14 RX ORDER — PROCHLORPERAZINE MALEATE 10 MG
10 TABLET ORAL EVERY 6 HOURS PRN
Qty: 30 TABLET | Refills: 2 | Status: SHIPPED | OUTPATIENT
Start: 2022-01-14 | End: 2022-05-06

## 2022-01-14 RX ORDER — SODIUM CHLORIDE 0.9 % (FLUSH) 0.9 %
10 SYRINGE (ML) INJECTION AS NEEDED
Status: CANCELLED | OUTPATIENT
Start: 2022-01-14

## 2022-01-14 RX ORDER — HEPARIN SODIUM (PORCINE) LOCK FLUSH IV SOLN 100 UNIT/ML 100 UNIT/ML
500 SOLUTION INTRAVENOUS AS NEEDED
Status: DISCONTINUED | OUTPATIENT
Start: 2022-01-14 | End: 2022-01-15 | Stop reason: HOSPADM

## 2022-01-14 RX ORDER — SODIUM CHLORIDE 9 MG/ML
1000 INJECTION, SOLUTION INTRAVENOUS ONCE
Status: CANCELLED
Start: 2022-01-14 | End: 2022-01-14

## 2022-01-14 RX ORDER — SODIUM CHLORIDE 9 MG/ML
1000 INJECTION, SOLUTION INTRAVENOUS ONCE
Status: COMPLETED | OUTPATIENT
Start: 2022-01-14 | End: 2022-01-14

## 2022-01-14 RX ADMIN — Medication 500 UNITS: at 12:30

## 2022-01-14 RX ADMIN — SODIUM CHLORIDE 1000 ML: 9 INJECTION, SOLUTION INTRAVENOUS at 10:05

## 2022-01-14 RX ADMIN — SODIUM CHLORIDE 1000 ML: 9 INJECTION, SOLUTION INTRAVENOUS at 11:22

## 2022-01-14 RX ADMIN — ONDANSETRON 8 MG: 2 INJECTION INTRAMUSCULAR; INTRAVENOUS at 10:15

## 2022-01-14 RX ADMIN — DEXAMETHASONE SODIUM PHOSPHATE 8 MG: 10 INJECTION, SOLUTION INTRAMUSCULAR; INTRAVENOUS at 10:30

## 2022-01-14 NOTE — PROGRESS NOTES
RADIATION ONCOLOGY PROGRESS NOTE  01/14/22    There were no vitals filed for this visit.  Pt brought to clinic today with complaints of dizziness, BP sitting 175/109, standing 181/105, p 94, o2 sats 93. No fever chills, pain in throat. Pt is scheduled to receive fluids today. Evaluated by Dr. Zambrano.

## 2022-01-17 ENCOUNTER — HOSPITAL ENCOUNTER (OUTPATIENT)
Dept: ONCOLOGY | Facility: HOSPITAL | Age: 57
Setting detail: INFUSION SERIES
Discharge: HOME | End: 2022-01-17

## 2022-01-17 ENCOUNTER — HOSPITAL ENCOUNTER (OUTPATIENT)
Dept: RADIATION ONCOLOGY | Facility: HOSPITAL | Age: 57
Discharge: HOME | End: 2022-01-17

## 2022-01-17 VITALS
SYSTOLIC BLOOD PRESSURE: 166 MMHG | DIASTOLIC BLOOD PRESSURE: 103 MMHG | RESPIRATION RATE: 18 BRPM | WEIGHT: 257.4 LBS | HEART RATE: 116 BPM | HEIGHT: 73 IN | TEMPERATURE: 97.2 F | BODY MASS INDEX: 34.11 KG/M2

## 2022-01-17 DIAGNOSIS — C10.9 OROPHARYNX CANCER: Primary | ICD-10-CM

## 2022-01-17 PROCEDURE — 25010000002 ONDANSETRON PER 1 MG: Performed by: INTERNAL MEDICINE

## 2022-01-17 PROCEDURE — 96375 TX/PRO/DX INJ NEW DRUG ADDON: CPT

## 2022-01-17 PROCEDURE — 25010000002 HEPARIN LOCK FLUSH PER 10 UNITS: Performed by: INTERNAL MEDICINE

## 2022-01-17 PROCEDURE — 96366 THER/PROPH/DIAG IV INF ADDON: CPT

## 2022-01-17 PROCEDURE — 25010000002 DEXAMETHASONE SODIUM PHOSPHATE 100 MG/10ML SOLUTION 10 ML VIAL: Performed by: INTERNAL MEDICINE

## 2022-01-17 PROCEDURE — 96365 THER/PROPH/DIAG IV INF INIT: CPT

## 2022-01-17 PROCEDURE — 77386: CPT | Performed by: RADIOLOGY

## 2022-01-17 RX ORDER — HEPARIN SODIUM (PORCINE) LOCK FLUSH IV SOLN 100 UNIT/ML 100 UNIT/ML
500 SOLUTION INTRAVENOUS AS NEEDED
Status: DISCONTINUED | OUTPATIENT
Start: 2022-01-17 | End: 2022-01-18 | Stop reason: HOSPADM

## 2022-01-17 RX ORDER — SODIUM CHLORIDE 0.9 % (FLUSH) 0.9 %
10 SYRINGE (ML) INJECTION AS NEEDED
Status: CANCELLED | OUTPATIENT
Start: 2022-01-17

## 2022-01-17 RX ORDER — HEPARIN SODIUM (PORCINE) LOCK FLUSH IV SOLN 100 UNIT/ML 100 UNIT/ML
500 SOLUTION INTRAVENOUS AS NEEDED
Status: CANCELLED | OUTPATIENT
Start: 2022-01-17

## 2022-01-17 RX ADMIN — ONDANSETRON 8 MG: 2 INJECTION INTRAMUSCULAR; INTRAVENOUS at 08:21

## 2022-01-17 RX ADMIN — HEPARIN 500 UNITS: 100 SYRINGE at 09:25

## 2022-01-17 RX ADMIN — SODIUM CHLORIDE 1000 ML: 9 INJECTION, SOLUTION INTRAVENOUS at 08:04

## 2022-01-17 RX ADMIN — DEXAMETHASONE SODIUM PHOSPHATE 8 MG: 10 INJECTION, SOLUTION INTRAMUSCULAR; INTRAVENOUS at 08:21

## 2022-01-18 ENCOUNTER — HOSPITAL ENCOUNTER (OUTPATIENT)
Dept: RADIATION ONCOLOGY | Facility: HOSPITAL | Age: 57
Discharge: HOME | End: 2022-01-18

## 2022-01-18 ENCOUNTER — DOCUMENTATION (OUTPATIENT)
Dept: NUTRITION | Facility: HOSPITAL | Age: 57
End: 2022-01-18

## 2022-01-18 VITALS — WEIGHT: 252.3 LBS | BODY MASS INDEX: 33.29 KG/M2

## 2022-01-18 PROCEDURE — 77387 GUIDANCE FOR RADJ TX DLVR: CPT | Performed by: RADIOLOGY

## 2022-01-18 PROCEDURE — 77386: CPT | Performed by: RADIOLOGY

## 2022-01-18 NOTE — PROGRESS NOTES
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 / > 3 cm cervical LN     Surgery:  Fine-needle aspiration of the left neck mass      Chemotherapy:  OP HEAD & NECK CISplatin (Weekly) + XRT - start date 12/23/21     Radiation:  Target a dose of 70 Gray in 35 fractions, but considering this is p16 positive, should he demonstrate a very good early response to treatment we may be able to de-escalate the radiation dosed to approximately 60 Hart / port films today     Weight 252.3 lbs / 18 lbs weight loss past month (6.6% weight loss)    Patient meets criteria for non-severe malnutrition in context of chronic illness as characterized by weight loss over the past month and recall of oral intake indicating inadequacy in meeting nutritional needs.    Follow up with patient last week via phone and today in RAD ONC status checks.  Patient is doing  better this week with oral intake; drinking at least 7 bottles water per day and in addition, also receiving IVFs 3x per week for hydration.      He is consuming limited amounts of modified food consistencies / most of nutritional intake is contributed by intake of 3 Ensure / Boost Plus per day, 2 of which are made into shakes with ice cream.  Calorie consumption estimated to be approximately 2000 calories per day / estimated calorie requirements = >3,000 calories per day and 150 grams protein.  Stressed importance of striving to achieve nutritional goals and preventing additional weight loss.    Will continue to follow.

## 2022-01-19 ENCOUNTER — HOSPITAL ENCOUNTER (OUTPATIENT)
Dept: RADIATION ONCOLOGY | Facility: HOSPITAL | Age: 57
Discharge: HOME | End: 2022-01-19

## 2022-01-19 ENCOUNTER — HOSPITAL ENCOUNTER (OUTPATIENT)
Dept: ONCOLOGY | Facility: HOSPITAL | Age: 57
Setting detail: INFUSION SERIES
Discharge: HOME | End: 2022-01-19

## 2022-01-19 VITALS
DIASTOLIC BLOOD PRESSURE: 79 MMHG | BODY MASS INDEX: 33.4 KG/M2 | WEIGHT: 252 LBS | HEIGHT: 73 IN | RESPIRATION RATE: 16 BRPM | SYSTOLIC BLOOD PRESSURE: 118 MMHG | TEMPERATURE: 97.8 F | HEART RATE: 113 BPM

## 2022-01-19 DIAGNOSIS — C10.9 OROPHARYNX CANCER: Primary | ICD-10-CM

## 2022-01-19 LAB
ALBUMIN SERPL-MCNC: 4.4 G/DL (ref 3.5–5.2)
ALBUMIN/GLOB SERPL: 1.4 G/DL
ALP SERPL-CCNC: 98 U/L (ref 39–117)
ALT SERPL W P-5'-P-CCNC: 12 U/L (ref 1–41)
ANION GAP SERPL CALCULATED.3IONS-SCNC: 10 MMOL/L (ref 5–15)
AST SERPL-CCNC: 12 U/L (ref 1–40)
BILIRUB SERPL-MCNC: 0.4 MG/DL (ref 0–1.2)
BUN SERPL-MCNC: 25 MG/DL (ref 6–20)
BUN/CREAT SERPL: 21 (ref 7–25)
CALCIUM SPEC-SCNC: 9.6 MG/DL (ref 8.6–10.5)
CHLORIDE SERPL-SCNC: 99 MMOL/L (ref 98–107)
CO2 SERPL-SCNC: 28 MMOL/L (ref 22–29)
CREAT SERPL-MCNC: 1.19 MG/DL (ref 0.76–1.27)
ERYTHROCYTE [DISTWIDTH] IN BLOOD BY AUTOMATED COUNT: 13.4 % (ref 12.3–15.4)
GFR SERPL CREATININE-BSD FRML MDRD: 63 ML/MIN/1.73
GLOBULIN UR ELPH-MCNC: 3.1 GM/DL
GLUCOSE SERPL-MCNC: 128 MG/DL (ref 65–99)
HCT VFR BLD AUTO: 29.2 % (ref 37.5–51)
HGB BLD-MCNC: 10.4 G/DL (ref 13–17.7)
LYMPHOCYTES # BLD AUTO: 0.7 10*3/MM3 (ref 0.7–3.1)
LYMPHOCYTES NFR BLD AUTO: 17.1 % (ref 19.6–45.3)
MAGNESIUM SERPL-MCNC: 1.9 MG/DL (ref 1.6–2.6)
MCH RBC QN AUTO: 35.8 PG (ref 26.6–33)
MCHC RBC AUTO-ENTMCNC: 35.8 G/DL (ref 31.5–35.7)
MCV RBC AUTO: 100.2 FL (ref 79–97)
MONOCYTES # BLD AUTO: 0.3 10*3/MM3 (ref 0.1–0.9)
MONOCYTES NFR BLD AUTO: 6.6 % (ref 5–12)
NEUTROPHILS NFR BLD AUTO: 3.2 10*3/MM3 (ref 1.7–7)
NEUTROPHILS NFR BLD AUTO: 76.3 % (ref 42.7–76)
PLATELET # BLD AUTO: 195 10*3/MM3 (ref 140–450)
PMV BLD AUTO: 6.3 FL (ref 6–12)
POTASSIUM SERPL-SCNC: 4.6 MMOL/L (ref 3.5–5.2)
PROT SERPL-MCNC: 7.5 G/DL (ref 6–8.5)
RBC # BLD AUTO: 2.91 10*6/MM3 (ref 4.14–5.8)
SODIUM SERPL-SCNC: 137 MMOL/L (ref 136–145)
WBC NRBC COR # BLD: 4.2 10*3/MM3 (ref 3.4–10.8)

## 2022-01-19 PROCEDURE — 25010000002 HEPARIN LOCK FLUSH PER 10 UNITS: Performed by: INTERNAL MEDICINE

## 2022-01-19 PROCEDURE — 77386: CPT | Performed by: RADIOLOGY

## 2022-01-19 PROCEDURE — 96360 HYDRATION IV INFUSION INIT: CPT

## 2022-01-19 PROCEDURE — 25010000002 DEXAMETHASONE PER 1 MG: Performed by: INTERNAL MEDICINE

## 2022-01-19 PROCEDURE — 96374 THER/PROPH/DIAG INJ IV PUSH: CPT

## 2022-01-19 PROCEDURE — 25010000002 ONDANSETRON PER 1 MG: Performed by: INTERNAL MEDICINE

## 2022-01-19 PROCEDURE — 80053 COMPREHEN METABOLIC PANEL: CPT | Performed by: INTERNAL MEDICINE

## 2022-01-19 PROCEDURE — 83735 ASSAY OF MAGNESIUM: CPT | Performed by: NURSE PRACTITIONER

## 2022-01-19 PROCEDURE — 96375 TX/PRO/DX INJ NEW DRUG ADDON: CPT

## 2022-01-19 PROCEDURE — 85025 COMPLETE CBC W/AUTO DIFF WBC: CPT | Performed by: NURSE PRACTITIONER

## 2022-01-19 RX ORDER — HEPARIN SODIUM (PORCINE) LOCK FLUSH IV SOLN 100 UNIT/ML 100 UNIT/ML
500 SOLUTION INTRAVENOUS AS NEEDED
Status: CANCELLED | OUTPATIENT
Start: 2022-01-19

## 2022-01-19 RX ORDER — HEPARIN SODIUM (PORCINE) LOCK FLUSH IV SOLN 100 UNIT/ML 100 UNIT/ML
500 SOLUTION INTRAVENOUS AS NEEDED
Status: DISCONTINUED | OUTPATIENT
Start: 2022-01-19 | End: 2022-01-20 | Stop reason: HOSPADM

## 2022-01-19 RX ORDER — SODIUM CHLORIDE 0.9 % (FLUSH) 0.9 %
10 SYRINGE (ML) INJECTION AS NEEDED
Status: CANCELLED | OUTPATIENT
Start: 2022-01-19

## 2022-01-19 RX ADMIN — SODIUM CHLORIDE 1000 ML: 9 INJECTION, SOLUTION INTRAVENOUS at 09:53

## 2022-01-19 RX ADMIN — DEXAMETHASONE SODIUM PHOSPHATE 8 MG: 4 INJECTION, SOLUTION INTRAMUSCULAR; INTRAVENOUS at 10:05

## 2022-01-19 RX ADMIN — ONDANSETRON 8 MG: 2 INJECTION INTRAMUSCULAR; INTRAVENOUS at 10:05

## 2022-01-19 RX ADMIN — Medication 500 UNITS: at 11:01

## 2022-01-20 ENCOUNTER — HOSPITAL ENCOUNTER (OUTPATIENT)
Dept: ONCOLOGY | Facility: HOSPITAL | Age: 57
Setting detail: INFUSION SERIES
Discharge: HOME | End: 2022-01-20

## 2022-01-20 ENCOUNTER — HOSPITAL ENCOUNTER (OUTPATIENT)
Dept: RADIATION ONCOLOGY | Facility: HOSPITAL | Age: 57
Discharge: HOME | End: 2022-01-20

## 2022-01-20 ENCOUNTER — OFFICE VISIT (OUTPATIENT)
Dept: ONCOLOGY | Facility: CLINIC | Age: 57
End: 2022-01-20

## 2022-01-20 VITALS
TEMPERATURE: 97.3 F | HEIGHT: 73 IN | SYSTOLIC BLOOD PRESSURE: 125 MMHG | WEIGHT: 255 LBS | BODY MASS INDEX: 33.8 KG/M2 | HEART RATE: 104 BPM | RESPIRATION RATE: 18 BRPM | DIASTOLIC BLOOD PRESSURE: 77 MMHG

## 2022-01-20 DIAGNOSIS — C10.9 OROPHARYNX CANCER: Primary | ICD-10-CM

## 2022-01-20 DIAGNOSIS — K12.30 MUCOSITIS: ICD-10-CM

## 2022-01-20 PROCEDURE — 25010000002 POTASSIUM CHLORIDE PER 2 MEQ OF POTASSIUM: Performed by: NURSE PRACTITIONER

## 2022-01-20 PROCEDURE — 25010000002 CISPLATIN PER 50 MG: Performed by: NURSE PRACTITIONER

## 2022-01-20 PROCEDURE — 25010000002 DEXAMETHASONE SODIUM PHOSPHATE 100 MG/10ML SOLUTION: Performed by: NURSE PRACTITIONER

## 2022-01-20 PROCEDURE — 96361 HYDRATE IV INFUSION ADD-ON: CPT

## 2022-01-20 PROCEDURE — 25010000002 FOSAPREPITANT PER 1 MG: Performed by: NURSE PRACTITIONER

## 2022-01-20 PROCEDURE — 99214 OFFICE O/P EST MOD 30 MIN: CPT | Performed by: INTERNAL MEDICINE

## 2022-01-20 PROCEDURE — 77336 RADIATION PHYSICS CONSULT: CPT | Performed by: RADIOLOGY

## 2022-01-20 PROCEDURE — 77386: CPT | Performed by: RADIOLOGY

## 2022-01-20 PROCEDURE — 96367 TX/PROPH/DG ADDL SEQ IV INF: CPT

## 2022-01-20 PROCEDURE — 25010000002 PALONOSETRON 0.25 MG/5ML SOLUTION PREFILLED SYRINGE: Performed by: NURSE PRACTITIONER

## 2022-01-20 PROCEDURE — 96413 CHEMO IV INFUSION 1 HR: CPT

## 2022-01-20 PROCEDURE — 96375 TX/PRO/DX INJ NEW DRUG ADDON: CPT

## 2022-01-20 PROCEDURE — 25010000002 HEPARIN LOCK FLUSH PER 10 UNITS: Performed by: INTERNAL MEDICINE

## 2022-01-20 PROCEDURE — 96366 THER/PROPH/DIAG IV INF ADDON: CPT

## 2022-01-20 PROCEDURE — 25010000002 MAGNESIUM SULFATE PER 500 MG OF MAGNESIUM: Performed by: NURSE PRACTITIONER

## 2022-01-20 PROCEDURE — 96360 HYDRATION IV INFUSION INIT: CPT

## 2022-01-20 RX ORDER — SODIUM CHLORIDE 9 MG/ML
250 INJECTION, SOLUTION INTRAVENOUS ONCE
Status: COMPLETED | OUTPATIENT
Start: 2022-01-20 | End: 2022-01-20

## 2022-01-20 RX ORDER — SODIUM CHLORIDE 0.9 % (FLUSH) 0.9 %
10 SYRINGE (ML) INJECTION AS NEEDED
Status: CANCELLED | OUTPATIENT
Start: 2022-01-20

## 2022-01-20 RX ORDER — HEPARIN SODIUM (PORCINE) LOCK FLUSH IV SOLN 100 UNIT/ML 100 UNIT/ML
500 SOLUTION INTRAVENOUS AS NEEDED
Status: DISCONTINUED | OUTPATIENT
Start: 2022-01-20 | End: 2022-01-21 | Stop reason: HOSPADM

## 2022-01-20 RX ORDER — SODIUM CHLORIDE 0.9 % (FLUSH) 0.9 %
10 SYRINGE (ML) INJECTION AS NEEDED
Status: DISCONTINUED | OUTPATIENT
Start: 2022-01-20 | End: 2022-01-21 | Stop reason: HOSPADM

## 2022-01-20 RX ORDER — OLANZAPINE 5 MG/1
5 TABLET ORAL ONCE
Status: COMPLETED | OUTPATIENT
Start: 2022-01-20 | End: 2022-01-20

## 2022-01-20 RX ORDER — OLANZAPINE 5 MG/1
5 TABLET ORAL ONCE
Status: CANCELLED | OUTPATIENT
Start: 2022-02-09 | End: 2022-02-03

## 2022-01-20 RX ORDER — HEPARIN SODIUM (PORCINE) LOCK FLUSH IV SOLN 100 UNIT/ML 100 UNIT/ML
500 SOLUTION INTRAVENOUS AS NEEDED
Status: CANCELLED | OUTPATIENT
Start: 2022-01-20

## 2022-01-20 RX ORDER — SODIUM CHLORIDE 9 MG/ML
250 INJECTION, SOLUTION INTRAVENOUS ONCE
Status: CANCELLED | OUTPATIENT
Start: 2022-02-09

## 2022-01-20 RX ORDER — PALONOSETRON 0.05 MG/ML
0.25 INJECTION, SOLUTION INTRAVENOUS ONCE
Status: CANCELLED | OUTPATIENT
Start: 2022-02-09

## 2022-01-20 RX ORDER — PALONOSETRON 0.05 MG/ML
0.25 INJECTION, SOLUTION INTRAVENOUS ONCE
Status: COMPLETED | OUTPATIENT
Start: 2022-01-20 | End: 2022-01-20

## 2022-01-20 RX ADMIN — POTASSIUM CHLORIDE 500 ML: 2 INJECTION, SOLUTION, CONCENTRATE INTRAVENOUS at 09:22

## 2022-01-20 RX ADMIN — SODIUM CHLORIDE 150 MG: 9 INJECTION, SOLUTION INTRAVENOUS at 10:31

## 2022-01-20 RX ADMIN — SODIUM CHLORIDE, PRESERVATIVE FREE 10 ML: 5 INJECTION INTRAVENOUS at 13:57

## 2022-01-20 RX ADMIN — CISPLATIN 96 MG: 1 INJECTION INTRAVENOUS at 11:35

## 2022-01-20 RX ADMIN — SODIUM CHLORIDE 250 ML: 9 INJECTION, SOLUTION INTRAVENOUS at 10:30

## 2022-01-20 RX ADMIN — POTASSIUM CHLORIDE 500 ML: 2 INJECTION, SOLUTION, CONCENTRATE INTRAVENOUS at 12:45

## 2022-01-20 RX ADMIN — Medication 500 UNITS: at 13:57

## 2022-01-20 RX ADMIN — DEXAMETHASONE SODIUM PHOSPHATE 12 MG: 10 INJECTION, SOLUTION INTRAMUSCULAR; INTRAVENOUS at 10:31

## 2022-01-20 RX ADMIN — OLANZAPINE 5 MG: 5 TABLET, FILM COATED ORAL at 10:29

## 2022-01-20 RX ADMIN — PALONOSETRON 0.25 MG: 0.25 INJECTION, SOLUTION INTRAVENOUS at 10:29

## 2022-01-20 NOTE — PROGRESS NOTES
PROBLEM LIST:  1. 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  Biopsy of left neck mass on 11/8/2021 showed squamous cell carcinoma.  P16 stain inconclusive.  Repeat biopsy done 11/24/21 - excision of left lingual tonsil, showed SCC, p16+  B) begin weekly cisplatin with radiation 12/23/2021.  2. Hypertension  3. GERd  4. Hyperlipidemia    Subjective     CHIEF COMPLAINT: p16+ oropharynx cancer    HISTORY OF PRESENT ILLNESS:   Nicholas Renee returns for follow-up.  He says he is feeling better.  He is taking the pain medicine about twice a day.  The IV fluids has been helpful.  He no longer feels dizzy and lightheaded.  He does have pain on the left side of his mouth.  He has been working from home, mostly in the afternoons.        Objective       There were no vitals taken for this visit.   There were no vitals filed for this visit.            Performance Status: 0      General: well appearing male in no acute distress  Neuro: alert and oriented  HEENT: sclerae anicteric  Lymphatics: Left cervical lymph node, firm and movable, approximately 3 cm  Cardiovascular: regular rate and rhythm, no murmurs  Lungs: clear to auscultation bilaterally  Abdomen: soft, nontender, nondistended.  No palpable organomegaly  Extremities: no lower extremity edema  Skin: no rashes, lesions, bruising, or petechiae  Psych: mood and affect appropriate      RECENT LABS:  Lab Results   Component Value Date    WBC 4.20 01/19/2022    HGB 10.4 (L) 01/19/2022    HCT 29.2 (L) 01/19/2022    .2 (H) 01/19/2022     01/19/2022       Lab Results   Component Value Date    GLUCOSE 128 (H) 01/19/2022    BUN 25 (H) 01/19/2022    CREATININE 1.19 01/19/2022    EGFRIFNONA 63 01/19/2022    BCR 21.0 01/19/2022    K 4.6 01/19/2022    CO2 28.0 01/19/2022    CALCIUM 9.6 01/19/2022    ALBUMIN 4.40 01/19/2022     AST 12 01/19/2022    ALT 12 01/19/2022                 Assessment/Plan   Nicholas Renee is a 56 y.o. male with a cT1-2N1M0 p16+ SCC of the left base of tongue, with > 3 cm cervical LN.    The cervical lymph node has not decreased in size significantly.  We discussed that sometimes the tumor can become necrotic without a significant decrease in size.  However if there is residual tumor after completion of chemoradiation, surgical excision may be required.    We will continue with chemo radiation with cycle 5 of weekly cisplatin today.  He has not developed any neuropathy symptoms.  We will continue with IV fluids 3 days a week for the remainder of his treatment.    Pain related to cancer and treatment: Continue liquid oxycodone as needed.  He is currently using this about twice a day.       We will see him weekly during chemoradiation.    F/u 1 week.                Georgiana Buck MD  [facility] Hematology and Oncology    1/20/2022

## 2022-01-21 ENCOUNTER — HOSPITAL ENCOUNTER (OUTPATIENT)
Dept: RADIATION ONCOLOGY | Facility: HOSPITAL | Age: 57
Discharge: HOME | End: 2022-01-21

## 2022-01-21 ENCOUNTER — HOSPITAL ENCOUNTER (OUTPATIENT)
Dept: ONCOLOGY | Facility: HOSPITAL | Age: 57
Setting detail: INFUSION SERIES
Discharge: HOME | End: 2022-01-21

## 2022-01-21 VITALS
HEART RATE: 110 BPM | WEIGHT: 258 LBS | TEMPERATURE: 97.8 F | HEIGHT: 73 IN | DIASTOLIC BLOOD PRESSURE: 74 MMHG | BODY MASS INDEX: 34.19 KG/M2 | RESPIRATION RATE: 18 BRPM | SYSTOLIC BLOOD PRESSURE: 118 MMHG

## 2022-01-21 DIAGNOSIS — C10.9 OROPHARYNX CANCER: Primary | ICD-10-CM

## 2022-01-21 PROCEDURE — 25010000002 DEXAMETHASONE PER 1 MG: Performed by: INTERNAL MEDICINE

## 2022-01-21 PROCEDURE — 77386: CPT | Performed by: RADIOLOGY

## 2022-01-21 PROCEDURE — 25010000002 ONDANSETRON PER 1 MG: Performed by: INTERNAL MEDICINE

## 2022-01-21 PROCEDURE — 96365 THER/PROPH/DIAG IV INF INIT: CPT

## 2022-01-21 PROCEDURE — 96374 THER/PROPH/DIAG INJ IV PUSH: CPT

## 2022-01-21 PROCEDURE — 25010000002 HEPARIN LOCK FLUSH PER 10 UNITS: Performed by: INTERNAL MEDICINE

## 2022-01-21 PROCEDURE — 96375 TX/PRO/DX INJ NEW DRUG ADDON: CPT

## 2022-01-21 RX ORDER — HEPARIN SODIUM (PORCINE) LOCK FLUSH IV SOLN 100 UNIT/ML 100 UNIT/ML
500 SOLUTION INTRAVENOUS AS NEEDED
Status: DISCONTINUED | OUTPATIENT
Start: 2022-01-21 | End: 2022-01-22 | Stop reason: HOSPADM

## 2022-01-21 RX ORDER — SODIUM CHLORIDE 0.9 % (FLUSH) 0.9 %
10 SYRINGE (ML) INJECTION AS NEEDED
Status: CANCELLED | OUTPATIENT
Start: 2022-01-21

## 2022-01-21 RX ORDER — HEPARIN SODIUM (PORCINE) LOCK FLUSH IV SOLN 100 UNIT/ML 100 UNIT/ML
500 SOLUTION INTRAVENOUS AS NEEDED
Status: CANCELLED | OUTPATIENT
Start: 2022-01-21

## 2022-01-21 RX ADMIN — Medication 500 UNITS: at 10:28

## 2022-01-21 RX ADMIN — ONDANSETRON 8 MG: 2 INJECTION INTRAMUSCULAR; INTRAVENOUS at 09:20

## 2022-01-21 RX ADMIN — SODIUM CHLORIDE 1000 ML: 9 INJECTION, SOLUTION INTRAVENOUS at 09:12

## 2022-01-21 RX ADMIN — DEXAMETHASONE SODIUM PHOSPHATE 8 MG: 4 INJECTION, SOLUTION INTRA-ARTICULAR; INTRALESIONAL; INTRAMUSCULAR; INTRAVENOUS; SOFT TISSUE at 09:30

## 2022-01-24 ENCOUNTER — HOSPITAL ENCOUNTER (OUTPATIENT)
Dept: RADIATION ONCOLOGY | Facility: HOSPITAL | Age: 57
Discharge: HOME | End: 2022-01-24

## 2022-01-24 ENCOUNTER — HOSPITAL ENCOUNTER (OUTPATIENT)
Dept: ONCOLOGY | Facility: HOSPITAL | Age: 57
Setting detail: INFUSION SERIES
Discharge: HOME | End: 2022-01-24

## 2022-01-24 VITALS
HEART RATE: 129 BPM | BODY MASS INDEX: 33.27 KG/M2 | RESPIRATION RATE: 18 BRPM | SYSTOLIC BLOOD PRESSURE: 117 MMHG | DIASTOLIC BLOOD PRESSURE: 76 MMHG | WEIGHT: 251 LBS | HEIGHT: 73 IN | TEMPERATURE: 97.5 F

## 2022-01-24 DIAGNOSIS — C10.9 OROPHARYNX CANCER: Primary | ICD-10-CM

## 2022-01-24 PROCEDURE — 96360 HYDRATION IV INFUSION INIT: CPT

## 2022-01-24 PROCEDURE — 77386: CPT | Performed by: RADIOLOGY

## 2022-01-24 PROCEDURE — 25010000002 HEPARIN LOCK FLUSH PER 10 UNITS: Performed by: INTERNAL MEDICINE

## 2022-01-24 PROCEDURE — 96375 TX/PRO/DX INJ NEW DRUG ADDON: CPT

## 2022-01-24 PROCEDURE — 25010000002 ONDANSETRON PER 1 MG: Performed by: INTERNAL MEDICINE

## 2022-01-24 PROCEDURE — 96374 THER/PROPH/DIAG INJ IV PUSH: CPT

## 2022-01-24 PROCEDURE — 25010000002 DEXAMETHASONE PER 1 MG: Performed by: INTERNAL MEDICINE

## 2022-01-24 PROCEDURE — 96376 TX/PRO/DX INJ SAME DRUG ADON: CPT

## 2022-01-24 RX ORDER — SODIUM CHLORIDE 0.9 % (FLUSH) 0.9 %
10 SYRINGE (ML) INJECTION AS NEEDED
Status: CANCELLED | OUTPATIENT
Start: 2022-01-24

## 2022-01-24 RX ORDER — HEPARIN SODIUM (PORCINE) LOCK FLUSH IV SOLN 100 UNIT/ML 100 UNIT/ML
500 SOLUTION INTRAVENOUS AS NEEDED
Status: DISCONTINUED | OUTPATIENT
Start: 2022-01-24 | End: 2022-01-25 | Stop reason: HOSPADM

## 2022-01-24 RX ORDER — SODIUM CHLORIDE 9 MG/ML
1000 INJECTION, SOLUTION INTRAVENOUS ONCE
Status: COMPLETED | OUTPATIENT
Start: 2022-01-24 | End: 2022-01-24

## 2022-01-24 RX ORDER — HEPARIN SODIUM (PORCINE) LOCK FLUSH IV SOLN 100 UNIT/ML 100 UNIT/ML
500 SOLUTION INTRAVENOUS AS NEEDED
Status: CANCELLED | OUTPATIENT
Start: 2022-01-24

## 2022-01-24 RX ADMIN — SODIUM CHLORIDE 999 ML/HR: 9 INJECTION, SOLUTION INTRAVENOUS at 09:35

## 2022-01-24 RX ADMIN — ONDANSETRON 8 MG: 2 INJECTION INTRAMUSCULAR; INTRAVENOUS at 10:17

## 2022-01-24 RX ADMIN — DEXAMETHASONE SODIUM PHOSPHATE 8 MG: 4 INJECTION, SOLUTION INTRA-ARTICULAR; INTRALESIONAL; INTRAMUSCULAR; INTRAVENOUS; SOFT TISSUE at 10:01

## 2022-01-24 RX ADMIN — Medication 500 UNITS: at 10:55

## 2022-01-24 NOTE — ADDENDUM NOTE
Encounter addended by: Delmi Waller RN on: 1/24/2022 9:35 AM   Actions taken: Diagnosis association updated, Order list changed

## 2022-01-25 ENCOUNTER — HOSPITAL ENCOUNTER (OUTPATIENT)
Dept: RADIATION ONCOLOGY | Facility: HOSPITAL | Age: 57
Discharge: HOME | End: 2022-01-25

## 2022-01-25 ENCOUNTER — DOCUMENTATION (OUTPATIENT)
Dept: NUTRITION | Facility: HOSPITAL | Age: 57
End: 2022-01-25

## 2022-01-25 VITALS — WEIGHT: 251.7 LBS | BODY MASS INDEX: 33.21 KG/M2

## 2022-01-25 DIAGNOSIS — L02.11 CELLULITIS AND ABSCESS OF NECK: ICD-10-CM

## 2022-01-25 DIAGNOSIS — C10.9 OROPHARYNX CANCER: Primary | ICD-10-CM

## 2022-01-25 DIAGNOSIS — L03.221 CELLULITIS AND ABSCESS OF NECK: ICD-10-CM

## 2022-01-25 PROCEDURE — 77386: CPT | Performed by: RADIOLOGY

## 2022-01-25 RX ORDER — LIDOCAINE HYDROCHLORIDE 20 MG/ML
5 SOLUTION OROPHARYNGEAL AS NEEDED
Qty: 100 ML | Refills: 2 | Status: SHIPPED | OUTPATIENT
Start: 2022-01-25 | End: 2022-03-15

## 2022-01-25 RX ORDER — AMOXICILLIN AND CLAVULANATE POTASSIUM 500; 125 MG/1; MG/1
1 TABLET, FILM COATED ORAL 2 TIMES DAILY
Qty: 14 TABLET | Refills: 0 | Status: SHIPPED | OUTPATIENT
Start: 2022-01-25 | End: 2022-02-03

## 2022-01-25 NOTE — PROGRESS NOTES
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 / > 3 cm cervical LN     Surgery:  Fine-needle aspiration of the left neck mass      Chemotherapy:  OP HEAD & NECK CISplatin (Weekly) + XRT - start date 12/23/21     Radiation:  Target a dose of 70 Gray in 35 fractions, but considering this is p16 positive, should he demonstrate a very good early response to treatment we may be able to de-escalate the radiation dosed to approximately 60 Hart / port films today - patient has completed 24/35 fractions     Weight 251.7 lbs / 19 lbs weight loss past month (7% weight loss) / less than 1# lost over the past week     Patient meets criteria for non-severe malnutrition in context of chronic illness as characterized by weight loss over the past month and recall of oral intake indicating inadequacy in meeting nutritional needs.    Patient continues with IVF 3x per week to aid with hydration as oral intake continues to be compromised.  He states that over the past few days, bloody phlegm has increased and swallowing has become more intense in soreness and pain discomfort.  He is trying to consume 2-3 high protein shakes per day, has started [employer]   Breakfast made with whole milk 1-2 servings per day.  He was able to eat 2 eggs yesterday, but that was the only solid food he has been able to consume for the past several days.    Provided additional suggestions for \"power packing\" shakes to boost protein intake; provided sample of Boost Breeze.    Encouraged patient to try liquid Mucinex for loosing phlegm, use a humidifer in bedroom at night, continue baking soda, salt and water mouth rinses.  He was prescribed lidocaine today to aid in swallowing.       "

## 2022-01-26 ENCOUNTER — HOSPITAL ENCOUNTER (OUTPATIENT)
Dept: RADIATION ONCOLOGY | Facility: HOSPITAL | Age: 57
Discharge: HOME | End: 2022-01-26

## 2022-01-26 ENCOUNTER — HOSPITAL ENCOUNTER (OUTPATIENT)
Dept: ONCOLOGY | Facility: HOSPITAL | Age: 57
Setting detail: INFUSION SERIES
Discharge: HOME | End: 2022-01-26

## 2022-01-26 VITALS
WEIGHT: 250 LBS | RESPIRATION RATE: 18 BRPM | HEART RATE: 109 BPM | TEMPERATURE: 97.5 F | SYSTOLIC BLOOD PRESSURE: 110 MMHG | BODY MASS INDEX: 33.13 KG/M2 | DIASTOLIC BLOOD PRESSURE: 73 MMHG | HEIGHT: 73 IN

## 2022-01-26 DIAGNOSIS — C10.9 OROPHARYNX CANCER: Primary | ICD-10-CM

## 2022-01-26 LAB
ALBUMIN SERPL-MCNC: 4.4 G/DL (ref 3.5–5.2)
ALBUMIN/GLOB SERPL: 1.6 G/DL
ALP SERPL-CCNC: 115 U/L (ref 39–117)
ALT SERPL W P-5'-P-CCNC: 10 U/L (ref 1–41)
ANION GAP SERPL CALCULATED.3IONS-SCNC: 12 MMOL/L (ref 5–15)
AST SERPL-CCNC: 13 U/L (ref 1–40)
BILIRUB SERPL-MCNC: 0.6 MG/DL (ref 0–1.2)
BUN SERPL-MCNC: 37 MG/DL (ref 6–20)
BUN/CREAT SERPL: 29.1 (ref 7–25)
CALCIUM SPEC-SCNC: 9.6 MG/DL (ref 8.6–10.5)
CHLORIDE SERPL-SCNC: 99 MMOL/L (ref 98–107)
CO2 SERPL-SCNC: 26 MMOL/L (ref 22–29)
CREAT SERPL-MCNC: 1.27 MG/DL (ref 0.76–1.27)
ERYTHROCYTE [DISTWIDTH] IN BLOOD BY AUTOMATED COUNT: 14 % (ref 12.3–15.4)
GFR SERPL CREATININE-BSD FRML MDRD: 58 ML/MIN/1.73
GLOBULIN UR ELPH-MCNC: 2.7 GM/DL
GLUCOSE SERPL-MCNC: 97 MG/DL (ref 65–99)
HCT VFR BLD AUTO: 26.4 % (ref 37.5–51)
HGB BLD-MCNC: 9.1 G/DL (ref 13–17.7)
LYMPHOCYTES # BLD AUTO: 0.5 10*3/MM3 (ref 0.7–3.1)
LYMPHOCYTES NFR BLD AUTO: 14.5 % (ref 19.6–45.3)
MAGNESIUM SERPL-MCNC: 2.1 MG/DL (ref 1.6–2.6)
MCH RBC QN AUTO: 33.9 PG (ref 26.6–33)
MCHC RBC AUTO-ENTMCNC: 34.4 G/DL (ref 31.5–35.7)
MCV RBC AUTO: 98.5 FL (ref 79–97)
MONOCYTES # BLD AUTO: 0.2 10*3/MM3 (ref 0.1–0.9)
MONOCYTES NFR BLD AUTO: 6.8 % (ref 5–12)
NEUTROPHILS NFR BLD AUTO: 2.8 10*3/MM3 (ref 1.7–7)
NEUTROPHILS NFR BLD AUTO: 78.7 % (ref 42.7–76)
PLATELET # BLD AUTO: 230 10*3/MM3 (ref 140–450)
PMV BLD AUTO: 6.1 FL (ref 6–12)
POTASSIUM SERPL-SCNC: 4.3 MMOL/L (ref 3.5–5.2)
PROT SERPL-MCNC: 7.1 G/DL (ref 6–8.5)
RBC # BLD AUTO: 2.68 10*6/MM3 (ref 4.14–5.8)
SODIUM SERPL-SCNC: 137 MMOL/L (ref 136–145)
WBC NRBC COR # BLD: 3.6 10*3/MM3 (ref 3.4–10.8)

## 2022-01-26 PROCEDURE — 25010000002 DEXAMETHASONE PER 1 MG: Performed by: INTERNAL MEDICINE

## 2022-01-26 PROCEDURE — 85025 COMPLETE CBC W/AUTO DIFF WBC: CPT | Performed by: NURSE PRACTITIONER

## 2022-01-26 PROCEDURE — 96375 TX/PRO/DX INJ NEW DRUG ADDON: CPT

## 2022-01-26 PROCEDURE — 25010000002 ONDANSETRON PER 1 MG: Performed by: INTERNAL MEDICINE

## 2022-01-26 PROCEDURE — 96361 HYDRATE IV INFUSION ADD-ON: CPT

## 2022-01-26 PROCEDURE — 96360 HYDRATION IV INFUSION INIT: CPT

## 2022-01-26 PROCEDURE — 25010000002 HEPARIN LOCK FLUSH PER 10 UNITS: Performed by: INTERNAL MEDICINE

## 2022-01-26 PROCEDURE — 77386: CPT | Performed by: RADIOLOGY

## 2022-01-26 PROCEDURE — 96374 THER/PROPH/DIAG INJ IV PUSH: CPT

## 2022-01-26 PROCEDURE — 80053 COMPREHEN METABOLIC PANEL: CPT | Performed by: NURSE PRACTITIONER

## 2022-01-26 PROCEDURE — 83735 ASSAY OF MAGNESIUM: CPT | Performed by: NURSE PRACTITIONER

## 2022-01-26 RX ORDER — SODIUM CHLORIDE 0.9 % (FLUSH) 0.9 %
10 SYRINGE (ML) INJECTION AS NEEDED
Status: CANCELLED | OUTPATIENT
Start: 2022-01-26

## 2022-01-26 RX ORDER — HEPARIN SODIUM (PORCINE) LOCK FLUSH IV SOLN 100 UNIT/ML 100 UNIT/ML
500 SOLUTION INTRAVENOUS AS NEEDED
Status: CANCELLED | OUTPATIENT
Start: 2022-01-26

## 2022-01-26 RX ORDER — HEPARIN SODIUM (PORCINE) LOCK FLUSH IV SOLN 100 UNIT/ML 100 UNIT/ML
500 SOLUTION INTRAVENOUS AS NEEDED
Status: DISCONTINUED | OUTPATIENT
Start: 2022-01-26 | End: 2022-01-27 | Stop reason: HOSPADM

## 2022-01-26 RX ADMIN — Medication 500 UNITS: at 10:59

## 2022-01-26 RX ADMIN — DEXAMETHASONE SODIUM PHOSPHATE 8 MG: 4 INJECTION, SOLUTION INTRA-ARTICULAR; INTRALESIONAL; INTRAMUSCULAR; INTRAVENOUS; SOFT TISSUE at 10:08

## 2022-01-26 RX ADMIN — ONDANSETRON 8 MG: 2 INJECTION INTRAMUSCULAR; INTRAVENOUS at 10:08

## 2022-01-26 RX ADMIN — SODIUM CHLORIDE 1000 ML: 9 INJECTION, SOLUTION INTRAVENOUS at 09:50

## 2022-01-27 ENCOUNTER — OFFICE VISIT (OUTPATIENT)
Dept: ONCOLOGY | Facility: CLINIC | Age: 57
End: 2022-01-27

## 2022-01-27 ENCOUNTER — HOSPITAL ENCOUNTER (OUTPATIENT)
Dept: CT IMAGING | Facility: HOSPITAL | Age: 57
Discharge: HOME | End: 2022-01-27
Admitting: NURSE PRACTITIONER

## 2022-01-27 ENCOUNTER — APPOINTMENT (OUTPATIENT)
Dept: ONCOLOGY | Facility: HOSPITAL | Age: 57
End: 2022-01-27

## 2022-01-27 ENCOUNTER — HOSPITAL ENCOUNTER (OUTPATIENT)
Dept: RADIATION ONCOLOGY | Facility: HOSPITAL | Age: 57
Discharge: HOME | End: 2022-01-27

## 2022-01-27 ENCOUNTER — TELEPHONE (OUTPATIENT)
Dept: ONCOLOGY | Facility: CLINIC | Age: 57
End: 2022-01-27

## 2022-01-27 VITALS
WEIGHT: 249.4 LBS | SYSTOLIC BLOOD PRESSURE: 106 MMHG | RESPIRATION RATE: 18 BRPM | OXYGEN SATURATION: 97 % | DIASTOLIC BLOOD PRESSURE: 73 MMHG | BODY MASS INDEX: 32.9 KG/M2 | HEART RATE: 88 BPM | TEMPERATURE: 96.9 F

## 2022-01-27 DIAGNOSIS — C10.9 OROPHARYNX CANCER: Primary | ICD-10-CM

## 2022-01-27 DIAGNOSIS — C10.9 OROPHARYNX CANCER: ICD-10-CM

## 2022-01-27 PROCEDURE — 77386: CPT | Performed by: RADIOLOGY

## 2022-01-27 PROCEDURE — 25010000002 IOPAMIDOL 61 % SOLUTION: Performed by: NURSE PRACTITIONER

## 2022-01-27 PROCEDURE — 70492 CT SFT TSUE NCK W/O & W/DYE: CPT

## 2022-01-27 PROCEDURE — 99215 OFFICE O/P EST HI 40 MIN: CPT | Performed by: NURSE PRACTITIONER

## 2022-01-27 PROCEDURE — 77336 RADIATION PHYSICS CONSULT: CPT | Performed by: RADIOLOGY

## 2022-01-27 RX ORDER — OXYCODONE HCL 5 MG/5 ML
5 SOLUTION, ORAL ORAL EVERY 4 HOURS PRN
Qty: 473 ML | Refills: 0 | Status: SHIPPED | OUTPATIENT
Start: 2022-01-27 | End: 2022-02-23 | Stop reason: SDUPTHER

## 2022-01-27 RX ADMIN — IOPAMIDOL 99 ML: 612 INJECTION, SOLUTION INTRAVENOUS at 11:04

## 2022-01-27 NOTE — TELEPHONE ENCOUNTER
Reviewed CT scan results with patient.  Lymph node is slightly larger on imaging, but appears necrotic.  Patient instructed to complete his current course of antibiotics.  We will proceed with his cisplatin tomorrow.  He needs to be at the infusion center at 7:30 AM on 1/28/2022.  Dr. Zambrano will hold radiation tomorrow because of the bleeding.

## 2022-01-27 NOTE — PROGRESS NOTES
PROBLEM LIST:  1. 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  Biopsy of left neck mass on 11/8/2021 showed squamous cell carcinoma.  P16 stain inconclusive.  Repeat biopsy done 11/24/21 - excision of left lingual tonsil, showed SCC, p16+  B) begin weekly cisplatin with radiation 12/23/2021.  2. Hypertension  3. GERd  4. Hyperlipidemia    Subjective     CHIEF COMPLAINT: p16+ oropharynx cancer    HISTORY OF PRESENT ILLNESS:   Nicholas Renee returns for follow-up. Monday evening January 24, 2022 the patient began spitting up streaks of blood, had a extremely bad taste in his mouth and increased pain in the mouth. He denied any fevers. He saw Dr. Zambrano Tuesday, January 25 and was started on Augmentin. Yesterday during the day he did not notice any more blood-tinged sputum. Last night he woke up spitting up blood. He had approximately a teaspoon of blood that has now slowed down and is just bright red streaked blood in the sputum. He still denies any fevers or chills. His cervical lymph node is much bigger today. He is having significant pain in the mouth on the left side particularly at the gumline. He rates his pain an 8 out of 10. He woke up late this morning and did not get a chance to take his pain medication. The IV fluids are helping and he has had no more dizziness or lightheadedness.          Objective       /73   Pulse 88   Temp 96.9 °F (36.1 °C) (Temporal)   Resp 18   Wt 113 kg (249 lb 6.4 oz)   SpO2 97%   BMI 32.90 kg/m²    Vitals:    01/27/22 0931   PainSc:   8   PainLoc: Mouth               Performance Status: 0  ECOG score: 0   General: well appearing male in no acute distress  Neuro: alert and oriented  HEENT: sclerae anicteric  Lymphatics: Left cervical lymph node, firm and movable, approximately 6 x 5 cm  Cardiovascular: regular rate and rhythm,  no murmurs  Lungs: clear to auscultation bilaterally  Abdomen: soft, nontender, nondistended.  No palpable organomegaly  Extremities: no lower extremity edema  Skin: no rashes, lesions, bruising, or petechiae  Psych: mood and affect appropriate      RECENT LABS:  Lab Results   Component Value Date    WBC 3.60 01/26/2022    HGB 9.1 (L) 01/26/2022    HCT 26.4 (L) 01/26/2022    MCV 98.5 (H) 01/26/2022     01/26/2022       Lab Results   Component Value Date    GLUCOSE 97 01/26/2022    BUN 37 (H) 01/26/2022    CREATININE 1.27 01/26/2022    EGFRIFNONA 58 (L) 01/26/2022    BCR 29.1 (H) 01/26/2022    K 4.3 01/26/2022    CO2 26.0 01/26/2022    CALCIUM 9.6 01/26/2022    ALBUMIN 4.40 01/26/2022    AST 13 01/26/2022    ALT 10 01/26/2022                 Assessment/Plan                                                                                             Nicholas Renee is a 56 y.o. male with a cT1-2N1M0 p16+ SCC of the left base of tongue, with > 3 cm cervical LN.    The cervical lymph node has actually increased in size. It has increased from approximately 3 cm to 6 cm. There were concerns that he had infection in the cervical node and he was started on Augmentin 1/25/2022.  He is spitting up some bright red blood.  After consultation with Dr. Georgiana Buck we will hold his cisplatin cycle 5 today and do a stat CT scan of the neck to evaluate for abscess.  We will contact the patient with results of the CT scan when available and make a decision of when to resume cisplatin at that time.  He will still be set up for IV fluids Monday,  Wednesday, Friday weekly.    Pain related to cancer and treatment.  I will have Dr. Georgiana Buck send a new prescription in for liquid oxycodone that he may use as needed.  He is currently using this about twice a day.     We will see him weekly during chemoradiation.    F/u 1 week.          I spent 41 minutes caring for Nicholas on this date of service. This time includes time spent by me  in the following activities: preparing for the visit, reviewing tests, obtaining and/or reviewing a separately obtained history, performing a medically appropriate examination and/or evaluation, counseling and educating the patient/family/caregiver, ordering medications, tests, or procedures, referring and communicating with other health care professionals and documenting information in the medical record      Della Kay APRN  [facility] Hematology and Oncology    1/27/2022

## 2022-01-28 ENCOUNTER — HOSPITAL ENCOUNTER (OUTPATIENT)
Dept: ONCOLOGY | Facility: HOSPITAL | Age: 57
Setting detail: INFUSION SERIES
Discharge: HOME | End: 2022-01-28

## 2022-01-28 ENCOUNTER — APPOINTMENT (OUTPATIENT)
Dept: ONCOLOGY | Facility: HOSPITAL | Age: 57
End: 2022-01-28

## 2022-01-28 VITALS
HEART RATE: 113 BPM | DIASTOLIC BLOOD PRESSURE: 90 MMHG | RESPIRATION RATE: 18 BRPM | BODY MASS INDEX: 32.73 KG/M2 | SYSTOLIC BLOOD PRESSURE: 139 MMHG | WEIGHT: 248.06 LBS | TEMPERATURE: 97.4 F

## 2022-01-28 DIAGNOSIS — C10.9 OROPHARYNX CANCER: Primary | ICD-10-CM

## 2022-01-28 PROCEDURE — 25010000002 MAGNESIUM SULFATE PER 500 MG OF MAGNESIUM: Performed by: NURSE PRACTITIONER

## 2022-01-28 PROCEDURE — 25010000002 POTASSIUM CHLORIDE PER 2 MEQ OF POTASSIUM: Performed by: NURSE PRACTITIONER

## 2022-01-28 PROCEDURE — 96413 CHEMO IV INFUSION 1 HR: CPT

## 2022-01-28 PROCEDURE — 25010000002 DEXAMETHASONE SODIUM PHOSPHATE 100 MG/10ML SOLUTION: Performed by: NURSE PRACTITIONER

## 2022-01-28 PROCEDURE — 25010000002 PALONOSETRON 0.25 MG/5ML SOLUTION PREFILLED SYRINGE: Performed by: NURSE PRACTITIONER

## 2022-01-28 PROCEDURE — 96366 THER/PROPH/DIAG IV INF ADDON: CPT

## 2022-01-28 PROCEDURE — 96361 HYDRATE IV INFUSION ADD-ON: CPT

## 2022-01-28 PROCEDURE — 96367 TX/PROPH/DG ADDL SEQ IV INF: CPT

## 2022-01-28 PROCEDURE — 96375 TX/PRO/DX INJ NEW DRUG ADDON: CPT

## 2022-01-28 PROCEDURE — 25010000002 CISPLATIN PER 50 MG: Performed by: NURSE PRACTITIONER

## 2022-01-28 PROCEDURE — 25010000002 FOSAPREPITANT PER 1 MG: Performed by: NURSE PRACTITIONER

## 2022-01-28 PROCEDURE — 96360 HYDRATION IV INFUSION INIT: CPT

## 2022-01-28 PROCEDURE — 25010000002 HEPARIN LOCK FLUSH PER 10 UNITS: Performed by: INTERNAL MEDICINE

## 2022-01-28 RX ORDER — SODIUM CHLORIDE 9 MG/ML
250 INJECTION, SOLUTION INTRAVENOUS ONCE
Status: COMPLETED | OUTPATIENT
Start: 2022-01-28 | End: 2022-01-28

## 2022-01-28 RX ORDER — HEPARIN SODIUM (PORCINE) LOCK FLUSH IV SOLN 100 UNIT/ML 100 UNIT/ML
500 SOLUTION INTRAVENOUS AS NEEDED
Status: DISCONTINUED | OUTPATIENT
Start: 2022-01-28 | End: 2022-01-29 | Stop reason: HOSPADM

## 2022-01-28 RX ORDER — SODIUM CHLORIDE 0.9 % (FLUSH) 0.9 %
10 SYRINGE (ML) INJECTION AS NEEDED
Status: CANCELLED | OUTPATIENT
Start: 2022-01-28

## 2022-01-28 RX ORDER — HEPARIN SODIUM (PORCINE) LOCK FLUSH IV SOLN 100 UNIT/ML 100 UNIT/ML
500 SOLUTION INTRAVENOUS AS NEEDED
Status: CANCELLED | OUTPATIENT
Start: 2022-01-28

## 2022-01-28 RX ORDER — PALONOSETRON 0.05 MG/ML
0.25 INJECTION, SOLUTION INTRAVENOUS ONCE
Status: COMPLETED | OUTPATIENT
Start: 2022-01-28 | End: 2022-01-28

## 2022-01-28 RX ORDER — OLANZAPINE 5 MG/1
5 TABLET ORAL ONCE
Status: COMPLETED | OUTPATIENT
Start: 2022-01-28 | End: 2022-01-28

## 2022-01-28 RX ADMIN — PALONOSETRON 0.25 MG: 0.25 INJECTION, SOLUTION INTRAVENOUS at 09:33

## 2022-01-28 RX ADMIN — HEPARIN 500 UNITS: 100 SYRINGE at 12:35

## 2022-01-28 RX ADMIN — POTASSIUM CHLORIDE 500 ML: 2 INJECTION, SOLUTION, CONCENTRATE INTRAVENOUS at 11:29

## 2022-01-28 RX ADMIN — OLANZAPINE 5 MG: 5 TABLET, FILM COATED ORAL at 09:33

## 2022-01-28 RX ADMIN — CISPLATIN 96 MG: 1 INJECTION INTRAVENOUS at 10:19

## 2022-01-28 RX ADMIN — SODIUM CHLORIDE 250 ML: 9 INJECTION, SOLUTION INTRAVENOUS at 08:21

## 2022-01-28 RX ADMIN — POTASSIUM CHLORIDE 500 ML: 2 INJECTION, SOLUTION, CONCENTRATE INTRAVENOUS at 08:23

## 2022-01-28 RX ADMIN — SODIUM CHLORIDE 150 MG: 9 INJECTION, SOLUTION INTRAVENOUS at 09:33

## 2022-01-28 RX ADMIN — DEXAMETHASONE SODIUM PHOSPHATE 12 MG: 10 INJECTION, SOLUTION INTRAMUSCULAR; INTRAVENOUS at 09:34

## 2022-01-28 NOTE — PROGRESS NOTES
ONC Nutrition    Follow up with patient during chemo infusion.  Patient is struggling today; states that throat is very sore and he has vomited 2x related to the thick mucous in his throat.  He tried liquid Mucinex, but stated that was unsuccessful and he experienced extreme burning in his throat.  Calorie, protein and nutrient intake inadequate to meet nutritional needs; yesterday's intake consisted of 2 shakes and water.    Weight 248 lbs / approximately 20 lbs lost over the past month    Patient meets criteria for severe malnutrition in context of chronic illness as characterized by weight loss and inadequacy of oral intake.      Nutritional Needs  Calories - 2820 (25 kcal per kg BW)  Protein - 140 grams +   Water - 125 ounces     Encouragement provided to patient.  Provided sample of Boost VHC while in chemo (530 calories each), which he stated he would try to drink.  Provided additional samples last week with encouragement to purchase for supporting nutritional intake as he is not consuming anything else at this point.    He continues with IVFs 3x per weeks; continues with antibiotics related to infectious mass in neck LN.  Discussed PEG and indication based on current oral intake and anticipation of 3-4 weeks of progressive symptoms.  He stated that he would consider, but reluctant to proceed forward.    Will continue to follow closely.

## 2022-01-31 ENCOUNTER — HOSPITAL ENCOUNTER (OUTPATIENT)
Dept: ONCOLOGY | Facility: HOSPITAL | Age: 57
Setting detail: INFUSION SERIES
Discharge: HOME | End: 2022-01-31

## 2022-01-31 ENCOUNTER — HOSPITAL ENCOUNTER (OUTPATIENT)
Dept: RADIATION ONCOLOGY | Facility: HOSPITAL | Age: 57
Discharge: HOME | End: 2022-01-31

## 2022-01-31 VITALS
HEIGHT: 73 IN | DIASTOLIC BLOOD PRESSURE: 68 MMHG | TEMPERATURE: 97.4 F | WEIGHT: 245 LBS | OXYGEN SATURATION: 96 % | BODY MASS INDEX: 32.47 KG/M2 | RESPIRATION RATE: 18 BRPM | SYSTOLIC BLOOD PRESSURE: 118 MMHG | HEART RATE: 80 BPM

## 2022-01-31 DIAGNOSIS — C10.9 OROPHARYNX CANCER: Primary | ICD-10-CM

## 2022-01-31 PROCEDURE — 96374 THER/PROPH/DIAG INJ IV PUSH: CPT

## 2022-01-31 PROCEDURE — 96361 HYDRATE IV INFUSION ADD-ON: CPT

## 2022-01-31 PROCEDURE — 25010000002 HEPARIN LOCK FLUSH PER 10 UNITS: Performed by: INTERNAL MEDICINE

## 2022-01-31 PROCEDURE — 96360 HYDRATION IV INFUSION INIT: CPT

## 2022-01-31 PROCEDURE — 96368 THER/DIAG CONCURRENT INF: CPT

## 2022-01-31 PROCEDURE — 25010000002 ONDANSETRON PER 1 MG: Performed by: INTERNAL MEDICINE

## 2022-01-31 PROCEDURE — 96375 TX/PRO/DX INJ NEW DRUG ADDON: CPT

## 2022-01-31 PROCEDURE — 77386: CPT | Performed by: RADIOLOGY

## 2022-01-31 PROCEDURE — 25010000002 DEXAMETHASONE PER 1 MG: Performed by: INTERNAL MEDICINE

## 2022-01-31 RX ORDER — HEPARIN SODIUM (PORCINE) LOCK FLUSH IV SOLN 100 UNIT/ML 100 UNIT/ML
500 SOLUTION INTRAVENOUS AS NEEDED
Status: DISCONTINUED | OUTPATIENT
Start: 2022-01-31 | End: 2022-02-01 | Stop reason: HOSPADM

## 2022-01-31 RX ORDER — SODIUM CHLORIDE 0.9 % (FLUSH) 0.9 %
10 SYRINGE (ML) INJECTION AS NEEDED
Status: CANCELLED | OUTPATIENT
Start: 2022-01-31

## 2022-01-31 RX ORDER — SODIUM CHLORIDE 0.9 % (FLUSH) 0.9 %
10 SYRINGE (ML) INJECTION AS NEEDED
Status: DISCONTINUED | OUTPATIENT
Start: 2022-01-31 | End: 2022-02-01 | Stop reason: HOSPADM

## 2022-01-31 RX ORDER — HEPARIN SODIUM (PORCINE) LOCK FLUSH IV SOLN 100 UNIT/ML 100 UNIT/ML
500 SOLUTION INTRAVENOUS AS NEEDED
Status: CANCELLED | OUTPATIENT
Start: 2022-01-31

## 2022-01-31 RX ADMIN — HEPARIN 500 UNITS: 100 SYRINGE at 10:38

## 2022-01-31 RX ADMIN — ONDANSETRON 8 MG: 2 INJECTION INTRAMUSCULAR; INTRAVENOUS at 09:23

## 2022-01-31 RX ADMIN — DEXAMETHASONE SODIUM PHOSPHATE 8 MG: 4 INJECTION, SOLUTION INTRA-ARTICULAR; INTRALESIONAL; INTRAMUSCULAR; INTRAVENOUS; SOFT TISSUE at 09:22

## 2022-01-31 RX ADMIN — SODIUM CHLORIDE, PRESERVATIVE FREE 10 ML: 5 INJECTION INTRAVENOUS at 10:38

## 2022-01-31 RX ADMIN — SODIUM CHLORIDE 1000 ML: 9 INJECTION, SOLUTION INTRAVENOUS at 09:15

## 2022-02-01 ENCOUNTER — HOSPITAL ENCOUNTER (OUTPATIENT)
Dept: RADIATION ONCOLOGY | Facility: HOSPITAL | Age: 57
Setting detail: RADIATION/ONCOLOGY SERIES
Discharge: HOME | End: 2022-02-01

## 2022-02-01 ENCOUNTER — HOSPITAL ENCOUNTER (OUTPATIENT)
Dept: RADIATION ONCOLOGY | Facility: HOSPITAL | Age: 57
Discharge: HOME | End: 2022-02-01

## 2022-02-01 VITALS — BODY MASS INDEX: 32.34 KG/M2 | WEIGHT: 245.1 LBS

## 2022-02-01 DIAGNOSIS — C10.9 OROPHARYNX CANCER: Primary | ICD-10-CM

## 2022-02-01 DIAGNOSIS — T45.1X5A CHEMOTHERAPY INDUCED NAUSEA AND VOMITING: ICD-10-CM

## 2022-02-01 DIAGNOSIS — R11.2 CHEMOTHERAPY INDUCED NAUSEA AND VOMITING: ICD-10-CM

## 2022-02-01 PROCEDURE — 77386: CPT | Performed by: RADIOLOGY

## 2022-02-01 RX ORDER — GRANISETRON 3.1 MG/24H
1 PATCH TRANSDERMAL ONCE
Qty: 1 PATCH | Refills: 2 | Status: SHIPPED | OUTPATIENT
Start: 2022-02-01 | End: 2022-02-01 | Stop reason: SDUPTHER

## 2022-02-01 RX ORDER — GRANISETRON 3.1 MG/24H
1 PATCH TRANSDERMAL ONCE
Qty: 1 PATCH | Refills: 5 | Status: SHIPPED | OUTPATIENT
Start: 2022-02-01 | End: 2022-02-02

## 2022-02-02 ENCOUNTER — TELEPHONE (OUTPATIENT)
Dept: ONCOLOGY | Facility: CLINIC | Age: 57
End: 2022-02-02

## 2022-02-02 ENCOUNTER — HOSPITAL ENCOUNTER (OUTPATIENT)
Dept: ONCOLOGY | Facility: HOSPITAL | Age: 57
Setting detail: INFUSION SERIES
Discharge: HOME | End: 2022-02-02

## 2022-02-02 ENCOUNTER — HOSPITAL ENCOUNTER (OUTPATIENT)
Dept: RADIATION ONCOLOGY | Facility: HOSPITAL | Age: 57
Discharge: HOME | End: 2022-02-02

## 2022-02-02 ENCOUNTER — DOCUMENTATION (OUTPATIENT)
Dept: RADIATION ONCOLOGY | Facility: HOSPITAL | Age: 57
End: 2022-02-02

## 2022-02-02 ENCOUNTER — DOCUMENTATION (OUTPATIENT)
Dept: NUTRITION | Facility: HOSPITAL | Age: 57
End: 2022-02-02

## 2022-02-02 VITALS
SYSTOLIC BLOOD PRESSURE: 152 MMHG | TEMPERATURE: 97.8 F | DIASTOLIC BLOOD PRESSURE: 78 MMHG | OXYGEN SATURATION: 96 % | RESPIRATION RATE: 18 BRPM | HEART RATE: 88 BPM

## 2022-02-02 VITALS
TEMPERATURE: 97.5 F | HEIGHT: 73 IN | RESPIRATION RATE: 16 BRPM | DIASTOLIC BLOOD PRESSURE: 74 MMHG | BODY MASS INDEX: 32.07 KG/M2 | HEART RATE: 108 BPM | WEIGHT: 242 LBS | SYSTOLIC BLOOD PRESSURE: 112 MMHG

## 2022-02-02 DIAGNOSIS — C10.9 OROPHARYNX CANCER: Primary | ICD-10-CM

## 2022-02-02 LAB
ALBUMIN SERPL-MCNC: 4.2 G/DL (ref 3.5–5.2)
ALBUMIN/GLOB SERPL: 1.5 G/DL
ALP SERPL-CCNC: 117 U/L (ref 39–117)
ALT SERPL W P-5'-P-CCNC: 13 U/L (ref 1–41)
ANION GAP SERPL CALCULATED.3IONS-SCNC: 13 MMOL/L (ref 5–15)
AST SERPL-CCNC: 15 U/L (ref 1–40)
BILIRUB SERPL-MCNC: 0.5 MG/DL (ref 0–1.2)
BUN SERPL-MCNC: 29 MG/DL (ref 6–20)
BUN/CREAT SERPL: 22.8 (ref 7–25)
CALCIUM SPEC-SCNC: 9.6 MG/DL (ref 8.6–10.5)
CHLORIDE SERPL-SCNC: 96 MMOL/L (ref 98–107)
CO2 SERPL-SCNC: 25 MMOL/L (ref 22–29)
CREAT SERPL-MCNC: 1.27 MG/DL (ref 0.76–1.27)
ERYTHROCYTE [DISTWIDTH] IN BLOOD BY AUTOMATED COUNT: 13.9 % (ref 12.3–15.4)
GFR SERPL CREATININE-BSD FRML MDRD: 58 ML/MIN/1.73
GLOBULIN UR ELPH-MCNC: 2.8 GM/DL
GLUCOSE SERPL-MCNC: 95 MG/DL (ref 65–99)
HCT VFR BLD AUTO: 25.1 % (ref 37.5–51)
HGB BLD-MCNC: 8.5 G/DL (ref 13–17.7)
LYMPHOCYTES # BLD AUTO: 0.5 10*3/MM3 (ref 0.7–3.1)
LYMPHOCYTES NFR BLD AUTO: 19.9 % (ref 19.6–45.3)
MAGNESIUM SERPL-MCNC: 2 MG/DL (ref 1.6–2.6)
MCH RBC QN AUTO: 33.5 PG (ref 26.6–33)
MCHC RBC AUTO-ENTMCNC: 33.9 G/DL (ref 31.5–35.7)
MCV RBC AUTO: 98.7 FL (ref 79–97)
MONOCYTES # BLD AUTO: 0.3 10*3/MM3 (ref 0.1–0.9)
MONOCYTES NFR BLD AUTO: 11.5 % (ref 5–12)
NEUTROPHILS NFR BLD AUTO: 1.6 10*3/MM3 (ref 1.7–7)
NEUTROPHILS NFR BLD AUTO: 68.6 % (ref 42.7–76)
PLATELET # BLD AUTO: 377 10*3/MM3 (ref 140–450)
PMV BLD AUTO: 5.6 FL (ref 6–12)
POTASSIUM SERPL-SCNC: 4.3 MMOL/L (ref 3.5–5.2)
PROT SERPL-MCNC: 7 G/DL (ref 6–8.5)
RBC # BLD AUTO: 2.54 10*6/MM3 (ref 4.14–5.8)
SODIUM SERPL-SCNC: 134 MMOL/L (ref 136–145)
WBC NRBC COR # BLD: 2.4 10*3/MM3 (ref 3.4–10.8)

## 2022-02-02 PROCEDURE — 25010000002 DEXAMETHASONE PER 1 MG: Performed by: INTERNAL MEDICINE

## 2022-02-02 PROCEDURE — 83735 ASSAY OF MAGNESIUM: CPT | Performed by: INTERNAL MEDICINE

## 2022-02-02 PROCEDURE — 25010000002 HEPARIN LOCK FLUSH PER 10 UNITS: Performed by: INTERNAL MEDICINE

## 2022-02-02 PROCEDURE — 96361 HYDRATE IV INFUSION ADD-ON: CPT

## 2022-02-02 PROCEDURE — 96374 THER/PROPH/DIAG INJ IV PUSH: CPT

## 2022-02-02 PROCEDURE — 85025 COMPLETE CBC W/AUTO DIFF WBC: CPT | Performed by: INTERNAL MEDICINE

## 2022-02-02 PROCEDURE — 96375 TX/PRO/DX INJ NEW DRUG ADDON: CPT

## 2022-02-02 PROCEDURE — 77386: CPT | Performed by: RADIOLOGY

## 2022-02-02 PROCEDURE — 25010000002 ONDANSETRON PER 1 MG: Performed by: INTERNAL MEDICINE

## 2022-02-02 PROCEDURE — 80053 COMPREHEN METABOLIC PANEL: CPT | Performed by: INTERNAL MEDICINE

## 2022-02-02 PROCEDURE — 96360 HYDRATION IV INFUSION INIT: CPT

## 2022-02-02 RX ORDER — GRANISETRON 3.1 MG/24H
1 PATCH TRANSDERMAL ONCE
Qty: 1 PATCH | Refills: 5 | Status: SHIPPED | OUTPATIENT
Start: 2022-02-02 | End: 2022-02-02

## 2022-02-02 RX ORDER — GRANISETRON 3.1 MG/24H
1 PATCH TRANSDERMAL ONCE
Qty: 1 PATCH | Refills: 5 | Status: CANCELLED | OUTPATIENT
Start: 2022-02-02 | End: 2022-02-02

## 2022-02-02 RX ORDER — HEPARIN SODIUM (PORCINE) LOCK FLUSH IV SOLN 100 UNIT/ML 100 UNIT/ML
500 SOLUTION INTRAVENOUS AS NEEDED
Status: CANCELLED | OUTPATIENT
Start: 2022-02-02

## 2022-02-02 RX ORDER — HEPARIN SODIUM (PORCINE) LOCK FLUSH IV SOLN 100 UNIT/ML 100 UNIT/ML
500 SOLUTION INTRAVENOUS AS NEEDED
Status: DISCONTINUED | OUTPATIENT
Start: 2022-02-02 | End: 2022-02-03 | Stop reason: HOSPADM

## 2022-02-02 RX ORDER — SODIUM CHLORIDE 0.9 % (FLUSH) 0.9 %
10 SYRINGE (ML) INJECTION AS NEEDED
Status: CANCELLED | OUTPATIENT
Start: 2022-02-02

## 2022-02-02 RX ORDER — DEXAMETHASONE SODIUM PHOSPHATE 4 MG/ML
8 INJECTION, SOLUTION INTRA-ARTICULAR; INTRALESIONAL; INTRAMUSCULAR; INTRAVENOUS; SOFT TISSUE ONCE
Status: DISCONTINUED | OUTPATIENT
Start: 2022-02-02 | End: 2022-02-02 | Stop reason: HOSPADM

## 2022-02-02 RX ADMIN — DEXAMETHASONE SODIUM PHOSPHATE 8 MG: 4 INJECTION, SOLUTION INTRAMUSCULAR; INTRAVENOUS at 09:55

## 2022-02-02 RX ADMIN — ONDANSETRON 8 MG: 2 INJECTION INTRAMUSCULAR; INTRAVENOUS at 09:55

## 2022-02-02 RX ADMIN — HEPARIN 500 UNITS: 100 SYRINGE at 11:19

## 2022-02-02 RX ADMIN — SODIUM CHLORIDE 1000 ML: 9 INJECTION, SOLUTION INTRAVENOUS at 09:45

## 2022-02-02 NOTE — PROGRESS NOTES
RADIATION ONCOLOGY PROGRESS NOTE  02/02/22    Vitals:    02/02/22 0957   BP: 152/78   Pulse: 88   Resp: 18   Temp: 97.8 °F (36.6 °C)   SpO2: 96%     Pt came to clinic- states vomiting thru the evening- not able to keep fluids down- assessed by Dr. Pena- pt getting fluids today and I.V. dex per Dr. parra

## 2022-02-02 NOTE — TELEPHONE ENCOUNTER
No PA rec'd.  No PA required for Zofran per insurance. Submitted PA for #30 for 10 day supply. Awaiting determination.  [id number]  ~Shell

## 2022-02-02 NOTE — TELEPHONE ENCOUNTER
April called just to advise the prior authorization has been approved for the zofran and she will call the patient and pharmacy. This is just a courtesy call.

## 2022-02-02 NOTE — PROGRESS NOTES
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 / > 3 cm cervical LN     Surgery:  Fine-needle aspiration of the left neck mass      Chemotherapy:  OP HEAD & NECK CISplatin (Weekly) + XRT - start date 12/23/21     Radiation:  Target a dose of 70 Gray in 35 fractions, but considering this is p16 positive, should he demonstrate a very good early response to treatment we may be able to de-escalate the radiation dosed to approximately 60 Hart - patient has completed 30/35 fractions     Weight 242 lbs / 24 lbs weight loss since initial consultation (9% weight loss)       Patient meets criteria for severe malnutrition in context of chronic illness as characterized by weight loss over the past month and diminished oral intake meeting less than 50% of his nutritional needs.  He has refused PEG placement for nutritional / hydration support during active treatment plan.    Follow up with patient last week in chemo and yesterday in RAD ONC status checks. He continues with no oral intake other than 2-3 ONS per day; he began vomiting over the weekend and continues through today.  He is currently in Infusion receiving IVFs and dexamethasone / Zofran re-order approved today.  He also has Sancuso patch for pain control.    Will continue to follow.

## 2022-02-02 NOTE — TELEPHONE ENCOUNTER
Patient's wife called she wants to make sure we receive the prior authorization on the zofran. Patient is coming in for radiation, and fluids today. Please let her know this has been received.

## 2022-02-03 ENCOUNTER — HOSPITAL ENCOUNTER (OUTPATIENT)
Dept: ONCOLOGY | Facility: HOSPITAL | Age: 57
Setting detail: INFUSION SERIES
Discharge: HOME | End: 2022-02-03

## 2022-02-03 ENCOUNTER — OFFICE VISIT (OUTPATIENT)
Dept: ONCOLOGY | Facility: CLINIC | Age: 57
End: 2022-02-03

## 2022-02-03 ENCOUNTER — HOSPITAL ENCOUNTER (OUTPATIENT)
Dept: RADIATION ONCOLOGY | Facility: HOSPITAL | Age: 57
Discharge: HOME | End: 2022-02-03

## 2022-02-03 VITALS
WEIGHT: 242 LBS | HEIGHT: 73 IN | OXYGEN SATURATION: 98 % | HEART RATE: 97 BPM | DIASTOLIC BLOOD PRESSURE: 79 MMHG | BODY MASS INDEX: 32.07 KG/M2 | SYSTOLIC BLOOD PRESSURE: 123 MMHG | RESPIRATION RATE: 18 BRPM | TEMPERATURE: 97.5 F

## 2022-02-03 DIAGNOSIS — C10.9 OROPHARYNX CANCER: Primary | ICD-10-CM

## 2022-02-03 PROCEDURE — 96365 THER/PROPH/DIAG IV INF INIT: CPT

## 2022-02-03 PROCEDURE — 96375 TX/PRO/DX INJ NEW DRUG ADDON: CPT

## 2022-02-03 PROCEDURE — 96366 THER/PROPH/DIAG IV INF ADDON: CPT

## 2022-02-03 PROCEDURE — 99214 OFFICE O/P EST MOD 30 MIN: CPT | Performed by: INTERNAL MEDICINE

## 2022-02-03 PROCEDURE — 96361 HYDRATE IV INFUSION ADD-ON: CPT

## 2022-02-03 PROCEDURE — 96374 THER/PROPH/DIAG INJ IV PUSH: CPT

## 2022-02-03 PROCEDURE — 25010000002 HEPARIN LOCK FLUSH PER 10 UNITS: Performed by: INTERNAL MEDICINE

## 2022-02-03 PROCEDURE — 25010000002 ONDANSETRON PER 1 MG: Performed by: INTERNAL MEDICINE

## 2022-02-03 PROCEDURE — 77386: CPT | Performed by: RADIOLOGY

## 2022-02-03 PROCEDURE — 25010000002 DEXAMETHASONE SODIUM PHOSPHATE 100 MG/10ML SOLUTION 10 ML VIAL: Performed by: INTERNAL MEDICINE

## 2022-02-03 RX ORDER — SODIUM CHLORIDE 0.9 % (FLUSH) 0.9 %
10 SYRINGE (ML) INJECTION AS NEEDED
Status: CANCELLED | OUTPATIENT
Start: 2022-02-03

## 2022-02-03 RX ORDER — HEPARIN SODIUM (PORCINE) LOCK FLUSH IV SOLN 100 UNIT/ML 100 UNIT/ML
500 SOLUTION INTRAVENOUS AS NEEDED
Status: DISCONTINUED | OUTPATIENT
Start: 2022-02-03 | End: 2022-02-04 | Stop reason: HOSPADM

## 2022-02-03 RX ORDER — HEPARIN SODIUM (PORCINE) LOCK FLUSH IV SOLN 100 UNIT/ML 100 UNIT/ML
500 SOLUTION INTRAVENOUS AS NEEDED
Status: CANCELLED | OUTPATIENT
Start: 2022-02-03

## 2022-02-03 RX ORDER — PROMETHAZINE HYDROCHLORIDE 25 MG/1
25 SUPPOSITORY RECTAL EVERY 6 HOURS PRN
Qty: 15 SUPPOSITORY | Refills: 1 | Status: SHIPPED | OUTPATIENT
Start: 2022-02-03 | End: 2022-05-06

## 2022-02-03 RX ADMIN — ONDANSETRON 8 MG: 2 INJECTION INTRAMUSCULAR; INTRAVENOUS at 09:47

## 2022-02-03 RX ADMIN — SODIUM CHLORIDE 2000 ML: 9 INJECTION, SOLUTION INTRAVENOUS at 09:30

## 2022-02-03 RX ADMIN — Medication 500 UNITS: at 11:46

## 2022-02-03 RX ADMIN — DEXAMETHASONE SODIUM PHOSPHATE 8 MG: 10 INJECTION, SOLUTION INTRAMUSCULAR; INTRAVENOUS at 09:47

## 2022-02-03 NOTE — PROGRESS NOTES
"      PROBLEM LIST:  1. 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  Biopsy of left neck mass on 11/8/2021 showed squamous cell carcinoma.  P16 stain inconclusive.  Repeat biopsy done 11/24/21 - excision of left lingual tonsil, showed SCC, p16+  B) begin weekly cisplatin with radiation 12/23/2021.  2. Hypertension  3. GERd  4. Hyperlipidemia    Subjective     CHIEF COMPLAINT: p16+ oropharynx cancer    HISTORY OF PRESENT ILLNESS:   Nicholas Renee returns for follow-up.  He will complete radiation treatment next week.  He says he is feeling pretty bad.  He has not really eaten much in about 3 days.  He tried to drink an Ensure shake last night but he vomited.  The nausea does seem worse after his cisplatin treatments.  However it just continues without relief.  A Sancuso patch was prescribed but he has not gotten this yet.  It is supposed to be shipped.          Objective       /79 Comment: standing  Pulse 97   Temp 97.5 °F (36.4 °C) (Temporal)   Resp 18   Ht 185.4 cm (72.99\")   Wt 110 kg (242 lb)   SpO2 98%   BMI 31.94 kg/m²    Vitals:    02/03/22 0811   PainSc:   9   PainLoc: Mouth               Performance Status:   ECOG score: 0   General: well appearing male in no acute distress  Neuro: alert and oriented  HEENT: sclerae anicteric  Lymphatics: Left cervical lymph node, firm and movable, approximately 3.5 cm  Cardiovascular: regular rate and rhythm, no murmurs  Lungs: clear to auscultation bilaterally  Abdomen: soft, nontender, nondistended.  No palpable organomegaly  Extremities: no lower extremity edema  Skin: no rashes, lesions, bruising, or petechiae  Psych: mood and affect appropriate      RECENT LABS:  Lab Results   Component Value Date    WBC 2.40 (L) 02/02/2022    HGB 8.5 (L) 02/02/2022    HCT 25.1 (L) 02/02/2022    MCV 98.7 (H) 02/02/2022     " 02/02/2022       Lab Results   Component Value Date    GLUCOSE 95 02/02/2022    BUN 29 (H) 02/02/2022    CREATININE 1.27 02/02/2022    EGFRIFNONA 58 (L) 02/02/2022    BCR 22.8 02/02/2022    K 4.3 02/02/2022    CO2 25.0 02/02/2022    CALCIUM 9.6 02/02/2022    ALBUMIN 4.20 02/02/2022    AST 15 02/02/2022    ALT 13 02/02/2022         CT Soft Tissue Neck With & Without Contrast  Narrative:    DATE OF EXAM:   1/27/2022 10:38 AM     PROCEDURE:   CT SOFT TISSUE NECK W WO CONTRAST-     INDICATIONS:   squamous cell carcinoma tongue with enlarging cervical lymph node and  spitting up blood; C10.9-Malignant neoplasm of oropharynx, unspecified     COMPARISON:  January 27, 2022     TECHNIQUE:   Volumetric axial CT imaging of the neck was performed before and after  the intravenous administration of  100 mL of Isovue 300. Automated  exposure control and iterative reconstruction methods were used.      FINDINGS:   There is a level 2 enlarged necrotic lymph node measuring 3.2 x 3.3 cm  previously measuring 2.7 x 3 cm. This could reflect metastatic disease.  There is some asymmetric fullness to the left aryepiglottic fold of  uncertain significance. There is a port catheter noted. This extends  into the right internal jugular vein. There are no unusual fluid  collections within the neck. The paranasal sinuses seem relatively  clear. There is ossification of the nuchal ligament.      Impression: IMPRESSION :   1.  There is again suggested to be a level 2 necrotic lymph node on the  left concerning for metastatic disease. A brachial cleft cyst could have  a similar appearance.  2.  Asymmetric fullness to the left aryepiglottic fold which is of  questionable significance and had a more normal appearance on the prior  CT.     This report was finalized on 1/27/2022 11:33 AM by Jose Martínez MD.       I personally reviewed the imaging studies        Assessment/Plan                   Nicholas Renee is a 56 y.o. male with a cT1-2N1M0 p16+  SCC of the left base of tongue, with > 3 cm cervical LN.    With the expected ice storm this afternoon, he will likely not be able to come tomorrow for IV fluids.  Because of this because of his significant nausea, I will delay his sixth dose of cisplatin until next week.  We will give 2 L of IV fluids today.    He will still be set up for IV fluids Monday,  Wednesday, Friday weekly.  We discussed that we may need to continue this for up to 2 or 3 weeks after completing chemoradiation.    Pain related to cancer and treatment.  Continue liquid oxycodone.     F/u 1 week.          I spent 31 minutes caring for Nicholas on this date of service. This time includes time spent by me in the following activities: preparing for the visit, reviewing tests, obtaining and/or reviewing a separately obtained history, performing a medically appropriate examination and/or evaluation, counseling and educating the patient/family/caregiver, ordering medications, tests, or procedures, referring and communicating with other health care professionals and documenting information in the medical record      Georgiana Buck MD  [facility] Hematology and Oncology    2/3/2022

## 2022-02-04 ENCOUNTER — APPOINTMENT (OUTPATIENT)
Dept: SPEECH THERAPY | Facility: HOSPITAL | Age: 57
End: 2022-02-04

## 2022-02-04 ENCOUNTER — APPOINTMENT (OUTPATIENT)
Dept: ONCOLOGY | Facility: HOSPITAL | Age: 57
End: 2022-02-04

## 2022-02-04 DIAGNOSIS — C10.9 OROPHARYNX CANCER: Primary | ICD-10-CM

## 2022-02-07 ENCOUNTER — HOSPITAL ENCOUNTER (OUTPATIENT)
Dept: ONCOLOGY | Facility: HOSPITAL | Age: 57
Setting detail: INFUSION SERIES
Discharge: HOME | End: 2022-02-07

## 2022-02-07 ENCOUNTER — HOSPITAL ENCOUNTER (OUTPATIENT)
Dept: RADIATION ONCOLOGY | Facility: HOSPITAL | Age: 57
Discharge: HOME | End: 2022-02-07

## 2022-02-07 VITALS
DIASTOLIC BLOOD PRESSURE: 82 MMHG | BODY MASS INDEX: 31.41 KG/M2 | HEIGHT: 73 IN | SYSTOLIC BLOOD PRESSURE: 127 MMHG | RESPIRATION RATE: 18 BRPM | WEIGHT: 237 LBS | TEMPERATURE: 97.5 F | HEART RATE: 95 BPM

## 2022-02-07 DIAGNOSIS — C10.9 OROPHARYNX CANCER: Primary | ICD-10-CM

## 2022-02-07 DIAGNOSIS — K12.30 MUCOSITIS: ICD-10-CM

## 2022-02-07 PROCEDURE — 96360 HYDRATION IV INFUSION INIT: CPT

## 2022-02-07 PROCEDURE — 96375 TX/PRO/DX INJ NEW DRUG ADDON: CPT

## 2022-02-07 PROCEDURE — 25010000002 HEPARIN LOCK FLUSH PER 10 UNITS: Performed by: INTERNAL MEDICINE

## 2022-02-07 PROCEDURE — 25010000002 ONDANSETRON PER 1 MG: Performed by: INTERNAL MEDICINE

## 2022-02-07 PROCEDURE — 96374 THER/PROPH/DIAG INJ IV PUSH: CPT

## 2022-02-07 PROCEDURE — 77386: CPT | Performed by: RADIOLOGY

## 2022-02-07 PROCEDURE — 25010000002 DEXAMETHASONE PER 1 MG: Performed by: INTERNAL MEDICINE

## 2022-02-07 RX ORDER — HEPARIN SODIUM (PORCINE) LOCK FLUSH IV SOLN 100 UNIT/ML 100 UNIT/ML
500 SOLUTION INTRAVENOUS AS NEEDED
Status: CANCELLED | OUTPATIENT
Start: 2022-02-07

## 2022-02-07 RX ORDER — SODIUM CHLORIDE 0.9 % (FLUSH) 0.9 %
10 SYRINGE (ML) INJECTION AS NEEDED
Status: DISCONTINUED | OUTPATIENT
Start: 2022-02-07 | End: 2022-02-08 | Stop reason: HOSPADM

## 2022-02-07 RX ORDER — SODIUM CHLORIDE 0.9 % (FLUSH) 0.9 %
10 SYRINGE (ML) INJECTION AS NEEDED
Status: CANCELLED | OUTPATIENT
Start: 2022-02-07

## 2022-02-07 RX ORDER — HEPARIN SODIUM (PORCINE) LOCK FLUSH IV SOLN 100 UNIT/ML 100 UNIT/ML
500 SOLUTION INTRAVENOUS AS NEEDED
Status: DISCONTINUED | OUTPATIENT
Start: 2022-02-07 | End: 2022-02-08 | Stop reason: HOSPADM

## 2022-02-07 RX ADMIN — SODIUM CHLORIDE, PRESERVATIVE FREE 10 ML: 5 INJECTION INTRAVENOUS at 10:34

## 2022-02-07 RX ADMIN — DEXAMETHASONE SODIUM PHOSPHATE 8 MG: 4 INJECTION, SOLUTION INTRA-ARTICULAR; INTRALESIONAL; INTRAMUSCULAR; INTRAVENOUS; SOFT TISSUE at 09:51

## 2022-02-07 RX ADMIN — ONDANSETRON 8 MG: 2 INJECTION INTRAMUSCULAR; INTRAVENOUS at 09:36

## 2022-02-07 RX ADMIN — Medication 500 UNITS: at 10:34

## 2022-02-07 RX ADMIN — SODIUM CHLORIDE 1000 ML: 9 INJECTION, SOLUTION INTRAVENOUS at 09:35

## 2022-02-08 ENCOUNTER — HOSPITAL ENCOUNTER (OUTPATIENT)
Dept: SPEECH THERAPY | Facility: HOSPITAL | Age: 57
Setting detail: THERAPIES SERIES
Discharge: HOME | End: 2022-02-08

## 2022-02-08 ENCOUNTER — DOCUMENTATION (OUTPATIENT)
Dept: NUTRITION | Facility: HOSPITAL | Age: 57
End: 2022-02-08

## 2022-02-08 ENCOUNTER — OFFICE VISIT (OUTPATIENT)
Dept: ONCOLOGY | Facility: CLINIC | Age: 57
End: 2022-02-08

## 2022-02-08 ENCOUNTER — HOSPITAL ENCOUNTER (OUTPATIENT)
Dept: ONCOLOGY | Facility: HOSPITAL | Age: 57
Setting detail: INFUSION SERIES
Discharge: HOME | End: 2022-02-08

## 2022-02-08 ENCOUNTER — HOSPITAL ENCOUNTER (OUTPATIENT)
Dept: RADIATION ONCOLOGY | Facility: HOSPITAL | Age: 57
Discharge: HOME | End: 2022-02-08

## 2022-02-08 VITALS
TEMPERATURE: 97.3 F | WEIGHT: 237 LBS | BODY MASS INDEX: 31.41 KG/M2 | HEART RATE: 108 BPM | RESPIRATION RATE: 18 BRPM | SYSTOLIC BLOOD PRESSURE: 120 MMHG | HEIGHT: 73 IN | DIASTOLIC BLOOD PRESSURE: 79 MMHG

## 2022-02-08 VITALS — BODY MASS INDEX: 31.37 KG/M2 | WEIGHT: 237.8 LBS

## 2022-02-08 DIAGNOSIS — C10.9 OROPHARYNX CANCER: Primary | ICD-10-CM

## 2022-02-08 LAB
ALBUMIN SERPL-MCNC: 4.2 G/DL (ref 3.5–5.2)
ALBUMIN/GLOB SERPL: 1.3 G/DL
ALP SERPL-CCNC: 98 U/L (ref 39–117)
ALT SERPL W P-5'-P-CCNC: 16 U/L (ref 1–41)
ANION GAP SERPL CALCULATED.3IONS-SCNC: 12 MMOL/L (ref 5–15)
AST SERPL-CCNC: 19 U/L (ref 1–40)
BILIRUB SERPL-MCNC: 0.4 MG/DL (ref 0–1.2)
BUN SERPL-MCNC: 25 MG/DL (ref 6–20)
BUN/CREAT SERPL: 24.3 (ref 7–25)
CALCIUM SPEC-SCNC: 9.7 MG/DL (ref 8.6–10.5)
CHLORIDE SERPL-SCNC: 97 MMOL/L (ref 98–107)
CO2 SERPL-SCNC: 26 MMOL/L (ref 22–29)
CREAT SERPL-MCNC: 1.03 MG/DL (ref 0.76–1.27)
ERYTHROCYTE [DISTWIDTH] IN BLOOD BY AUTOMATED COUNT: 14.9 % (ref 12.3–15.4)
GFR SERPL CREATININE-BSD FRML MDRD: 74 ML/MIN/1.73
GLOBULIN UR ELPH-MCNC: 3.2 GM/DL
GLUCOSE SERPL-MCNC: 91 MG/DL (ref 65–99)
HCT VFR BLD AUTO: 25.8 % (ref 37.5–51)
HGB BLD-MCNC: 8.5 G/DL (ref 13–17.7)
LYMPHOCYTES # BLD AUTO: 0.6 10*3/MM3 (ref 0.7–3.1)
LYMPHOCYTES NFR BLD AUTO: 19.3 % (ref 19.6–45.3)
MAGNESIUM SERPL-MCNC: 1.7 MG/DL (ref 1.6–2.6)
MCH RBC QN AUTO: 33.3 PG (ref 26.6–33)
MCHC RBC AUTO-ENTMCNC: 32.9 G/DL (ref 31.5–35.7)
MCV RBC AUTO: 101.2 FL (ref 79–97)
MONOCYTES # BLD AUTO: 0.3 10*3/MM3 (ref 0.1–0.9)
MONOCYTES NFR BLD AUTO: 9.4 % (ref 5–12)
NEUTROPHILS NFR BLD AUTO: 2.3 10*3/MM3 (ref 1.7–7)
NEUTROPHILS NFR BLD AUTO: 71.3 % (ref 42.7–76)
PLATELET # BLD AUTO: 399 10*3/MM3 (ref 140–450)
PMV BLD AUTO: 5.8 FL (ref 6–12)
POTASSIUM SERPL-SCNC: 3.8 MMOL/L (ref 3.5–5.2)
PROT SERPL-MCNC: 7.4 G/DL (ref 6–8.5)
RBC # BLD AUTO: 2.55 10*6/MM3 (ref 4.14–5.8)
SODIUM SERPL-SCNC: 135 MMOL/L (ref 136–145)
WBC NRBC COR # BLD: 3.2 10*3/MM3 (ref 3.4–10.8)

## 2022-02-08 PROCEDURE — 99213 OFFICE O/P EST LOW 20 MIN: CPT | Performed by: NURSE PRACTITIONER

## 2022-02-08 PROCEDURE — 80053 COMPREHEN METABOLIC PANEL: CPT | Performed by: INTERNAL MEDICINE

## 2022-02-08 PROCEDURE — 25010000002 ONDANSETRON PER 1 MG: Performed by: INTERNAL MEDICINE

## 2022-02-08 PROCEDURE — 83735 ASSAY OF MAGNESIUM: CPT | Performed by: INTERNAL MEDICINE

## 2022-02-08 PROCEDURE — 96375 TX/PRO/DX INJ NEW DRUG ADDON: CPT

## 2022-02-08 PROCEDURE — 25010000002 DEXAMETHASONE PER 1 MG: Performed by: INTERNAL MEDICINE

## 2022-02-08 PROCEDURE — 96360 HYDRATION IV INFUSION INIT: CPT

## 2022-02-08 PROCEDURE — 77386: CPT | Performed by: RADIOLOGY

## 2022-02-08 PROCEDURE — 96361 HYDRATE IV INFUSION ADD-ON: CPT

## 2022-02-08 PROCEDURE — 85025 COMPLETE CBC W/AUTO DIFF WBC: CPT | Performed by: INTERNAL MEDICINE

## 2022-02-08 PROCEDURE — 92526 ORAL FUNCTION THERAPY: CPT

## 2022-02-08 PROCEDURE — 96374 THER/PROPH/DIAG INJ IV PUSH: CPT

## 2022-02-08 PROCEDURE — 25010000002 HEPARIN LOCK FLUSH PER 10 UNITS: Performed by: INTERNAL MEDICINE

## 2022-02-08 RX ORDER — HEPARIN SODIUM (PORCINE) LOCK FLUSH IV SOLN 100 UNIT/ML 100 UNIT/ML
500 SOLUTION INTRAVENOUS AS NEEDED
Status: CANCELLED | OUTPATIENT
Start: 2022-02-08

## 2022-02-08 RX ORDER — HEPARIN SODIUM (PORCINE) LOCK FLUSH IV SOLN 100 UNIT/ML 100 UNIT/ML
500 SOLUTION INTRAVENOUS AS NEEDED
Status: DISCONTINUED | OUTPATIENT
Start: 2022-02-08 | End: 2022-02-09 | Stop reason: HOSPADM

## 2022-02-08 RX ORDER — SODIUM CHLORIDE 0.9 % (FLUSH) 0.9 %
10 SYRINGE (ML) INJECTION AS NEEDED
Status: CANCELLED | OUTPATIENT
Start: 2022-02-08

## 2022-02-08 RX ADMIN — ONDANSETRON 8 MG: 2 INJECTION INTRAMUSCULAR; INTRAVENOUS at 10:36

## 2022-02-08 RX ADMIN — SODIUM CHLORIDE 1000 ML: 9 INJECTION, SOLUTION INTRAVENOUS at 10:30

## 2022-02-08 RX ADMIN — DEXAMETHASONE SODIUM PHOSPHATE 8 MG: 4 INJECTION, SOLUTION INTRAMUSCULAR; INTRAVENOUS at 10:36

## 2022-02-08 RX ADMIN — HEPARIN 500 UNITS: 100 SYRINGE at 11:36

## 2022-02-08 NOTE — THERAPY PROGRESS REPORT/RE-CERT
"Outpatient Speech Language Pathology   Adult Swallow Progress Note  [facility]     Patient Name: Nicholas Renee  : 1965  MRN: [id number]  Today's Date: 2022         Visit Date: 2022   Patient Active Problem List   Diagnosis   • HTN (hypertension)   • Prediabetes   • Diverticulitis   • S/P low anterior colon resection   • Acute blood loss anemia, mild, asymptomatic   • Acute postoperative pain   • Primary osteoarthritis of right knee   • Oropharynx cancer (HCC)        Visit Dx:    ICD-10-CM ICD-9-CM   1. Oropharynx cancer (HCC)  C10.9 146.9        OP SLP Assessment/Plan - 22        SLP Plan    Plan Comments Cont with Prophylactic swallowing exercises. SLP will call pt in 2 weeks post radiation tx.  -HG          User Key  (r) = Recorded By, (t) = Taken By, (c) = Cosigned By    Initials Name Provider Type    Ariella Whitten MS CCC-SLP Speech and Language Pathologist                                   SLP OP Goals     Row Name 22          Goal Type Needed    Goal Type Needed Dysphagia; Other Adult Goals  -HG            Subjective Comments    Subjective Comments Pt alert, cooperative, complained of feeling \"rough\".  -HG            Dysphagia Goals    Patient will safely consume the recommended diet without complications such as aspiration pneumonia regular/thin  -HG     Status: Patient will safely consume the recommended diet without complications such as aspiration pneumonia Progressing as expected  -HG     Comments: Patient will safely consume the recommended diet without complications such as aspiration pneumonia 22: Pt has lost 33 lbs in total. Pt is very nauseous and vomiting and tolerating solids and liquids but it hurts to swallow. Pt is taking magic mouthwash for this reason more consistently.   22: Pt tolerating a continued oral diet. Pt eating softer foods: scrambled eggs, baked potato with butter and sour cream. Pt stated he would be adding protein " shakes to his diet soon. Pt has lost 10 lbs.  -HG     Status: Patient will improve oral skills to enhance safety and increase eating efficiency by increasing accuracy of back of tongue control Discontinued  -HG     Patient will increase laryngeal elevation to reduce residue that might fall into airway by completing Mendelsohn maneuver; super-supraglottic swallow; with cues; falsetto/pitch glide  -HG     Status: Patient will increase laryngeal elevation to reduce residue that might fall into airway by completing Progressing as expected  -HG     Comments: Patient will increase laryngeal elevation to reduce residue that might fall into airway by completing 2/8/22: Encouraged pt to complete when tolerable given throat pain. 1/7/22: Pt demonstrated this date with accuracy and reports completing exercises while driving.  -HG     Patient will increase closure of larynx to keep food from falling into the airway by completing super-supraglottic swallow; with cues  -HG     Status: Patient will increase closure of larynx to keep food from falling into the airway by completing Progressing as expected  -HG     Comments: Patient will increase closure of larynx to keep food from falling into the airway by completing 2/8/22: Encouraged pt to complete when tolerable given throat pain.  1/7/22: Pt demonstrated this date with accuracy and reports completing exercises while driving.  -HG     Patient will increase strength of tongue base and posterior pharyngeal walls to reduce residue that might fall into airway by completing effotful swallow; Maya (tongue hold); with cues  -HG     Status: Patient will increase strength of tongue base and posterior pharyngeal walls to reduce residue that might fall into airway by completing Progressing as expected  -HG     Comments: Patient will increase strength of tongue base and posterior pharyngeal walls to reduce residue that might fall into airway by completing 2/8/22: Encouraged pt to complete  when tolerable given throat pain.  1/7/22: Pt demonstrated this date with accuracy and reports completing exercises while driving.  -HG     Patient will improve hyolaryngeal elevation via completing Ulisses (head lift) sustained lift (comment #/duration of lifts); repetitive lift (comment #/duration of lifts); with cues  CTAR  -HG     Status: Patient will improve hyolaryngeal elevation via completing Ulisses (head lift) Progressing as expected  -HG     Comments: Patient will improve hyolaryngeal elevation via completing Ulisses (head lift) 2/8/22: Encouraged pt to complete when tolerable given throat pain. 1/7/22: Pt demonstrated this date with accuracy. Pt completing the CTAR.  -HG            Other Goals    Other Adult Goal- 1 Pt will complete stretches of the strap muscles to improve range of motion and flexiblity.  -HG     Status: Other Adult Goal- 1 Progressing as expected  -HG     Comments: Other Adult Goal- 1 2/8/22: Encouraged pt to complete when tolerable given throat pain. 1/7/22: Pt demonstrated this date with accuracy.  -HG     Other Adult Goal- 2 --  -HG     Status: Other Adult Goal- 2 --  -HG     Comments: Other Adult Goal- 2 --  -HG            SLP Time Calculation    SLP Goal Re-Cert Due Date 03/19/22  -HG           User Key  (r) = Recorded By, (t) = Taken By, (c) = Cosigned By    Initials Name Provider Type    Ariella Whitten MS CCC-SLP Speech and Language Pathologist               OP SLP Education     Row Name 02/08/22 0915       Education    Education Comments Education for completing exercises as tolerated even post radiation tx.  -HG          User Key  (r) = Recorded By, (t) = Taken By, (c) = Cosigned By    Initials Name Effective Dates    Ariella Whitten MS CCC-SLP 06/16/21 -                     Time Calculation:   SLP Start Time: 0915  Untimed Charges  34627-NM Treatment Swallow Minutes: 30  Total Minutes  Untimed Charges Total Minutes: 30   Total Minutes: 30    Therapy Charges for  Today     Code Description Service Date Service Provider Modifiers Qty    [id number] HC ST TREATMENT SWALLOW 2 2/8/2022 Ariella Anthony, MS CCC-SLP GN 1                   Ariella Anthony MS CCC-SLP  2/8/2022

## 2022-02-08 NOTE — PROGRESS NOTES
PROBLEM LIST:  1. 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  Biopsy of left neck mass on 11/8/2021 showed squamous cell carcinoma.  P16 stain inconclusive.  Repeat biopsy done 11/24/21 - excision of left lingual tonsil, showed SCC, p16+  B) begin weekly cisplatin with radiation 12/23/2021.  2. Hypertension  3. GERd  4. Hyperlipidemia    Subjective     CHIEF COMPLAINT: p16+ oropharynx cancer    HISTORY OF PRESENT ILLNESS:   Nicholas Renee returns for follow-up.  He will complete radiation tomorrow.  He is feeling pretty bad at this time.  He feels like he will need IV fluids daily for a few days since he is unable to eat or drink without pain.  He still has significant throat pain that improves with pain medication.  He still has some nausea but has not had any vomiting in the past 2 days.          Objective       There were no vitals taken for this visit.   There were no vitals filed for this visit.            Performance Status:       General: well appearing male in no acute distress  Neuro: alert and oriented  HEENT: sclerae anicteric  Lymphatics: Left cervical lymph node, firm and movable, approximately 3.5 cm  Cardiovascular: regular rate and rhythm, no murmurs  Lungs: clear to auscultation bilaterally  Abdomen: soft, nontender, nondistended.  No palpable organomegaly  Extremities: no lower extremity edema  Skin: no rashes, lesions, bruising, or petechiae  Psych: mood and affect appropriate      RECENT LABS:  Lab Results   Component Value Date    WBC 3.20 (L) 02/08/2022    HGB 8.5 (L) 02/08/2022    HCT 25.8 (L) 02/08/2022    .2 (H) 02/08/2022     02/08/2022       Lab Results   Component Value Date    GLUCOSE 91 02/08/2022    BUN 25 (H) 02/08/2022    CREATININE 1.03 02/08/2022    EGFRIFNONA 74 02/08/2022    BCR 24.3 02/08/2022    K 3.8 02/08/2022    CO2  26.0 02/08/2022    CALCIUM 9.7 02/08/2022    ALBUMIN 4.20 02/08/2022    AST 19 02/08/2022    ALT 16 02/08/2022         CT Soft Tissue Neck With & Without Contrast  Narrative:    DATE OF EXAM:   1/27/2022 10:38 AM     PROCEDURE:   CT SOFT TISSUE NECK W WO CONTRAST-     INDICATIONS:   squamous cell carcinoma tongue with enlarging cervical lymph node and  spitting up blood; C10.9-Malignant neoplasm of oropharynx, unspecified     COMPARISON:  January 27, 2022     TECHNIQUE:   Volumetric axial CT imaging of the neck was performed before and after  the intravenous administration of  100 mL of Isovue 300. Automated  exposure control and iterative reconstruction methods were used.      FINDINGS:   There is a level 2 enlarged necrotic lymph node measuring 3.2 x 3.3 cm  previously measuring 2.7 x 3 cm. This could reflect metastatic disease.  There is some asymmetric fullness to the left aryepiglottic fold of  uncertain significance. There is a port catheter noted. This extends  into the right internal jugular vein. There are no unusual fluid  collections within the neck. The paranasal sinuses seem relatively  clear. There is ossification of the nuchal ligament.      Impression: IMPRESSION :   1.  There is again suggested to be a level 2 necrotic lymph node on the  left concerning for metastatic disease. A brachial cleft cyst could have  a similar appearance.  2.  Asymmetric fullness to the left aryepiglottic fold which is of  questionable significance and had a more normal appearance on the prior  CT.     This report was finalized on 1/27/2022 11:33 AM by Jose Martínez MD.             Assessment/Plan                   Nicholas Renee is a 56 y.o. male with a cT1-2N1M0 p16+ SCC of the left base of tongue, with > 3 cm cervical LN.    He will complete chemo and radiation tomorrow.  We will proceed with his last dose of cisplatin tomorrow as scheduled.  We will give him IV fluids Monday through Friday for the next 2 weeks.  We  will plan on a PET scan in 6 weeks to evaluate response to treatment.    Pain related to cancer and treatment.  Continue liquid oxycodone.     F/u 6 weeks.          I spent 20 minutes caring for Nicholas on this date of service. This time includes time spent by me in the following activities: preparing for the visit, obtaining and/or reviewing a separately obtained history, performing a medically appropriate examination and/or evaluation, counseling and educating the patient/family/caregiver, ordering medications, tests, or procedures and documenting information in the medical record        Della Kay APRN  [facility] Hematology and Oncology    2/8/2022

## 2022-02-08 NOTE — PROGRESS NOTES
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 / > 3 cm cervical LN     Surgery:  Fine-needle aspiration of the left neck mass      Chemotherapy:  OP HEAD & NECK CISplatin (Weekly) + XRT -  5/6 cycles completed    Radiation:  Target a dose of 70 Gray in 35 fractions, but considering this is p16 positive, should he demonstrate a very good early response to treatment we may be able to de-escalate the radiation dosed to approximately 60 Hart - patient has completed 32/33 fractions     Weight 237.8 lbs / 33 lbs weight loss since initial consultation (12.2% weight loss)       Patient meets criteria for severe malnutrition in context of chronic illness as characterized by weight loss over the past month and diminished oral intake meeting less than 50% of his nutritional needs.  He has refused PEG placement for nutritional / hydration support during active treatment plan.    Patient with 1 remaining radiation and chemo treatment.  He states that oral intake was probably better yesterday than it had been in days and with that, he only consumed 2 ONS and water.  Mucous thick.  Patient will continue to receive daily IVFs for the 2 week post treatment.  Painful swallowing continues to intensify.  Discussed what to expect in the 2 weeks following treatment.

## 2022-02-09 ENCOUNTER — HOSPITAL ENCOUNTER (OUTPATIENT)
Dept: ONCOLOGY | Facility: HOSPITAL | Age: 57
Setting detail: INFUSION SERIES
Discharge: HOME | End: 2022-02-09

## 2022-02-09 ENCOUNTER — HOSPITAL ENCOUNTER (OUTPATIENT)
Dept: RADIATION ONCOLOGY | Facility: HOSPITAL | Age: 57
Discharge: HOME | End: 2022-02-09

## 2022-02-09 VITALS
BODY MASS INDEX: 31.54 KG/M2 | HEIGHT: 73 IN | WEIGHT: 238 LBS | DIASTOLIC BLOOD PRESSURE: 79 MMHG | RESPIRATION RATE: 16 BRPM | HEART RATE: 104 BPM | SYSTOLIC BLOOD PRESSURE: 123 MMHG | TEMPERATURE: 97.8 F

## 2022-02-09 DIAGNOSIS — C10.9 OROPHARYNX CANCER: Primary | ICD-10-CM

## 2022-02-09 PROCEDURE — 25010000002 MAGNESIUM SULFATE PER 500 MG OF MAGNESIUM: Performed by: INTERNAL MEDICINE

## 2022-02-09 PROCEDURE — 25010000002 CISPLATIN PER 50 MG: Performed by: INTERNAL MEDICINE

## 2022-02-09 PROCEDURE — 96413 CHEMO IV INFUSION 1 HR: CPT

## 2022-02-09 PROCEDURE — 25010000002 POTASSIUM CHLORIDE PER 2 MEQ OF POTASSIUM: Performed by: INTERNAL MEDICINE

## 2022-02-09 PROCEDURE — 77336 RADIATION PHYSICS CONSULT: CPT | Performed by: RADIOLOGY

## 2022-02-09 PROCEDURE — 96366 THER/PROPH/DIAG IV INF ADDON: CPT

## 2022-02-09 PROCEDURE — 77386: CPT | Performed by: RADIOLOGY

## 2022-02-09 PROCEDURE — 96361 HYDRATE IV INFUSION ADD-ON: CPT

## 2022-02-09 PROCEDURE — 96375 TX/PRO/DX INJ NEW DRUG ADDON: CPT

## 2022-02-09 PROCEDURE — 96374 THER/PROPH/DIAG INJ IV PUSH: CPT

## 2022-02-09 PROCEDURE — 96360 HYDRATION IV INFUSION INIT: CPT

## 2022-02-09 PROCEDURE — 96367 TX/PROPH/DG ADDL SEQ IV INF: CPT

## 2022-02-09 PROCEDURE — 25010000002 HEPARIN LOCK FLUSH PER 10 UNITS: Performed by: INTERNAL MEDICINE

## 2022-02-09 PROCEDURE — 25010000002 FOSAPREPITANT PER 1 MG: Performed by: INTERNAL MEDICINE

## 2022-02-09 PROCEDURE — 25010000002 DEXAMETHASONE SODIUM PHOSPHATE 100 MG/10ML SOLUTION: Performed by: INTERNAL MEDICINE

## 2022-02-09 PROCEDURE — 25010000002 PALONOSETRON 0.25 MG/5ML SOLUTION PREFILLED SYRINGE: Performed by: INTERNAL MEDICINE

## 2022-02-09 RX ORDER — SODIUM CHLORIDE 0.9 % (FLUSH) 0.9 %
10 SYRINGE (ML) INJECTION AS NEEDED
Status: CANCELLED | OUTPATIENT
Start: 2022-02-09

## 2022-02-09 RX ORDER — HEPARIN SODIUM (PORCINE) LOCK FLUSH IV SOLN 100 UNIT/ML 100 UNIT/ML
500 SOLUTION INTRAVENOUS AS NEEDED
Status: CANCELLED | OUTPATIENT
Start: 2022-02-09

## 2022-02-09 RX ORDER — PALONOSETRON 0.05 MG/ML
0.25 INJECTION, SOLUTION INTRAVENOUS ONCE
Status: COMPLETED | OUTPATIENT
Start: 2022-02-09 | End: 2022-02-09

## 2022-02-09 RX ORDER — OLANZAPINE 5 MG/1
5 TABLET ORAL ONCE
Status: COMPLETED | OUTPATIENT
Start: 2022-02-09 | End: 2022-02-09

## 2022-02-09 RX ORDER — HEPARIN SODIUM (PORCINE) LOCK FLUSH IV SOLN 100 UNIT/ML 100 UNIT/ML
500 SOLUTION INTRAVENOUS AS NEEDED
Status: DISCONTINUED | OUTPATIENT
Start: 2022-02-09 | End: 2022-02-10 | Stop reason: HOSPADM

## 2022-02-09 RX ORDER — SODIUM CHLORIDE 9 MG/ML
250 INJECTION, SOLUTION INTRAVENOUS ONCE
Status: COMPLETED | OUTPATIENT
Start: 2022-02-09 | End: 2022-02-09

## 2022-02-09 RX ADMIN — OLANZAPINE 5 MG: 5 TABLET, FILM COATED ORAL at 09:00

## 2022-02-09 RX ADMIN — SODIUM CHLORIDE 250 ML: 9 INJECTION, SOLUTION INTRAVENOUS at 08:38

## 2022-02-09 RX ADMIN — POTASSIUM CHLORIDE 500 ML: 2 INJECTION, SOLUTION, CONCENTRATE INTRAVENOUS at 12:07

## 2022-02-09 RX ADMIN — CISPLATIN 96 MG: 1 INJECTION INTRAVENOUS at 10:59

## 2022-02-09 RX ADMIN — PALONOSETRON 0.25 MG: 0.25 INJECTION, SOLUTION INTRAVENOUS at 09:44

## 2022-02-09 RX ADMIN — SODIUM CHLORIDE 150 MG: 9 INJECTION, SOLUTION INTRAVENOUS at 09:41

## 2022-02-09 RX ADMIN — POTASSIUM CHLORIDE 500 ML: 2 INJECTION, SOLUTION, CONCENTRATE INTRAVENOUS at 08:38

## 2022-02-09 RX ADMIN — HEPARIN 500 UNITS: 100 SYRINGE at 13:10

## 2022-02-09 RX ADMIN — DEXAMETHASONE SODIUM PHOSPHATE 12 MG: 10 INJECTION, SOLUTION INTRAMUSCULAR; INTRAVENOUS at 09:45

## 2022-02-10 ENCOUNTER — HOSPITAL ENCOUNTER (OUTPATIENT)
Dept: ONCOLOGY | Facility: HOSPITAL | Age: 57
Setting detail: INFUSION SERIES
Discharge: HOME | End: 2022-02-10

## 2022-02-10 VITALS
TEMPERATURE: 98.2 F | WEIGHT: 233.5 LBS | HEIGHT: 73 IN | DIASTOLIC BLOOD PRESSURE: 88 MMHG | HEART RATE: 104 BPM | BODY MASS INDEX: 30.95 KG/M2 | RESPIRATION RATE: 16 BRPM | SYSTOLIC BLOOD PRESSURE: 136 MMHG

## 2022-02-10 DIAGNOSIS — C10.9 OROPHARYNX CANCER: Primary | ICD-10-CM

## 2022-02-10 PROCEDURE — 96374 THER/PROPH/DIAG INJ IV PUSH: CPT

## 2022-02-10 PROCEDURE — 25010000002 ONDANSETRON PER 1 MG: Performed by: INTERNAL MEDICINE

## 2022-02-10 PROCEDURE — 25010000002 HEPARIN LOCK FLUSH PER 10 UNITS: Performed by: INTERNAL MEDICINE

## 2022-02-10 PROCEDURE — 96375 TX/PRO/DX INJ NEW DRUG ADDON: CPT

## 2022-02-10 PROCEDURE — 96361 HYDRATE IV INFUSION ADD-ON: CPT

## 2022-02-10 PROCEDURE — 25010000002 DEXAMETHASONE PER 1 MG: Performed by: INTERNAL MEDICINE

## 2022-02-10 PROCEDURE — 96360 HYDRATION IV INFUSION INIT: CPT

## 2022-02-10 RX ORDER — SODIUM CHLORIDE 0.9 % (FLUSH) 0.9 %
10 SYRINGE (ML) INJECTION AS NEEDED
Status: CANCELLED | OUTPATIENT
Start: 2022-02-10

## 2022-02-10 RX ORDER — HEPARIN SODIUM (PORCINE) LOCK FLUSH IV SOLN 100 UNIT/ML 100 UNIT/ML
500 SOLUTION INTRAVENOUS AS NEEDED
Status: DISCONTINUED | OUTPATIENT
Start: 2022-02-10 | End: 2022-02-11 | Stop reason: HOSPADM

## 2022-02-10 RX ORDER — HEPARIN SODIUM (PORCINE) LOCK FLUSH IV SOLN 100 UNIT/ML 100 UNIT/ML
500 SOLUTION INTRAVENOUS AS NEEDED
Status: CANCELLED | OUTPATIENT
Start: 2022-02-10

## 2022-02-10 RX ADMIN — DEXAMETHASONE SODIUM PHOSPHATE 8 MG: 4 INJECTION, SOLUTION INTRAMUSCULAR; INTRAVENOUS at 08:25

## 2022-02-10 RX ADMIN — ONDANSETRON 8 MG: 2 INJECTION INTRAMUSCULAR; INTRAVENOUS at 08:08

## 2022-02-10 RX ADMIN — SODIUM CHLORIDE 1000 ML: 9 INJECTION, SOLUTION INTRAVENOUS at 07:50

## 2022-02-10 RX ADMIN — HEPARIN 500 UNITS: 100 SYRINGE at 09:03

## 2022-02-11 ENCOUNTER — HOSPITAL ENCOUNTER (OUTPATIENT)
Dept: ONCOLOGY | Facility: HOSPITAL | Age: 57
Setting detail: INFUSION SERIES
Discharge: HOME | End: 2022-02-11

## 2022-02-11 VITALS
HEART RATE: 97 BPM | BODY MASS INDEX: 31.41 KG/M2 | DIASTOLIC BLOOD PRESSURE: 77 MMHG | WEIGHT: 237 LBS | TEMPERATURE: 97.2 F | RESPIRATION RATE: 18 BRPM | HEIGHT: 73 IN | SYSTOLIC BLOOD PRESSURE: 121 MMHG

## 2022-02-11 DIAGNOSIS — C10.9 OROPHARYNX CANCER: Primary | ICD-10-CM

## 2022-02-11 PROCEDURE — 96360 HYDRATION IV INFUSION INIT: CPT

## 2022-02-11 PROCEDURE — 25010000002 ONDANSETRON PER 1 MG: Performed by: INTERNAL MEDICINE

## 2022-02-11 PROCEDURE — 96413 CHEMO IV INFUSION 1 HR: CPT

## 2022-02-11 PROCEDURE — 25010000002 HEPARIN LOCK FLUSH PER 10 UNITS: Performed by: INTERNAL MEDICINE

## 2022-02-11 PROCEDURE — 96375 TX/PRO/DX INJ NEW DRUG ADDON: CPT

## 2022-02-11 PROCEDURE — 96361 HYDRATE IV INFUSION ADD-ON: CPT

## 2022-02-11 PROCEDURE — 96376 TX/PRO/DX INJ SAME DRUG ADON: CPT

## 2022-02-11 PROCEDURE — 96374 THER/PROPH/DIAG INJ IV PUSH: CPT

## 2022-02-11 PROCEDURE — 25010000002 DEXAMETHASONE PER 1 MG: Performed by: INTERNAL MEDICINE

## 2022-02-11 RX ORDER — HEPARIN SODIUM (PORCINE) LOCK FLUSH IV SOLN 100 UNIT/ML 100 UNIT/ML
500 SOLUTION INTRAVENOUS AS NEEDED
Status: CANCELLED | OUTPATIENT
Start: 2022-02-11

## 2022-02-11 RX ORDER — HEPARIN SODIUM (PORCINE) LOCK FLUSH IV SOLN 100 UNIT/ML 100 UNIT/ML
500 SOLUTION INTRAVENOUS AS NEEDED
Status: DISCONTINUED | OUTPATIENT
Start: 2022-02-11 | End: 2022-02-12 | Stop reason: HOSPADM

## 2022-02-11 RX ORDER — SODIUM CHLORIDE 0.9 % (FLUSH) 0.9 %
10 SYRINGE (ML) INJECTION AS NEEDED
Status: CANCELLED | OUTPATIENT
Start: 2022-02-11

## 2022-02-11 RX ADMIN — DEXAMETHASONE SODIUM PHOSPHATE 8 MG: 4 INJECTION, SOLUTION INTRAMUSCULAR; INTRAVENOUS at 09:10

## 2022-02-11 RX ADMIN — ONDANSETRON 8 MG: 2 INJECTION INTRAMUSCULAR; INTRAVENOUS at 09:10

## 2022-02-11 RX ADMIN — SODIUM CHLORIDE 1000 ML: 9 INJECTION, SOLUTION INTRAVENOUS at 09:13

## 2022-02-11 RX ADMIN — HEPARIN 500 UNITS: 100 SYRINGE at 10:25

## 2022-02-14 ENCOUNTER — HOSPITAL ENCOUNTER (OUTPATIENT)
Dept: ONCOLOGY | Facility: HOSPITAL | Age: 57
Setting detail: INFUSION SERIES
Discharge: HOME | End: 2022-02-14

## 2022-02-14 VITALS
TEMPERATURE: 96.8 F | DIASTOLIC BLOOD PRESSURE: 71 MMHG | BODY MASS INDEX: 30.75 KG/M2 | WEIGHT: 232 LBS | SYSTOLIC BLOOD PRESSURE: 111 MMHG | HEART RATE: 112 BPM | HEIGHT: 73 IN | RESPIRATION RATE: 18 BRPM

## 2022-02-14 DIAGNOSIS — C10.9 OROPHARYNX CANCER: Primary | ICD-10-CM

## 2022-02-14 PROCEDURE — 25010000002 ONDANSETRON PER 1 MG: Performed by: INTERNAL MEDICINE

## 2022-02-14 PROCEDURE — 96375 TX/PRO/DX INJ NEW DRUG ADDON: CPT

## 2022-02-14 PROCEDURE — 96360 HYDRATION IV INFUSION INIT: CPT

## 2022-02-14 PROCEDURE — 25010000002 DEXAMETHASONE PER 1 MG: Performed by: INTERNAL MEDICINE

## 2022-02-14 PROCEDURE — 96374 THER/PROPH/DIAG INJ IV PUSH: CPT

## 2022-02-14 PROCEDURE — 25010000002 HEPARIN LOCK FLUSH PER 10 UNITS: Performed by: INTERNAL MEDICINE

## 2022-02-14 RX ORDER — HEPARIN SODIUM (PORCINE) LOCK FLUSH IV SOLN 100 UNIT/ML 100 UNIT/ML
500 SOLUTION INTRAVENOUS AS NEEDED
Status: DISCONTINUED | OUTPATIENT
Start: 2022-02-14 | End: 2022-02-15 | Stop reason: HOSPADM

## 2022-02-14 RX ORDER — SODIUM CHLORIDE 0.9 % (FLUSH) 0.9 %
10 SYRINGE (ML) INJECTION AS NEEDED
Status: CANCELLED | OUTPATIENT
Start: 2022-02-14

## 2022-02-14 RX ORDER — HEPARIN SODIUM (PORCINE) LOCK FLUSH IV SOLN 100 UNIT/ML 100 UNIT/ML
500 SOLUTION INTRAVENOUS AS NEEDED
Status: CANCELLED | OUTPATIENT
Start: 2022-02-14

## 2022-02-14 RX ADMIN — DEXAMETHASONE SODIUM PHOSPHATE 8 MG: 4 INJECTION, SOLUTION INTRAMUSCULAR; INTRAVENOUS at 13:20

## 2022-02-14 RX ADMIN — SODIUM CHLORIDE 1000 ML: 9 INJECTION, SOLUTION INTRAVENOUS at 13:05

## 2022-02-14 RX ADMIN — ONDANSETRON 8 MG: 2 INJECTION INTRAMUSCULAR; INTRAVENOUS at 13:05

## 2022-02-14 RX ADMIN — HEPARIN 500 UNITS: 100 SYRINGE at 14:18

## 2022-02-15 ENCOUNTER — HOSPITAL ENCOUNTER (OUTPATIENT)
Dept: ONCOLOGY | Facility: HOSPITAL | Age: 57
Setting detail: INFUSION SERIES
Discharge: HOME | End: 2022-02-15

## 2022-02-15 VITALS
HEIGHT: 73 IN | HEART RATE: 111 BPM | BODY MASS INDEX: 30.75 KG/M2 | DIASTOLIC BLOOD PRESSURE: 75 MMHG | TEMPERATURE: 97.8 F | RESPIRATION RATE: 16 BRPM | SYSTOLIC BLOOD PRESSURE: 115 MMHG | WEIGHT: 232 LBS

## 2022-02-15 DIAGNOSIS — C10.9 OROPHARYNX CANCER: Primary | ICD-10-CM

## 2022-02-15 PROCEDURE — 96376 TX/PRO/DX INJ SAME DRUG ADON: CPT

## 2022-02-15 PROCEDURE — 96361 HYDRATE IV INFUSION ADD-ON: CPT

## 2022-02-15 PROCEDURE — 96374 THER/PROPH/DIAG INJ IV PUSH: CPT

## 2022-02-15 PROCEDURE — 25010000002 HEPARIN LOCK FLUSH PER 10 UNITS: Performed by: INTERNAL MEDICINE

## 2022-02-15 PROCEDURE — 25010000002 DEXAMETHASONE PER 1 MG: Performed by: INTERNAL MEDICINE

## 2022-02-15 PROCEDURE — 25010000002 ONDANSETRON PER 1 MG: Performed by: INTERNAL MEDICINE

## 2022-02-15 PROCEDURE — 96375 TX/PRO/DX INJ NEW DRUG ADDON: CPT

## 2022-02-15 PROCEDURE — 96360 HYDRATION IV INFUSION INIT: CPT

## 2022-02-15 RX ORDER — HEPARIN SODIUM (PORCINE) LOCK FLUSH IV SOLN 100 UNIT/ML 100 UNIT/ML
500 SOLUTION INTRAVENOUS AS NEEDED
Status: DISCONTINUED | OUTPATIENT
Start: 2022-02-15 | End: 2022-02-17 | Stop reason: HOSPADM

## 2022-02-15 RX ORDER — SODIUM CHLORIDE 0.9 % (FLUSH) 0.9 %
10 SYRINGE (ML) INJECTION AS NEEDED
Status: CANCELLED | OUTPATIENT
Start: 2022-02-15

## 2022-02-15 RX ORDER — HEPARIN SODIUM (PORCINE) LOCK FLUSH IV SOLN 100 UNIT/ML 100 UNIT/ML
500 SOLUTION INTRAVENOUS AS NEEDED
Status: CANCELLED | OUTPATIENT
Start: 2022-02-15

## 2022-02-15 RX ADMIN — SODIUM CHLORIDE 1000 ML: 9 INJECTION, SOLUTION INTRAVENOUS at 08:32

## 2022-02-15 RX ADMIN — ONDANSETRON 8 MG: 2 INJECTION INTRAMUSCULAR; INTRAVENOUS at 08:32

## 2022-02-15 RX ADMIN — HEPARIN 500 UNITS: 100 SYRINGE at 09:54

## 2022-02-15 RX ADMIN — DEXAMETHASONE SODIUM PHOSPHATE 8 MG: 4 INJECTION, SOLUTION INTRAMUSCULAR; INTRAVENOUS at 08:32

## 2022-02-16 ENCOUNTER — HOSPITAL ENCOUNTER (OUTPATIENT)
Dept: ONCOLOGY | Facility: HOSPITAL | Age: 57
Setting detail: INFUSION SERIES
Discharge: HOME | End: 2022-02-16

## 2022-02-16 ENCOUNTER — OFFICE VISIT (OUTPATIENT)
Dept: RADIATION ONCOLOGY | Facility: HOSPITAL | Age: 57
End: 2022-02-16

## 2022-02-16 VITALS
HEART RATE: 73 BPM | DIASTOLIC BLOOD PRESSURE: 89 MMHG | BODY MASS INDEX: 31.03 KG/M2 | SYSTOLIC BLOOD PRESSURE: 154 MMHG | TEMPERATURE: 97.6 F | RESPIRATION RATE: 16 BRPM | WEIGHT: 235.2 LBS | OXYGEN SATURATION: 97 %

## 2022-02-16 VITALS
DIASTOLIC BLOOD PRESSURE: 81 MMHG | HEART RATE: 91 BPM | HEIGHT: 73 IN | TEMPERATURE: 97.3 F | BODY MASS INDEX: 30.88 KG/M2 | SYSTOLIC BLOOD PRESSURE: 132 MMHG | WEIGHT: 233 LBS | RESPIRATION RATE: 16 BRPM

## 2022-02-16 DIAGNOSIS — C10.9 OROPHARYNX CANCER: Primary | ICD-10-CM

## 2022-02-16 PROCEDURE — 96360 HYDRATION IV INFUSION INIT: CPT

## 2022-02-16 PROCEDURE — 96374 THER/PROPH/DIAG INJ IV PUSH: CPT

## 2022-02-16 PROCEDURE — 96375 TX/PRO/DX INJ NEW DRUG ADDON: CPT

## 2022-02-16 PROCEDURE — 25010000002 ONDANSETRON PER 1 MG: Performed by: INTERNAL MEDICINE

## 2022-02-16 PROCEDURE — 25010000002 DEXAMETHASONE PER 1 MG: Performed by: INTERNAL MEDICINE

## 2022-02-16 PROCEDURE — 25010000002 HEPARIN LOCK FLUSH PER 10 UNITS: Performed by: INTERNAL MEDICINE

## 2022-02-16 RX ORDER — HEPARIN SODIUM (PORCINE) LOCK FLUSH IV SOLN 100 UNIT/ML 100 UNIT/ML
500 SOLUTION INTRAVENOUS AS NEEDED
Status: CANCELLED | OUTPATIENT
Start: 2022-02-16

## 2022-02-16 RX ORDER — HEPARIN SODIUM (PORCINE) LOCK FLUSH IV SOLN 100 UNIT/ML 100 UNIT/ML
500 SOLUTION INTRAVENOUS AS NEEDED
Status: DISCONTINUED | OUTPATIENT
Start: 2022-02-16 | End: 2022-02-17 | Stop reason: HOSPADM

## 2022-02-16 RX ORDER — SODIUM CHLORIDE 0.9 % (FLUSH) 0.9 %
10 SYRINGE (ML) INJECTION AS NEEDED
Status: CANCELLED | OUTPATIENT
Start: 2022-02-16

## 2022-02-16 RX ORDER — SODIUM CHLORIDE 0.9 % (FLUSH) 0.9 %
10 SYRINGE (ML) INJECTION AS NEEDED
Status: DISCONTINUED | OUTPATIENT
Start: 2022-02-16 | End: 2022-02-17 | Stop reason: HOSPADM

## 2022-02-16 RX ADMIN — HEPARIN 500 UNITS: 100 SYRINGE at 08:59

## 2022-02-16 RX ADMIN — SODIUM CHLORIDE 1000 ML: 9 INJECTION, SOLUTION INTRAVENOUS at 07:49

## 2022-02-16 RX ADMIN — DEXAMETHASONE SODIUM PHOSPHATE 8 MG: 4 INJECTION, SOLUTION INTRAMUSCULAR; INTRAVENOUS at 08:18

## 2022-02-16 RX ADMIN — ONDANSETRON 8 MG: 2 INJECTION INTRAMUSCULAR; INTRAVENOUS at 07:58

## 2022-02-16 RX ADMIN — SODIUM CHLORIDE, PRESERVATIVE FREE 10 ML: 5 INJECTION INTRAVENOUS at 08:59

## 2022-02-16 NOTE — PROGRESS NOTES
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 gentleman who returns today for short-interval follow up after completing radiation just 1 week ago.  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  The patient began definitive treatment with concurrent cisplatin chemoradiation.  The patient had a fairly difficult time tolerating treatment and required a few short breaks from chemotherapy as well as extended weekends during radiation which he ultimately completed over a span of 7 weeks.  The oropharynx and bilateral necks were treated to a cumulative dose of 66 Gy in 33 fractions using IMRT and simultaneous integrated boost technique.  The patient developed the anticipated toxicities of xerostomia, thick secretions, and mucositis which would be considered grade 2 and appropriate for treatment.  At present, the patient is still having difficulties with moderate dysphagia and odynophagia as well as poor oral intake.  He has lost about 35 pounds.  He previously declined a feeding tube.  He reports oral intake of about 2 ONS shakes daily and 4-5 glasses of water.  He is still unable to tolerate solids.  He notes occasional nausea and vomiting which he attributes to coughing fits and thick, tenacious secretions.  He notes resolution of blood-streaked sputum following completion of course of Augmentin for suspected cervical node infection with enlarged, necrotic appearance on CT  neck 1/27/2022.  He continues to use zofran and compazine on occasion.  He was unable to tolerate Sancuso patch due to anticholinergic effects.  He notes that he has not been using Mucinex as it burns his throat.  He continues magic mouthwash and oxycodone elixir as needed with good but fleeting results.  He denies oral sores, lesions, or thrush.  He notes taste is poor.  He continues to be seen by the oncology dietician for ongoing diet recommendations.  He continues to receive IV fluids in the chemotherapy suite 5 days per week for the past 2 weeks.  Post-treatment fatigue continues to persist.  He presents today alongside his wife who is a nurse in our department.        MEDICATIONS: Medication reconciliation for the patient was reviewed and confirmed in the electronic medical record.    Review of Systems   Constitutional: Positive for appetite change, fatigue and unexpected weight change (35#).   HENT:   Positive for sore throat and trouble swallowing.         +xerstomia  +dysgeusia    Respiratory: Positive for cough (productive).    Gastrointestinal: Positive for nausea and vomiting.   Hematological: Positive for adenopathy (left cervical LN).   Psychiatric/Behavioral: Positive for sleep disturbance (due to coughing).   All other systems reviewed and are negative.          KPS 80%      Physical Exam  Vitals and nursing note reviewed.   Constitutional:       General: He is not in acute distress.     Appearance: He is well-developed.   HENT:      Head: Normocephalic and atraumatic.      Mouth/Throat:      Mouth: Mucous membranes are dry.      Comments: Oral mucosa appears dry.  Oropharynx erythematous and changes consistent with radiation.  Bilateral tonsils enlarged.  No lesions, ulcers, or thrush visible.  Within the left lateral base of tongue, there is fullness and induration without palpable nodule or mass.  No evidence of trismus.    Eyes:      Conjunctiva/sclera: Conjunctivae normal.      Pupils: Pupils  are equal, round, and reactive to light.   Neck:      Comments: Good ROM of neck.  Very minimal fibrosis.  Skin appears hyperpigmented with dry desquamation consistent with radiation dermatitis.  Cardiovascular:      Rate and Rhythm: Normal rate and regular rhythm.      Heart sounds: No murmur heard.  No friction rub.   Pulmonary:      Effort: Pulmonary effort is normal.      Breath sounds: Normal breath sounds. No wheezing.   Abdominal:      General: Bowel sounds are normal. There is no distension.      Palpations: Abdomen is soft. There is no mass.      Tenderness: There is no abdominal tenderness.   Musculoskeletal:         General: Normal range of motion.      Cervical back: Normal range of motion and neck supple.   Lymphadenopathy:      Cervical: Cervical adenopathy (left level 2 lymph node measuring 6 cm, readily mobile and nontender) present.   Skin:     General: Skin is warm and dry.   Neurological:      Mental Status: He is alert and oriented to person, place, and time.   Psychiatric:         Behavior: Behavior normal.         Thought Content: Thought content normal.         Judgment: Judgment normal.         VITAL SIGNS:   Vitals:    02/16/22 0930   BP: 154/89   Pulse: 73   Resp: 16   Temp: 97.6 °F (36.4 °C)   TempSrc: Temporal   SpO2: 97%  Comment: RA   Weight: 107 kg (235 lb 3.2 oz)   PainSc:   9   PainLoc: Mouth               The following portions of the patient's history were reviewed and updated as appropriate: allergies, current medications, past family history, past medical history, past social history, past surgical history and problem list.         Diagnoses and all orders for this visit:    1. Oropharynx cancer (HCC) (Primary)         IMPRESSION:  Nicholas Renee is a 56 y.o. male with a clinical T2N1M0 p16+ squamous cell carcinoma of the left tonsil/base of tongue with single ipsilateral left cervical lymph node metastasis and no additional evidence of other regional or distant disease.  He  underwent definitive treatment with combined chemoradiation therapy, which he completed just 1 week ago in spite of some troubles tolerating treatment.  He did develop some expected radiation-related toxicities, common with treatment for head and neck cancers.  We discussed that these should continue to improve with some time and ongoing symptom management.  Hopefully as mucositis, esophagitis, and nausea improve he can push oral fluids and advance his diet as tolerated to gain some weight back.  He continues to require infusional support, and I anticipate he may still benefit from IV fluids for a few weeks following chemoradiation, but hopefully back down to just 3 days per week as tolerated.  We discussed trying guaifenesin syrup up to 3 times daily as needed for secretion clearance, which he was hesitant to do due to burning sensation of the medication.  He has plenty of this medication at home.  We discussed trying viscous lidocaine and magic mouthwash 15 minutes prior to guaifenesin to coat the throat, which he had not tried.  Hopefully this will in turn lessen bouts of coughing and subsequent vomiting.  He is doing ROM exercises of the neck and has minimal fibrosis on exam today.  Clinical exam does not demonstrate evidence of residual disease in the oral cavity.  Large left neck node measures 6 cm today, stable over the past few weeks.  The patient is scheduled for follow-up with Dr. Manuel for exam and repeat flexible laryngoscopy on 2/21/2022.  He is undergoing restaging PET/CT scan on 3/21/2022 with Dr. Buck.  We reviewed follow up intervals, surveillance ENT exams, repeat imaging, and expectations for response to treatment.      RECOMMENDATIONS:  Mr. Renee continues routine oncologic and ENT surveillance under the care of Jazzy Buck and Kaleb, respectively.  I will schedule the patient to return to our clinic in about 6 weeks to review repeat PET scan and ENT findings, and to check on his  recovery.    Return in about 6 weeks (around 3/28/2022) for Office Visit.    Haritha Ortiz, APRN

## 2022-02-17 ENCOUNTER — HOSPITAL ENCOUNTER (OUTPATIENT)
Dept: ONCOLOGY | Facility: HOSPITAL | Age: 57
Setting detail: INFUSION SERIES
Discharge: HOME | End: 2022-02-17

## 2022-02-17 ENCOUNTER — DOCUMENTATION (OUTPATIENT)
Dept: NUTRITION | Facility: HOSPITAL | Age: 57
End: 2022-02-17

## 2022-02-17 VITALS
TEMPERATURE: 97.8 F | SYSTOLIC BLOOD PRESSURE: 113 MMHG | WEIGHT: 234 LBS | RESPIRATION RATE: 16 BRPM | HEART RATE: 111 BPM | BODY MASS INDEX: 30.87 KG/M2 | DIASTOLIC BLOOD PRESSURE: 81 MMHG

## 2022-02-17 DIAGNOSIS — C10.9 OROPHARYNX CANCER: Primary | ICD-10-CM

## 2022-02-17 DIAGNOSIS — K12.30 MUCOSITIS: ICD-10-CM

## 2022-02-17 PROCEDURE — 25010000002 ONDANSETRON PER 1 MG: Performed by: INTERNAL MEDICINE

## 2022-02-17 PROCEDURE — 96360 HYDRATION IV INFUSION INIT: CPT

## 2022-02-17 PROCEDURE — 96374 THER/PROPH/DIAG INJ IV PUSH: CPT

## 2022-02-17 PROCEDURE — 96375 TX/PRO/DX INJ NEW DRUG ADDON: CPT

## 2022-02-17 PROCEDURE — 25010000002 HEPARIN LOCK FLUSH PER 10 UNITS: Performed by: INTERNAL MEDICINE

## 2022-02-17 PROCEDURE — 25010000002 DEXAMETHASONE PER 1 MG: Performed by: INTERNAL MEDICINE

## 2022-02-17 RX ORDER — HEPARIN SODIUM (PORCINE) LOCK FLUSH IV SOLN 100 UNIT/ML 100 UNIT/ML
500 SOLUTION INTRAVENOUS AS NEEDED
Status: DISCONTINUED | OUTPATIENT
Start: 2022-02-17 | End: 2022-02-18 | Stop reason: HOSPADM

## 2022-02-17 RX ORDER — SODIUM CHLORIDE 0.9 % (FLUSH) 0.9 %
10 SYRINGE (ML) INJECTION AS NEEDED
Status: CANCELLED | OUTPATIENT
Start: 2022-02-17

## 2022-02-17 RX ORDER — HEPARIN SODIUM (PORCINE) LOCK FLUSH IV SOLN 100 UNIT/ML 100 UNIT/ML
500 SOLUTION INTRAVENOUS AS NEEDED
Status: CANCELLED | OUTPATIENT
Start: 2022-02-17

## 2022-02-17 RX ADMIN — ONDANSETRON 8 MG: 2 INJECTION INTRAMUSCULAR; INTRAVENOUS at 08:40

## 2022-02-17 RX ADMIN — DEXAMETHASONE SODIUM PHOSPHATE 8 MG: 4 INJECTION, SOLUTION INTRAMUSCULAR; INTRAVENOUS at 08:24

## 2022-02-17 RX ADMIN — SODIUM CHLORIDE 1000 ML: 9 INJECTION, SOLUTION INTRAVENOUS at 08:14

## 2022-02-17 RX ADMIN — Medication 500 UNITS: at 09:33

## 2022-02-17 NOTE — TELEPHONE ENCOUNTER
Caller: [place]    Relationship: Self    Best call back number:  [phone]    Requested Prescriptions:   Requested Prescriptions     Pending Prescriptions Disp Refills   • magic mouthwash oral suspension 240 mL 3     Sig: Swish and spit or swallow 5-10 mL 4 (Four) Times a Day As Needed.        Pharmacy where request should be sent: [facility]     Does the patient have less than a 3 day supply:  [x] Yes  [] No    Cecy Farooq Rep   02/17/22 09:46 EST

## 2022-02-17 NOTE — PROGRESS NOTES
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 / > 3 cm cervical LN     Surgery:  Fine-needle aspiration of the left neck mass      Chemotherapy:  OP HEAD & NECK CISplatin (Weekly) + XRT -  6/6 cycles completed     Radiation:  Target a dose of 70 Gray in 35 fractions, but considering this is p16 positive, should he demonstrate a very good early response to treatment we may be able to de-escalate the radiation dosed to approximately 60 Hart - patient  completed 33/33 fractions one week ago     Weight 234 lbs / 32 lbs weight loss since initial consultation (12% weight loss)       Patient meets criteria for severe malnutrition in context of chronic illness as characterized by weight loss over the past month and diminished oral intake meeting less than 50% of his nutritional needs.  He has refused PEG placement for nutritional / hydration support during active treatment plan    Follow up visit.  Patient continues to receive IVF 5x week for hydration.  Oral intake continues to include 2 ONS per day and 4-5 bottles water.  He continues with painful swallowing which he is managing with MMW and oxycodone elixir, xerostomia, ageusia and thick mucous secretions.     Patient states that symptoms remain about the same as last week.  Provided samples of Boost VHC with encouragement for patient to reach a goal of at least 3 per day.  Will continue to follow; hopes that symptoms will begin to improve next week.

## 2022-02-17 NOTE — TELEPHONE ENCOUNTER
Caller: [facility] [place]    Relationship: Pharmacy    Best call back number: [phone]    What was the call regarding: ZEYAD CALLED REGARDING THIS PRESCRIPTION. SHE IS NEEDING A CALL BACK TO DISCUSS.    Do you require a callback: YES

## 2022-02-18 ENCOUNTER — HOSPITAL ENCOUNTER (OUTPATIENT)
Dept: ONCOLOGY | Facility: HOSPITAL | Age: 57
Setting detail: INFUSION SERIES
Discharge: HOME | End: 2022-02-18

## 2022-02-18 VITALS
SYSTOLIC BLOOD PRESSURE: 135 MMHG | HEIGHT: 73 IN | BODY MASS INDEX: 31.03 KG/M2 | HEART RATE: 110 BPM | DIASTOLIC BLOOD PRESSURE: 91 MMHG | TEMPERATURE: 97.5 F | WEIGHT: 234.13 LBS | RESPIRATION RATE: 16 BRPM

## 2022-02-18 DIAGNOSIS — C10.9 OROPHARYNX CANCER: Primary | ICD-10-CM

## 2022-02-18 PROCEDURE — 96376 TX/PRO/DX INJ SAME DRUG ADON: CPT

## 2022-02-18 PROCEDURE — 25010000002 HEPARIN LOCK FLUSH PER 10 UNITS: Performed by: INTERNAL MEDICINE

## 2022-02-18 PROCEDURE — 25010000002 ONDANSETRON PER 1 MG: Performed by: INTERNAL MEDICINE

## 2022-02-18 PROCEDURE — 25010000002 DEXAMETHASONE PER 1 MG: Performed by: INTERNAL MEDICINE

## 2022-02-18 PROCEDURE — 96361 HYDRATE IV INFUSION ADD-ON: CPT

## 2022-02-18 PROCEDURE — 96375 TX/PRO/DX INJ NEW DRUG ADDON: CPT

## 2022-02-18 PROCEDURE — 96374 THER/PROPH/DIAG INJ IV PUSH: CPT

## 2022-02-18 RX ORDER — HEPARIN SODIUM (PORCINE) LOCK FLUSH IV SOLN 100 UNIT/ML 100 UNIT/ML
500 SOLUTION INTRAVENOUS AS NEEDED
Status: CANCELLED | OUTPATIENT
Start: 2022-02-18

## 2022-02-18 RX ORDER — SODIUM CHLORIDE 0.9 % (FLUSH) 0.9 %
10 SYRINGE (ML) INJECTION AS NEEDED
Status: CANCELLED | OUTPATIENT
Start: 2022-02-18

## 2022-02-18 RX ORDER — HEPARIN SODIUM (PORCINE) LOCK FLUSH IV SOLN 100 UNIT/ML 100 UNIT/ML
500 SOLUTION INTRAVENOUS AS NEEDED
Status: DISCONTINUED | OUTPATIENT
Start: 2022-02-18 | End: 2022-02-19 | Stop reason: HOSPADM

## 2022-02-18 RX ADMIN — ONDANSETRON 8 MG: 2 INJECTION INTRAMUSCULAR; INTRAVENOUS at 07:53

## 2022-02-18 RX ADMIN — SODIUM CHLORIDE 1000 ML: 9 INJECTION, SOLUTION INTRAVENOUS at 07:33

## 2022-02-18 RX ADMIN — DEXAMETHASONE SODIUM PHOSPHATE 8 MG: 4 INJECTION, SOLUTION INTRAMUSCULAR; INTRAVENOUS at 07:52

## 2022-02-18 RX ADMIN — Medication 500 UNITS: at 08:41

## 2022-02-21 ENCOUNTER — HOSPITAL ENCOUNTER (OUTPATIENT)
Dept: ONCOLOGY | Facility: HOSPITAL | Age: 57
Setting detail: INFUSION SERIES
Discharge: HOME | End: 2022-02-21

## 2022-02-21 VITALS
HEART RATE: 79 BPM | WEIGHT: 229 LBS | DIASTOLIC BLOOD PRESSURE: 87 MMHG | RESPIRATION RATE: 20 BRPM | SYSTOLIC BLOOD PRESSURE: 131 MMHG | BODY MASS INDEX: 30.22 KG/M2 | TEMPERATURE: 97.5 F

## 2022-02-21 DIAGNOSIS — C10.9 OROPHARYNX CANCER: Primary | ICD-10-CM

## 2022-02-21 PROCEDURE — 96360 HYDRATION IV INFUSION INIT: CPT

## 2022-02-21 PROCEDURE — 96374 THER/PROPH/DIAG INJ IV PUSH: CPT

## 2022-02-21 PROCEDURE — 25010000002 HEPARIN LOCK FLUSH PER 10 UNITS: Performed by: INTERNAL MEDICINE

## 2022-02-21 PROCEDURE — 25010000002 ONDANSETRON PER 1 MG: Performed by: INTERNAL MEDICINE

## 2022-02-21 PROCEDURE — 96375 TX/PRO/DX INJ NEW DRUG ADDON: CPT

## 2022-02-21 PROCEDURE — 25010000002 DEXAMETHASONE PER 1 MG: Performed by: INTERNAL MEDICINE

## 2022-02-21 RX ORDER — SODIUM CHLORIDE 0.9 % (FLUSH) 0.9 %
10 SYRINGE (ML) INJECTION AS NEEDED
Status: CANCELLED | OUTPATIENT
Start: 2022-02-21

## 2022-02-21 RX ORDER — HEPARIN SODIUM (PORCINE) LOCK FLUSH IV SOLN 100 UNIT/ML 100 UNIT/ML
500 SOLUTION INTRAVENOUS AS NEEDED
Status: DISCONTINUED | OUTPATIENT
Start: 2022-02-21 | End: 2022-02-22 | Stop reason: HOSPADM

## 2022-02-21 RX ORDER — HEPARIN SODIUM (PORCINE) LOCK FLUSH IV SOLN 100 UNIT/ML 100 UNIT/ML
500 SOLUTION INTRAVENOUS AS NEEDED
Status: CANCELLED | OUTPATIENT
Start: 2022-02-21

## 2022-02-21 RX ADMIN — HEPARIN 500 UNITS: 100 SYRINGE at 08:56

## 2022-02-21 RX ADMIN — DEXAMETHASONE SODIUM PHOSPHATE 8 MG: 4 INJECTION, SOLUTION INTRAMUSCULAR; INTRAVENOUS at 08:40

## 2022-02-21 RX ADMIN — ONDANSETRON 8 MG: 2 INJECTION INTRAMUSCULAR; INTRAVENOUS at 08:25

## 2022-02-21 RX ADMIN — SODIUM CHLORIDE 1000 ML: 9 INJECTION, SOLUTION INTRAVENOUS at 07:44

## 2022-02-21 NOTE — ADDENDUM NOTE
Encounter addended by: Leonardo Chambers, PharmD on: 2/21/2022 11:26 AM   Actions taken: Order list changed

## 2022-02-23 ENCOUNTER — TELEPHONE (OUTPATIENT)
Dept: ONCOLOGY | Facility: OTHER | Age: 57
End: 2022-02-23

## 2022-02-23 ENCOUNTER — HOSPITAL ENCOUNTER (OUTPATIENT)
Dept: ONCOLOGY | Facility: HOSPITAL | Age: 57
Setting detail: INFUSION SERIES
Discharge: HOME | End: 2022-02-23

## 2022-02-23 VITALS
WEIGHT: 227 LBS | TEMPERATURE: 97.3 F | DIASTOLIC BLOOD PRESSURE: 83 MMHG | SYSTOLIC BLOOD PRESSURE: 140 MMHG | RESPIRATION RATE: 18 BRPM | HEART RATE: 61 BPM | BODY MASS INDEX: 30.09 KG/M2 | HEIGHT: 73 IN

## 2022-02-23 DIAGNOSIS — C10.9 OROPHARYNX CANCER: ICD-10-CM

## 2022-02-23 DIAGNOSIS — C10.9 OROPHARYNX CANCER: Primary | ICD-10-CM

## 2022-02-23 PROCEDURE — 25010000002 HEPARIN LOCK FLUSH PER 10 UNITS: Performed by: INTERNAL MEDICINE

## 2022-02-23 PROCEDURE — 96374 THER/PROPH/DIAG INJ IV PUSH: CPT

## 2022-02-23 PROCEDURE — 25010000002 ONDANSETRON PER 1 MG: Performed by: INTERNAL MEDICINE

## 2022-02-23 PROCEDURE — 96360 HYDRATION IV INFUSION INIT: CPT

## 2022-02-23 PROCEDURE — 25010000002 DEXAMETHASONE PER 1 MG: Performed by: INTERNAL MEDICINE

## 2022-02-23 PROCEDURE — 96375 TX/PRO/DX INJ NEW DRUG ADDON: CPT

## 2022-02-23 RX ORDER — OXYCODONE HCL 5 MG/5 ML
5 SOLUTION, ORAL ORAL EVERY 4 HOURS PRN
Qty: 473 ML | Refills: 0 | Status: SHIPPED | OUTPATIENT
Start: 2022-02-23 | End: 2022-05-06

## 2022-02-23 RX ORDER — HEPARIN SODIUM (PORCINE) LOCK FLUSH IV SOLN 100 UNIT/ML 100 UNIT/ML
500 SOLUTION INTRAVENOUS AS NEEDED
Status: DISCONTINUED | OUTPATIENT
Start: 2022-02-23 | End: 2022-02-24 | Stop reason: HOSPADM

## 2022-02-23 RX ORDER — HEPARIN SODIUM (PORCINE) LOCK FLUSH IV SOLN 100 UNIT/ML 100 UNIT/ML
500 SOLUTION INTRAVENOUS AS NEEDED
Status: CANCELLED | OUTPATIENT
Start: 2022-02-23

## 2022-02-23 RX ORDER — SODIUM CHLORIDE 0.9 % (FLUSH) 0.9 %
10 SYRINGE (ML) INJECTION AS NEEDED
Status: DISCONTINUED | OUTPATIENT
Start: 2022-02-23 | End: 2022-02-24 | Stop reason: HOSPADM

## 2022-02-23 RX ORDER — SODIUM CHLORIDE 0.9 % (FLUSH) 0.9 %
10 SYRINGE (ML) INJECTION AS NEEDED
Status: CANCELLED | OUTPATIENT
Start: 2022-02-23

## 2022-02-23 RX ADMIN — Medication 10 ML: at 10:01

## 2022-02-23 RX ADMIN — SODIUM CHLORIDE 1000 ML: 9 INJECTION, SOLUTION INTRAVENOUS at 08:50

## 2022-02-23 RX ADMIN — ONDANSETRON 8 MG: 2 INJECTION INTRAMUSCULAR; INTRAVENOUS at 09:15

## 2022-02-23 RX ADMIN — HEPARIN 500 UNITS: 100 SYRINGE at 10:01

## 2022-02-23 RX ADMIN — DEXAMETHASONE SODIUM PHOSPHATE 8 MG: 4 INJECTION, SOLUTION INTRAMUSCULAR; INTRAVENOUS at 09:15

## 2022-02-23 NOTE — TELEPHONE ENCOUNTER
PATIENT CALLED STATING THAT HE WAS IN THE OFFICE EARLIER TODAY AND HAD REQUESTED A REFILL ON HIS PAIN MEDICATION.  HE HAD GONE TO PHARMACY AND WAS TOLD THAT THEY HAD NOT REC'D THIS YET.  PER CHART, ORDER IS PENDING. EXPLAINED THIS TO PATIENT WHO V/U. HE WILL CHECK BACK WITH HIS PHARMACY LATER TODAY.  STATED THAT HE DOES NOT ACTUALLY NEED UNTIL Friday.

## 2022-02-25 ENCOUNTER — HOSPITAL ENCOUNTER (OUTPATIENT)
Dept: ONCOLOGY | Facility: HOSPITAL | Age: 57
Setting detail: INFUSION SERIES
Discharge: HOME | End: 2022-02-25

## 2022-02-25 VITALS
TEMPERATURE: 97.8 F | SYSTOLIC BLOOD PRESSURE: 106 MMHG | WEIGHT: 228 LBS | BODY MASS INDEX: 30.22 KG/M2 | DIASTOLIC BLOOD PRESSURE: 67 MMHG | HEIGHT: 73 IN | RESPIRATION RATE: 18 BRPM | HEART RATE: 113 BPM

## 2022-02-25 DIAGNOSIS — C10.9 OROPHARYNX CANCER: Primary | ICD-10-CM

## 2022-02-25 PROCEDURE — 25010000002 DEXAMETHASONE PER 1 MG: Performed by: INTERNAL MEDICINE

## 2022-02-25 PROCEDURE — 96361 HYDRATE IV INFUSION ADD-ON: CPT

## 2022-02-25 PROCEDURE — 96360 HYDRATION IV INFUSION INIT: CPT

## 2022-02-25 PROCEDURE — 96523 IRRIG DRUG DELIVERY DEVICE: CPT

## 2022-02-25 PROCEDURE — 25010000002 ONDANSETRON PER 1 MG: Performed by: INTERNAL MEDICINE

## 2022-02-25 PROCEDURE — 96375 TX/PRO/DX INJ NEW DRUG ADDON: CPT

## 2022-02-25 PROCEDURE — 25010000002 HEPARIN LOCK FLUSH PER 10 UNITS: Performed by: INTERNAL MEDICINE

## 2022-02-25 PROCEDURE — 96374 THER/PROPH/DIAG INJ IV PUSH: CPT

## 2022-02-25 RX ORDER — SODIUM CHLORIDE 0.9 % (FLUSH) 0.9 %
10 SYRINGE (ML) INJECTION AS NEEDED
Status: CANCELLED | OUTPATIENT
Start: 2022-02-25

## 2022-02-25 RX ORDER — SODIUM CHLORIDE 0.9 % (FLUSH) 0.9 %
10 SYRINGE (ML) INJECTION AS NEEDED
Status: DISCONTINUED | OUTPATIENT
Start: 2022-02-25 | End: 2022-02-26 | Stop reason: HOSPADM

## 2022-02-25 RX ORDER — HEPARIN SODIUM (PORCINE) LOCK FLUSH IV SOLN 100 UNIT/ML 100 UNIT/ML
500 SOLUTION INTRAVENOUS AS NEEDED
Status: DISCONTINUED | OUTPATIENT
Start: 2022-02-25 | End: 2022-02-26 | Stop reason: HOSPADM

## 2022-02-25 RX ORDER — HEPARIN SODIUM (PORCINE) LOCK FLUSH IV SOLN 100 UNIT/ML 100 UNIT/ML
500 SOLUTION INTRAVENOUS AS NEEDED
Status: CANCELLED | OUTPATIENT
Start: 2022-02-25

## 2022-02-25 RX ADMIN — Medication 10 ML: at 10:11

## 2022-02-25 RX ADMIN — SODIUM CHLORIDE 1000 ML: 9 INJECTION, SOLUTION INTRAVENOUS at 08:40

## 2022-02-25 RX ADMIN — ONDANSETRON 8 MG: 2 INJECTION INTRAMUSCULAR; INTRAVENOUS at 08:45

## 2022-02-25 RX ADMIN — Medication 500 UNITS: at 10:11

## 2022-02-25 RX ADMIN — DEXAMETHASONE SODIUM PHOSPHATE 8 MG: 4 INJECTION, SOLUTION INTRAMUSCULAR; INTRAVENOUS at 09:18

## 2022-02-28 ENCOUNTER — OFFICE VISIT (OUTPATIENT)
Dept: RADIATION ONCOLOGY | Facility: HOSPITAL | Age: 57
End: 2022-02-28

## 2022-02-28 ENCOUNTER — HOSPITAL ENCOUNTER (OUTPATIENT)
Dept: ONCOLOGY | Facility: HOSPITAL | Age: 57
Setting detail: INFUSION SERIES
Discharge: HOME | End: 2022-02-28

## 2022-02-28 ENCOUNTER — TELEPHONE (OUTPATIENT)
Dept: RADIATION ONCOLOGY | Facility: HOSPITAL | Age: 57
End: 2022-02-28

## 2022-02-28 VITALS
RESPIRATION RATE: 18 BRPM | TEMPERATURE: 97.6 F | HEART RATE: 107 BPM | DIASTOLIC BLOOD PRESSURE: 81 MMHG | BODY MASS INDEX: 30.08 KG/M2 | SYSTOLIC BLOOD PRESSURE: 129 MMHG | WEIGHT: 228 LBS

## 2022-02-28 VITALS
BODY MASS INDEX: 30.49 KG/M2 | OXYGEN SATURATION: 98 % | HEART RATE: 86 BPM | DIASTOLIC BLOOD PRESSURE: 90 MMHG | TEMPERATURE: 98.1 F | WEIGHT: 231.1 LBS | RESPIRATION RATE: 18 BRPM | SYSTOLIC BLOOD PRESSURE: 138 MMHG

## 2022-02-28 DIAGNOSIS — C10.9 OROPHARYNX CANCER: Primary | ICD-10-CM

## 2022-02-28 DIAGNOSIS — C10.9 OROPHARYNX CANCER: ICD-10-CM

## 2022-02-28 PROCEDURE — 25010000002 ONDANSETRON PER 1 MG: Performed by: INTERNAL MEDICINE

## 2022-02-28 PROCEDURE — G0463 HOSPITAL OUTPT CLINIC VISIT: HCPCS | Performed by: RADIOLOGY

## 2022-02-28 PROCEDURE — 96361 HYDRATE IV INFUSION ADD-ON: CPT

## 2022-02-28 PROCEDURE — 96375 TX/PRO/DX INJ NEW DRUG ADDON: CPT

## 2022-02-28 PROCEDURE — 96374 THER/PROPH/DIAG INJ IV PUSH: CPT

## 2022-02-28 PROCEDURE — 25010000002 HEPARIN LOCK FLUSH PER 10 UNITS: Performed by: INTERNAL MEDICINE

## 2022-02-28 PROCEDURE — 25010000002 DEXAMETHASONE PER 1 MG: Performed by: INTERNAL MEDICINE

## 2022-02-28 PROCEDURE — 96360 HYDRATION IV INFUSION INIT: CPT

## 2022-02-28 RX ORDER — SODIUM CHLORIDE 0.9 % (FLUSH) 0.9 %
10 SYRINGE (ML) INJECTION AS NEEDED
Status: CANCELLED | OUTPATIENT
Start: 2022-02-28

## 2022-02-28 RX ORDER — HEPARIN SODIUM (PORCINE) LOCK FLUSH IV SOLN 100 UNIT/ML 100 UNIT/ML
500 SOLUTION INTRAVENOUS AS NEEDED
Status: DISCONTINUED | OUTPATIENT
Start: 2022-02-28 | End: 2022-03-01 | Stop reason: HOSPADM

## 2022-02-28 RX ORDER — HEPARIN SODIUM (PORCINE) LOCK FLUSH IV SOLN 100 UNIT/ML 100 UNIT/ML
500 SOLUTION INTRAVENOUS AS NEEDED
Status: CANCELLED | OUTPATIENT
Start: 2022-02-28

## 2022-02-28 RX ADMIN — DEXAMETHASONE SODIUM PHOSPHATE 8 MG: 4 INJECTION, SOLUTION INTRAMUSCULAR; INTRAVENOUS at 13:11

## 2022-02-28 RX ADMIN — HEPARIN 500 UNITS: 100 SYRINGE at 14:16

## 2022-02-28 RX ADMIN — ONDANSETRON 8 MG: 2 INJECTION INTRAMUSCULAR; INTRAVENOUS at 13:11

## 2022-02-28 RX ADMIN — SODIUM CHLORIDE 1000 ML: 9 INJECTION, SOLUTION INTRAVENOUS at 13:01

## 2022-02-28 NOTE — PROGRESS NOTES
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 Renee returns to clinic today for a unscheduled follow-up visit.  He is a 56-year-old gentleman who just recently completed concurrent chemo patient for a left base of tongue cancer with involvement of the left level 2 lymph nodes that was HPV mediated.  He had a rather difficult time with treatment.  Dehydration and pain within the oropharynx and oral cavity.  He has been under close surveillance since completing treatment and has been receiving IV fluids 5 days/week.  He presents to clinic today with complaints of ongoing soreness in the back of his throat and is asking for evaluation as he feels like he is not improving yet as much as he would like.  He states he is only using Magic mouthwash at this point for his pain, and reports that he has been consuming 3 protein shakes per day without any additional food other than drinking water in between.  He denies any other associated symptoms.  He was evaluated by Dr. Manuel 1 week ago, who is tentatively scheduling the patient for the end of this month to undergo a excision of the remaining lymph node in the left neck.    MEDICATIONS: Medication reconciliation for the patient was reviewed and confirmed in the electronic medical record.    Review of Systems   HENT:   Positive for sore throat and trouble swallowing.         Dry mouth  Swelling left neck   All other systems reviewed and are negative.      Karnofsky score: 90     Physical Exam  Vitals and nursing note reviewed.   Constitutional:       General: He is not in acute distress.     Appearance: He is well-developed.   HENT:      Head: Normocephalic and atraumatic.       Mouth/Throat:      Comments: Inflammatory changes and swelling in the posterior oropharynx.  There is no clearly visible ulcerations or masses at this point.  There is mucosal changes consistent with radiation effect.  Eyes:      Conjunctiva/sclera: Conjunctivae normal.      Pupils: Pupils are equal, round, and reactive to light.   Neck:      Comments: Persistent 2 x 3 cm lymph node left level 2.  It is mobile, without any associated tenderness.  I do not appreciate any other palpable adenopathy within the cervical or supraclavicular lymph node regions.  Cardiovascular:      Rate and Rhythm: Normal rate and regular rhythm.      Heart sounds: No murmur heard.  No friction rub.   Pulmonary:      Effort: Pulmonary effort is normal.      Breath sounds: Normal breath sounds. No wheezing.   Abdominal:      General: Bowel sounds are normal. There is no distension.      Palpations: Abdomen is soft. There is no mass.      Tenderness: There is no abdominal tenderness.   Musculoskeletal:         General: Normal range of motion.      Cervical back: Normal range of motion and neck supple.   Lymphadenopathy:      Cervical: Cervical adenopathy present.   Skin:     General: Skin is warm and dry.   Neurological:      Mental Status: He is alert and oriented to person, place, and time.   Psychiatric:         Behavior: Behavior normal.         Thought Content: Thought content normal.         Judgment: Judgment normal.         VITAL SIGNS:   Vitals:    02/28/22 1436   BP: 138/90   Pulse: 86   Resp: 18   Temp: 98.1 °F (36.7 °C)   TempSrc: Temporal   SpO2: 98%   Weight: 105 kg (231 lb 1.6 oz)   PainSc:   8   PainLoc: Comment: throat             Karnofsky score: 90     IMAGING  I have personally reviewed the relevant imaging studies, as follows:  CT Soft Tissue Neck With Contrast    Result Date: 11/5/2021  Centrally necrotic or cystic pathologic 3 cm left level IIb cervical lymph node concerning for metastatic involvement with likely  primary lesion noted involving the left base of tongue, difficult to discretely measure, however appearing to extend inferiorly along the preepiglottic space. Recommend otolaryngology consultation and direct laryngoscopy.   This report was finalized on 11/5/2021 12:50 PM by John Jaramillo.      CT Soft Tissue Neck With & Without Contrast    Result Date: 1/27/2022  IMPRESSION : 1.  There is again suggested to be a level 2 necrotic lymph node on the left concerning for metastatic disease. A brachial cleft cyst could have a similar appearance. 2.  Asymmetric fullness to the left aryepiglottic fold which is of questionable significance and had a more normal appearance on the prior CT.  This report was finalized on 1/27/2022 11:33 AM by Jose Martínez MD.      NM PET/CT Skull Base to Mid Thigh    Result Date: 12/2/2021  The 3.5 cm left level 2 cervical lymph node demonstrates expected hypermetabolism, maximum SUV 3.8 compatible with metastatic involvement. The primary lesion is not well identified, likely related to reported interval excision. There is otherwise no evidence of additional contralateral hypermetabolic lymphadenopathy or distant metastasis elsewhere.  This report was finalized on 12/2/2021 10:01 AM by John Jaramillo.          The following portions of the patient's history were reviewed and updated as appropriate: allergies, current medications, past family history, past medical history, past social history, past surgical history and problem list.         Diagnoses and all orders for this visit:    1. Oropharynx cancer (HCC)         IMPRESSION:  Mr. Renee is a 56 year old gentleman with a history of a clinical T2N1 HPV mediated base of tongue cancer.  He is 3 weeks out from completion of therapy.  He continues to have inflammatory changes in the posterior oropharynx, which are appropriate for treatment, but lingering longer than we would hope.  I think this largely is related to his nutrition intake.  I  have had long discussions with him previously, and we reiterated the importance today of his protein intake and total caloric intake.  I again provided him with nutritional goals, and we discussed varying his intake and incorporating soups and other soft/liquid foods into his diet.  He is right on the verge of turning the corner and healing based on exam, which I reassured him.  I will continue to keep a close watch on him, so I scheduled him to return to my clinic in 1week.  He is already scheduled for a PET/CT in 3 weeks with Dr. Manuel.    RECOMMENDATIONS:      Return in about 1 week (around 3/7/2022) for Office Visit.    Brandt Zambrano MD

## 2022-02-28 NOTE — TELEPHONE ENCOUNTER
Pt left VM with c/o sore throat-states was dry over the weekend- has infusion today for fluids-after discussion with Dr Zambrano I called pt to notify him to come to our office after infusion to see Dr. Zambrano- verbalized understanding

## 2022-03-02 ENCOUNTER — APPOINTMENT (OUTPATIENT)
Dept: ONCOLOGY | Facility: HOSPITAL | Age: 57
End: 2022-03-02

## 2022-03-04 ENCOUNTER — DOCUMENTATION (OUTPATIENT)
Dept: NUTRITION | Facility: HOSPITAL | Age: 57
End: 2022-03-04

## 2022-03-04 ENCOUNTER — HOSPITAL ENCOUNTER (OUTPATIENT)
Dept: ONCOLOGY | Facility: HOSPITAL | Age: 57
Setting detail: INFUSION SERIES
Discharge: HOME | End: 2022-03-04

## 2022-03-04 VITALS
SYSTOLIC BLOOD PRESSURE: 124 MMHG | TEMPERATURE: 97 F | DIASTOLIC BLOOD PRESSURE: 83 MMHG | HEART RATE: 84 BPM | WEIGHT: 221.1 LBS | HEIGHT: 73 IN | RESPIRATION RATE: 16 BRPM | BODY MASS INDEX: 29.3 KG/M2

## 2022-03-04 DIAGNOSIS — C10.9 OROPHARYNX CANCER: Primary | ICD-10-CM

## 2022-03-04 PROCEDURE — 96360 HYDRATION IV INFUSION INIT: CPT

## 2022-03-04 PROCEDURE — 96374 THER/PROPH/DIAG INJ IV PUSH: CPT

## 2022-03-04 PROCEDURE — 25010000002 ONDANSETRON PER 1 MG: Performed by: NURSE PRACTITIONER

## 2022-03-04 PROCEDURE — 96375 TX/PRO/DX INJ NEW DRUG ADDON: CPT

## 2022-03-04 PROCEDURE — 25010000002 HEPARIN LOCK FLUSH PER 10 UNITS: Performed by: INTERNAL MEDICINE

## 2022-03-04 PROCEDURE — 25010000002 DEXAMETHASONE PER 1 MG: Performed by: NURSE PRACTITIONER

## 2022-03-04 RX ORDER — HEPARIN SODIUM (PORCINE) LOCK FLUSH IV SOLN 100 UNIT/ML 100 UNIT/ML
500 SOLUTION INTRAVENOUS AS NEEDED
Status: CANCELLED | OUTPATIENT
Start: 2022-03-04

## 2022-03-04 RX ORDER — HEPARIN SODIUM (PORCINE) LOCK FLUSH IV SOLN 100 UNIT/ML 100 UNIT/ML
500 SOLUTION INTRAVENOUS AS NEEDED
Status: DISCONTINUED | OUTPATIENT
Start: 2022-03-04 | End: 2022-03-05 | Stop reason: HOSPADM

## 2022-03-04 RX ORDER — SODIUM CHLORIDE 0.9 % (FLUSH) 0.9 %
10 SYRINGE (ML) INJECTION AS NEEDED
Status: CANCELLED | OUTPATIENT
Start: 2022-03-04

## 2022-03-04 RX ADMIN — SODIUM CHLORIDE 1000 ML: 9 INJECTION, SOLUTION INTRAVENOUS at 07:45

## 2022-03-04 RX ADMIN — ONDANSETRON 8 MG: 2 INJECTION INTRAMUSCULAR; INTRAVENOUS at 08:14

## 2022-03-04 RX ADMIN — Medication 500 UNITS: at 08:56

## 2022-03-04 RX ADMIN — DEXAMETHASONE SODIUM PHOSPHATE 8 MG: 4 INJECTION, SOLUTION INTRAMUSCULAR; INTRAVENOUS at 08:14

## 2022-03-04 NOTE — PROGRESS NOTES
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 / > 3 cm cervical LN     Surgery:  Fine-needle aspiration of the left neck mass      Chemotherapy:  OP HEAD & NECK CISplatin (Weekly) + XRT -  6/6 cycles completed     Radiation:  Target a dose of 70 Gray in 35 fractions, but considering this is p16 positive, should he demonstrate a very good early response to treatment we may be able to de-escalate the radiation dosed to approximately 60 Hart - patient  completed 33/33 fractions 2/9/22     Weight 221.1 lbs / 45 lbs weight loss since initial consultation (17% weight loss)       Patient meets criteria for severe malnutrition in context of chronic illness as characterized by weight loss over the past month and diminished oral intake meeting less than 50% of his nutritional needs. He has refused PEG placement for nutritional / hydration support during active treatment plan    Follow up visit.  Patient continues to receive IVFs for hydration.  Oral intake continues to include 2-3 ONS per day and 4-5 bottles water. He continues with sore throat and painful swallowing, which he states has not improved since the completion of radiation.  Xerostomia has worsened; states that mouth is so dry at night that he can hardly stand it.    FU with Dr. Zambrano earlier this week and PCP yesterday who prescribed a Z-Pack with possible indication for strep throat.      Strongly encouraged patient to resume the baking soda, salt and water mouth rinses numerous times throughout the day (6+), continue humidifier in bedroom at night. Also provided patient with tips for moisturizing his mouth and roof of mouth at night to aid in comfort.  Provided numerous suggestions for blenderized cream soups, yogurts, homemade shakes to provide nutrititional support in addition to the ONS and variety.  He stated that he would be willing to try some of the suggestions.

## 2022-03-07 ENCOUNTER — OFFICE VISIT (OUTPATIENT)
Dept: RADIATION ONCOLOGY | Facility: HOSPITAL | Age: 57
End: 2022-03-07

## 2022-03-07 ENCOUNTER — HOSPITAL ENCOUNTER (OUTPATIENT)
Dept: RADIATION ONCOLOGY | Facility: HOSPITAL | Age: 57
Setting detail: RADIATION/ONCOLOGY SERIES
Discharge: HOME | End: 2022-03-07

## 2022-03-07 VITALS
WEIGHT: 226.3 LBS | RESPIRATION RATE: 16 BRPM | HEIGHT: 73 IN | TEMPERATURE: 96.9 F | HEART RATE: 75 BPM | OXYGEN SATURATION: 95 % | SYSTOLIC BLOOD PRESSURE: 134 MMHG | DIASTOLIC BLOOD PRESSURE: 85 MMHG | BODY MASS INDEX: 29.99 KG/M2

## 2022-03-07 DIAGNOSIS — C10.9 OROPHARYNX CANCER: ICD-10-CM

## 2022-03-07 DIAGNOSIS — C10.9 OROPHARYNX CANCER: Primary | ICD-10-CM

## 2022-03-07 PROCEDURE — G0463 HOSPITAL OUTPT CLINIC VISIT: HCPCS | Performed by: RADIOLOGY

## 2022-03-07 NOTE — PROGRESS NOTES
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 Renee returns to clinic today for follow-up.  He is almost 1 month out from completion of chemoradiation for his oropharynx cancer.  He continues to report poor sense of taste, trouble swallowing due to dryness, and lack of desire to eat.  He has been able to tolerate more variety this week and states he is starting to eat soups as well as more ice cream.  He has been drinking 3 shakes per day.  He says his pain is better controlled, but continues to be worse at night particularly when he develops a dry throat.  He was seen by his PCP last week with flu-like symptoms and he was given a Zpack.    MEDICATIONS: Medication reconciliation for the patient was reviewed and confirmed in the electronic medical record.    Review of Systems   HENT:   Positive for sore throat and trouble swallowing.         Dry mouth  Swelling left neck   All other systems reviewed and are negative.      Karnofsky score: 90     Physical Exam  Vitals and nursing note reviewed.   Constitutional:       General: He is not in acute distress.     Appearance: He is well-developed.   HENT:      Head: Normocephalic and atraumatic.      Mouth/Throat:      Comments: Swelling and erythema with some superficial inflammatory changes throughout the posterior oropharynx.  No visible tumor or palpable disease is appreciated.  Mucous membranes are moist at the moment.  No trismus.  Tongue protrudes midline with all movements intact.  Eyes:      Conjunctiva/sclera: Conjunctivae normal.      Pupils: Pupils are equal, round, and reactive to light.   Neck:      Comments: Persistent 2x3cm left level II cervical node.  Dense  "without fluctuance, but mobile.  Nontender.  No other palpable nodes appreciated.  Cardiovascular:      Rate and Rhythm: Normal rate and regular rhythm.      Heart sounds: No murmur heard.    No friction rub.   Pulmonary:      Effort: Pulmonary effort is normal.      Breath sounds: Normal breath sounds. No wheezing.   Abdominal:      General: Bowel sounds are normal. There is no distension.      Palpations: Abdomen is soft. There is no mass.      Tenderness: There is no abdominal tenderness.   Musculoskeletal:         General: Normal range of motion.      Cervical back: Normal range of motion and neck supple.   Lymphadenopathy:      Cervical: No cervical adenopathy.   Skin:     General: Skin is warm and dry.   Neurological:      Mental Status: He is alert and oriented to person, place, and time.   Psychiatric:         Behavior: Behavior normal.         Thought Content: Thought content normal.         Judgment: Judgment normal.         VITAL SIGNS:   Vitals:    03/07/22 1500   BP: 134/85   Pulse: 75   Resp: 16   Temp: 96.9 °F (36.1 °C)   TempSrc: Temporal   SpO2: 95%  Comment: RA   Weight: 103 kg (226 lb 4.8 oz)   Height: 185.4 cm (73\")   PainSc:   8   PainLoc: Throat  Comment: throat/left side of inside his mouth             Karnofsky score: 90         The following portions of the patient's history were reviewed and updated as appropriate: allergies, current medications, past family history, past medical history, past social history, past surgical history and problem list.         Diagnoses and all orders for this visit:    1. Oropharynx cancer (HCC)         IMPRESSION:  Mr. Renee is a 56 year old gentleman with a locally advanced oropharyngeal cancer.  He is 1 month post definitive chemoradiation, and continues to have persistent mucositis and oropharyngeal symptoms.  I think the fact he continues to have acute side effects is directly related to his poor nutritional intake.  He has lost 40 pounds since the start " of treatment, and even an additional 5 pounds since I saw him last week.  He reassures me he is feeling better now and tolerating more foods than just shakes, but he really has never met the nuritional goals he has been given and he adamantly declined a feeding tube previously.  I again counseled him at length regarding nutitional goals of 3000 hiro per day, and we discussed strategies such as drinking 5 shakes per day, plus adding ice cream, peanut butter, etc. To boost the caloric content.  He acknowledged to me that he has not been eating or drinking enough, but states he will moving forward.  He is scheduled to see Dr. KNIGHT tomorrow, to initiate discussions about removing the neck lymph node and he has a PET/CT scheduled on the 21st.  We were able to spare quite a bit of his salivary glands from radiation so I do anticipate he will have salivary function when this is over, but he simply needs to increase his nutitional intake and I anticipate all of that will follow.    RECOMMENDATIONS:   Continue to push nutitional intake.  Follow-up with Dr. KNIGHT    I spent a total of 30 minutes on todays visit, with more than 20 minutes in direct face to face communication, and the remainder of the time spent in reviewing the relevant history, records, available imaging, and for documentation.    Return in about 2 weeks (around 3/21/2022) for Office Visit.    Brandt Zambrano MD

## 2022-03-08 ENCOUNTER — DOCUMENTATION (OUTPATIENT)
Dept: NUTRITION | Facility: HOSPITAL | Age: 57
End: 2022-03-08

## 2022-03-08 ENCOUNTER — HOSPITAL ENCOUNTER (OUTPATIENT)
Dept: ONCOLOGY | Facility: HOSPITAL | Age: 57
Setting detail: INFUSION SERIES
Discharge: HOME | End: 2022-03-08

## 2022-03-08 VITALS
TEMPERATURE: 97.2 F | BODY MASS INDEX: 29.55 KG/M2 | RESPIRATION RATE: 18 BRPM | WEIGHT: 223 LBS | HEIGHT: 73 IN | HEART RATE: 104 BPM | SYSTOLIC BLOOD PRESSURE: 123 MMHG | DIASTOLIC BLOOD PRESSURE: 88 MMHG

## 2022-03-08 DIAGNOSIS — C10.9 OROPHARYNX CANCER: Primary | ICD-10-CM

## 2022-03-08 PROCEDURE — 96375 TX/PRO/DX INJ NEW DRUG ADDON: CPT

## 2022-03-08 PROCEDURE — 96360 HYDRATION IV INFUSION INIT: CPT

## 2022-03-08 PROCEDURE — 25010000002 DEXAMETHASONE PER 1 MG: Performed by: NURSE PRACTITIONER

## 2022-03-08 PROCEDURE — 25010000002 HEPARIN LOCK FLUSH PER 10 UNITS: Performed by: INTERNAL MEDICINE

## 2022-03-08 PROCEDURE — 25010000002 ONDANSETRON PER 1 MG: Performed by: NURSE PRACTITIONER

## 2022-03-08 PROCEDURE — 96374 THER/PROPH/DIAG INJ IV PUSH: CPT

## 2022-03-08 RX ORDER — HEPARIN SODIUM (PORCINE) LOCK FLUSH IV SOLN 100 UNIT/ML 100 UNIT/ML
500 SOLUTION INTRAVENOUS AS NEEDED
Status: CANCELLED | OUTPATIENT
Start: 2022-03-08

## 2022-03-08 RX ORDER — SODIUM CHLORIDE 0.9 % (FLUSH) 0.9 %
10 SYRINGE (ML) INJECTION AS NEEDED
Status: CANCELLED | OUTPATIENT
Start: 2022-03-08

## 2022-03-08 RX ORDER — SODIUM CHLORIDE 0.9 % (FLUSH) 0.9 %
10 SYRINGE (ML) INJECTION AS NEEDED
Status: DISCONTINUED | OUTPATIENT
Start: 2022-03-08 | End: 2022-03-09 | Stop reason: HOSPADM

## 2022-03-08 RX ORDER — HEPARIN SODIUM (PORCINE) LOCK FLUSH IV SOLN 100 UNIT/ML 100 UNIT/ML
500 SOLUTION INTRAVENOUS AS NEEDED
Status: DISCONTINUED | OUTPATIENT
Start: 2022-03-08 | End: 2022-03-09 | Stop reason: HOSPADM

## 2022-03-08 RX ADMIN — Medication 500 UNITS: at 16:13

## 2022-03-08 RX ADMIN — SODIUM CHLORIDE 1000 ML: 9 INJECTION, SOLUTION INTRAVENOUS at 15:04

## 2022-03-08 RX ADMIN — ONDANSETRON 8 MG: 2 INJECTION INTRAMUSCULAR; INTRAVENOUS at 15:09

## 2022-03-08 RX ADMIN — DEXAMETHASONE SODIUM PHOSPHATE 8 MG: 4 INJECTION, SOLUTION INTRAMUSCULAR; INTRAVENOUS at 15:09

## 2022-03-08 NOTE — PROGRESS NOTES
ONC Nutrition    2 cases of Boost [facility] ordered for patient from  [facility]; will  3/9/22.   Patient encouraged to drink 4 per day to obtain at least 2120 calories of his 3000 calorie goal.  Will continue to follow patient closely.    Anika

## 2022-03-09 ENCOUNTER — APPOINTMENT (OUTPATIENT)
Dept: ONCOLOGY | Facility: HOSPITAL | Age: 57
End: 2022-03-09

## 2022-03-09 ENCOUNTER — TELEPHONE (OUTPATIENT)
Dept: ONCOLOGY | Facility: CLINIC | Age: 57
End: 2022-03-09

## 2022-03-09 DIAGNOSIS — C10.9 OROPHARYNX CANCER: Primary | ICD-10-CM

## 2022-03-09 NOTE — TELEPHONE ENCOUNTER
I called Stephanie back and she said that the patient is tentatively scheduled for a radical neck dissection at the end of the month but she is asking if we have any current labs.  I spoke to REBEKAH Arreaga and she said to collect a cbc and cmp this Friday when patient is here for fluids.  I told Stephanie we would get labs on 3/11 and that patient has a PET on 3/21 and followup with Dr. Buck on 3/23.  She verbalized understanding.

## 2022-03-09 NOTE — TELEPHONE ENCOUNTER
----- Message from Soniya Granados sent at 3/9/2022 11:46 AM EST -----  Regarding: [facility]  Contact: [phone]  Stephanie with Dr. KNIGHT's office called patient is going to have a radical neck dissection surgery and she wants a call back to discuss this.

## 2022-03-11 ENCOUNTER — TRANSCRIBE ORDERS (OUTPATIENT)
Dept: CARDIOLOGY | Facility: HOSPITAL | Age: 57
End: 2022-03-11

## 2022-03-11 ENCOUNTER — HOSPITAL ENCOUNTER (OUTPATIENT)
Dept: CARDIOLOGY | Facility: HOSPITAL | Age: 57
Discharge: HOME | End: 2022-03-11
Admitting: OTOLARYNGOLOGY

## 2022-03-11 ENCOUNTER — HOSPITAL ENCOUNTER (OUTPATIENT)
Dept: ONCOLOGY | Facility: HOSPITAL | Age: 57
Setting detail: INFUSION SERIES
Discharge: HOME | End: 2022-03-11

## 2022-03-11 VITALS
TEMPERATURE: 97.5 F | WEIGHT: 224 LBS | RESPIRATION RATE: 16 BRPM | HEIGHT: 73 IN | SYSTOLIC BLOOD PRESSURE: 111 MMHG | DIASTOLIC BLOOD PRESSURE: 73 MMHG | HEART RATE: 109 BPM | BODY MASS INDEX: 29.69 KG/M2

## 2022-03-11 DIAGNOSIS — Z01.811 PRE-OP CHEST EXAM: Primary | ICD-10-CM

## 2022-03-11 DIAGNOSIS — C10.9 OROPHARYNX CANCER: Primary | ICD-10-CM

## 2022-03-11 DIAGNOSIS — Z01.811 PRE-OP CHEST EXAM: ICD-10-CM

## 2022-03-11 LAB
ALBUMIN SERPL-MCNC: 4.1 G/DL (ref 3.5–5.2)
ALBUMIN/GLOB SERPL: 1.5 G/DL
ALP SERPL-CCNC: 88 U/L (ref 39–117)
ALT SERPL W P-5'-P-CCNC: 16 U/L (ref 1–41)
ANION GAP SERPL CALCULATED.3IONS-SCNC: 9 MMOL/L (ref 5–15)
AST SERPL-CCNC: 20 U/L (ref 1–40)
BILIRUB SERPL-MCNC: 0.4 MG/DL (ref 0–1.2)
BUN SERPL-MCNC: 28 MG/DL (ref 6–20)
BUN/CREAT SERPL: 30.8 (ref 7–25)
CALCIUM SPEC-SCNC: 9.6 MG/DL (ref 8.6–10.5)
CHLORIDE SERPL-SCNC: 99 MMOL/L (ref 98–107)
CO2 SERPL-SCNC: 29 MMOL/L (ref 22–29)
CREAT SERPL-MCNC: 0.91 MG/DL (ref 0.76–1.27)
EGFRCR SERPLBLD CKD-EPI 2021: 97.7 ML/MIN/1.73
ERYTHROCYTE [DISTWIDTH] IN BLOOD BY AUTOMATED COUNT: 16.4 % (ref 12.3–15.4)
GLOBULIN UR ELPH-MCNC: 2.8 GM/DL
GLUCOSE SERPL-MCNC: 106 MG/DL (ref 65–99)
HCT VFR BLD AUTO: 29.7 % (ref 37.5–51)
HGB BLD-MCNC: 10.1 G/DL (ref 13–17.7)
LYMPHOCYTES # BLD AUTO: 0.7 10*3/MM3 (ref 0.7–3.1)
LYMPHOCYTES NFR BLD AUTO: 30.7 % (ref 19.6–45.3)
MCH RBC QN AUTO: 34 PG (ref 26.6–33)
MCHC RBC AUTO-ENTMCNC: 33.9 G/DL (ref 31.5–35.7)
MCV RBC AUTO: 100.4 FL (ref 79–97)
MONOCYTES # BLD AUTO: 0.2 10*3/MM3 (ref 0.1–0.9)
MONOCYTES NFR BLD AUTO: 7.7 % (ref 5–12)
NEUTROPHILS NFR BLD AUTO: 1.4 10*3/MM3 (ref 1.7–7)
NEUTROPHILS NFR BLD AUTO: 61.6 % (ref 42.7–76)
PLATELET # BLD AUTO: 147 10*3/MM3 (ref 140–450)
PMV BLD AUTO: 6.3 FL (ref 6–12)
POTASSIUM SERPL-SCNC: 3.9 MMOL/L (ref 3.5–5.2)
PROT SERPL-MCNC: 6.9 G/DL (ref 6–8.5)
QT INTERVAL: 380 MS
QTC INTERVAL: 424 MS
RBC # BLD AUTO: 2.96 10*6/MM3 (ref 4.14–5.8)
SODIUM SERPL-SCNC: 137 MMOL/L (ref 136–145)
WBC NRBC COR # BLD: 2.3 10*3/MM3 (ref 3.4–10.8)

## 2022-03-11 PROCEDURE — 80053 COMPREHEN METABOLIC PANEL: CPT | Performed by: INTERNAL MEDICINE

## 2022-03-11 PROCEDURE — 85025 COMPLETE CBC W/AUTO DIFF WBC: CPT | Performed by: INTERNAL MEDICINE

## 2022-03-11 PROCEDURE — 93005 ELECTROCARDIOGRAM TRACING: CPT | Performed by: OTOLARYNGOLOGY

## 2022-03-11 PROCEDURE — 93010 ELECTROCARDIOGRAM REPORT: CPT | Performed by: STUDENT IN AN ORGANIZED HEALTH CARE EDUCATION/TRAINING PROGRAM

## 2022-03-11 PROCEDURE — 25010000002 ONDANSETRON PER 1 MG: Performed by: INTERNAL MEDICINE

## 2022-03-11 PROCEDURE — 25010000002 DEXAMETHASONE PER 1 MG: Performed by: INTERNAL MEDICINE

## 2022-03-11 PROCEDURE — 25010000002 HEPARIN LOCK FLUSH PER 10 UNITS: Performed by: INTERNAL MEDICINE

## 2022-03-11 PROCEDURE — 96375 TX/PRO/DX INJ NEW DRUG ADDON: CPT

## 2022-03-11 PROCEDURE — 96374 THER/PROPH/DIAG INJ IV PUSH: CPT

## 2022-03-11 PROCEDURE — 96360 HYDRATION IV INFUSION INIT: CPT

## 2022-03-11 RX ORDER — HEPARIN SODIUM (PORCINE) LOCK FLUSH IV SOLN 100 UNIT/ML 100 UNIT/ML
500 SOLUTION INTRAVENOUS AS NEEDED
Status: CANCELLED | OUTPATIENT
Start: 2022-03-11

## 2022-03-11 RX ORDER — HEPARIN SODIUM (PORCINE) LOCK FLUSH IV SOLN 100 UNIT/ML 100 UNIT/ML
500 SOLUTION INTRAVENOUS AS NEEDED
Status: DISCONTINUED | OUTPATIENT
Start: 2022-03-11 | End: 2022-03-12 | Stop reason: HOSPADM

## 2022-03-11 RX ORDER — SODIUM CHLORIDE 0.9 % (FLUSH) 0.9 %
10 SYRINGE (ML) INJECTION AS NEEDED
Status: CANCELLED | OUTPATIENT
Start: 2022-03-11

## 2022-03-11 RX ADMIN — Medication 500 UNITS: at 15:51

## 2022-03-11 RX ADMIN — ONDANSETRON 8 MG: 2 INJECTION INTRAMUSCULAR; INTRAVENOUS at 14:50

## 2022-03-11 RX ADMIN — SODIUM CHLORIDE 1000 ML: 9 INJECTION, SOLUTION INTRAVENOUS at 14:49

## 2022-03-11 RX ADMIN — DEXAMETHASONE SODIUM PHOSPHATE 8 MG: 4 INJECTION, SOLUTION INTRAMUSCULAR; INTRAVENOUS at 15:05

## 2022-03-15 DIAGNOSIS — K12.30 MUCOSITIS: ICD-10-CM

## 2022-03-15 NOTE — TELEPHONE ENCOUNTER
Caller: Nicholas Renee    Relationship: Self    Requested Prescriptions:   Requested Prescriptions     Pending Prescriptions Disp Refills   • magic mouthwash oral suspension 480 mL 3     Sig: Swish and spit or swallow 5-10 mL 4 (Four) Times a Day As Needed.        Pharmacy where request should be sent:  [facility]    Additional details provided by patient: N/A    Does the patient have less than a 3 day supply:  [x] Yes  [] No    Cecy CALZADA Rep   03/15/22 14:35 EDT

## 2022-03-21 ENCOUNTER — HOSPITAL ENCOUNTER (OUTPATIENT)
Dept: PET IMAGING | Facility: HOSPITAL | Age: 57
Discharge: HOME | End: 2022-03-21

## 2022-03-21 ENCOUNTER — LAB (OUTPATIENT)
Dept: LAB | Facility: HOSPITAL | Age: 57
End: 2022-03-21

## 2022-03-21 DIAGNOSIS — C10.9 OROPHARYNX CANCER: ICD-10-CM

## 2022-03-21 LAB
ALBUMIN SERPL-MCNC: 4.3 G/DL (ref 3.5–5.2)
ALBUMIN/GLOB SERPL: 1.4 G/DL
ALP SERPL-CCNC: 87 U/L (ref 39–117)
ALT SERPL W P-5'-P-CCNC: 14 U/L (ref 1–41)
ANION GAP SERPL CALCULATED.3IONS-SCNC: 10 MMOL/L (ref 5–15)
AST SERPL-CCNC: 18 U/L (ref 1–40)
BASOPHILS # BLD AUTO: 0.01 10*3/MM3 (ref 0–0.2)
BASOPHILS NFR BLD AUTO: 0.4 % (ref 0–1.5)
BILIRUB SERPL-MCNC: 0.4 MG/DL (ref 0–1.2)
BUN SERPL-MCNC: 13 MG/DL (ref 6–20)
BUN/CREAT SERPL: 13.8 (ref 7–25)
CALCIUM SPEC-SCNC: 10 MG/DL (ref 8.6–10.5)
CHLORIDE SERPL-SCNC: 103 MMOL/L (ref 98–107)
CO2 SERPL-SCNC: 28 MMOL/L (ref 22–29)
CREAT SERPL-MCNC: 0.94 MG/DL (ref 0.76–1.27)
DEPRECATED RDW RBC AUTO: 57.7 FL (ref 37–54)
EGFRCR SERPLBLD CKD-EPI 2021: 94 ML/MIN/1.73
EOSINOPHIL # BLD AUTO: 0.06 10*3/MM3 (ref 0–0.4)
EOSINOPHIL NFR BLD AUTO: 2.3 % (ref 0.3–6.2)
ERYTHROCYTE [DISTWIDTH] IN BLOOD BY AUTOMATED COUNT: 15.1 % (ref 12.3–15.4)
GLOBULIN UR ELPH-MCNC: 3 GM/DL
GLUCOSE BLDC GLUCOMTR-MCNC: 95 MG/DL (ref 70–130)
GLUCOSE SERPL-MCNC: 89 MG/DL (ref 65–99)
HCT VFR BLD AUTO: 26.5 % (ref 37.5–51)
HGB BLD-MCNC: 8.7 G/DL (ref 13–17.7)
IMM GRANULOCYTES # BLD AUTO: 0.01 10*3/MM3 (ref 0–0.05)
IMM GRANULOCYTES NFR BLD AUTO: 0.4 % (ref 0–0.5)
LYMPHOCYTES # BLD AUTO: 0.85 10*3/MM3 (ref 0.7–3.1)
LYMPHOCYTES NFR BLD AUTO: 33.1 % (ref 19.6–45.3)
MCH RBC QN AUTO: 34.4 PG (ref 26.6–33)
MCHC RBC AUTO-ENTMCNC: 32.8 G/DL (ref 31.5–35.7)
MCV RBC AUTO: 104.7 FL (ref 79–97)
MONOCYTES # BLD AUTO: 0.28 10*3/MM3 (ref 0.1–0.9)
MONOCYTES NFR BLD AUTO: 10.9 % (ref 5–12)
NEUTROPHILS NFR BLD AUTO: 1.36 10*3/MM3 (ref 1.7–7)
NEUTROPHILS NFR BLD AUTO: 52.9 % (ref 42.7–76)
NRBC BLD AUTO-RTO: 0 /100 WBC (ref 0–0.2)
PLATELET # BLD AUTO: 220 10*3/MM3 (ref 140–450)
PMV BLD AUTO: 9.1 FL (ref 6–12)
POTASSIUM SERPL-SCNC: 4.5 MMOL/L (ref 3.5–5.2)
PROT SERPL-MCNC: 7.3 G/DL (ref 6–8.5)
RBC # BLD AUTO: 2.53 10*6/MM3 (ref 4.14–5.8)
SODIUM SERPL-SCNC: 141 MMOL/L (ref 136–145)
WBC NRBC COR # BLD: 2.57 10*3/MM3 (ref 3.4–10.8)

## 2022-03-21 PROCEDURE — 78815 PET IMAGE W/CT SKULL-THIGH: CPT

## 2022-03-21 PROCEDURE — 82962 GLUCOSE BLOOD TEST: CPT

## 2022-03-21 PROCEDURE — 85025 COMPLETE CBC W/AUTO DIFF WBC: CPT

## 2022-03-21 PROCEDURE — 80053 COMPREHEN METABOLIC PANEL: CPT

## 2022-03-21 PROCEDURE — 36415 COLL VENOUS BLD VENIPUNCTURE: CPT

## 2022-03-21 PROCEDURE — A9552 F18 FDG: HCPCS | Performed by: NURSE PRACTITIONER

## 2022-03-21 PROCEDURE — 0 FLUDEOXYGLUCOSE F18 SOLUTION: Performed by: NURSE PRACTITIONER

## 2022-03-21 RX ADMIN — FLUDEOXYGLUCOSE F18 1 DOSE: 300 INJECTION INTRAVENOUS at 08:11

## 2022-03-22 ENCOUNTER — DOCUMENTATION (OUTPATIENT)
Dept: RADIATION ONCOLOGY | Facility: HOSPITAL | Age: 57
End: 2022-03-22

## 2022-03-22 DIAGNOSIS — L02.11 CELLULITIS AND ABSCESS OF NECK: Primary | ICD-10-CM

## 2022-03-22 DIAGNOSIS — L03.221 CELLULITIS AND ABSCESS OF NECK: Primary | ICD-10-CM

## 2022-03-22 RX ORDER — AMOXICILLIN AND CLAVULANATE POTASSIUM 875; 125 MG/1; MG/1
1 TABLET, FILM COATED ORAL 2 TIMES DAILY
Qty: 20 TABLET | Refills: 0 | Status: SHIPPED | OUTPATIENT
Start: 2022-03-22 | End: 2022-05-06

## 2022-03-22 NOTE — PROGRESS NOTES
TELEPHONE NOTE    I spoke with Mr. Renee today regarding the results of his PET/CT scan, and I personally reviewed the images, for which the report is below:    NM PET/CT Skull Base to Mid Thigh    Result Date: 3/21/2022  Findings concerning for disease progression. CT demonstrates increased soft tissue thickening with corresponding hypermetabolism of the left tongue base/vallecula concerning for local disease progression. There is persistent hypermetabolism associated with the previously noted centrally necrotic left level 2 lymph node. There are also contralateral level 2 nodes, not strictly enlarged on CT, however borderline hypermetabolic maximum SUV 3.3. No evidence of new distant metastatic disease otherwise.  This report was finalized on 3/21/2022 1:45 PM by John Jaramillo.    Although Mr. Renee continues to have measureable adenopathy, particularly on the left, he has been improving clinically, and the lymph node at this point is far less prominent and palpable.  He has also been closely evaluated by Dr. Sims with ENT for the potential of a left neck selective neck dissection to remove the node.  It is hard to determine what to make of this PET scan as it was obtained really at 5 1/2 weeks following radiation, which is really too early to assess response due to residual inflammation from the radiation treatments.  The areas of hypermetabolism correspond exactly to the region of his radiation treatments, which raise the suspicion that what we are seeing are a false positive.  Considering he is improving clinically, my recommendation is to treat him with antibiotics for likely a secondary oropharyngeal infection due to the increasing adenopathy, and to repeat his PET/CT in 6 weeks.    I discussed both this plan with Mr. Renee and with Dr. Buck who were in agreement.

## 2022-03-23 ENCOUNTER — OFFICE VISIT (OUTPATIENT)
Dept: ONCOLOGY | Facility: CLINIC | Age: 57
End: 2022-03-23

## 2022-03-23 VITALS
OXYGEN SATURATION: 98 % | HEIGHT: 73 IN | HEART RATE: 90 BPM | TEMPERATURE: 98.4 F | BODY MASS INDEX: 30.09 KG/M2 | DIASTOLIC BLOOD PRESSURE: 89 MMHG | WEIGHT: 227 LBS | SYSTOLIC BLOOD PRESSURE: 132 MMHG | RESPIRATION RATE: 18 BRPM

## 2022-03-23 DIAGNOSIS — C10.9 OROPHARYNX CANCER: Primary | ICD-10-CM

## 2022-03-23 PROCEDURE — 99214 OFFICE O/P EST MOD 30 MIN: CPT | Performed by: INTERNAL MEDICINE

## 2022-03-23 NOTE — PROGRESS NOTES
"      PROBLEM LIST:  1. 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  Biopsy of left neck mass on 11/8/2021 showed squamous cell carcinoma.  P16 stain inconclusive.  Repeat biopsy done 11/24/21 - excision of left lingual tonsil, showed SCC, p16+  B) begin weekly cisplatin with radiation 12/23/2021.  2. Hypertension  3. GERd  4. Hyperlipidemia    Subjective     CHIEF COMPLAINT: p16+ oropharynx cancer    HISTORY OF PRESENT ILLNESS:   Nicholas Renee returns for follow-up.  He says he is feeling okay.  He is eating and swallowing pretty well but he still has a fair amount of pain in his throat.  He reports that the lymph node in his neck just in the past for 5 days has gone down significantly.  He discussed his PET scan results with Dr. Zambrano yesterday.          Objective       /89   Pulse 90   Temp 98.4 °F (36.9 °C)   Resp 18   Ht 185.4 cm (73\")   Wt 103 kg (227 lb)   SpO2 98%   BMI 29.95 kg/m²    Vitals:    03/23/22 1045   PainSc: 0-No pain               Performance Status:   ECOG score: 0   General: well appearing male in no acute distress  Neuro: alert and oriented  HEENT: sclerae anicteric  Lymphatics: Left cervical lymph node, firm and movable, approximately 2 cm  Cardiovascular: regular rate and rhythm, no murmurs  Lungs: clear to auscultation bilaterally  Abdomen: soft, nontender, nondistended.  No palpable organomegaly  Extremities: no lower extremity edema  Skin: no rashes, lesions, bruising, or petechiae  Psych: mood and affect appropriate      RECENT LABS:  Lab Results   Component Value Date    WBC 2.57 (L) 03/21/2022    HGB 8.7 (L) 03/21/2022    HCT 26.5 (L) 03/21/2022    .7 (H) 03/21/2022     03/21/2022       Lab Results   Component Value Date    GLUCOSE 89 03/21/2022    BUN 13 03/21/2022    CREATININE 0.94 03/21/2022    EGFRIFNONA 74 " 02/08/2022    BCR 13.8 03/21/2022    K 4.5 03/21/2022    CO2 28.0 03/21/2022    CALCIUM 10.0 03/21/2022    ALBUMIN 4.30 03/21/2022    AST 18 03/21/2022    ALT 14 03/21/2022         NM PET/CT Skull Base to Mid Thigh  Narrative: DATE OF EXAM: 3/21/2022 7:55 AM     PROCEDURE: NM PET/CT SKULL BASE TO MID THIGH-     INDICATIONS: squamous cell carcinoma tongue with enlarged cervical lymph  node evaluation of treatment; C10.9-Malignant neoplasm of oropharynx,  unspecified     TECHNIQUE: 13.14 mCi of F-18 labeled FDG was administered intravenously  followed by PET imaging from the skull base through the mid thighs.  Low-dose non contrast CT imaging of the same body region was performed.  Fused PET-CT images and 3D MIP PET images were utilized for  interpretation. Blood glucose level at the time of injection was 95  mg/dl.     COMPARISON: Subsequent exam for follow-up with prior PET/CT dated  12/2/2021     HEAD AND NECK: There is increased asymmetric soft tissue thickening with  corresponding FDG hypermetabolism in the region of the left tongue base  and vallecula concerning for local disease progression, maximum SUV 5.5.  The previously noted centrally necrotic left level 2 lymph node  demonstrates continued hypermetabolism, maximum SUV 4.5. Grouped,  nonenlarged contralateral right level 2 lymph nodes demonstrate  borderline hypermetabolism with maximum SUV 3.3.     CHEST: There is physiologic hypermetabolism of the left ventricular  myocardium. There is otherwise no hypermetabolic adenopathy or pulmonary  nodularity.     ABDOMEN AND PELVIS: There is physiologic activity of the  gastrointestinal and genitourinary tracts, without focal hypermetabolism  concerning for acute or neoplastic process. Osseous structures are  unremarkable diffusely.     Impression: Findings concerning for disease progression. CT demonstrates increased  soft tissue thickening with corresponding hypermetabolism of the left  tongue base/vallecula  concerning for local disease progression. There is  persistent hypermetabolism associated with the previously noted  centrally necrotic left level 2 lymph node. There are also contralateral  level 2 nodes, not strictly enlarged on CT, however borderline  hypermetabolic maximum SUV 3.3. No evidence of new distant metastatic  disease otherwise.     This report was finalized on 3/21/2022 1:45 PM by John Jaramillo.       I personally reviewed the imaging studies.      Assessment/Plan                   Nicholas Renee is a 56 y.o. male with a cT1-2N1M0 p16+ SCC of the left base of tongue, with > 3 cm cervical LN.    He has completed chemotherapy and radiation with cisplatin.  Repeat PET scan was done earlier this week due to concerns about lack of improvement in the cervical lymph node.  This shows hypermetabolic activity in the areas of radiation, but no distant disease.  Given the subsequent improvement in his palpable lymph node, we will plan to repeat PET scan imaging > 12 weeks from the completion of radiation.  If there is no hypermetabolic activity at that time, then he may not need any further intervention.  Discussed with Dr. Zambrano.    Follow-up in May after PET scan imaging.        I spent 31 minutes caring for Nicholas on this date of service. This time includes time spent by me in the following activities: preparing for the visit, obtaining and/or reviewing a separately obtained history, performing a medically appropriate examination and/or evaluation, counseling and educating the patient/family/caregiver, ordering medications, tests, or procedures, referring and communicating with other health care professionals, documenting information in the medical record and independently interpreting results and communicating that information with the patient/family/caregiver        Georgiana Buck MD  [facility]    3/23/2022

## 2022-03-24 ENCOUNTER — OFFICE VISIT (OUTPATIENT)
Dept: RADIATION ONCOLOGY | Facility: HOSPITAL | Age: 57
End: 2022-03-24

## 2022-03-24 VITALS
OXYGEN SATURATION: 96 % | RESPIRATION RATE: 18 BRPM | TEMPERATURE: 97.5 F | HEART RATE: 97 BPM | BODY MASS INDEX: 30.02 KG/M2 | SYSTOLIC BLOOD PRESSURE: 186 MMHG | DIASTOLIC BLOOD PRESSURE: 85 MMHG | WEIGHT: 227.5 LBS

## 2022-03-24 DIAGNOSIS — C10.9 OROPHARYNX CANCER: ICD-10-CM

## 2022-03-24 PROCEDURE — G0463 HOSPITAL OUTPT CLINIC VISIT: HCPCS | Performed by: RADIOLOGY

## 2022-03-24 NOTE — PROGRESS NOTES
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 Renee returns to clinic today for a scheduled short-interval follow-up.  He states he feels like he continues to do better.  He is tolerating a more broad range of foods at this point, he reports he continues to have difficulty with breads, but is able to swallow without difficulty.  He is down to one pain pill per day (nighttime) and he feels like he is about to hit the point where he can stop altogether.  He feels like the lymph node in his neck has gone down tremendously.  He is very pleased.  He did undergo a PET/CT earlier this week (which was obtained at 5.5 weeks) that showed some residual hypermetabolism that corresponds to the radiation fields.  He is awaiting a repeat PET/CT in 2 months.    MEDICATIONS: Medication reconciliation for the patient was reviewed and confirmed in the electronic medical record.    Review of Systems   HENT:   Positive for sore throat (getting better) and trouble swallowing (reports much better).         C/o dry mouth   All other systems reviewed and are negative.      Karnofsky score: 90     Physical Exam  Vitals and nursing note reviewed.   Constitutional:       General: He is not in acute distress.     Appearance: He is well-developed.   HENT:      Head: Normocephalic and atraumatic.      Mouth/Throat:      Comments: Appropriate moisture is present.  There are minimal signs of mucositis at this point, with some residual inflammatory changes in the posterior oropharynx.  Eyes:      Conjunctiva/sclera: Conjunctivae normal.      Pupils: Pupils are equal, round, and reactive to light.   Neck:      Comments: The left level II lymph node is  far less prominent, smaller, and barely palpable at this point.  No palpable nodes on the right.  Cardiovascular:      Rate and Rhythm: Normal rate and regular rhythm.      Heart sounds: No murmur heard.    No friction rub.   Pulmonary:      Effort: Pulmonary effort is normal.      Breath sounds: Normal breath sounds. No wheezing.   Abdominal:      General: Bowel sounds are normal. There is no distension.      Palpations: Abdomen is soft. There is no mass.      Tenderness: There is no abdominal tenderness.   Musculoskeletal:         General: Normal range of motion.      Cervical back: Normal range of motion and neck supple.   Skin:     General: Skin is warm and dry.   Neurological:      Mental Status: He is alert and oriented to person, place, and time.   Psychiatric:         Behavior: Behavior normal.         Thought Content: Thought content normal.         Judgment: Judgment normal.         VITAL SIGNS:   Vitals:    03/24/22 0934   BP: (!) 186/85   Pulse: 97   Resp: 18   Temp: 97.5 °F (36.4 °C)   TempSrc: Temporal   SpO2: 96%   Weight: 103 kg (227 lb 8 oz)   PainSc:   5   PainLoc: Comment: throat             Karnofsky score: 90     IMAGING  I have personally reviewed the relevant imaging studies, as follows:  NM PET/CT Skull Base to Mid Thigh    Result Date: 3/21/2022  Findings concerning for disease progression. CT demonstrates increased soft tissue thickening with corresponding hypermetabolism of the left tongue base/vallecula concerning for local disease progression. There is persistent hypermetabolism associated with the previously noted centrally necrotic left level 2 lymph node. There are also contralateral level 2 nodes, not strictly enlarged on CT, however borderline hypermetabolic maximum SUV 3.3. No evidence of new distant metastatic disease otherwise.  This report was finalized on 3/21/2022 1:45 PM by John Jaramillo.      NM PET/CT Skull Base to Mid Thigh    Result Date: 12/2/2021  The 3.5 cm left  level 2 cervical lymph node demonstrates expected hypermetabolism, maximum SUV 3.8 compatible with metastatic involvement. The primary lesion is not well identified, likely related to reported interval excision. There is otherwise no evidence of additional contralateral hypermetabolic lymphadenopathy or distant metastasis elsewhere.  This report was finalized on 12/2/2021 10:01 AM by John Jaramillo.          The following portions of the patient's history were reviewed and updated as appropriate: allergies, current medications, past family history, past medical history, past social history, past surgical history and problem list.         Diagnoses and all orders for this visit:    1. Oropharynx cancer (HCC)         IMPRESSION: Mr. Renee is a 56 year old gentleman with a recent history of a locally advanced HPV-mediated oropharyngeal cancer.  He finally seems like he is turning the corner and clinically improving.  Considering his exam is improving as well, I anticipate the findings we are seeing on PET/CT are consistent with treatment response and residual inflammation.  He is completing antibiotics.  I discussed the long term survivorship model with him today, including dietary counseling, exercise, and return to activity.  I will see him back in 2 months.    RECOMMENDATIONS:      I spent a total of 20 minutes on todays visit, with more than 15 minutes in direct face to face communication, and the remainder of the time spent in reviewing the relevant history, records, available imaging, and for documentation.    Return in about 2 months (around 5/24/2022).    Brandt Zambrano MD

## 2022-05-06 ENCOUNTER — OFFICE VISIT (OUTPATIENT)
Dept: NEUROSURGERY | Facility: CLINIC | Age: 57
End: 2022-05-06

## 2022-05-06 VITALS
WEIGHT: 224.6 LBS | DIASTOLIC BLOOD PRESSURE: 94 MMHG | SYSTOLIC BLOOD PRESSURE: 128 MMHG | HEIGHT: 73 IN | BODY MASS INDEX: 29.77 KG/M2 | TEMPERATURE: 97.1 F

## 2022-05-06 DIAGNOSIS — M50.30 DEGENERATION OF INTERVERTEBRAL DISC OF CERVICAL REGION WITH OSTEOPHYTE OF CERVICAL VERTEBRA: ICD-10-CM

## 2022-05-06 DIAGNOSIS — M25.512 NONTRAUMATIC SHOULDER PAIN, LEFT: ICD-10-CM

## 2022-05-06 DIAGNOSIS — M47.812 CERVICAL SPONDYLOSIS: Primary | ICD-10-CM

## 2022-05-06 DIAGNOSIS — M25.78 DEGENERATION OF INTERVERTEBRAL DISC OF CERVICAL REGION WITH OSTEOPHYTE OF CERVICAL VERTEBRA: ICD-10-CM

## 2022-05-06 DIAGNOSIS — M48.02 NEURAL FORAMINAL STENOSIS OF CERVICAL SPINE: ICD-10-CM

## 2022-05-06 PROCEDURE — 99204 OFFICE O/P NEW MOD 45 MIN: CPT | Performed by: NEUROLOGICAL SURGERY

## 2022-05-06 NOTE — PROGRESS NOTES
NAME: HOUSTON SIMON   DOS: 2022  : 1965  PCP: Jack Gibson MD    Chief Complaint:    Chief Complaint   Patient presents with   • Neck Pain   • Shoulder Pain     left       History of Present Illness:  57 y.o. male   Is a 57-year-old saw for the presence of a history of left-sided supraspinatus pain.  The pain began somewhat intensive about a year ago was noticed after weed eating is not associated with pain that radiates down below the shoulder area and really is confined to the supraspinatus area.  He has been seen and evaluated by orthopedics and an MRI was ordered.  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  It is in remission.  It was reportedly stage I    He has a past history of smoking but nothing current    He denies gait instability his pain right now is not present but is present only with severe activities he is here for evaluation  PMHX  Allergies:  Allergies   Allergen Reactions   • Levaquin [Levofloxacin] Myalgia   • Sulfa Antibiotics Hives and Rash     Medications    Current Outpatient Medications:   •  acetaminophen (TYLENOL) 500 MG tablet, Take 1,000 mg by mouth Every 4 (Four) Hours As Needed., Disp: , Rfl:   •  cetirizine (zyrTEC) 10 MG tablet, Take 10 mg by mouth Daily As Needed., Disp: , Rfl:   •  Diclofenac Sodium (VOLTAREN) 1 % gel gel, Apply 4 g topically to the appropriate area as directed 4 (Four) Times a Day As Needed., Disp: , Rfl:   •  FIBER PO, Take  by mouth., Disp: , Rfl:   •  gemfibrozil (LOPID) 600 MG tablet, Take 600 mg by mouth 2 (Two) Times a Day Before Meals., Disp: , Rfl:   •  losartan (COZAAR) 100 MG tablet, Take 1 tablet by mouth Daily., Disp: 90 tablet, Rfl: 3  •  meloxicam (MOBIC) 15 MG tablet, Take 1 tablet by mouth Daily., Disp: 30 tablet, Rfl: 7  •  Multiple Vitamins-Minerals (MULTIVITAMIN ADULT PO), Take 1 tablet by mouth Daily., Disp: , Rfl:   •  pantoprazole (PROTONIX)  "40 MG EC tablet, Take 1 tablet by mouth Daily. Swallow whole, Disp: 90 tablet, Rfl: 3  •  Probiotic Product (Align) capsule, , Disp: , Rfl:   •  METAMUCIL FIBER PO, , Disp: , Rfl:   Past Medical History:  Past Medical History:   Diagnosis Date   • C. difficile diarrhea    • Diverticulitis    • Esophagitis    • GERD (gastroesophageal reflux disease)    • History of radiation therapy 2022    oropharynx, bilateral necks   • Hypertension    • Oropharynx cancer (HCC) 2021   • Osteoarthritis of both knees    • Wears glasses      Past Surgical History:  Past Surgical History:   Procedure Laterality Date   • ABDOMINAL SURGERY      INFANT (6wk)- STOMACH BLOCKAGE    • COLON RESECTION N/A 2019    Procedure: COLON RESECTION LAPAROSCOPIC LOW ANTERIOR;  Surgeon: Magan Blum MD;  Location:  [facility];  Service: General   • COLONOSCOPY N/A 2017    Procedure: COLONOSCOPY;  Surgeon: Magan Blum MD;  Location:  [facility];  Service:    • ENDOSCOPY     • FOOT SURGERY      BILAT    • KNEE SURGERY Bilateral     x 5   • PERIPHERALLY INSERTED CENTRAL CATHETER INSERTION  2019    RIGHT ARM      Social Hx:  Social History     Tobacco Use   • Smoking status: Former Smoker     Packs/day: 0.50     Years: 20.00     Pack years: 10.00     Types: Cigarettes     Quit date: 2003     Years since quittin.8   • Smokeless tobacco: Current User     Types: Snuff   Vaping Use   • Vaping Use: Never used   Substance Use Topics   • Alcohol use: Yes     Comment: socially, \"rarely\"   • Drug use: No     Family Hx:  Family History   Problem Relation Age of Onset   • Diabetes Father    • Heart disease Father    • Hypertension Father    • Heart attack Father    • Rheum arthritis Other    • No Known Problems Mother      Review of Systems:        Review of Systems   Constitutional: Positive for appetite change. Negative for activity change, chills, diaphoresis, fatigue, fever and unexpected weight change.   HENT: Positive " for ear pain, facial swelling, sneezing, sore throat, trouble swallowing and voice change. Negative for congestion, dental problem, drooling, ear discharge, hearing loss, mouth sores, nosebleeds, postnasal drip, rhinorrhea, sinus pressure and tinnitus.    Eyes: Negative for photophobia, pain, discharge, redness, itching and visual disturbance.   Respiratory: Negative for apnea, cough, choking, chest tightness, shortness of breath, wheezing and stridor.    Cardiovascular: Negative for chest pain, palpitations and leg swelling.   Gastrointestinal: Negative for abdominal distention, abdominal pain, anal bleeding, blood in stool, constipation, diarrhea, nausea, rectal pain and vomiting.   Endocrine: Negative for cold intolerance, heat intolerance, polydipsia, polyphagia and polyuria.   Genitourinary: Positive for frequency. Negative for decreased urine volume, difficulty urinating, dysuria, enuresis, flank pain, genital sores, hematuria and urgency.   Musculoskeletal: Positive for joint swelling, neck pain and neck stiffness. Negative for arthralgias, back pain, gait problem and myalgias.   Skin: Negative for color change, pallor, rash and wound.   Allergic/Immunologic: Negative for environmental allergies, food allergies and immunocompromised state.   Neurological: Positive for dizziness and light-headedness. Negative for tremors, seizures, syncope, facial asymmetry, speech difficulty, weakness, numbness and headaches.   Hematological: Negative for adenopathy. Does not bruise/bleed easily.   Psychiatric/Behavioral: Negative for agitation, behavioral problems, confusion, decreased concentration, dysphoric mood, hallucinations, self-injury, sleep disturbance and suicidal ideas. The patient is not nervous/anxious and is not hyperactive.    All other systems reviewed and are negative.     I have reviewed this note template and all pertinent parts of the review of systems social, family history, surgical history and  medication list      Physical Examination:  Vitals:    05/06/22 1041   BP: 128/94   Temp: 97.1 °F (36.2 °C)      General Appearance:   Well developed, well nourished, well groomed, alert, and cooperative.  Neurological examination:  Neurologic Exam    Vital signs were reviewed and documented in the chart  Patient appeared in good neurologic function with normal comprehension fluent speech  Mood and affect are normal  Sense of smell deferred  COVID mask left in place, pupils symmetric no Wendy's  Muscle bulk and tone normal  5 out of 5 strength no motor drift  Gait normal intact  Negative Romberg  No clonus long tract signs or myelopathy  Subtle mass left neck good range of motion no postradiation scar area  Reflexes symmetric particularly in the brachioradialis bilaterally  No edema noted and extremities skin appears normal    Some decrease pinprick in left supraspinatus region      Review of Imaging/DATA:  MRI was personally reviewed that demonstrates the presence of C5-6 degenerative changes there is no evidence of high-grade cord compression that I detect he does have uncovertebral spurring bilateral high-grade interestingly a lot of the disc bulging is to the right side  Diagnoses/Plan:    Mr. Renee is a 57 y.o. male   I suspect this gentleman is suffering from subacute C6 radiculopathy that being said his reflexes intact and he does have numbness in his supraspinatus fossa which could lead to a competing diagnosis of super spinatus tendinopathy and/or entrapment.    Given its intermittent nature, the presence of a reasonable C6 reflex absence of weakness as well as the evidence that he has no pain today and only has activity related pain I think it is important to consider taking this somewhat slow.  I recommended referral to an interventional pain specialist for a supraspinatus block, he needs to undergo some physical therapy.  Additionally with his post radiated neck I explained the risk benefits and expected  outcome of surgical complications would be much higher given the fact that we would have to choose a posterior approach it would be associated with implantation of hardware which may make imaging in the future of the region difficult etc.  He certainly does not have any spinal involvement on his PET scans that I reviewed personally.    I explained the concept of activity modulation we will make arrangements for follow-up as needed if his symptoms persist and he fails conservative management AND his systems become persistent we can revisit this in the future    Would be a pleasure to provide neurosurgical care

## 2022-05-23 ENCOUNTER — HOSPITAL ENCOUNTER (OUTPATIENT)
Dept: PET IMAGING | Facility: HOSPITAL | Age: 57
Discharge: HOME | End: 2022-05-23

## 2022-05-23 DIAGNOSIS — C10.9 OROPHARYNX CANCER: ICD-10-CM

## 2022-05-23 LAB — GLUCOSE BLDC GLUCOMTR-MCNC: 91 MG/DL (ref 70–130)

## 2022-05-23 PROCEDURE — 82962 GLUCOSE BLOOD TEST: CPT

## 2022-05-23 PROCEDURE — A9552 F18 FDG: HCPCS | Performed by: INTERNAL MEDICINE

## 2022-05-23 PROCEDURE — 78815 PET IMAGE W/CT SKULL-THIGH: CPT

## 2022-05-23 PROCEDURE — 0 FLUDEOXYGLUCOSE F18 SOLUTION: Performed by: INTERNAL MEDICINE

## 2022-05-23 RX ADMIN — FLUDEOXYGLUCOSE F18 1 DOSE: 300 INJECTION INTRAVENOUS at 08:13

## 2022-05-25 ENCOUNTER — OFFICE VISIT (OUTPATIENT)
Dept: ONCOLOGY | Facility: CLINIC | Age: 57
End: 2022-05-25

## 2022-05-25 ENCOUNTER — OFFICE VISIT (OUTPATIENT)
Dept: RADIATION ONCOLOGY | Facility: HOSPITAL | Age: 57
End: 2022-05-25

## 2022-05-25 ENCOUNTER — HOSPITAL ENCOUNTER (OUTPATIENT)
Dept: RADIATION ONCOLOGY | Facility: HOSPITAL | Age: 57
Setting detail: RADIATION/ONCOLOGY SERIES
Discharge: HOME | End: 2022-05-25

## 2022-05-25 VITALS
RESPIRATION RATE: 18 BRPM | OXYGEN SATURATION: 98 % | TEMPERATURE: 97.3 F | SYSTOLIC BLOOD PRESSURE: 136 MMHG | HEIGHT: 73 IN | DIASTOLIC BLOOD PRESSURE: 97 MMHG | HEART RATE: 80 BPM | WEIGHT: 228 LBS | BODY MASS INDEX: 30.22 KG/M2

## 2022-05-25 VITALS
TEMPERATURE: 97.3 F | WEIGHT: 228 LBS | OXYGEN SATURATION: 98 % | BODY MASS INDEX: 30.08 KG/M2 | DIASTOLIC BLOOD PRESSURE: 97 MMHG | RESPIRATION RATE: 18 BRPM | HEART RATE: 80 BPM | SYSTOLIC BLOOD PRESSURE: 136 MMHG

## 2022-05-25 DIAGNOSIS — C10.9 OROPHARYNX CANCER: Primary | ICD-10-CM

## 2022-05-25 DIAGNOSIS — C10.9 OROPHARYNX CANCER: ICD-10-CM

## 2022-05-25 PROCEDURE — G0463 HOSPITAL OUTPT CLINIC VISIT: HCPCS | Performed by: RADIOLOGY

## 2022-05-25 PROCEDURE — 99214 OFFICE O/P EST MOD 30 MIN: CPT | Performed by: INTERNAL MEDICINE

## 2022-05-25 NOTE — PROGRESS NOTES
FOLLOW UP NOTE    PATIENT:                                                      Nicholas Renee  MEDICAL RECORD #:                        [id number]  :                                                          1965  COMPLETION DATE:    2022  DIAGNOSIS:     Oropharynx cancer (HCC)  - Stage I (cT2, cN1, cM0, p16+)    BRIEF HISTORY:  Mr. Renee returns to clinic today for a scheduled follow-up visit of his left-sided HPV mediated oropharynx cancer.  He received definitive chemoradiation and completed in early 2022.  He tolerated radiation moderately well.  He had difficulty with weight loss, dehydration, and fatigue.  He was able to complete on schedule but required quite a bit of support in order to do so.  He had a complete response within the oropharynx, but he did have a persistent node in the left neck that took about 6 weeks to regress.  From a symptomatic standpoint, he had initially a slow recovery, but for the past 2 months, he has really improved.  He states his appetite is better, as is his taste.  He continues to report difficulty with breads and foods that require a lot of chewing.  He denies pain.  The lymph node on the left side of the neck is no longer palpable at this point.  He denies any other complaints.      MEDICATIONS: Medication reconciliation for the patient was reviewed and confirmed in the electronic medical record.    Review of Systems   HENT:   Positive for trouble swallowing.         Reports dry mouth and loss of taste sensation getting better.  Reports lymphedema ? In the a.m.- gets better as day goes on    All other systems reviewed and are negative.    Karnofsky score- 90%      Physical Exam  Vitals and nursing note reviewed.   Constitutional:       General: He is not in acute distress.     Appearance: He is well-developed.   HENT:      Head: Normocephalic and atraumatic.   Eyes:      Conjunctiva/sclera: Conjunctivae normal.      Pupils: Pupils are equal, round, and  reactive to light.   Neck:      Comments: Mild edema anteriorly in the neck limited to midline.  No palpable adenopathy, and the previously identified left level II node is no longer palpable.    RT infraclavicular PORT.  Cardiovascular:      Rate and Rhythm: Normal rate and regular rhythm.      Heart sounds: No murmur heard.    No friction rub.   Pulmonary:      Effort: Pulmonary effort is normal.      Breath sounds: Normal breath sounds. No wheezing.   Abdominal:      General: Bowel sounds are normal. There is no distension.      Palpations: Abdomen is soft. There is no mass.      Tenderness: There is no abdominal tenderness.   Musculoskeletal:         General: Normal range of motion.      Cervical back: Normal range of motion and neck supple.   Lymphadenopathy:      Cervical: No cervical adenopathy.   Skin:     General: Skin is warm and dry.   Neurological:      Mental Status: He is alert and oriented to person, place, and time.   Psychiatric:         Behavior: Behavior normal.         Thought Content: Thought content normal.         Judgment: Judgment normal.         VITAL SIGNS:   Vitals:    05/25/22 1122   BP: 136/97   Pulse: 80   Resp: 18   Temp: 97.3 °F (36.3 °C)   TempSrc: Temporal   SpO2: 98%   Weight: 103 kg (228 lb)   PainSc: 0-No pain           IMAGING  I have personally reviewed the relevant imaging studies, as follows:  CT Soft Tissue Neck With & Without Contrast    Result Date: 1/27/2022  IMPRESSION : 1.  There is again suggested to be a level 2 necrotic lymph node on the left concerning for metastatic disease. A brachial cleft cyst could have a similar appearance. 2.  Asymmetric fullness to the left aryepiglottic fold which is of questionable significance and had a more normal appearance on the prior CT.  This report was finalized on 1/27/2022 11:33 AM by Jose Martínez MD.      NM PET/CT Skull Base to Mid Thigh    Result Date: 5/23/2022  Essentially negative PET-CT scan with interval metabolic  resolution of previously seen disease in the oropharynx and neck, without evidence of new or worsening disease or distant metastatic disease.  This report was finalized on 5/23/2022 10:09 AM by Roman Crabtree MD.      NM PET/CT Skull Base to Mid Thigh    Result Date: 3/21/2022  Findings concerning for disease progression. CT demonstrates increased soft tissue thickening with corresponding hypermetabolism of the left tongue base/vallecula concerning for local disease progression. There is persistent hypermetabolism associated with the previously noted centrally necrotic left level 2 lymph node. There are also contralateral level 2 nodes, not strictly enlarged on CT, however borderline hypermetabolic maximum SUV 3.3. No evidence of new distant metastatic disease otherwise.  This report was finalized on 3/21/2022 1:45 PM by John Jaramillo.      The following portions of the patient's history were reviewed and updated as appropriate: allergies, current medications, past family history, past medical history, past social history, past surgical history and problem list.         Diagnoses and all orders for this visit:    1. Oropharynx cancer (HCC)         IMPRESSION:  Mr. Renee is a 57 year old gentleman with a history of a clinical T2N1 HPV-mediated left tonsillar cancer with involvement of the base of tongue.  He has responded very well to treatment and at this point he has both a complete clinical and metabolic response on imaging.  I am very pleased with his recovery.  We again discussed the long term survivorship model including the timeframe for repeat exams, imaging, exercises, dental care, and healthy exercise.  He is scheduled to see Dr. Buck later today.  I encouraged him to discuss removal of his PORT, which he is interested in having completed.  I will plan to see him back in 3 months with repeat CT scans.      RECOMMENDATIONS:    Return to clinic in 3 months    I spent a total of 30 minutes on todays  visit, with more than 20 minutes in direct face to face communication, and the remainder of the time spent in reviewing the relevant history, records, available imaging, and for documentation.    Return in about 3 months (around 8/25/2022) for Office Visit.    Brandt Zambrano MD

## 2022-05-25 NOTE — PROGRESS NOTES
"      PROBLEM LIST:  1. 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  Biopsy of left neck mass on 11/8/2021 showed squamous cell carcinoma.  P16 stain inconclusive.  Repeat biopsy done 11/24/21 - excision of left lingual tonsil, showed SCC, p16+  B) begin weekly cisplatin with radiation 12/23/2021.  2. Hypertension  3. GERd  4. Hyperlipidemia    Subjective     CHIEF COMPLAINT: p16+ oropharynx cancer    HISTORY OF PRESENT ILLNESS:   Nicholas Renee returns for follow-up.  He says he has been doing pretty well.  He notices steady progress.  He still has swelling in his neck that is worse in the morning and gets better as the day goes on.  He is still working on swallowing exercises.          Objective       /97   Pulse 80   Temp 97.3 °F (36.3 °C)   Resp 18   Ht 185.4 cm (73\")   Wt 103 kg (228 lb)   SpO2 98%   BMI 30.08 kg/m²    Vitals:    05/25/22 1154   PainSc: 0-No pain               Performance Status:   ECOG score: 0   General: well appearing male in no acute distress  Neuro: alert and oriented  HEENT: sclerae anicteric.  Edema with a fluid wave in the central neck  Lymphatics:  No palpable cervical adenopathy bilaterally.  Cardiovascular: regular rate and rhythm, no murmurs  Lungs: clear to auscultation bilaterally  Abdomen: soft, nontender, nondistended.  No palpable organomegaly  Extremities: no lower extremity edema  Skin: no rashes, lesions, bruising, or petechiae  Psych: mood and affect appropriate      RECENT LABS:  Lab Results   Component Value Date    WBC 2.57 (L) 03/21/2022    HGB 8.7 (L) 03/21/2022    HCT 26.5 (L) 03/21/2022    .7 (H) 03/21/2022     03/21/2022       Lab Results   Component Value Date    GLUCOSE 89 03/21/2022    BUN 13 03/21/2022    CREATININE 0.94 03/21/2022    EGFRIFNONA 74 02/08/2022    BCR 13.8 03/21/2022    K 4.5 " 03/21/2022    CO2 28.0 03/21/2022    CALCIUM 10.0 03/21/2022    ALBUMIN 4.30 03/21/2022    AST 18 03/21/2022    ALT 14 03/21/2022         NM PET/CT Skull Base to Mid Thigh  Narrative: DATE OF EXAM: 5/23/2022 8:16 AM     PROCEDURE: NM PET/CT SKULL BASE TO MID THIGH-     INDICATIONS: head and neck cancer; C10.9-Malignant neoplasm of  oropharynx, unspecified     COMPARISON: PET/CT 3/21/2022     TECHNIQUE: 12.0 mCi of F-18 labeled FDG was administered intravenously  via right antecubital vein followed by PET imaging from the skull vertex  through the mid thighs. Low-dose non contrast CT imaging of the same  body region was performed. Fused PET-CT images and 3D MIP PET images  were utilized for interpretation. Blood glucose level at the time of  injection was 91 mg/dl.     FINDINGS:     Head and neck:  Grossly symmetric FDG uptake within the brain. There is physiologic  uptake within the oropharynx and muscles of phonation. There has been  interval metabolic resolution of previously noted disease within the  oropharynx and neck compatible with treatment response and/or resolved  radiation treatment changes. Specifically, there has been metabolic  resolution of previously noted disease at the left tongue base and left  level 2 lymph node. There is decreased size and conspicuity of the  centrally cystic left level 2 node from prior, now demonstrating  essentially background FDG uptake with SUV max 2.4, previously 4.5. No  evidence of new or worsening disease.     Chest:  There is physiologic uptake within the left ventricular myocardium. No  hypermetabolic mediastinal, hilar, or axillary lymph nodes. No  hypermetabolic pulmonary parenchymal process. No evidence of discrete  lung nodule noting limitations of low-dose CT technique. A right chest  port is in place.     Abdomen and pelvis:  There is physiologic uptake within the GI and  tracts. No evidence of  focal hypermetabolic uptake to suggest primary or metastatic  visceral or  josseline disease in the abdomen or pelvis. Redemonstration of large  photopenic left renal cyst measuring 12 cm in diameter.     Bones:  No focal hypermetabolic marrow uptake. There is ill-defined low level  uptake at the right sternoclavicular joint, degenerative. No suspicious  appearing bony lesions on the CT images.     Impression: Essentially negative PET-CT scan with interval metabolic resolution of  previously seen disease in the oropharynx and neck, without evidence of  new or worsening disease or distant metastatic disease.     This report was finalized on 5/23/2022 10:09 AM by Roman Crabtree MD.       I personally reviewed the imaging studies.      Assessment & Plan                   Nicholas Renee is a 57 y.o. male with a cT1-2N1M0 p16+ SCC of the left base of tongue, with > 3 cm cervical LN.    His PET scan shows resolution of all previous hypermetabolic activity.  No evidence of residual disease.  Encouraged him to continue his swallowing exercises and focus on maintaining a healthy body weight.    Discussed follow-up scheduled with Dr. Zambrano.  Dr. Zambrano ordered a CT of the neck in 3 months.  He will see Dr. Manuel in July and Dr. Zambrano in August.  I will plan to see Nicholas back in 6 months with labs including TSH.    I will refer him for port removal.    F/u 6 months.                    Georgiana Buck MD  [facility]    5/25/2022

## 2022-05-27 ENCOUNTER — TRANSCRIBE ORDERS (OUTPATIENT)
Dept: ADMINISTRATIVE | Facility: HOSPITAL | Age: 57
End: 2022-05-27

## 2022-05-27 DIAGNOSIS — Z11.59 ENCOUNTER FOR SCREENING FOR VIRAL DISEASE: Primary | ICD-10-CM

## 2022-06-05 ENCOUNTER — LAB (OUTPATIENT)
Dept: PREADMISSION TESTING | Facility: HOSPITAL | Age: 57
End: 2022-06-05

## 2022-06-05 DIAGNOSIS — Z11.59 ENCOUNTER FOR SCREENING FOR VIRAL DISEASE: ICD-10-CM

## 2022-06-05 LAB — SARS-COV-2 RNA PNL SPEC NAA+PROBE: NOT DETECTED

## 2022-06-05 PROCEDURE — U0004 COV-19 TEST NON-CDC HGH THRU: HCPCS

## 2022-06-05 PROCEDURE — C9803 HOPD COVID-19 SPEC COLLECT: HCPCS

## 2022-06-06 ENCOUNTER — OFFICE VISIT (OUTPATIENT)
Dept: ORTHOPEDIC SURGERY | Facility: CLINIC | Age: 57
End: 2022-06-06

## 2022-06-06 VITALS
WEIGHT: 220 LBS | BODY MASS INDEX: 29.16 KG/M2 | HEIGHT: 73 IN | SYSTOLIC BLOOD PRESSURE: 132 MMHG | DIASTOLIC BLOOD PRESSURE: 80 MMHG

## 2022-06-06 DIAGNOSIS — M17.11 PRIMARY OSTEOARTHRITIS OF RIGHT KNEE: Primary | ICD-10-CM

## 2022-06-06 DIAGNOSIS — M17.0 PRIMARY OSTEOARTHRITIS OF BOTH KNEES: ICD-10-CM

## 2022-06-06 PROCEDURE — 20610 DRAIN/INJ JOINT/BURSA W/O US: CPT | Performed by: ORTHOPAEDIC SURGERY

## 2022-06-06 PROCEDURE — 99213 OFFICE O/P EST LOW 20 MIN: CPT | Performed by: ORTHOPAEDIC SURGERY

## 2022-06-06 RX ORDER — ROPIVACAINE HYDROCHLORIDE 5 MG/ML
4 INJECTION, SOLUTION EPIDURAL; INFILTRATION; PERINEURAL
Status: COMPLETED | OUTPATIENT
Start: 2022-06-06 | End: 2022-06-06

## 2022-06-06 RX ORDER — TRIAMCINOLONE ACETONIDE 40 MG/ML
40 INJECTION, SUSPENSION INTRA-ARTICULAR; INTRAMUSCULAR
Status: COMPLETED | OUTPATIENT
Start: 2022-06-06 | End: 2022-06-06

## 2022-06-06 RX ADMIN — TRIAMCINOLONE ACETONIDE 40 MG: 40 INJECTION, SUSPENSION INTRA-ARTICULAR; INTRAMUSCULAR at 13:34

## 2022-06-06 RX ADMIN — ROPIVACAINE HYDROCHLORIDE 4 ML: 5 INJECTION, SOLUTION EPIDURAL; INFILTRATION; PERINEURAL at 13:34

## 2022-06-06 NOTE — PROGRESS NOTES
[facility] [place] [facility] Note    Subjective     Chief Complaint   Patient presents with   • Follow-up     15 month recheck - Primary osteoarthritis of both knees         HPI    It has been 15 month(s) since Mr. Renee's last visit. He returns to clinic today for follow-up of bilateral knee arthritis. The issue has been ongoing for 10 year(s). He rates his pain a 8/10 on the pain scale. Previous/current treatments: NSAIDS and physical therapy. Current symptoms: pain, swelling, popping, grinding and stiffness. The pain is worse with walking, climbing stairs, working and rising from seated position; resting and lying down improve the pain. Overall, he is doing worse.  He would like aspiration and injections today.  He is thinking about surgery for the next year, around March 2023.    Of note, he is recovering from throat cancer, treated with chemotherapy and radiation.    I have reviewed the following portions of the patient's history and agree with: History of Present Illness and Review of Systems    Patient Active Problem List   Diagnosis   • HTN (hypertension)   • Prediabetes   • Diverticulitis   • S/P low anterior colon resection   • Acute blood loss anemia, mild, asymptomatic   • Acute postoperative pain   • Primary osteoarthritis of right knee   • Oropharynx cancer (HCC)     Past Medical History:   Diagnosis Date   • C. difficile diarrhea    • Diverticulitis    • Esophagitis    • GERD (gastroesophageal reflux disease)    • History of radiation therapy 02/09/2022    oropharynx, bilateral necks   • Hypertension    • Oropharynx cancer (HCC) 12/1/2021   • Osteoarthritis of both knees    • Wears glasses       Past Surgical History:   Procedure Laterality Date   • ABDOMINAL SURGERY      INFANT (6wk)- STOMACH BLOCKAGE    • COLON RESECTION N/A 7/2/2019    Procedure: COLON RESECTION LAPAROSCOPIC LOW ANTERIOR;  Surgeon: Magan Blum MD;  Location: [facility];  Service: General   • COLONOSCOPY N/A 7/11/2017     "Procedure: COLONOSCOPY;  Surgeon: Magan Blum MD;  [facility]: [facility];  Service:    • ENDOSCOPY     • FOOT SURGERY      BILAT    • KNEE SURGERY Bilateral     x 5   • PERIPHERALLY INSERTED CENTRAL CATHETER INSERTION  2019    RIGHT ARM       Family History   Problem Relation Age of Onset   • Diabetes Father    • Heart disease Father    • Hypertension Father    • Heart attack Father    • Rheum arthritis Other    • No Known Problems Mother      Social History     Socioeconomic History   • Marital status:    Tobacco Use   • Smoking status: Former Smoker     Packs/day: 0.50     Years: 20.00     Pack years: 10.00     Types: Cigarettes     Quit date: 2003     Years since quittin.9   • Smokeless tobacco: Current User     Types: Snuff   Vaping Use   • Vaping Use: Never used   Substance and Sexual Activity   • Alcohol use: Yes     Comment: socially, \"rarely\"   • Drug use: No   • Sexual activity: Defer      Current Outpatient Medications on File Prior to Visit   Medication Sig Dispense Refill   • acetaminophen (TYLENOL) 500 MG tablet Take 1,000 mg by mouth Every 4 (Four) Hours As Needed.     • cetirizine (zyrTEC) 10 MG tablet Take 10 mg by mouth Daily As Needed.     • Diclofenac Sodium (VOLTAREN) 1 % gel gel Apply 4 g topically to the appropriate area as directed 4 (Four) Times a Day As Needed.     • FIBER PO Take  by mouth.     • gemfibrozil (LOPID) 600 MG tablet Take 600 mg by mouth 2 (Two) Times a Day Before Meals.     • gemfibrozil (LOPID) 600 MG tablet Take 1 tablet by mouth 2 (Two) Times a Day. 180 tablet 3   • losartan (COZAAR) 100 MG tablet Take 1 tablet by mouth Daily. 90 tablet 3   • meloxicam (MOBIC) 15 MG tablet Take 1 tablet by mouth Daily. 30 tablet 7   • METAMUCIL FIBER PO      • Multiple Vitamins-Minerals (MULTIVITAMIN ADULT PO) Take 1 tablet by mouth Daily.     • pantoprazole (PROTONIX) 40 MG EC tablet Take 1 tablet by mouth Daily. Swallow whole 90 tablet 3   • Probiotic " Product (Align) capsule        No current facility-administered medications on file prior to visit.      Allergies   Allergen Reactions   • Levaquin [Levofloxacin] Myalgia   • Sulfa Antibiotics Hives and Rash        Review of Systems   Constitutional: Negative for activity change, appetite change, chills, diaphoresis, fatigue, fever and unexpected weight change.   HENT: Negative for congestion, dental problem, drooling, ear discharge, ear pain, facial swelling, hearing loss, mouth sores, nosebleeds, postnasal drip, rhinorrhea, sinus pressure, sneezing, sore throat, tinnitus, trouble swallowing and voice change.    Eyes: Negative for photophobia, pain, discharge, redness, itching and visual disturbance.   Respiratory: Negative for apnea, cough, choking, chest tightness, shortness of breath, wheezing and stridor.    Cardiovascular: Negative for chest pain, palpitations and leg swelling.   Gastrointestinal: Negative for abdominal distention, abdominal pain, anal bleeding, blood in stool, constipation, diarrhea, nausea, rectal pain and vomiting.   Endocrine: Negative for cold intolerance, heat intolerance, polydipsia, polyphagia and polyuria.   Genitourinary: Negative for decreased urine volume, difficulty urinating, dysuria, enuresis, flank pain, frequency, genital sores, hematuria and urgency.   Musculoskeletal: Positive for arthralgias. Negative for back pain, gait problem, joint swelling, myalgias, neck pain and neck stiffness.   Skin: Negative for color change, pallor, rash and wound.   Allergic/Immunologic: Negative for environmental allergies, food allergies and immunocompromised state.   Neurological: Negative for dizziness, tremors, seizures, syncope, facial asymmetry, speech difficulty, weakness, light-headedness, numbness and headaches.   Hematological: Negative for adenopathy. Does not bruise/bleed easily.   Psychiatric/Behavioral: Negative for agitation, behavioral problems, confusion, decreased  "concentration, dysphoric mood, hallucinations, self-injury, sleep disturbance and suicidal ideas. The patient is not nervous/anxious and is not hyperactive.         Objective      Physical Exam  /80   Ht 185.4 cm (72.99\")   Wt 99.8 kg (220 lb)   BMI 29.03 kg/m²     Body mass index is 29.03 kg/m².    General:   Mental Status:  Alert   Appearance: Cooperative, in no acute distress   Build and Nutrition: Well-nourished well-developed male   Orientation: Alert and oriented to person, place and time   Posture: Normal   Gait: Nonantalgic    Integument:              Right knee: Well-healed previous incisions              Left knee: no skin lesions, no rash, no ecchymosis     Lower Extremities:              Right Knee:                          Effusion:          3+                          Swelling:          None                          Crepitus:          Positive                          Atrophy:           None                          Range of motion:        Extension:       0°                                                              Flexion:           120°  Instability:        No varus laxity, no valgus laxity, positive anterior drawer  Deformities:     Valgus              Left Knee:                          Effusion:          1+                          Swelling:          None                          Crepitus:          Positive                          Atrophy:           None                          Range of motion:        Extension:       0°                                                              Flexion:           120°  Instability:        No varus laxity, no valgus laxity, positive anterior drawer  Deformities:     Valgus    Imaging/Studies  Imaging Results (Last 24 Hours)     Procedure Component Value Units Date/Time    XR Knee 4+ View Bilateral [[id number]] Resulted: 06/06/22 1311     Updated: 06/06/22 1312    Narrative:      Right Knee Radiographs  Indication: right knee pain  Views: Standing " AP's and skiers of both knees, with lateral and sunrise   views of the right knee    Comparison: 1/20/2021    Findings:   Advanced arthritis, with valgus alignment, bone-on-bone contact and   tricompartmental fashion, worsening compared to the previous imaging.  No   acute bony abnormalities.    Left Knee Radiographs  Indication: left knee pain  Views: Standing AP's and skiers of both knees, with lateral and sunrise   views of the left knee    Comparison: 1/20/2021    Findings:   Advanced arthritis, with valgus alignment, bone-on-bone contact and   tricompartmental fashion, worsening compared to the previous imaging.  No   acute bony abnormalities.            Assessment and Plan     Diagnoses and all orders for this visit:    1. Primary osteoarthritis of right knee (Primary)    2. Primary osteoarthritis of both knees  -     XR Knee 4+ View Bilateral        1. Primary osteoarthritis of right knee    2. Primary osteoarthritis of both knees        I reviewed my findings with the patient.  He does have advanced bilateral knee arthritis, and is a good candidate for knee replacement surgery.  He is not yet interested in proceeding, but is thinking about March 2023.  He would like aspiration and injections today, and was or provided.  I will see him back in 4 months, but sooner for any problems.    Procedure Note:  The potential benefits of performing a therapeutic bilateral knee joint aspirations and injections, as well as potential risks (including, but not limited to infection, swelling, pain, bleeding, bruising, nerve/blood vessel damage, skin color changes, transient elevation in blood glucose levels, and fat atrophy) were discussed with the patient.  After informed consent, timeout procedure was performed, and the skin on the right and left knees was prepped with chlorhexidine soap and alcohol, after which ethyl chloride was applied to the skin at the injection site. Via the superior lateral approach, 75 cc of clear  straw-colored fluid was aspirated from the right knee and 17 cc of clear straw-colored fluid was aspirated from the left knee and then 1ml of Kenalog 40mg/ml mixed with 4ml 0.5% ropivacaine plain was injected into the knee joints.  The patient tolerated the procedures well, experiencing 80% improvement in the right knee and 80% improvement in the left knee a few minutes following the injections. There were no complications.  Band-Aids were applied to the injection sites. Post-procedural instructions were given to the patient and/or their caregiver.      Return in about 4 months (around 10/6/2022).      Gage Britt MD  06/06/22  13:32 EDT

## 2022-06-06 NOTE — PROGRESS NOTES
Procedure   - Large Joint Arthrocentesis: bilateral knee on 6/6/2022 1:34 PM  Indications: pain  Details: 18 G needle, anterolateral approach  Medications (Right): 4 mL ropivacaine 0.5 %; 40 mg triamcinolone acetonide 40 MG/ML  Aspirate (Right): 75 mL clear and yellow  Medications (Left): 4 mL ropivacaine 0.5 %; 40 mg triamcinolone acetonide 40 MG/ML  Aspirate (Left): 17 mL clear and yellow  Outcome: tolerated well, no immediate complications  Procedure, treatment alternatives, risks and benefits explained, specific risks discussed. Consent was given by the patient. Patient was prepped and draped in the usual sterile fashion.

## 2022-06-07 ENCOUNTER — LAB REQUISITION (OUTPATIENT)
Dept: LAB | Facility: HOSPITAL | Age: 57
End: 2022-06-07

## 2022-06-07 DIAGNOSIS — C10.9 MALIGNANT NEOPLASM OF OROPHARYNX, UNSPECIFIED: ICD-10-CM

## 2022-06-07 PROCEDURE — 88300 SURGICAL PATH GROSS: CPT | Performed by: SURGERY

## 2022-06-10 NOTE — PROGRESS NOTES
"Chief Complaint: \"Neck and left shoulder pain.\"        History of Present Illness:   Patient: Mr. Nicholas Renee, 57 y.o. male   Referring Physician: Dr. Fermín Acosta  Reason for Referral: Consultation for chronic intractable posterior cervicalgia and left shoulder pain.   Pain History: Patient reports a 1-year history of progressive posterior cervicalgia and left shoulder pain which he noticed after weed eating for two days in a row. Patient underwent orthopedic consultation with Dr. Darby on 9/20/2021 for left shoulder pain. Patient complained of left-sided scapular pain that mainly occurred when rotating his neck. He had some subacromial injections of left shoulder without significant relief.  MRI of the cervical spine on 9/27/2022 revealed multilevel spondylitic changes greatest at C5-C6 where there is a large disc osteophyte complex with far lateralizing features contributing to lateral recess stenosis and paracentral canal stenosis particularly at the left side with moderate to severe neuroforaminal stenosis. MRI of the left shoulder on 9/27/2021 revealed moderate to severe degenerative joint disease of the AC joint and glenohumeral joint.  Supraspinatus and infraspinatus tendons show some partial split thickness tearing without retraction. Nicholas Renee underwent neurosurgical consultation with Dr. Fermín Acosta on 5/6/2022, and was found not to be a surgical candidate at that time.  Due to her history of previous radiation, the incidence of surgical complications could be higher. Patient has history of left-sided base of the tongue cancer and has undergone cervical radiation. A recent PET scan did not reveal any spinal involvement. Patient has failed to obtain pain relief with conservative measures for more than 3 months including oral analgesics, physical therapy, chiropractic therapy, to name a few. Pain has progressed in intensity over the past year.   Pain Description: Constant pain " with intermittent exacerbation, described as aching, burning, shooting and throbbing sensation.   Radiation of Pain: The pain radiates into the left shoulder  Pain intensity today: 7/10  Average pain intensity last week: 7/10  Pain intensity ranges from: 5/10 to 9/10  Aggravating factors: Pain increases with using his upper extremities I.e using a computer  Alleviating factors: Pain decreases with ice, heat, lying down  Associated Symptoms:   Patient denies pain, numbness, or weakness in the upper extremities.   Patient denies any new bladder or bowel problems.   Patient denies difficulties with his balance or recent falls.   Pain interferes with ADLs, general activities, and affects patient's quality of life    Review of previous therapies and additional medical records:  Nicholas Renee has already failed the following measures, including:   Conservative Measures: Oral analgesics, topical analgesics, ice, heat, chiropractic therapy, physical therapy   Interventional Measures: Left shoulder injections with Dr Calderon  Surgical Measures: No history of previous cervical spine or shoulder surgery   Nicholas Renee underwent neurosurgical consultation with Dr. Dr. Fermín Acosta on 5/6/2022, and was found not to be a surgical candidate  Patient underwent orthopedic consultation with Dr. Darby on 9/20/2021 and was found not to be a surgical candidate  Nicholas Renee presents with significant comorbidities including diverticulitis, GERD, hypertension, prediabetes, history of oral cancer status postradiation, bilateral knee osteoarthritis  In terms of current analgesics, Nicholas Renee takes: Tylenol, meloxicam, diclofenac gel  I have reviewed Pasquale Report #[id number] (no CS) consistent with medication reconciliation.  SOAPP: Low Risk     PHQ-2 Depression Screening  Little interest or pleasure in doing things?  0   Feeling down, depressed, or hopeless?  0   PHQ-2 Total Score  0       Global Pain Scale  06-14  2022          Pain 17          Feelings 4          Clinical outcomes 13          Activities 15          GPS Total: 49            Review of Diagnostic Studies: I have reviewed the images with the patient and used the images and a tridimensional spine model to explain findings. I have reviewed the report, as well.  MRI of the cervical spine without contrast on 9/27/2021 revealed vertebral body heights and alignment are preserved.  Cervical medullary junction is normal.  Spinal cord caliber and signal are normal.  Axial imaging:  C2-C3: Disc osteophyte complex with left lateralizing features, correlating to mild left paracentral spinal canal stenosis and left foraminal stenosis  C3-C4: Disc osteophyte complex, uncovertebral hypertrophy contributing to mild canal stenosis and mild to moderate left foraminal stenosis  C4-C5: Disc osteophyte complex, uncovertebral hypertrophy contributing to mild canal stenosis and mild to moderate left neuroforaminal stenosis  C5-C6: Large disc osteophyte complex, uncovertebral hypertrophy contributing to moderate canal stenosis greatest in lateral recess and left paracentral region with there is mild indentation of the left hemicord without cord edema.  Uncovertebral hypertrophy contributing to moderate to severe right and moderate left foraminal stenosis  C6-C7: Disc osteophyte complex, facet hypertrophy contributing to mild canal stenosis and foraminal stenosis  C7-T1: No significant canal or foraminal stenosis  MRI of the left shoulder without contrast on 9/27/2021 revealed moderate to severe degenerative joint disease of the AC joint, evidence of subdeltoid bursitis.  Moderate degenerative joint disease of the glenohumeral joint with joint space narrowing.  Increased signal of the articular sided fibers of the supraspinatus with partial tear and or tendinopathy without retraction.  Infraspinatus has minimal partial-thickness tearing without retraction.  The subscapularis is  normal.  Long head of the biceps well seated within the bicipital groove.  Teres minor is intact.  Mild to moderate inferior capsular thickening.    Review of Systems   Constitutional: Positive for activity change.   Musculoskeletal: Positive for joint swelling, neck pain and neck stiffness.   Allergic/Immunologic: Positive for environmental allergies.   All other systems reviewed and are negative.        Patient Active Problem List   Diagnosis   • HTN (hypertension)   • Prediabetes   • Diverticulitis   • S/P low anterior colon resection   • Acute blood loss anemia, mild, asymptomatic   • Acute postoperative pain   • Primary osteoarthritis of right knee   • Oropharynx cancer (HCC)   • Cervical disc disorder at C5-C6 level with radiculopathy   • Connective tissue stenosis of neural canal of cervical region   • Primary osteoarthritis of left shoulder   • Rotator cuff tendinitis, right       Past Medical History:   Diagnosis Date   • C. difficile diarrhea    • Diverticulitis    • Esophagitis    • GERD (gastroesophageal reflux disease)    • History of radiation therapy 02/09/2022    oropharynx, bilateral necks   • Hypertension    • Oropharynx cancer (HCC) 12/1/2021   • Osteoarthritis of both knees    • Wears glasses          Past Surgical History:   Procedure Laterality Date   • ABDOMINAL SURGERY      INFANT (6wk)- STOMACH BLOCKAGE    • COLON RESECTION N/A 7/2/2019    Procedure: COLON RESECTION LAPAROSCOPIC LOW ANTERIOR;  Surgeon: Magan Blum MD;  Location:  [facility] OR;  Service: General   • COLONOSCOPY N/A 7/11/2017    Procedure: COLONOSCOPY;  Surgeon: Magan Blum MD;  Location:  [facility] ENDOSCOPY;  Service:    • ENDOSCOPY     • FOOT SURGERY      BILAT    • KNEE SURGERY Bilateral     x 5   • PERIPHERALLY INSERTED CENTRAL CATHETER INSERTION  06/13/2019    RIGHT ARM          Family History   Problem Relation Age of Onset   • Diabetes Father    • Heart disease Father    • Hypertension Father    • Heart attack Father   "  • Rheum arthritis Other    • No Known Problems Mother          Social History     Socioeconomic History   • Marital status:    Tobacco Use   • Smoking status: Former Smoker     Packs/day: 0.50     Years: 20.00     Pack years: 10.00     Types: Cigarettes     Quit date: 2003     Years since quittin.9   • Smokeless tobacco: Current User     Types: Snuff   Vaping Use   • Vaping Use: Never used   Substance and Sexual Activity   • Alcohol use: Yes     Comment: socially, \"rarely\"   • Drug use: No   • Sexual activity: Defer           Current Outpatient Medications:   •  acetaminophen (TYLENOL) 500 MG tablet, Take 1,000 mg by mouth Every 4 (Four) Hours As Needed., Disp: , Rfl:   •  Diclofenac Sodium (VOLTAREN) 1 % gel gel, Apply 4 g topically to the appropriate area as directed 4 (Four) Times a Day As Needed., Disp: , Rfl:   •  FIBER PO, Take  by mouth., Disp: , Rfl:   •  gemfibrozil (LOPID) 600 MG tablet, Take 1 tablet by mouth 2 (Two) Times a Day., Disp: 180 tablet, Rfl: 3  •  losartan (COZAAR) 100 MG tablet, Take 1 tablet by mouth Daily., Disp: 90 tablet, Rfl: 3  •  meloxicam (MOBIC) 15 MG tablet, Take 1 tablet by mouth Daily., Disp: 30 tablet, Rfl: 7  •  METAMUCIL FIBER PO, , Disp: , Rfl:   •  Multiple Vitamins-Minerals (MULTIVITAMIN ADULT PO), Take 1 tablet by mouth Daily., Disp: , Rfl:   •  pantoprazole (PROTONIX) 40 MG EC tablet, Take 1 tablet by mouth Daily. Swallow whole, Disp: 90 tablet, Rfl: 3  •  Probiotic Product (Align) capsule, , Disp: , Rfl:   •  cetirizine (zyrTEC) 10 MG tablet, Take 10 mg by mouth Daily As Needed., Disp: , Rfl:       Allergies   Allergen Reactions   • Levaquin [Levofloxacin] Myalgia   • Sulfa Antibiotics Hives and Rash         /83   Pulse 77   Temp 96.9 °F (36.1 °C)   Ht 185.4 cm (73\")   Wt 102 kg (224 lb 3.2 oz)   SpO2 99%   BMI 29.58 kg/m²       Physical Exam:  Constitutional: Patient appears well-developed, well-nourished, well-hydrated  HEENT: Head: " Normocephalic and atraumatic  Eyes: Conjunctivae and lids are normal  Pupils: Equal, round, reactive to light  Neck: Trachea normal. Neck supple. No JVD present.   Lymphatic: No cervical adenopathy  Musculoskeletal   Gait and station: Gait evaluation demonstrated a normal gait. Able to walk on heels, toes, tandem walking   Cervical spine:  Passive and active range of motion are limited secondary to pain. Extension and rotation of the cervical spine increased and reproduced his left scapular pain. Cervical facet joint loading maneuvers are mildly positive  Muscles: Presence of active trigger points at the left levator scapulae   Shoulders: The range of motion of the glenohumeral joints is full and without pain. Rotator cuff strength is 5/5.   Neurological:   Patient is alert and oriented to person, place, and time.   Speech: Normal.   Cortical function: Normal mental status.   Reflex Scores:  Right brachioradialis: 2+  Left brachioradialis: 2+  Right biceps: 2+  Left biceps: 2+  Right triceps: 2+  Left triceps: 2+  Right patellar: 2+  Left patellar: 2+  Right Achilles: 1+  Left Achilles: 1+  Motor strength: 5/5  Motor Tone: Normal  Involuntary movements: None.   Superficial/Primitive Reflexes: Primitive reflexes were absent.   Right Casas: Absent  Left Casas: Absent  Right ankle clonus: Absent  Left ankle clonus: Absent   Babinsky: Absent  Spurling sign: Positive. Neck tornado test: Positive. Lhermitte sign: Negative. Long tract signs: Negative.   Sensory exam: Intact to light touch, intact pain and temperature sensation, intact vibration sensation and normal proprioception.   Coordination: Normal finger to nose and heel to shin. Normal balance and negative Romberg's sign   Skin and subcutaneous tissue: Skin is warm and intact. No rash noted. No cyanosis.   Psychiatric: Judgment and insight: Normal. Recent and remote memory: Intact. Mood and affect: Normal.     ASSESSMENT:   1. Cervical disc disorder at C5-C6  level with radiculopathy    2. Connective tissue stenosis of neural canal of cervical region    3. Primary osteoarthritis of left shoulder    4. Rotator cuff tendinitis, left    5. Prediabetes    6. Oropharynx cancer (HCC)          PLAN/MEDICAL DECISION MAKING:  Mr. Nicholas Renee, 57 y.o. male presents with a 1-year history of unrelenting posterior cervicalgia and left shoulder pain. Patient underwent orthopedic consultation with Dr. Darby on 9/20/2021 for left shoulder pain and had some subacromial injections in the left shoulder without significant relief. MRI of the left shoulder on 9/27/2021 revealed moderate to severe degenerative joint disease of the AC joint and glenohumeral joint.  Supraspinatus and infraspinatus tendons show some partial split thickness tearing without retraction. MRI of the cervical spine on 9/27/2022 revealed multilevel spondylitic changes greatest at C5-C6 where there is a large disc osteophyte complex with far lateralizing features contributing to lateral recess stenosis and paracentral canal stenosis particularly at the left side with moderate to severe neuroforaminal stenosis. Nicholas Renee underwent neurosurgical consultation with Dr. Fermín Acosta on 5/6/2022, and was found not to be a surgical candidate at that time. Due to a history of previous radiation, the incidence of surgical complications could be higher. Patient has history of left-sided base of the tongue cancer that was treated with a combination of cisplatin and radiation therapy. A recent PET scan did not reveal spinal involvement. Patient has failed to obtain pain relief with conservative measures for more than 3 months including oral analgesics, physical therapy, chiropractic therapy, to name a few. Pain has progressed in intensity over the past year.  A comprehensive initial evaluation including history and physical exam were performed including pertinent physiologic and functional assessment. Patient  presents with intractable pain due to the diagnoses listed above. Patient has failed to respond to conservative modalities, previous minimally invasive interventional pain management measures, as referenced under HPI including the impact of patient's moderate-to-severe pain contributing to significant impairment in daily activities, ADLs, and a negative impact on quality of life. Supporting diagnostic studies of patient's chronic pain condition have been reviewed.  I have reviewed all available patient's medical records as well as previous therapies as referenced above. I had a lengthy conversation with . Nicholas Renee regarding his chronic pain condition and potential therapeutic options including risks, benefits, alternative therapies, to name a few. We have discussed using a stepwise approach starting with the shortest or least intense level of treatment, care, or service as determined by the extent required to diagnose and or treat patient's condition. The treatments proposed are consistent with the patient's medical condition and are known to be as safe and effective by current guidelines and standard of care. There is no evidence of absolute contraindications for the proposed procedures under the current circumstances. These treatments are not considered experimental or investigational. The duration and frequency proposed are considered appropriate for the service in accordance with accepted standards of medical practice for the diagnosis and or treatment of the patient's condition and or intended to improve the patient's level of function. These services will be furnished in a setting appropriate to the patient's medical needs and condition. Therefore, I have proposed the following plan:  1. Interventional pain management measures: Patient will be scheduled for cervical epidural steroid injection by left paramedian interlaminar approach. We may repeat epidural depending on patient's outcome or proceed  with diagnostic and therapeutic left C5-C6 transforaminal epidural steroid injection. Patient will follow-up with Dr. Acosta thereafter.  2. Diagnostic studies: None indicated at this time.  Patient may need cervical myelogram followed by CT postmyelogram and electrodiagnostic studies of the upper extremities if he continues to struggle with pain  3. Pharmacological measures: Reviewed and discussed;   A. Patient takes Tylenol, meloxicam, diclofenac gel  B. Trial with gabapentin 100 mg 3 times per day, #90, 1 refill  C. Trial with Rheumate one tablet daily  D. Start pyridoxine 100 mg one tablet by mouth daily, #30, take for 30 days, no refills  E. Start alpha lipoid acid 1796-2226 mg per day divided into 3 doses  F. Trial with prilocaine 2%, lidocaine 10%, imipramine 3%, capsaicin 0.001% and mannitol 20% cream, apply 1 to 2 grams of cream to the affected areas every 4 to 6 hours as needed  Screening and compliance with controlled substances:  Patient has completed a SOAPP and ORT questionnaires (revealing low risk). Pasquale report has been reviewed and appropriate. Patient has signed a consent for treatment with controlled substances after reviewing the document and verbalizing full understanding of the risks, benefits, alternatives, and consequences of compliance and adherence with treatment and comprehensive plan of care. Patient received in hand a copy of this document.  4. Long-term rehabilitation efforts:  A. The patient does not have a history of falls. I did complete a risk assessment for falls.   B. Patient will start a comprehensive physical therapy program for upper body strengthening/posture correction, neurodynamics, ASTYM, E-STIM, myofascial release, cupping, dry needling, home exercises  C. Contrast therapy: Apply ice-packs for 15-20 minutes, followed by heating pads for 15-20 minutes to affected area   D. Nicholas Renee  reports that he quit smoking about 18 years ago. His smoking use included  cigarettes. He has a 10.00 pack-year smoking history. His smokeless tobacco use includes snuff.    5. The patient has been instructed to contact my office with any questions or difficulties. The patient understands the plan and agrees to proceed accordingly.    The patient has a documented plan of care to address chronic pain. Nicholas Renee reports a pain score of 7/10.  Given his pain assessment as noted, treatment options were discussed and the following options were decided upon as a follow-up plan to address the patient's pain: continuation of current treatment plan for pain, home exercises and therapy, prescription for non-opiod analgesics, referral to Physical Therapy, steroid injections, use of non-medical modalities (ice, heat, stretching and/or behavior modifications) and Interventional pain management measures.           Pain Management Panel    There is no flowsheet data to display.        TIGIST query complete. TIGIST reviewed by Dimitri Mesa MD.     Pain Medications             acetaminophen (TYLENOL) 500 MG tablet Take 1,000 mg by mouth Every 4 (Four) Hours As Needed.    meloxicam (MOBIC) 15 MG tablet Take 1 tablet by mouth Daily.           Please note that portions of this note were completed with a voice recognition program.     Dimitri Mesa MD    Patient Care Team:  aJck Gibson MD as PCP - General  Jack Gibson MD as PCP - Family Medicine  Nicholas Manuel MD as Referring Physician (Otolaryngology)  Brandt Zambrano MD as Consulting Physician (Radiation Oncology)  Georgiana Buck MD as Consulting Physician (Hematology and Oncology)  Dimitri Mesa MD as Consulting Physician (Pain Medicine)     No orders of the defined types were placed in this encounter.        Future Appointments   Date Time Provider Department Center   8/31/2022 11:00 AM [facility] CT 1 BH [facility] CT SO [facility]   8/31/2022  3:30 PM Brandt Zambrano MD [facility] None    11/7/2022  1:00 PM River Nicole PA-C [facility]   11/7/2022  2:00 PM Gage Britt MD [facility]   11/29/2022 10:30 AM Della Kay APRN [facility]C [facility]

## 2022-06-11 PROBLEM — M50.122 CERVICAL DISC DISORDER AT C5-C6 LEVEL WITH RADICULOPATHY: Status: ACTIVE | Noted: 2022-06-11

## 2022-06-11 PROBLEM — M99.41 CONNECTIVE TISSUE STENOSIS OF NEURAL CANAL OF CERVICAL REGION: Status: ACTIVE | Noted: 2022-06-11

## 2022-06-11 PROBLEM — M75.81 ROTATOR CUFF TENDINITIS, RIGHT: Status: ACTIVE | Noted: 2022-06-11

## 2022-06-11 PROBLEM — M19.012 PRIMARY OSTEOARTHRITIS OF LEFT SHOULDER: Status: ACTIVE | Noted: 2022-06-11

## 2022-06-14 ENCOUNTER — OFFICE VISIT (OUTPATIENT)
Dept: PAIN MEDICINE | Facility: CLINIC | Age: 57
End: 2022-06-14

## 2022-06-14 VITALS
HEART RATE: 77 BPM | HEIGHT: 73 IN | SYSTOLIC BLOOD PRESSURE: 119 MMHG | DIASTOLIC BLOOD PRESSURE: 83 MMHG | OXYGEN SATURATION: 99 % | BODY MASS INDEX: 29.72 KG/M2 | WEIGHT: 224.2 LBS | TEMPERATURE: 96.9 F

## 2022-06-14 DIAGNOSIS — C10.9 OROPHARYNX CANCER: ICD-10-CM

## 2022-06-14 DIAGNOSIS — M19.012 PRIMARY OSTEOARTHRITIS OF LEFT SHOULDER: ICD-10-CM

## 2022-06-14 DIAGNOSIS — M75.81 ROTATOR CUFF TENDINITIS, RIGHT: ICD-10-CM

## 2022-06-14 DIAGNOSIS — M99.41 CONNECTIVE TISSUE STENOSIS OF NEURAL CANAL OF CERVICAL REGION: ICD-10-CM

## 2022-06-14 DIAGNOSIS — M50.122 CERVICAL DISC DISORDER AT C5-C6 LEVEL WITH RADICULOPATHY: Primary | ICD-10-CM

## 2022-06-14 DIAGNOSIS — M50.122 CERVICAL DISC DISORDER AT C5-C6 LEVEL WITH RADICULOPATHY: ICD-10-CM

## 2022-06-14 DIAGNOSIS — R73.03 PREDIABETES: ICD-10-CM

## 2022-06-14 PROCEDURE — 99204 OFFICE O/P NEW MOD 45 MIN: CPT | Performed by: ANESTHESIOLOGY

## 2022-06-14 RX ORDER — MULTIVITAMIN WITH IRON
100 TABLET ORAL DAILY
Qty: 30 TABLET | Refills: 0 | Status: ON HOLD | OUTPATIENT
Start: 2022-06-14 | End: 2023-03-28

## 2022-06-14 RX ORDER — ME-TETRAHYDROFOLATE/B12/HRB236 1-1-500 MG
1 CAPSULE ORAL DAILY
Qty: 90 CAPSULE | Refills: 1 | Status: SHIPPED | OUTPATIENT
Start: 2022-06-14 | End: 2023-03-03

## 2022-06-14 RX ORDER — GABAPENTIN 100 MG/1
CAPSULE ORAL
Qty: 90 CAPSULE | Refills: 1 | Status: SHIPPED | OUTPATIENT
Start: 2022-06-14 | End: 2022-08-17 | Stop reason: SDUPTHER

## 2022-06-14 RX ORDER — ST. JOHN'S WORT 300 MG
400 CAPSULE ORAL 3 TIMES DAILY
Qty: 180 CAPSULE | Refills: 5 | Status: SHIPPED | OUTPATIENT
Start: 2022-06-14 | End: 2022-11-07

## 2022-06-27 ENCOUNTER — OUTSIDE FACILITY SERVICE (OUTPATIENT)
Dept: PAIN MEDICINE | Facility: CLINIC | Age: 57
End: 2022-06-27

## 2022-06-27 PROCEDURE — 62321 NJX INTERLAMINAR CRV/THRC: CPT | Performed by: ANESTHESIOLOGY

## 2022-06-28 ENCOUNTER — TELEPHONE (OUTPATIENT)
Dept: PAIN MEDICINE | Facility: CLINIC | Age: 57
End: 2022-06-28

## 2022-06-28 NOTE — TELEPHONE ENCOUNTER
Spoke with pt regarding yesterday’s procedure with Dr. Mesa. Pt stated they are doing well, with no complaints. Advised of f/u appointment. Pt understood, and no further needs expressed

## 2022-07-05 ENCOUNTER — PATIENT OUTREACH (OUTPATIENT)
Dept: CASE MANAGEMENT | Facility: OTHER | Age: 57
End: 2022-07-05

## 2022-07-05 NOTE — OUTREACH NOTE
AMBULATORY CASE MANAGEMENT NOTE    Name and Relationship of Patient/Support Person: Nicholas Renee - Self    Patient Outreach    Role of ACM explained to patient, patient denies care needs at this time, service declined.    GILBERT KNIGHT  Ambulatory Case Management    7/5/2022, 14:22 EDT

## 2022-08-02 ENCOUNTER — DOCUMENTATION (OUTPATIENT)
Dept: SPEECH THERAPY | Facility: HOSPITAL | Age: 57
End: 2022-08-02

## 2022-08-02 DIAGNOSIS — C10.9 OROPHARYNX CANCER: Primary | ICD-10-CM

## 2022-08-02 NOTE — THERAPY DISCHARGE NOTE
Speech Language Pathology Discharge Summary         Patient Name: Nicholas Renee  : 1965  MRN: [id number]    Today's Date: 2022       SLP OP Goals     Row Name 22 1200          Goal Type Needed    Goal Type Needed Dysphagia;Other Adult Goals  -HG            Subjective Comments    Subjective Comments Pt seen for evaluation and one tx session and follow up phone calls.  -HG            Dysphagia Goals    Patient will safely consume the recommended diet without complications such as aspiration pneumonia regular/thin  -HG     Status: Patient will safely consume the recommended diet without complications such as aspiration pneumonia Progressing as expected  -HG     Comments: Patient will safely consume the recommended diet without complications such as aspiration pneumonia 22: Pt has lost 33 lbs in total. Pt is very nauseous and vomiting and tolerating solids and liquids but it hurts to swallow. Pt is taking magic mouthwash for this reason more consistently.   22: Pt tolerating a continued oral diet. Pt eating softer foods: scrambled eggs, baked potato with butter and sour cream. Pt stated he would be adding protein shakes to his diet soon. Pt has lost 10 lbs.  -HG     Status: Patient will improve oral skills to enhance safety and increase eating efficiency by increasing accuracy of back of tongue control Discontinued  -HG     Patient will increase laryngeal elevation to reduce residue that might fall into airway by completing Mendelsohn maneuver;super-supraglottic swallow;with cues;falsetto/pitch glide  -HG     Status: Patient will increase laryngeal elevation to reduce residue that might fall into airway by completing Progressing as expected  -HG     Comments: Patient will increase laryngeal elevation to reduce residue that might fall into airway by completing 22: Encouraged pt to complete when tolerable given throat pain. 22: Pt demonstrated this date with accuracy and reports  completing exercises while driving.  -HG     Patient will increase closure of larynx to keep food from falling into the airway by completing super-supraglottic swallow;with cues  -HG     Status: Patient will increase closure of larynx to keep food from falling into the airway by completing Progressing as expected  -HG     Comments: Patient will increase closure of larynx to keep food from falling into the airway by completing 2/8/22: Encouraged pt to complete when tolerable given throat pain.  1/7/22: Pt demonstrated this date with accuracy and reports completing exercises while driving.  -HG     Patient will increase strength of tongue base and posterior pharyngeal walls to reduce residue that might fall into airway by completing effotful swallow;Maya (tongue hold);with cues  -HG     Status: Patient will increase strength of tongue base and posterior pharyngeal walls to reduce residue that might fall into airway by completing Progressing as expected  -HG     Comments: Patient will increase strength of tongue base and posterior pharyngeal walls to reduce residue that might fall into airway by completing 2/8/22: Encouraged pt to complete when tolerable given throat pain.  1/7/22: Pt demonstrated this date with accuracy and reports completing exercises while driving.  -HG     Patient will improve hyolaryngeal elevation via completing Ulisses (head lift) sustained lift (comment #/duration of lifts);repetitive lift (comment #/duration of lifts);with cues  CTAR  -HG     Status: Patient will improve hyolaryngeal elevation via completing Ulisses (head lift) Progressing as expected  -HG     Comments: Patient will improve hyolaryngeal elevation via completing Ulisses (head lift) 2/8/22: Encouraged pt to complete when tolerable given throat pain. 1/7/22: Pt demonstrated this date with accuracy. Pt completing the CTAR.  -HG            Other Goals    Other Adult Goal- 1 Pt will complete stretches of the strap muscles to improve  range of motion and flexiblity.  -     Status: Other Adult Goal- 1 Progressing as expected  -     Comments: Other Adult Goal- 1 2/8/22: Encouraged pt to complete when tolerable given throat pain. 1/7/22: Pt demonstrated this date with accuracy.  -            SLP Time Calculation    SLP Goal Re-Cert Due Date 03/19/22  -           User Key  (r) = Recorded By, (t) = Taken By, (c) = Cosigned By    Initials Name Provider Type     Ariella Anthony MS CCC-SLP Speech and Language Pathologist                       Time Calculation:                    Ariella Anthony MS CCC-SLP  8/2/2022

## 2022-08-17 DIAGNOSIS — M50.122 CERVICAL DISC DISORDER AT C5-C6 LEVEL WITH RADICULOPATHY: ICD-10-CM

## 2022-08-17 RX ORDER — GABAPENTIN 100 MG/1
100 CAPSULE ORAL 3 TIMES DAILY
Qty: 90 CAPSULE | Refills: 0 | Status: SHIPPED | OUTPATIENT
Start: 2022-08-17 | End: 2022-08-17 | Stop reason: SDUPTHER

## 2022-08-17 RX ORDER — GABAPENTIN 100 MG/1
100 CAPSULE ORAL 3 TIMES DAILY
Qty: 90 CAPSULE | Refills: 0 | Status: SHIPPED | OUTPATIENT
Start: 2022-08-17 | End: 2022-11-30 | Stop reason: SDUPTHER

## 2022-08-28 NOTE — PROGRESS NOTES
"Chief Complaint: \"The injection has really helped..\"        History of Present Illness:   Patient: Mr. Nicholas Renee, 57 y.o. male originally referred by Dr. Fermín Acosta in consultation for chronic intractable neck and left shoulder pain.  Patient reports a 1 year history of neck and left shoulder pain, which he noticed after doing some yard work particularly weed eating.  He has seen orthopedics, Dr. Souza on 9/20/2021 for his left shoulder, and he underwent some subacromial injections of the left shoulder without relief.  MRI of the cervical spine revealed multilevel spondylitic changes most significant at C5-C6, where there is a large disc osteophyte complex contributing to lateral recess stenosis and central spinal canal stenosis.  He has seen Dr. Fermín Acosta with neurosurgery, it was found not to be surgical candidate at that time.  He has a significant medical history of previous left sided tongue cancer and has undergone cervical radiation.  We last saw him on June 27, 2022, when he underwent a cervical epidural steroid injection, from which he reports experiencing 75% pain relief and functional improvement is ongoing.  He also tells me that he has been able to perform activities such as weed eating again without pain.  He participated in 1 physical therapy visit thereafter, then developed COVID and was unable to return.  Although, he completed several rounds of physical therapy prior to injection.  He returns today for postprocedure follow-up and evaluation.  Pain Description: intermittent exacerbation (previously constant), described as aching, burning, shooting and throbbing sensation.   Radiation of Pain: The pain radiates into the left shoulder  Pain intensity today: 4/10  Average pain intensity last week: 4/10  Pain intensity ranges from: 3/10 to 7/10  Aggravating factors: Pain increases with using his upper extremities.  He tells me since epidural, he is able to use his arms, and " perform activities without pain.  Alleviating factors: Pain decreases with ice, heat, lying down  Associated Symptoms:   Patient denies pain, numbness, or weakness in the upper extremities.   Patient denies any new bladder or bowel problems.   Patient denies difficulties with his balance or recent falls.       Review of previous therapies and additional medical records:  Nicholas Renee has already failed the following measures, including:   Conservative Measures: Oral analgesics, topical analgesics, ice, heat, chiropractic therapy, physical therapy   Interventional Measures: Left shoulder injections with Dr Malone  6/27/2022: ABEL  Surgical Measures: No history of previous cervical spine or shoulder surgery   Nicholas Renee underwent neurosurgical consultation with Dr. Dr. Fermín Acosta on 5/6/2022, and was found not to be a surgical candidate  Patient underwent orthopedic consultation with Dr. Souza on 9/20/2021 and was found not to be a surgical candidate  Nicholas Renee presents with significant comorbidities including diverticulitis, GERD, hypertension, prediabetes, history of oral cancer status postradiation, bilateral knee osteoarthritis  In terms of current analgesics, Nicholas Renee takes: Tylenol, meloxicam, diclofenac gel  I have reviewed Pasquale Report is consistent with medication reconciliation.  SOAPP: Low Risk         Global Pain Scale 06-14  2022 08-29  2022         Pain 17 9         Feelings 4 3         Clinical outcomes 13 4         Activities 15 0         GPS Total: 49 16           Review of Diagnostic Studies:   MRI of the cervical spine without contrast 9/27/2021: Imaging was reviewed.  Vertebral body heights are well-maintained without evidence of malalignment.  The spinal cord appears normal throughout.    C2-C3: Disc osteophyte complex with left lateralizing features, correlating to mild left paracentral spinal canal stenosis and left foraminal stenosis  C3-C4: Disc  osteophyte complex, uncovertebral hypertrophy contributing to mild canal stenosis and mild to moderate left foraminal stenosis  C4-C5: Disc osteophyte complex, uncovertebral hypertrophy contributing to mild canal stenosis and mild to moderate left neuroforaminal stenosis  C5-C6: Large disc osteophyte complex, uncovertebral hypertrophy contributing to moderate canal stenosis greatest in lateral recess and left paracentral region with there is mild indentation of the left hemicord without cord edema.  Uncovertebral hypertrophy contributing to moderate to severe right and moderate left foraminal stenosis  C6-C7: Disc osteophyte complex, facet hypertrophy contributing to mild canal stenosis and foraminal stenosis  C7-T1: No significant canal or foraminal stenosis  MRI of the left shoulder without contrast 9/27/2021: moderate to severe degenerative joint disease of the AC joint, evidence of subdeltoid bursitis.  Moderate degenerative joint disease of the glenohumeral joint with joint space narrowing.  Increased signal of the articular sided fibers of the supraspinatus with partial tear and or tendinopathy without retraction.  Infraspinatus has minimal partial-thickness tearing without retraction.  The subscapularis is normal.  Long head of the biceps well seated within the bicipital groove.  Teres minor is intact.  Mild to moderate inferior capsular thickening.    Review of Systems   Musculoskeletal: Positive for arthralgias and joint swelling.   Allergic/Immunologic: Positive for environmental allergies.   All other systems reviewed and are negative.        Patient Active Problem List   Diagnosis   • HTN (hypertension)   • Prediabetes   • Diverticulitis   • S/P low anterior colon resection   • Acute blood loss anemia, mild, asymptomatic   • Acute postoperative pain   • Primary osteoarthritis of right knee   • Oropharynx cancer (HCC)   • Cervical disc disorder at C5-C6 level with radiculopathy   • Connective tissue  "stenosis of neural canal of cervical region   • Primary osteoarthritis of left shoulder   • Rotator cuff tendinitis, right       Past Medical History:   Diagnosis Date   • C. difficile diarrhea    • Diverticulitis    • Esophagitis    • GERD (gastroesophageal reflux disease)    • History of radiation therapy 2022    oropharynx, bilateral necks   • Hypertension    • Oropharynx cancer (HCC) 2021   • Osteoarthritis of both knees    • Wears glasses          Past Surgical History:   Procedure Laterality Date   • ABDOMINAL SURGERY      INFANT (6wk)- STOMACH BLOCKAGE    • COLON RESECTION N/A 2019    Procedure: COLON RESECTION LAPAROSCOPIC LOW ANTERIOR;  Surgeon: Magan Blum MD;  Location:  [facility];  Service: General   • COLONOSCOPY N/A 2017    Procedure: COLONOSCOPY;  Surgeon: Magan Blum MD;  Location:  [facility];  Service:    • ENDOSCOPY     • FOOT SURGERY      BILAT    • KNEE SURGERY Bilateral     x 5   • PERIPHERALLY INSERTED CENTRAL CATHETER INSERTION  2019    RIGHT ARM          Family History   Problem Relation Age of Onset   • Diabetes Father    • Heart disease Father    • Hypertension Father    • Heart attack Father    • Rheum arthritis Other    • No Known Problems Mother          Social History     Socioeconomic History   • Marital status:    Tobacco Use   • Smoking status: Former Smoker     Packs/day: 0.50     Years: 20.00     Pack years: 10.00     Types: Cigarettes     Quit date: 2003     Years since quittin.1   • Smokeless tobacco: Current User     Types: Snuff   Vaping Use   • Vaping Use: Never used   Substance and Sexual Activity   • Alcohol use: Yes     Comment: socially, \"rarely\"   • Drug use: No   • Sexual activity: Defer           Current Outpatient Medications:   •  Alpha Lipoic Acid 200 MG capsule, Take 2 capsules by mouth 3 (Three) Times a Day., Disp: 180 capsule, Rfl: 5  •  cetirizine (zyrTEC) 10 MG tablet, Take 10 mg by mouth Daily As Needed., " "Disp: , Rfl:   •  FIBER PO, Take  by mouth., Disp: , Rfl:   •  gabapentin (NEURONTIN) 100 MG capsule, Take 1 capsule by mouth 3 (Three) Times a Day, Disp: 90 capsule, Rfl: 0  •  Gel Base gel, 2 g 4 (Four) Times a Day. prilocaine 2%, lidocaine 10%, imipramine 3%, capsaicin 0.001% and mannitol 20%, Disp: 240 g, Rfl: 5  •  gemfibrozil (LOPID) 600 MG tablet, Take 1 tablet by mouth 2 (Two) Times a Day., Disp: 180 tablet, Rfl: 3  •  losartan (COZAAR) 100 MG tablet, Take 1 tablet by mouth Daily., Disp: 90 tablet, Rfl: 3  •  meloxicam (MOBIC) 15 MG tablet, Take 1 tablet by mouth Daily., Disp: 30 tablet, Rfl: 7  •  METAMUCIL FIBER PO, , Disp: , Rfl:   •  pantoprazole (PROTONIX) 40 MG EC tablet, Take 1 tablet by mouth Daily. Swallow whole, Disp: 90 tablet, Rfl: 3  •  Probiotic Product (Align) capsule, , Disp: , Rfl:   •  acetaminophen (TYLENOL) 500 MG tablet, Take 1,000 mg by mouth Every 4 (Four) Hours As Needed., Disp: , Rfl:   •  Diclofenac Sodium (VOLTAREN) 1 % gel gel, Apply 4 g topically to the appropriate area as directed 4 (Four) Times a Day As Needed., Disp: , Rfl:   •  Dietary Management Product (Rheumate) capsule, Take 1 capsule by mouth Daily., Disp: 90 capsule, Rfl: 1  •  gabapentin (NEURONTIN) 100 MG capsule, Take 1 capsule by mouth 3 (Three) Times a Day., Disp: 90 capsule, Rfl: 0  •  Multiple Vitamins-Minerals (MULTIVITAMIN ADULT PO), Take 1 tablet by mouth Daily., Disp: , Rfl:   •  vitamin B-6 (PYRIDOXINE) 100 MG tablet, Take 1 tablet by mouth Daily., Disp: 30 tablet, Rfl: 0      Allergies   Allergen Reactions   • Levaquin [Levofloxacin] Myalgia   • Sulfa Antibiotics Hives and Rash         /70   Pulse 79   Temp 97.7 °F (36.5 °C) (Temporal)   Resp 16   Ht 185.4 cm (73\")   Wt 104 kg (229 lb)   SpO2 97%   BMI 30.21 kg/m²       Physical Exam:  Constitutional: Patient appears well-developed, well-nourished, well-hydrated  HEENT: Head: Normocephalic and atraumatic  Eyes: Conjunctivae and lids are " normal  Pupils: Equal, round, reactive to light  Neck: Trachea normal. Neck supple. No JVD present.   Lymphatic: No cervical adenopathy  Musculoskeletal   Gait and station: Gait evaluation demonstrated a normal gait. Able to walk on heels, toes, tandem walking   Cervical spine:  Passive and active range of motion are almost full and without pain. Extension, flexion, and rotation of the cervical spine not increase or reproduce. Cervical facet joint loading maneuvers are equivocal  Muscles: Presence of active trigger points at the left levator scapulae   Shoulders: The range of motion of the glenohumeral joints is full and without pain. Rotator cuff strength is 5/5.   Neurological:   Patient is alert and oriented to person, place, and time.   Speech: Normal.   Cortical function: Normal mental status.   Reflex Scores:  Right brachioradialis: 2+  Left brachioradialis: 2+  Right biceps: 2+  Left biceps: 2+  Right triceps: 2+  Left triceps: 2+  Right patellar: 2+  Left patellar: 2+  Right Achilles: 1+  Left Achilles: 1+  Motor strength: 5/5  Motor Tone: Normal  Involuntary movements: None.   Superficial/Primitive Reflexes: Primitive reflexes were absent.   Right Casas: Absent  Left Casas: Absent  Right ankle clonus: Absent  Left ankle clonus: Absent   Babinsky: Absent  Spurling sign: Mildly positive. Neck tornado test: Negative. Lhermitte sign: Negative. Long tract signs: Negative.   Sensory exam: Intact to light touch, intact pain and temperature sensation, intact vibration sensation and normal proprioception.   Coordination: Normal finger to nose and heel to shin. Normal balance and negative Romberg's sign   Skin and subcutaneous tissue: Skin is warm and intact. No rash noted. No cyanosis.   Psychiatric: Judgment and insight: Normal. Recent and remote memory: Intact. Mood and affect: Normal.     ASSESSMENT:   1. Cervical disc disorder at C5-C6 level with radiculopathy    2. Connective tissue stenosis of neural canal  of cervical region    3. Primary osteoarthritis of left shoulder    4. Rotator cuff tendinitis, left    5. Prediabetes    6. Oropharynx cancer (HCC)          PLAN/MEDICAL DECISION MAKING:  Mr. Nicholas Renee, 57 y.o. male reports a 1 year history of neck and left shoulder pain, which he noticed after doing some yard work particularly weed eating.  He has seen orthopedics, Dr. Souza on 9/20/2021 for his left shoulder, and he underwent some subacromial injections of the left shoulder without relief. MRI of the left shoulder on 9/27/2021 revealed moderate to severe degenerative joint disease of the AC joint and glenohumeral joint.  Supraspinatus and infraspinatus tendons show some partial split thickness tearing without retraction. He has seen Dr. Fermín Acosta with neurosurgery, it was found not to be surgical pain at that time. MRI of the cervical spine revealed multilevel spondylitic changes most significant at C5-C6, where there is a large disc osteophyte complex contributing to lateral recess stenosis and central spinal canal stenosis. He has a significant medical history of previous left sided tongue cancer and has undergone cervical radiation. Patient has failed to obtain pain relief with conservative measures for more than 3 months including oral analgesics, physical therapy, chiropractic therapy, to name a few.  Currently Mr. Renee is finding significant improvement with combination of epidural as well as medications.  He will remain on gabapentin, and follow-up as needed.  If his symptoms return, we may revisit physical therapy, dose adjustment on his gabapentin, to name a few.  I had a lengthy conversation with Mr. Nicholas Renee regarding his chronic pain condition and potential therapeutic options including risks, benefits, alternative therapies, to name a few. Therefore, I have proposed the following plan:  1. Interventional pain management measures: None indicated at this time.  Follow-up on  as-needed basis.  If symptoms recur we may repeat cervical epidural steroid injection by left paramedian interlaminar approach. We may repeat epidural depending on patient's outcome or proceed with diagnostic and therapeutic left C5-C6 transforaminal epidural steroid injection. Patient will follow-up with Dr. Acosta thereafter.  2. Pharmacological measures: Reviewed and discussed;   A. Patient takes Tylenol, meloxicam, diclofenac gel  B. Continue gabapentin 100 mg 3 times per day  C. Continue alpha lipoid acid 3992-8870 mg per day divided into 3 doses  D. Continue prilocaine 2%, lidocaine 10%, imipramine 3%, capsaicin 0.001% and mannitol 20% cream, apply 1 to 2 grams of cream to the affected areas every 4 to 6 hours as needed  3. Long-term rehabilitation efforts:  A. The patient does not have a history of falls. I did complete a risk assessment for falls.   B. Patient will start a comprehensive physical therapy program for upper body strengthening/posture correction, neurodynamics, ASTYM, E-STIM, myofascial release, cupping, dry needling, home exercises if pain recurs  C. Contrast therapy: Apply ice-packs for 15-20 minutes, followed by heating pads for 15-20 minutes to affected area   4. The patient has been instructed to contact my office with any questions or difficulties. The patient understands the plan and agrees to proceed accordingly.         Pain Medications             gabapentin (NEURONTIN) 100 MG capsule Take 1 capsule by mouth 3 (Three) Times a Day    meloxicam (MOBIC) 15 MG tablet Take 1 tablet by mouth Daily.    acetaminophen (TYLENOL) 500 MG tablet Take 1,000 mg by mouth Every 4 (Four) Hours As Needed.    gabapentin (NEURONTIN) 100 MG capsule Take 1 capsule by mouth 3 (Three) Times a Day.           Please note that portions of this note were completed with a voice recognition program.     REBEKAH Ojeda    Patient Care Team:  Jack Gibson MD as PCP - General  Jack Gibson MD as PCP  - Family Medicine  Nicholas Manuel MD as Referring Physician (Otolaryngology)  Brandt Zambrano MD as Consulting Physician (Radiation Oncology)  Georgiana Buck MD as Consulting Physician (Hematology and Oncology)  Dimitri Mesa MD as Consulting Physician (Pain Medicine)  Georgiana Yeh APRN as Nurse Practitioner (Pain Medicine)     No orders of the defined types were placed in this encounter.        Future Appointments   Date Time Provider Department [place]   8/31/2022 11:00 AM [facility] CT 1 BH [facility]   8/31/2022  3:30 PM Brandt Zambrano MD [facility] None   11/7/2022  1:00 PM River Nicole PA-C [facility]   11/7/2022  2:00 PM Gage Britt MD [facility]   11/29/2022 10:30 AM Della Kay APRN [facility]

## 2022-08-29 ENCOUNTER — OFFICE VISIT (OUTPATIENT)
Dept: PAIN MEDICINE | Facility: CLINIC | Age: 57
End: 2022-08-29

## 2022-08-29 VITALS
HEART RATE: 79 BPM | WEIGHT: 229 LBS | BODY MASS INDEX: 30.35 KG/M2 | SYSTOLIC BLOOD PRESSURE: 110 MMHG | OXYGEN SATURATION: 97 % | TEMPERATURE: 97.7 F | RESPIRATION RATE: 16 BRPM | DIASTOLIC BLOOD PRESSURE: 70 MMHG | HEIGHT: 73 IN

## 2022-08-29 DIAGNOSIS — M50.122 CERVICAL DISC DISORDER AT C5-C6 LEVEL WITH RADICULOPATHY: ICD-10-CM

## 2022-08-29 DIAGNOSIS — M75.81 ROTATOR CUFF TENDINITIS, RIGHT: ICD-10-CM

## 2022-08-29 DIAGNOSIS — M19.012 PRIMARY OSTEOARTHRITIS OF LEFT SHOULDER: ICD-10-CM

## 2022-08-29 DIAGNOSIS — R73.03 PREDIABETES: ICD-10-CM

## 2022-08-29 DIAGNOSIS — C10.9 OROPHARYNX CANCER: ICD-10-CM

## 2022-08-29 DIAGNOSIS — M99.41 CONNECTIVE TISSUE STENOSIS OF NEURAL CANAL OF CERVICAL REGION: ICD-10-CM

## 2022-08-29 PROCEDURE — 99213 OFFICE O/P EST LOW 20 MIN: CPT | Performed by: NURSE PRACTITIONER

## 2022-08-31 ENCOUNTER — HOSPITAL ENCOUNTER (OUTPATIENT)
Dept: CT IMAGING | Facility: HOSPITAL | Age: 57
Discharge: HOME | End: 2022-08-31

## 2022-08-31 ENCOUNTER — OFFICE VISIT (OUTPATIENT)
Dept: RADIATION ONCOLOGY | Facility: HOSPITAL | Age: 57
End: 2022-08-31

## 2022-08-31 ENCOUNTER — HOSPITAL ENCOUNTER (OUTPATIENT)
Dept: RADIATION ONCOLOGY | Facility: HOSPITAL | Age: 57
Setting detail: RADIATION/ONCOLOGY SERIES
Discharge: HOME | End: 2022-08-31

## 2022-08-31 VITALS
DIASTOLIC BLOOD PRESSURE: 79 MMHG | TEMPERATURE: 97.7 F | BODY MASS INDEX: 30.64 KG/M2 | OXYGEN SATURATION: 97 % | SYSTOLIC BLOOD PRESSURE: 146 MMHG | WEIGHT: 232.2 LBS | RESPIRATION RATE: 18 BRPM | HEART RATE: 74 BPM

## 2022-08-31 DIAGNOSIS — C10.9 OROPHARYNX CANCER: ICD-10-CM

## 2022-08-31 DIAGNOSIS — K21.9 GERD WITHOUT ESOPHAGITIS: Primary | ICD-10-CM

## 2022-08-31 PROCEDURE — 25010000002 IOPAMIDOL 61 % SOLUTION: Performed by: RADIOLOGY

## 2022-08-31 PROCEDURE — G0463 HOSPITAL OUTPT CLINIC VISIT: HCPCS | Performed by: RADIOLOGY

## 2022-08-31 PROCEDURE — 71260 CT THORAX DX C+: CPT

## 2022-08-31 PROCEDURE — 70491 CT SOFT TISSUE NECK W/DYE: CPT

## 2022-08-31 RX ORDER — PANTOPRAZOLE SODIUM 40 MG/1
40 TABLET, DELAYED RELEASE ORAL 2 TIMES DAILY
Qty: 180 TABLET | Refills: 3 | Status: SHIPPED | OUTPATIENT
Start: 2022-08-31

## 2022-08-31 RX ADMIN — IOPAMIDOL 100 ML: 612 INJECTION, SOLUTION INTRAVENOUS at 11:15

## 2022-08-31 NOTE — PROGRESS NOTES
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 Renee returns to clinic today for a scheduled follow-up visit of his left-sided HPV mediated oropharynx cancer.  He received definitive chemoradiation and completed in early 2022.  He tolerated radiation moderately well.  He had difficulty with weight loss, dehydration, and fatigue.  He was able to complete on schedule but required quite a bit of support in order to do so.  He had a complete response within the oropharynx, but he did have a persistent node in the left neck that took about 6 weeks to regress.  From a symptomatic standpoint, he had initially a slow recovery, but for the past 5 months, he has really improved.  Aside from a recent bout of Covid, which he says affected his taste for approximately 2 weeks, he does feel like he continues to improve from treatment.  He denies any new pain or symptoms.  His chief complaint today is increased reflux type symptoms.  As far as his diet is concerned, he admits to drinking a fair amount of sweet tea, and possibly the reflux corresponds to when he is drinking sweet tea.    MEDICATIONS: Medication reconciliation for the patient was reviewed and confirmed in the electronic medical record.    Review of Systems   HENT:   Positive for trouble swallowing (better).         Lymphadema getting better-continues massage  Reports back of tongue/throat loss taste sensation   c/o dry mouth   All other systems reviewed and are negative.      Karnofsky score: 90     Physical Exam  Vitals and nursing note reviewed.   Constitutional:       General: He is not in acute distress.     Appearance: He is well-developed.   HENT:       Head: Normocephalic and atraumatic.      Mouth/Throat:      Comments: Post treatment related changes are noted in the posterior oropharynx, with some subtle but persistent inflammation.  Dentition is good.  Base of tongue and tonsils are soft.  No other abnormalities are noted.  Appropriate moisture is present.  Eyes:      Conjunctiva/sclera: Conjunctivae normal.      Pupils: Pupils are equal, round, and reactive to light.   Neck:      Comments: No cervical or supraclavicular adenopathy  Cardiovascular:      Rate and Rhythm: Normal rate and regular rhythm.      Heart sounds: No murmur heard.    No friction rub.   Pulmonary:      Effort: Pulmonary effort is normal.      Breath sounds: Normal breath sounds. No wheezing.   Abdominal:      General: Bowel sounds are normal. There is no distension.      Palpations: Abdomen is soft. There is no mass.      Tenderness: There is no abdominal tenderness.   Musculoskeletal:         General: Normal range of motion.      Cervical back: Normal range of motion and neck supple.   Lymphadenopathy:      Cervical: No cervical adenopathy.   Skin:     General: Skin is warm and dry.   Neurological:      Mental Status: He is alert and oriented to person, place, and time.   Psychiatric:         Behavior: Behavior normal.         Thought Content: Thought content normal.         Judgment: Judgment normal.         VITAL SIGNS:   Vitals:    08/31/22 1512   BP: 146/79   Pulse: 74   Resp: 18   Temp: 97.7 °F (36.5 °C)   TempSrc: Temporal   SpO2: 97%   Weight: 105 kg (232 lb 3.2 oz)   PainSc: 0-No pain             Karnofsky score: 90     IMAGING  I have personally reviewed the relevant imaging studies, as follows:  CT Soft Tissue Neck With Contrast    Result Date: 8/31/2022  No specific evidence of recurrent or new metastatic disease in the neck. A previously noted enlarged left level 2 lymph node is decreased in size from recent PET/CT. There is some persistent asymmetry of the left tongue base,  stable from comparison, otherwise nonspecific. Consider correlation with direct exam, as indicated.  No evidence of acute finding or metastatic disease in the chest.  This report was finalized on 8/31/2022 12:06 PM by John Jaramillo.      CT Chest With Contrast Diagnostic    Result Date: 8/31/2022  No specific evidence of recurrent or new metastatic disease in the neck. A previously noted enlarged left level 2 lymph node is decreased in size from recent PET/CT. There is some persistent asymmetry of the left tongue base, stable from comparison, otherwise nonspecific. Consider correlation with direct exam, as indicated.  No evidence of acute finding or metastatic disease in the chest.  This report was finalized on 8/31/2022 12:06 PM by John Jaramillo.      NM PET/CT Skull Base to Mid Thigh    Result Date: 5/23/2022  Essentially negative PET-CT scan with interval metabolic resolution of previously seen disease in the oropharynx and neck, without evidence of new or worsening disease or distant metastatic disease.  This report was finalized on 5/23/2022 10:09 AM by Roman Crabtree MD.        The following portions of the patient's history were reviewed and updated as appropriate: allergies, current medications, past family history, past medical history, past social history, past surgical history and problem list.         Diagnoses and all orders for this visit:    1. GERD without esophagitis (Primary)  -     pantoprazole (PROTONIX) 40 MG EC tablet; Take 1 tablet by mouth 2 (Two) Times a Day.  Dispense: 180 tablet; Refill: 3    2. Oropharynx cancer (HCC)  -     CT Soft Tissue Neck With Contrast; Future  -     CT Chest With Contrast; Future         IMPRESSION:  Mr. 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  He is now 6 months out from completion of therapy and clinically, he continues to improve.  He has no evidence of recurrence on exam, and his last  laryngoscopy with Dr Manuel was 2 months ago and it was clear as well.  I reassured Mr. Renee today that we are still seeing changes of inflammation from treatment on exam and his imaging, so I do think he will continue to improve.  As far as his reflux is concerned, he already takes protonix once daily.  We discussed some dietary changes he can try to make to improve his symptoms, most notably trying to cut back on the sugar content of his tea, or trying other non-acidic beverages and alternatives.  I will refill his prescription for Protonix.  I plan to see him back in my clinic in 6 months, and he will see Dr. Manuel in the interim.    RECOMMENDATIONS:    1. Continue Protonix  2. Dietary recommendations  3. RTC in 6 months    I spent a total of 30 minutes on todays visit, with more than 20 minutes in direct face to face communication, and the remainder of the time spent in reviewing the relevant history, records, available imaging, and for documentation.    Return in about 6 months (around 2/28/2023) for Office Visit, Imaging - See orders.    Brandt Zambrano MD

## 2022-11-07 ENCOUNTER — OFFICE VISIT (OUTPATIENT)
Dept: NEUROSURGERY | Facility: CLINIC | Age: 57
End: 2022-11-07

## 2022-11-07 ENCOUNTER — OFFICE VISIT (OUTPATIENT)
Dept: ORTHOPEDIC SURGERY | Facility: CLINIC | Age: 57
End: 2022-11-07

## 2022-11-07 VITALS — HEIGHT: 73 IN | BODY MASS INDEX: 30.8 KG/M2 | TEMPERATURE: 97.3 F | WEIGHT: 232.4 LBS

## 2022-11-07 VITALS
HEIGHT: 73 IN | DIASTOLIC BLOOD PRESSURE: 78 MMHG | BODY MASS INDEX: 30.68 KG/M2 | WEIGHT: 231.48 LBS | SYSTOLIC BLOOD PRESSURE: 130 MMHG

## 2022-11-07 DIAGNOSIS — M47.812 CERVICAL SPONDYLOSIS: Primary | ICD-10-CM

## 2022-11-07 DIAGNOSIS — M17.11 PRIMARY OSTEOARTHRITIS OF RIGHT KNEE: ICD-10-CM

## 2022-11-07 DIAGNOSIS — M17.0 PRIMARY OSTEOARTHRITIS OF BOTH KNEES: Primary | ICD-10-CM

## 2022-11-07 PROCEDURE — 20610 DRAIN/INJ JOINT/BURSA W/O US: CPT | Performed by: ORTHOPAEDIC SURGERY

## 2022-11-07 PROCEDURE — 99213 OFFICE O/P EST LOW 20 MIN: CPT | Performed by: PHYSICIAN ASSISTANT

## 2022-11-07 PROCEDURE — 99214 OFFICE O/P EST MOD 30 MIN: CPT | Performed by: ORTHOPAEDIC SURGERY

## 2022-11-07 RX ORDER — TRIAMCINOLONE ACETONIDE 40 MG/ML
40 INJECTION, SUSPENSION INTRA-ARTICULAR; INTRAMUSCULAR
Status: COMPLETED | OUTPATIENT
Start: 2022-11-07 | End: 2022-11-07

## 2022-11-07 RX ORDER — ROPIVACAINE HYDROCHLORIDE 5 MG/ML
4 INJECTION, SOLUTION EPIDURAL; INFILTRATION; PERINEURAL
Status: COMPLETED | OUTPATIENT
Start: 2022-11-07 | End: 2022-11-07

## 2022-11-07 RX ORDER — ACETAMINOPHEN 325 MG/1
1000 TABLET ORAL ONCE
Status: CANCELLED | OUTPATIENT
Start: 2022-11-07 | End: 2022-11-07

## 2022-11-07 RX ORDER — MELOXICAM 7.5 MG/1
15 TABLET ORAL ONCE
Status: CANCELLED | OUTPATIENT
Start: 2022-11-07 | End: 2022-11-07

## 2022-11-07 RX ORDER — PREGABALIN 150 MG/1
150 CAPSULE ORAL ONCE
Status: CANCELLED | OUTPATIENT
Start: 2022-11-07 | End: 2022-11-07

## 2022-11-07 RX ADMIN — ROPIVACAINE HYDROCHLORIDE 4 ML: 5 INJECTION, SOLUTION EPIDURAL; INFILTRATION; PERINEURAL at 14:50

## 2022-11-07 RX ADMIN — TRIAMCINOLONE ACETONIDE 40 MG: 40 INJECTION, SUSPENSION INTRA-ARTICULAR; INTRAMUSCULAR at 14:50

## 2022-11-07 NOTE — PROGRESS NOTES
Procedure   - Large Joint Arthrocentesis: bilateral knee on 11/7/2022 2:50 PM  Indications: pain  Details: 18 G needle, anterolateral approach  Medications (Right): 4 mL ropivacaine 0.5 %; 40 mg triamcinolone acetonide 40 MG/ML  Aspirate (Right): 60 mL yellow and clear  Medications (Left): 4 mL ropivacaine 0.5 %; 40 mg triamcinolone acetonide 40 MG/ML  Outcome: tolerated well, no immediate complications  Procedure, treatment alternatives, risks and benefits explained, specific risks discussed. Consent was given by the patient. Immediately prior to procedure a time out was called to verify the correct patient, procedure, equipment, support staff and site/side marked as required. Patient was prepped and draped in the usual sterile fashion.

## 2022-11-07 NOTE — PROGRESS NOTES
[facility] [place] [facility] Note    Subjective     Chief Complaint   Patient presents with   • Follow-up     5 month f/u Primary osteoarthritis of both knees; cortisone injections 06.06.2022        HPI    It has been 5  month(s) since Mr. Renee's last visit. He returns to clinic today for follow-up of bilateral knee arthritis. The issue has been ongoing for 5 year(s). He rates his pain a 7/10 on the pain scale. Previous/current treatments: NSAIDS, physical therapy and weight loss. Current symptoms: pain, swelling, grinding, stiffness and same as prior visit. The pain is worse with walking, climbing stairs, leisure and rising from seated position; resting, pain medication and/or NSAID and lying down improve the pain. Overall, he is doing the same.  He would like to consider right total knee arthroplasty surgery in March/April 2023.  In the meantime he would like injections in his knees today.  No history of clots or clotting disorders.  No diabetes.  No heart disease.  His wife is able to help out postoperatively.    I have reviewed the following portions of the patient's history and agree with: History of Present Illness and Review of Systems    Patient Active Problem List   Diagnosis   • HTN (hypertension)   • Prediabetes   • Diverticulitis   • S/P low anterior colon resection   • Acute blood loss anemia, mild, asymptomatic   • Acute postoperative pain   • Primary osteoarthritis of right knee   • Oropharynx cancer (HCC)   • Cervical disc disorder at C5-C6 level with radiculopathy   • Connective tissue stenosis of neural canal of cervical region   • Primary osteoarthritis of left shoulder   • Rotator cuff tendinitis, right   • Primary osteoarthritis of both knees     Past Medical History:   Diagnosis Date   • C. difficile diarrhea    • Diverticulitis    • Esophagitis    • GERD (gastroesophageal reflux disease)    • History of radiation therapy 02/09/2022    oropharynx, bilateral necks   • Hypertension    •  "Oropharynx cancer (HCC) 2021   • Osteoarthritis of both knees    • Wears glasses       Past Surgical History:   Procedure Laterality Date   • ABDOMINAL SURGERY      INFANT (6wk)- STOMACH BLOCKAGE    • COLON RESECTION N/A 2019    Procedure: COLON RESECTION LAPAROSCOPIC LOW ANTERIOR;  Surgeon: Magan Blum MD;  [facility];  Service: General   • COLONOSCOPY N/A 2017    Procedure: COLONOSCOPY;  Surgeon: Magan Blum MD;  Location:  [facility];  Service:    • ENDOSCOPY     • FOOT SURGERY      BILAT    • KNEE SURGERY Bilateral     x 5   • PERIPHERALLY INSERTED CENTRAL CATHETER INSERTION  2019    RIGHT ARM       Family History   Problem Relation Age of Onset   • Diabetes Father    • Heart disease Father    • Hypertension Father    • Heart attack Father    • Rheum arthritis Other    • No Known Problems Mother      Social History     Socioeconomic History   • Marital status:    Tobacco Use   • Smoking status: Former     Packs/day: 0.50     Years: 20.00     Pack years: 10.00     Types: Cigarettes     Quit date: 2003     Years since quittin.3   • Smokeless tobacco: Current     Types: Snuff   Vaping Use   • Vaping Use: Never used   Substance and Sexual Activity   • Alcohol use: Yes     Comment: socially, \"rarely\"   • Drug use: No   • Sexual activity: Defer      Current Outpatient Medications on File Prior to Visit   Medication Sig Dispense Refill   • acetaminophen (TYLENOL) 500 MG tablet Take 1,000 mg by mouth Every 4 (Four) Hours As Needed.     • cetirizine (zyrTEC) 10 MG tablet Take 10 mg by mouth Daily As Needed.     • Diclofenac Sodium (VOLTAREN) 1 % gel gel Apply 4 g topically to the appropriate area as directed 4 (Four) Times a Day As Needed.     • Dietary Management Product (Rheumate) capsule Take 1 capsule by mouth Daily. 90 capsule 1   • FIBER PO Take  by mouth.     • gabapentin (NEURONTIN) 100 MG capsule Take 1 capsule by mouth 3 (Three) Times a Day. 90 capsule 0 "   • Gel Base gel 2 g 4 (Four) Times a Day. prilocaine 2%, lidocaine 10%, imipramine 3%, capsaicin 0.001% and mannitol 20% 240 g 5   • gemfibrozil (LOPID) 600 MG tablet Take 1 tablet by mouth 2 (Two) Times a Day. 180 tablet 3   • losartan (COZAAR) 100 MG tablet Take 1 tablet by mouth Daily. 90 tablet 3   • meloxicam (MOBIC) 15 MG tablet Take 1 tablet by mouth Daily. 30 tablet 7   • METAMUCIL FIBER PO      • Multiple Vitamins-Minerals (MULTIVITAMIN ADULT PO) Take 1 tablet by mouth Daily.     • pantoprazole (PROTONIX) 40 MG EC tablet Take 1 tablet by mouth 2 (Two) Times a Day. 180 tablet 3   • pilocarpine (SALAGEN) 5 MG tablet Take 1 tablet by mouth 3 (Three) Times a Day As Needed. 60 tablet 1   • Probiotic Product (Align) capsule      • triamcinolone (KENALOG) 0.1 % cream Apply a thin layer topically to the affected area(s) as directed 2 times per day 80 g 3   • vitamin B-6 (PYRIDOXINE) 100 MG tablet Take 1 tablet by mouth Daily. 30 tablet 0   • gabapentin (NEURONTIN) 100 MG capsule Take 1 capsule by mouth 3 (Three) Times a Day 90 capsule 0   • [DISCONTINUED] Alpha Lipoic Acid 200 MG capsule Take 2 capsules by mouth 3 (Three) Times a Day. 180 capsule 5     No current facility-administered medications on file prior to visit.      Allergies   Allergen Reactions   • Levaquin [Levofloxacin] Myalgia   • Alpha-Lipoic Acid Rash   • Sulfa Antibiotics Hives and Rash        Review of Systems   Constitutional: Negative for activity change, appetite change, chills, diaphoresis, fatigue, fever and unexpected weight change.   HENT: Negative for congestion, dental problem, drooling, ear discharge, ear pain, facial swelling, hearing loss, mouth sores, nosebleeds, postnasal drip, rhinorrhea, sinus pressure, sneezing, sore throat, tinnitus, trouble swallowing and voice change.    Eyes: Negative for photophobia, pain, discharge, redness, itching and visual disturbance.   Respiratory: Negative for apnea, cough, choking, chest tightness,  "shortness of breath, wheezing and stridor.    Cardiovascular: Negative for chest pain, palpitations and leg swelling.   Gastrointestinal: Negative for abdominal distention, abdominal pain, anal bleeding, blood in stool, constipation, diarrhea, nausea, rectal pain and vomiting.   Endocrine: Negative for cold intolerance, heat intolerance, polydipsia, polyphagia and polyuria.   Genitourinary: Negative for decreased urine volume, difficulty urinating, dysuria, enuresis, flank pain, frequency, genital sores, hematuria and urgency.   Musculoskeletal: Positive for arthralgias. Negative for back pain, gait problem, joint swelling, myalgias, neck pain and neck stiffness.   Skin: Negative for color change, pallor, rash and wound.   Allergic/Immunologic: Negative for environmental allergies, food allergies and immunocompromised state.   Neurological: Negative for dizziness, tremors, seizures, syncope, facial asymmetry, speech difficulty, weakness, light-headedness, numbness and headaches.   Hematological: Negative for adenopathy. Does not bruise/bleed easily.   Psychiatric/Behavioral: Negative for agitation, behavioral problems, confusion, decreased concentration, dysphoric mood, hallucinations, self-injury, sleep disturbance and suicidal ideas. The patient is not nervous/anxious and is not hyperactive.         Objective      Physical Exam  /78   Ht 185.4 cm (72.99\")   Wt 105 kg (231 lb 7.7 oz)   BMI 30.55 kg/m²     Body mass index is 30.55 kg/m².    General:   Mental Status:  Alert   Appearance: Cooperative, in no acute distress   Build and Nutrition: Well-nourished well-developed male   Orientation: Alert and oriented to person, place and time   Posture: Normal   Gait: Antalgic on both lower extremities    Integument:              Right knee: Well-healed previous incisions              Left knee: no skin lesions, no rash, no ecchymosis     Lower Extremities:              Right " Knee:                          Tenderness:    None                          Effusion:          2+                          Swelling:          None                          Crepitus:          Positive                          Atrophy:           None                          Range of motion:        Extension:       0°                                                              Flexion:           120°  Instability:        No varus laxity, no valgus laxity, positive anterior drawer  Deformities:     Valgus              Left Knee:                          Tenderness:    None                          Effusion:          Trace                          Swelling:          None                          Crepitus:          Positive                          Atrophy:           None                          Range of motion:        Extension:       0°                                                              Flexion:           120°  Instability:        No varus laxity, no valgus laxity, positive anterior drawer  Deformities:     Valgus    Imaging/Studies  Imaging Results (Last 24 Hours)     ** No results found for the last 24 hours. **        No new imaging today.    Assessment and Plan     Diagnoses and all orders for this visit:    1. Primary osteoarthritis of both knees (Primary)  -     - Large Joint Arthrocentesis: bilateral knee    2. Primary osteoarthritis of right knee  -     Case Request; Standing  -     Instructions on coughing, deep breathing, and incentive spirometry.; Future  -     CBC and Differential; Future  -     Basic metabolic panel; Future  -     Protime-INR; Future  -     APTT; Future  -     Hemoglobin A1c; Future  -     Sedimentation rate; Future  -     C-reactive protein; Future  -     Tranexamic Acid 1,000 mg in sodium chloride 0.9 % 100 mL  -     Tranexamic Acid 1,000 mg in sodium chloride 0.9 % 100 mL  -     ceFAZolin (ANCEF) 2 g in sodium chloride 0.9 % 100 mL IVPB  -     acetaminophen (TYLENOL) tablet  975 mg  -     meloxicam (MOBIC) tablet 15 mg  -     pregabalin (LYRICA) capsule 150 mg  -     mupirocin (BACTROBAN) 2 % nasal ointment 1 application  -     Case Request    Other orders  -     Cancel: Large Joint Arthrocentesis  -     Outpatient In A Bed; Standing  -     Follow Anesthesia Guidelines / Protocol; Future  -     Obtain informed consent  -     Provide instructions to patient regarding NPO status  -     Chlorhexidine Skin Prep - Educate and Review With Patient; Future  -     Provide Patient With ERAS Hydration Instructions  -     Provide Patient With Enhanced Recovery Booklet(s) or Handout  -     Provide Instructions/Handout For Benzoyl Peroxide 5% Wash If Having Shoulder/Arm Surgery (If Prescribed)  -     Provide Instructions/Handout For Bactroban And Chlorhexidine Shower (If Prescribed)  -     Perform A Memory Screening On All Hip/Knee Replacement Patients >Or Equal To 65 Years Or Older  -     Complete A PROMIS And HOOS Or KOOS Survey If Having Hip Or Knee Replacement  -     Follow Anesthesia Guidelines / Protocol; Standing  -     Nerve Block; Standing  -     Verify NPO Status; Standing  -     Verify The Time Patient Completed ERAS Hydration Drink; Standing  -     SCD (sequential compression device)- to be placed on patient in Pre-op; Standing  -     POC Glucose Once; Standing  -     Clip operative site; Standing  -     Obtain informed consent (if not collected inpatient or PAT); Standing  -     Notify Physician - Standard; Standing  -     Provide Patient With Carbo Loading Instructions  -     Provide Patient With ERAS Booklet(s)/Handout  -     mupirocin (BACTROBAN) 2 % ointment; into the nostril(s) as directed by provider 2 (Two) Times a Day.  Dispense: 22 g; Refill: 0  -     chlorhexidine (HIBICLENS) 4 % external liquid; Apply  topically to the appropriate area as directed Daily. Shower with hibiclens solution as directed for 5 days prior to surgery  Dispense: 236 mL; Refill: 0        1. Primary  osteoarthritis of both knees    2. Primary osteoarthritis of right knee        I reviewed my findings with the patient.  He would like to go ahead and proceed with right total knee arthroplasty surgery but wants to wait until March/April next year.  Risks, benefits, and alternatives to surgery been discussed.  Please see my counseling note for details.  We have detailed this also previously.  In the meantime, he would like injections for both of his knees.    Procedure Note:  The potential benefits of performing a therapeutic bilateral knee joint injections, as well as potential risks (including, but not limited to infection, swelling, pain, bleeding, bruising, nerve/blood vessel damage, skin color changes, transient elevation in blood glucose levels, and fat atrophy) were discussed with the patient.  After informed consent, timeout procedure was performed, and the skin on the right and left knees was prepped with chlorhexidine soap and alcohol, after which ethyl chloride was applied to the skin at the injection site.  60 cc of clear straw-colored fluid was aspirated from the right knee, then 1ml of Kenalog 40mg/ml mixed with 4ml 0.5% ropivacaine plain was injected into the knee joints.  The patient tolerated the procedures well, experiencing 80% improvement in the right knee and 80% improvement in the left knee a few minutes following the injections. There were no complications.  Band-Aids were applied to the injection sites. Post-procedural instructions were given to the patient and/or their caregiver.      Return for surgery.      Gage Britt MD  11/07/22  15:03 EST

## 2022-11-07 NOTE — PROGRESS NOTES
"Patient: Nicholas Renee  : 1965    Primary Care Provider: Jack Gibson MD      Chief Complaint: Cervical radiculopathy    History of Present Illness:       Patient is nice 57-year-old gentleman who was seen by Dr. Acosta about 6 months ago.  Patient has concomitant shoulder impingement as well as cervical radiculopathy.  Patient had mainly right shoulder pain and right cervical radiculopathy.  Patient was set up for a 6-month visit secondary to moderate disc bulge at C5-6.  We are checking him and making sure that he was continued to do well.  Patient has no complaints today and feels that he is doing well with injections and feels like that is the \"ticket\"    Review of Systems   Constitutional: Positive for appetite change. Negative for activity change, chills, diaphoresis, fatigue, fever and unexpected weight change.   HENT: Positive for ear pain, facial swelling, sneezing, sore throat, trouble swallowing and voice change. Negative for congestion, dental problem, drooling, ear discharge, hearing loss, mouth sores, nosebleeds, postnasal drip, rhinorrhea, sinus pressure and tinnitus.    Eyes: Negative for photophobia, pain, discharge, redness, itching and visual disturbance.   Respiratory: Negative for apnea, cough, choking, chest tightness, shortness of breath, wheezing and stridor.    Cardiovascular: Negative for chest pain, palpitations and leg swelling.   Gastrointestinal: Negative for abdominal distention, abdominal pain, anal bleeding, blood in stool, constipation, diarrhea, nausea, rectal pain and vomiting.   Endocrine: Negative for cold intolerance, heat intolerance, polydipsia, polyphagia and polyuria.   Genitourinary: Positive for frequency. Negative for decreased urine volume, difficulty urinating, dysuria, enuresis, flank pain, genital sores, hematuria and urgency.   Musculoskeletal: Positive for joint swelling, neck pain and neck stiffness. Negative for arthralgias, back pain, gait " problem and myalgias.   Skin: Negative for color change, pallor, rash and wound.   Allergic/Immunologic: Negative for environmental allergies, food allergies and immunocompromised state.   Neurological: Positive for dizziness and light-headedness. Negative for tremors, seizures, syncope, facial asymmetry, speech difficulty, weakness, numbness and headaches.   Hematological: Negative for adenopathy. Does not bruise/bleed easily.   Psychiatric/Behavioral: Negative for agitation, behavioral problems, confusion, decreased concentration, dysphoric mood, hallucinations, self-injury, sleep disturbance and suicidal ideas. The patient is not nervous/anxious and is not hyperactive.    All other systems reviewed and are negative.      Past Medical History:     Past Medical History:   Diagnosis Date   • C. difficile diarrhea    • Diverticulitis    • Esophagitis    • GERD (gastroesophageal reflux disease)    • History of radiation therapy 02/09/2022    oropharynx, bilateral necks   • Hypertension    • Oropharynx cancer (HCC) 12/1/2021   • Osteoarthritis of both knees    • Wears glasses        Family History:     Family History   Problem Relation Age of Onset   • Diabetes Father    • Heart disease Father    • Hypertension Father    • Heart attack Father    • Rheum arthritis Other    • No Known Problems Mother        Social History:    reports that he quit smoking about 19 years ago. His smoking use included cigarettes. He has a 10.00 pack-year smoking history. His smokeless tobacco use includes snuff. He reports current alcohol use. He reports that he does not use drugs.   SMOKING STATUS: Non-smoker    Surgical History:     Past Surgical History:   Procedure Laterality Date   • ABDOMINAL SURGERY      INFANT (6wk)- STOMACH BLOCKAGE    • COLON RESECTION N/A 7/2/2019    Procedure: COLON RESECTION LAPAROSCOPIC LOW ANTERIOR;  Surgeon: Magan Blum MD;  Location: [facility];  Service: General   • COLONOSCOPY N/A 7/11/2017     "Procedure: COLONOSCOPY;  Surgeon: Magan Blum MD;  Location: [facility] ENDOSCOPY;  Service:    • ENDOSCOPY     • FOOT SURGERY      BILAT    • KNEE SURGERY Bilateral     x 5   • PERIPHERALLY INSERTED CENTRAL CATHETER INSERTION  06/13/2019    RIGHT ARM        Allergies:   Levaquin [levofloxacin], Alpha-lipoic acid, and Sulfa antibiotics    Physical Exam:    Vital Signs:Temp 97.3 °F (36.3 °C) (Infrared)   Ht 185.4 cm (72.99\")   Wt 105 kg (232 lb 6.4 oz)   BMI 30.67 kg/m²    BMI: Body mass index is 30.67 kg/m².     GENERAL:   The patient is in no acute distress, and is able to answer all questions appropriately.  Musculoskeletal:     strength is 5 out of 5 bilaterally.   Shoulder abduction is 5 out of 5.      The patient´s gait is normal without antalgia.  Neurologic:   The patient is alert and oriented by 3.     Pupils are equal and reactive to light.    Visual fields are full.    Extraocular movements are intact without nystagmus.    There is no evidence of central motor drift. No facial droop.  No difficulty with rapid alternating movements.    Sensation is equal bilaterally with no deficit.      No sign of Casas's, clonus, or long track signs      Medical Decision Making    Data Review:   No new films reviewed at this visit    Diagnosis:   Cervical radiculopathy  Shoulder impingement    Treatment Options:   Patient is doing well and wanting to proceed with ongoing pain management which I think is reasonable.  If he has resurgence of symptoms or the injections fail we can see him back for possibility of ACDF at C5-6.  However with his symptoms improving I have this would not be necessary.    BMI is >= 30 and <35. (Class 1 Obesity). The following options were offered after discussion;: weight loss educational material (shared in after visit summary)     Diagnosis Plan   1. Cervical spondylosis          "

## 2022-11-08 PROBLEM — M47.812 CERVICAL SPONDYLOSIS: Status: ACTIVE | Noted: 2022-11-08

## 2022-11-29 ENCOUNTER — OFFICE VISIT (OUTPATIENT)
Dept: ONCOLOGY | Facility: CLINIC | Age: 57
End: 2022-11-29

## 2022-11-29 ENCOUNTER — LAB (OUTPATIENT)
Dept: LAB | Facility: HOSPITAL | Age: 57
End: 2022-11-29

## 2022-11-29 VITALS
BODY MASS INDEX: 30.62 KG/M2 | RESPIRATION RATE: 18 BRPM | WEIGHT: 231 LBS | HEART RATE: 64 BPM | TEMPERATURE: 98 F | HEIGHT: 73 IN | OXYGEN SATURATION: 98 % | SYSTOLIC BLOOD PRESSURE: 123 MMHG | DIASTOLIC BLOOD PRESSURE: 80 MMHG

## 2022-11-29 DIAGNOSIS — C10.9 OROPHARYNX CANCER: ICD-10-CM

## 2022-11-29 DIAGNOSIS — C10.9 OROPHARYNX CANCER: Primary | ICD-10-CM

## 2022-11-29 LAB
ALBUMIN SERPL-MCNC: 4.5 G/DL (ref 3.5–5.2)
ALBUMIN/GLOB SERPL: 1.7 G/DL
ALP SERPL-CCNC: 78 U/L (ref 39–117)
ALT SERPL W P-5'-P-CCNC: 14 U/L (ref 1–41)
ANION GAP SERPL CALCULATED.3IONS-SCNC: 11 MMOL/L (ref 5–15)
AST SERPL-CCNC: 16 U/L (ref 1–40)
BASOPHILS # BLD AUTO: 0.01 10*3/MM3 (ref 0–0.2)
BASOPHILS NFR BLD AUTO: 0.2 % (ref 0–1.5)
BILIRUB SERPL-MCNC: 0.3 MG/DL (ref 0–1.2)
BUN SERPL-MCNC: 24 MG/DL (ref 6–20)
BUN/CREAT SERPL: 22.9 (ref 7–25)
CALCIUM SPEC-SCNC: 9.1 MG/DL (ref 8.6–10.5)
CHLORIDE SERPL-SCNC: 102 MMOL/L (ref 98–107)
CO2 SERPL-SCNC: 25 MMOL/L (ref 22–29)
CREAT SERPL-MCNC: 1.05 MG/DL (ref 0.76–1.27)
DEPRECATED RDW RBC AUTO: 43.8 FL (ref 37–54)
EGFRCR SERPLBLD CKD-EPI 2021: 81.8 ML/MIN/1.73
EOSINOPHIL # BLD AUTO: 0.19 10*3/MM3 (ref 0–0.4)
EOSINOPHIL NFR BLD AUTO: 3.8 % (ref 0.3–6.2)
ERYTHROCYTE [DISTWIDTH] IN BLOOD BY AUTOMATED COUNT: 12.1 % (ref 12.3–15.4)
GLOBULIN UR ELPH-MCNC: 2.7 GM/DL
GLUCOSE SERPL-MCNC: 91 MG/DL (ref 65–99)
HCT VFR BLD AUTO: 33.5 % (ref 37.5–51)
HGB BLD-MCNC: 11.5 G/DL (ref 13–17.7)
IMM GRANULOCYTES # BLD AUTO: 0.02 10*3/MM3 (ref 0–0.05)
IMM GRANULOCYTES NFR BLD AUTO: 0.4 % (ref 0–0.5)
LYMPHOCYTES # BLD AUTO: 1.28 10*3/MM3 (ref 0.7–3.1)
LYMPHOCYTES NFR BLD AUTO: 25.3 % (ref 19.6–45.3)
MCH RBC QN AUTO: 34 PG (ref 26.6–33)
MCHC RBC AUTO-ENTMCNC: 34.3 G/DL (ref 31.5–35.7)
MCV RBC AUTO: 99.1 FL (ref 79–97)
MONOCYTES # BLD AUTO: 0.37 10*3/MM3 (ref 0.1–0.9)
MONOCYTES NFR BLD AUTO: 7.3 % (ref 5–12)
NEUTROPHILS NFR BLD AUTO: 3.18 10*3/MM3 (ref 1.7–7)
NEUTROPHILS NFR BLD AUTO: 63 % (ref 42.7–76)
PLATELET # BLD AUTO: 237 10*3/MM3 (ref 140–450)
PMV BLD AUTO: 8.9 FL (ref 6–12)
POTASSIUM SERPL-SCNC: 4.4 MMOL/L (ref 3.5–5.2)
PROT SERPL-MCNC: 7.2 G/DL (ref 6–8.5)
RBC # BLD AUTO: 3.38 10*6/MM3 (ref 4.14–5.8)
SODIUM SERPL-SCNC: 138 MMOL/L (ref 136–145)
TSH SERPL DL<=0.05 MIU/L-ACNC: 1.8 UIU/ML (ref 0.27–4.2)
WBC NRBC COR # BLD: 5.05 10*3/MM3 (ref 3.4–10.8)

## 2022-11-29 PROCEDURE — 80050 GENERAL HEALTH PANEL: CPT

## 2022-11-29 PROCEDURE — 99214 OFFICE O/P EST MOD 30 MIN: CPT | Performed by: NURSE PRACTITIONER

## 2022-11-29 PROCEDURE — 36415 COLL VENOUS BLD VENIPUNCTURE: CPT

## 2022-11-29 NOTE — PROGRESS NOTES
"      PROBLEM LIST:  1. 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  Biopsy of left neck mass on 11/8/2021 showed squamous cell carcinoma.  P16 stain inconclusive.  Repeat biopsy done 11/24/21 - excision of left lingual tonsil, showed SCC, p16+  B) begin weekly cisplatin with radiation 12/23/2021. Completed cisplatin 2/9/2022.   2. Hypertension  3. GERd  4. Hyperlipidemia    Subjective     CHIEF COMPLAINT: p16+ oropharynx cancer    HISTORY OF PRESENT ILLNESS:   Nicholas Renee returns for follow-up.  Overall he is doing well.  He still has dry mouth that is worse at night and some taste aversion.  He has a little difficulty swallowing but no pain with swallowing.  He continues to follow routinely with Dr. Zambrano and Dr. Manuel.          Objective       /80 Comment: LUE  Pulse 64   Temp 98 °F (36.7 °C) (Infrared)   Resp 18   Ht 185.4 cm (73\")   Wt 105 kg (231 lb)   SpO2 98% Comment: RA  BMI 30.48 kg/m²    Vitals:    11/29/22 1021   PainSc: 0-No pain                 ECOG score: 0   General: well appearing male in no acute distress  Neuro: alert and oriented  HEENT: sclerae anicteric. No edema in the neck  Lymphatics:  No palpable cervical adenopathy bilaterally.  Cardiovascular: regular rate and rhythm, no murmurs  Lungs: clear to auscultation bilaterally  Abdomen: soft, nontender, nondistended.  No palpable organomegaly  Extremities: no lower extremity edema  Skin: no rashes, lesions, bruising, or petechiae  Psych: mood and affect appropriate      RECENT LABS:  Lab Results   Component Value Date    WBC 2.57 (L) 03/21/2022    HGB 8.7 (L) 03/21/2022    HCT 26.5 (L) 03/21/2022    .7 (H) 03/21/2022     03/21/2022       Lab Results   Component Value Date    GLUCOSE 89 03/21/2022    BUN 13 03/21/2022    CREATININE 0.94 03/21/2022    EGFRIFNONA 74 " 02/08/2022    BCR 13.8 03/21/2022    K 4.5 03/21/2022    CO2 28.0 03/21/2022    CALCIUM 10.0 03/21/2022    ALBUMIN 4.30 03/21/2022    AST 18 03/21/2022    ALT 14 03/21/2022         CT Soft Tissue Neck With Contrast, CT Chest With Contrast Diagnostic  Narrative: DATE OF EXAM: 8/31/2022 10:48 AM     PROCEDURE: CT SOFT TISSUE NECK W CONTRAST-, CT CHEST W CONTRAST  DIAGNOSTIC-     INDICATIONS: oropharynx cancer post chemoradiation; C10.9-Malignant  neoplasm of oropharynx, unspecified     COMPARISON: PET/CT 5/23/2022     TECHNIQUE: After the intravenous administration of 100 mL of Isovue 300,  contiguous axial images were obtained through the neck and chest.  Coronal and sagittal reconstructions were performed. Automated exposure  control and iterative reconstruction methods were used.      The radiation dose reduction device was turned on for each scan per the  ALARA (As Low as Reasonably Achievable) protocol.     FINDINGS:   Neck: Limited intracranial evaluation demonstrates no acute finding  evaluation of the osseous structures demonstrates unchanged multilevel  cervical spondylosis. Vascular structures appear intact, without  evidence of new high-grade stenosis or occlusion. The thyroid is  homogeneous. The parotid and submandibular glands are symmetric. There  is some minimal persisting abnormal soft tissue noted near left cervical  level 2, consistent with posttreatment changes. A previously noted  metastatic left level 2 lymph node previously measured 9 mm short axis,  6 mm on today's exam. There are no distinct new or enlarging pathologic  cervical lymph nodes. Evaluation of the aerodigestive tract structures  demonstrate some unchanged asymmetry at the left tongue base, extending  towards the vallecula, similar to comparison and without evidence of new  mass or other luminal irregularity.     CHEST: There is no pathologic axillary adenopathy or other worrisome  body wall soft tissue finding in the chest. No  acute findings are  present in the partially characterized upper abdomen. There is no  pleural or pericardial effusion. There is no pathologic mediastinal or  hilar lymphadenopathy. Minimally atherosclerotic, nonaneurysmal thoracic  aorta. Incidentally noted calcific coronary artery atherosclerosis is  present. Evaluation of the osseous structures demonstrates no evidence  of acute fracture or aggressive osseous lesion. Evaluation of the lung  fields demonstrates no evidence of acute infectious or inflammatory  process. There are no distinct suspicious pulmonary nodules.     Impression: No specific evidence of recurrent or new metastatic disease in the neck.  A previously noted enlarged left level 2 lymph node is decreased in size  from recent PET/CT. There is some persistent asymmetry of the left  tongue base, stable from comparison, otherwise nonspecific. Consider  correlation with direct exam, as indicated.     No evidence of acute finding or metastatic disease in the chest.     This report was finalized on 8/31/2022 12:06 PM by John Jaramillo.             Assessment & Plan                                                                                                                       Nicholas Renee is a 57 y.o. male with a cT1-2N1M0 p16+ SCC of the left base of tongue, with > 3 cm cervical LN.    CT of the neck and chest from 8/31/2022 showed:No specific evidence of recurrent or new metastatic disease in the neck. A previously noted enlarged left level 2 lymph node is decreased in size from recent PET/CT. There is some persistent asymmetry of the left tongue base, stable from comparison, otherwise nonspecific. Consider correlation with direct exam, as indicated.  No evidence of acute finding or metastatic disease in the chest.     He is scheduled with Dr. Zambrano March 3, 2023 with a CT of the neck and chest at that time.  He will continue to follow with Dr. Manuel.  Labs from today are pending.  I  will review when available.    F/u 6 months with CBC, CMP and TSH on return.          Della Kay APRN  [facility]    11/29/2022

## 2022-11-30 DIAGNOSIS — M50.122 CERVICAL DISC DISORDER AT C5-C6 LEVEL WITH RADICULOPATHY: ICD-10-CM

## 2022-11-30 RX ORDER — GABAPENTIN 100 MG/1
100 CAPSULE ORAL 3 TIMES DAILY
Qty: 90 CAPSULE | Refills: 0 | Status: SHIPPED | OUTPATIENT
Start: 2022-11-30 | End: 2023-03-03

## 2022-11-30 NOTE — TELEPHONE ENCOUNTER
Caller:  Martin hawk    Relationship: self  Best call back number:[phone]    Requested Prescriptions:   Requested Prescriptions     Pending Prescriptions Disp Refills   • gabapentin (NEURONTIN) 100 MG capsule 90 capsule 0     Sig: Take 1 capsule by mouth 3 (Three) Times a Day        Pharmacy where request should be sent:   pharmacy    Additional details provided by patient: 1 day left    Does the patient have less than a 3 day supply:  [x] Yes  [] No    Would you like a call back once the refill request has been completed: [x] Yes [] No    If the office needs to give you a call back, can they leave a voicemail: [x] Yes [] No    Cecy Stanford Rep   11/30/22 08:28 EST

## 2023-01-25 ENCOUNTER — OFFICE VISIT (OUTPATIENT)
Dept: ORTHOPEDIC SURGERY | Facility: CLINIC | Age: 58
End: 2023-01-25
Payer: COMMERCIAL

## 2023-01-25 VITALS
WEIGHT: 234 LBS | HEIGHT: 73 IN | SYSTOLIC BLOOD PRESSURE: 124 MMHG | BODY MASS INDEX: 31.01 KG/M2 | DIASTOLIC BLOOD PRESSURE: 86 MMHG

## 2023-01-25 DIAGNOSIS — M17.11 PRIMARY OSTEOARTHRITIS OF RIGHT KNEE: ICD-10-CM

## 2023-01-25 DIAGNOSIS — M25.552 LEFT HIP PAIN: Primary | ICD-10-CM

## 2023-01-25 PROCEDURE — 99214 OFFICE O/P EST MOD 30 MIN: CPT | Performed by: ORTHOPAEDIC SURGERY

## 2023-01-25 NOTE — PROGRESS NOTES
[facility] Note    Subjective     Chief Complaint   Patient presents with   • Left Hip - Pain        HPI    Nicholas Renee is a 57 y.o. male who presents with new problem of: left hip pain.  Onset: atraumatic and gradual in nature. The issue has been ongoing for 1 month(s). Pain is a 8/10 on the pain scale. Pain is described as aching. Associated symptoms include pain and stiffness. The pain is worse with walking, climbing stairs, working, leisure and any movement of the joint; resting, sitting, heat and lying down improve the pain. Previous treatments have included: NSAIDS.  He might of strained the hip back around [place], when he hurt his back when he was getting off of a tractor.  The pain is actually improved since he seen a chiropractor about 3 weeks ago, and today has been a very good day with regards to pain relief.  No numbness or tingling.  Ambulatory without external aids.    I have reviewed the following portions of the patient's history and agree with: History of Present Illness and Review of Systems    Patient Active Problem List   Diagnosis   • HTN (hypertension)   • Prediabetes   • Diverticulitis   • S/P low anterior colon resection   • Acute blood loss anemia, mild, asymptomatic   • Acute postoperative pain   • Primary osteoarthritis of right knee   • Oropharynx cancer (HCC)   • Cervical disc disorder at C5-C6 level with radiculopathy   • Connective tissue stenosis of neural canal of cervical region   • Primary osteoarthritis of left shoulder   • Rotator cuff tendinitis, right   • Primary osteoarthritis of both knees   • Cervical spondylosis     Past Medical History:   Diagnosis Date   • C. difficile diarrhea    • Diverticulitis    • Esophagitis    • GERD (gastroesophageal reflux disease)    • History of radiation therapy 02/09/2022    oropharynx, bilateral necks   • Hypertension    • Oropharynx cancer (HCC) 12/1/2021   • Osteoarthritis of both knees    • Wears  "glasses       Past Surgical History:   Procedure Laterality Date   • ABDOMINAL SURGERY      INFANT (6wk)- STOMACH BLOCKAGE    • COLON RESECTION N/A 2019    Procedure: COLON RESECTION LAPAROSCOPIC LOW ANTERIOR;  Surgeon: Magan Blum MD;  Location:  [facility];  Service: General   • COLONOSCOPY N/A 2017    Procedure: COLONOSCOPY;  Surgeon: Magan Blum MD;  Location:  [facility];  Service:    • ENDOSCOPY     • FOOT SURGERY      BILAT    • KNEE SURGERY Bilateral     x 5   • PERIPHERALLY INSERTED CENTRAL CATHETER INSERTION  2019    RIGHT ARM       Family History   Problem Relation Age of Onset   • Diabetes Father    • Heart disease Father    • Hypertension Father    • Heart attack Father    • Rheum arthritis Other    • No Known Problems Mother      Social History     Socioeconomic History   • Marital status:    Tobacco Use   • Smoking status: Former     Packs/day: 0.50     Years: 20.00     Pack years: 10.00     Types: Cigarettes     Quit date: 2003     Years since quittin.5   • Smokeless tobacco: Current     Types: Snuff   Vaping Use   • Vaping Use: Never used   Substance and Sexual Activity   • Alcohol use: Yes     Comment: socially, \"rarely\"   • Drug use: No   • Sexual activity: Defer      Current Outpatient Medications on File Prior to Visit   Medication Sig Dispense Refill   • acetaminophen (TYLENOL) 500 MG tablet Take 1,000 mg by mouth Every 4 (Four) Hours As Needed.     • cetirizine (zyrTEC) 10 MG tablet Take 10 mg by mouth Daily As Needed.     • Chlorhexidine Gluconate 4 % solution Apply  topically to the appropriate area as directed Daily. 237 mL 0   • Diclofenac Sodium (VOLTAREN) 1 % gel gel Apply 4 g topically to the appropriate area as directed 4 (Four) Times a Day As Needed.     • Dietary Management Product (Rheumate) capsule Take 1 capsule by mouth Daily. 90 capsule 1   • FIBER PO Take  by mouth.     • gabapentin (NEURONTIN) 100 MG capsule Take 1 capsule by mouth 3 " (Three) Times a Day. 90 capsule 0   • Gel Base gel 2 g 4 (Four) Times a Day. prilocaine 2%, lidocaine 10%, imipramine 3%, capsaicin 0.001% and mannitol 20% 240 g 5   • gemfibrozil (LOPID) 600 MG tablet Take 1 tablet by mouth 2 (Two) Times a Day. 180 tablet 3   • losartan (COZAAR) 100 MG tablet Take 1 tablet by mouth Daily. 90 tablet 3   • meloxicam (MOBIC) 15 MG tablet Take 1 tablet by mouth Daily. 30 tablet 3   • METAMUCIL FIBER PO      • Multiple Vitamins-Minerals (MULTIVITAMIN ADULT PO) Take 1 tablet by mouth Daily.     • mupirocin (BACTROBAN) 2 % ointment Apply into the nostril(s) as directed by provider 2 (Two) Times a Day as directed. 22 g 0   • pantoprazole (PROTONIX) 40 MG EC tablet Take 1 tablet by mouth 2 (Two) Times a Day. 180 tablet 3   • pilocarpine (SALAGEN) 5 MG tablet Take 1 tablet by mouth 3 (Three) Times a Day As Needed. 60 tablet 1   • Probiotic Product (Align) capsule      • triamcinolone (KENALOG) 0.1 % cream Apply a thin layer topically to the affected area(s) as directed 2 times per day 80 g 3   • trimethoprim-polymyxin b (POLYTRIM) 82947-1.1 UNIT/ML-% ophthalmic solution Instill 1 drop into affected eye(s) as directed by provider every 6 hours 10 mL 1   • vitamin B-6 (PYRIDOXINE) 100 MG tablet Take 1 tablet by mouth Daily. 30 tablet 0   • gabapentin (NEURONTIN) 100 MG capsule Take 1 capsule by mouth 3 (Three) Times a Day 90 capsule 0     No current facility-administered medications on file prior to visit.      Allergies   Allergen Reactions   • Levaquin [Levofloxacin] Myalgia   • Alpha-Lipoic Acid Rash   • Sulfa Antibiotics Hives and Rash        Review of Systems   Constitutional: Negative for activity change, appetite change, chills, diaphoresis, fatigue, fever and unexpected weight change.   HENT: Negative for congestion, dental problem, drooling, ear discharge, ear pain, facial swelling, hearing loss, mouth sores, nosebleeds, postnasal drip, rhinorrhea, sinus pressure, sneezing, sore  "throat, tinnitus, trouble swallowing and voice change.    Eyes: Negative for photophobia, pain, discharge, redness, itching and visual disturbance.   Respiratory: Negative for apnea, cough, choking, chest tightness, shortness of breath, wheezing and stridor.    Cardiovascular: Negative for chest pain, palpitations and leg swelling.   Gastrointestinal: Negative for abdominal distention, abdominal pain, anal bleeding, blood in stool, constipation, diarrhea, nausea, rectal pain and vomiting.   Endocrine: Negative for cold intolerance, heat intolerance, polydipsia, polyphagia and polyuria.   Genitourinary: Negative for decreased urine volume, difficulty urinating, dysuria, enuresis, flank pain, frequency, genital sores, hematuria and urgency.   Musculoskeletal: Positive for arthralgias. Negative for back pain, gait problem, joint swelling, myalgias, neck pain and neck stiffness.   Skin: Negative for color change, pallor, rash and wound.   Allergic/Immunologic: Negative for environmental allergies, food allergies and immunocompromised state.   Neurological: Negative for dizziness, tremors, seizures, syncope, facial asymmetry, speech difficulty, weakness, light-headedness, numbness and headaches.   Hematological: Negative for adenopathy. Does not bruise/bleed easily.   Psychiatric/Behavioral: Negative for agitation, behavioral problems, confusion, decreased concentration, dysphoric mood, hallucinations, self-injury, sleep disturbance and suicidal ideas. The patient is not nervous/anxious and is not hyperactive.         Objective      Physical Exam  /86   Ht 185.4 cm (72.99\")   Wt 106 kg (234 lb)   BMI 30.88 kg/m²     Body mass index is 30.88 kg/m².    General:   Mental Status:  Alert   Appearance: Cooperative, in no acute distress   Build and Nutrition: Well-nourished well-developed male   Orientation: Alert and oriented to person, place and time   Posture: Normal   Gait: Nonantalgic    Integument:   Left hip: No " skin lesions, no rash, no ecchymosis    Lower Extremity:   Left Hip:    Tenderness:  None    Swelling:  None    Crepitus:  None    Range of motion:  External Rotation: 40°       Internal Rotation: 40°       Flexion:  100°       Extension:  0°    Deformities:  None  Functional testing: Negative Stinchfield    No leg length discrepancy      Imaging/Studies      Imaging Results (Last 24 Hours)     Procedure Component Value Units Date/Time    XR Hip With or Without Pelvis 2 - 3 View Left [[id number]] Resulted: 01/25/23 1308     Updated: 01/25/23 1309    Narrative:      Left Hip Radiographs  Indication: left hip pain  Views: low AP pelvis and lateral of the left hip    Comparison: no prior studies available for review    Findings:   No acute bony abnormalities.  Mild degenerative changes.  No unusual bony   features.    XR Knee 4+ View Right [[id number]] Resulted: 01/25/23 1307     Updated: 01/25/23 1308    Narrative:      Right Knee Radiographs  Indication: right knee pain  Views: Standing AP's and skiers of both knees, with lateral and sunrise   views of the right knee    Comparison: 6/6/2022    Findings:   Advanced arthritis, with bone-on-bone contact in the medial and lateral   compartments, large tricompartmental osteophytes, retained screws in both   the proximal tibia and distal femur consistent with previous   reconstructive surgery, with no acute bony abnormalities.  Mild worsening   compared to the previous imaging.  No unusual bony features.          Assessment and Plan     Diagnoses and all orders for this visit:    1. Left hip pain (Primary)  -     XR Hip With or Without Pelvis 2 - 3 View Left    2. Primary osteoarthritis of right knee  -     XR Knee 4+ View Right        1. Left hip pain    2. Primary osteoarthritis of right knee        I reviewed my findings with the patient.  He has mild degenerative changes in the left hip, and I suspect that he is dealing with a hip strain.  He has had improvement with  seeing the chiropractor.  I will see him back for any worsening or problems for his hip.    However, he is scheduled for right total knee arthroplasty in March, and I will certainly see him then but sooner for any problems.    Return for surgery.      Gage Britt MD  01/25/23  13:22 EST

## 2023-02-08 ENCOUNTER — HOSPITAL ENCOUNTER (OUTPATIENT)
Dept: GENERAL RADIOLOGY | Facility: HOSPITAL | Age: 58
Discharge: HOME | End: 2023-02-08
Admitting: NURSE PRACTITIONER
Payer: COMMERCIAL

## 2023-02-08 ENCOUNTER — TRANSCRIBE ORDERS (OUTPATIENT)
Dept: GENERAL RADIOLOGY | Facility: HOSPITAL | Age: 58
End: 2023-02-08
Payer: COMMERCIAL

## 2023-02-08 DIAGNOSIS — M54.50 LOW BACK PAIN, UNSPECIFIED BACK PAIN LATERALITY, UNSPECIFIED CHRONICITY, UNSPECIFIED WHETHER SCIATICA PRESENT: Primary | ICD-10-CM

## 2023-02-08 DIAGNOSIS — M54.50 LOW BACK PAIN, UNSPECIFIED BACK PAIN LATERALITY, UNSPECIFIED CHRONICITY, UNSPECIFIED WHETHER SCIATICA PRESENT: ICD-10-CM

## 2023-02-08 PROCEDURE — 72100 X-RAY EXAM L-S SPINE 2/3 VWS: CPT

## 2023-03-03 ENCOUNTER — HOSPITAL ENCOUNTER (OUTPATIENT)
Dept: CT IMAGING | Facility: HOSPITAL | Age: 58
Discharge: HOME | End: 2023-03-03
Admitting: RADIOLOGY
Payer: COMMERCIAL

## 2023-03-03 ENCOUNTER — OFFICE VISIT (OUTPATIENT)
Dept: RADIATION ONCOLOGY | Facility: HOSPITAL | Age: 58
End: 2023-03-03
Payer: COMMERCIAL

## 2023-03-03 ENCOUNTER — HOSPITAL ENCOUNTER (OUTPATIENT)
Dept: RADIATION ONCOLOGY | Facility: HOSPITAL | Age: 58
Setting detail: RADIATION/ONCOLOGY SERIES
Discharge: HOME | End: 2023-03-03
Payer: COMMERCIAL

## 2023-03-03 VITALS
OXYGEN SATURATION: 97 % | BODY MASS INDEX: 32.11 KG/M2 | WEIGHT: 243.3 LBS | SYSTOLIC BLOOD PRESSURE: 135 MMHG | TEMPERATURE: 97 F | DIASTOLIC BLOOD PRESSURE: 85 MMHG | HEART RATE: 73 BPM | RESPIRATION RATE: 18 BRPM

## 2023-03-03 DIAGNOSIS — C10.9 OROPHARYNX CANCER: ICD-10-CM

## 2023-03-03 LAB — CREAT BLDA-MCNC: 1.3 MG/DL (ref 0.6–1.3)

## 2023-03-03 PROCEDURE — 82565 ASSAY OF CREATININE: CPT

## 2023-03-03 PROCEDURE — 70491 CT SOFT TISSUE NECK W/DYE: CPT

## 2023-03-03 PROCEDURE — G0463 HOSPITAL OUTPT CLINIC VISIT: HCPCS | Performed by: RADIOLOGY

## 2023-03-03 PROCEDURE — 25510000001 IOPAMIDOL 61 % SOLUTION: Performed by: RADIOLOGY

## 2023-03-03 PROCEDURE — 71260 CT THORAX DX C+: CPT

## 2023-03-03 RX ADMIN — IOPAMIDOL 85 ML: 612 INJECTION, SOLUTION INTRAVENOUS at 08:53

## 2023-03-03 NOTE — PROGRESS NOTES
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  He completed treatment with definitive chemoradiation in 2022.  He has done well over the past year.  His symptoms of xerostomia and poor sense of taste have gradually improved and at least for the past 6 months he has been able to maintain his weight.  He is a denies any major symptoms at this time, and is preparing for a right knee replacement later this month.  His only other recent symptoms was that he suffered a fall and has pain as a result of potentially a herniated disc.  He is currently undergoing physical therapy.  He states his symptoms have improved quite a bit since starting PT.    MEDICATIONS: Medication reconciliation for the patient was reviewed and confirmed in the electronic medical record.    Review of Systems   HENT:   Positive for trouble swallowing (getting better- dry foods).         Dry mouth  Approx. 50% taste back  lymphadema- occ   Psychiatric/Behavioral: Sleep disturbance: KPS 90%   All other systems reviewed and are negative.      Karnofsky score: 90     Physical Exam  Vitals and nursing note reviewed.   Constitutional:       General: He is not in acute distress.     Appearance: He is well-developed.   HENT:      Head: Normocephalic and atraumatic.      Mouth/Throat:      Comments: Normal-appearing posterior oropharynx.  Tonsils and tongue base are well visualized and there is no abnormal nodules, masses, or ulcerations.  Mucosa is appropriately moist.  On manual palpation I do not identify any  masses or abnormalities throughout the entire oral cavity or oropharynx.  Eyes:      Conjunctiva/sclera: Conjunctivae normal.      Pupils: Pupils are equal, round, and reactive to light.   Cardiovascular:      Rate and Rhythm: Normal rate and regular rhythm.      Heart sounds: No murmur heard.    No friction rub.   Pulmonary:      Effort: Pulmonary effort is normal.      Breath sounds: Normal breath sounds. No wheezing.   Abdominal:      General: Bowel sounds are normal. There is no distension.      Palpations: Abdomen is soft. There is no mass.      Tenderness: There is no abdominal tenderness.   Musculoskeletal:         General: Normal range of motion.      Cervical back: Normal range of motion and neck supple.   Lymphadenopathy:      Cervical: No cervical adenopathy.   Skin:     General: Skin is warm and dry.   Neurological:      Mental Status: He is alert and oriented to person, place, and time.   Psychiatric:         Behavior: Behavior normal.         Thought Content: Thought content normal.         Judgment: Judgment normal.         VITAL SIGNS:   Vitals:    03/03/23 0953   BP: 135/85   Pulse: 73   Resp: 18   Temp: 97 °F (36.1 °C)   TempSrc: Temporal   SpO2: 97%   Weight: 110 kg (243 lb 4.8 oz)   PainSc: 0-No pain         KPS 90%    IMAGING  I have personally reviewed the relevant imaging studies, as follows:  XR Spine Lumbar 2 or 3 View    Result Date: 2/8/2023  Impression: 1. Multilevel degenerative disc disease, most prominent in the mid and lower lumbar spine as described above. Electronically Signed: Eleazar Irby  2/8/2023 10:46 AM EST  Workstation ID: [id number]    CT Soft Tissue Neck With Contrast    Result Date: 3/3/2023  Impression: 1.No evidence of local recurrence or metastasis within neck or chest. 2.No acute process identified. Electronically Signed: Isaiah Hunt  3/3/2023 9:12 AM EST  Workstation ID: [id number]    CT Chest With Contrast Diagnostic    Result Date: 3/3/2023  Impression: 1.No  evidence of local recurrence or metastasis within neck or chest. 2.No acute process identified. Electronically Signed: Isaiah Hunt  3/3/2023 9:12 AM EST  Workstation ID: [id number]      The following portions of the patient's history were reviewed and updated as appropriate: allergies, current medications, past family history, past medical history, past social history, past surgical history and problem list.       Diagnoses and all orders for this visit:    1. Oropharynx cancer (HCC)    IMPRESSION: Mr. Renee is a 57-year-old gentleman with a history of a clinical T2N1 HPV mediated oropharyngeal cancer.  He is now 1 year out from completion of therapy and clinically has no signs of residual disease.  I counseled him related to the long-term survivorship plans, considering his scans earlier today are negative, and he has previously had a complete response by PET, then we will simply recommend at this point that he have annual CT scans.  I will see him back in 6 months, as he is already scheduled to be seen again by Dr. Manuel in the next 3 months for repeat endoscopy.    RECOMMENDATIONS: Return to clinic in 6 months    I spent a total of 30 minutes on todays visit, with more than 20 minutes in direct face to face communication, and the remainder of the time spent in reviewing the relevant history, records, available imaging, and for documentation.    Return in about 6 months (around 9/3/2023) for Office Visit.    Brandt Zambrano MD

## 2023-03-14 ENCOUNTER — PRE-ADMISSION TESTING (OUTPATIENT)
Dept: PREADMISSION TESTING | Facility: HOSPITAL | Age: 58
End: 2023-03-14
Payer: COMMERCIAL

## 2023-03-14 VITALS — BODY MASS INDEX: 32.26 KG/M2 | WEIGHT: 243.39 LBS | HEIGHT: 73 IN

## 2023-03-14 DIAGNOSIS — M17.11 PRIMARY OSTEOARTHRITIS OF RIGHT KNEE: ICD-10-CM

## 2023-03-14 LAB
ANION GAP SERPL CALCULATED.3IONS-SCNC: 8 MMOL/L (ref 5–15)
APTT PPP: 30.9 SECONDS (ref 22–39)
BASOPHILS # BLD AUTO: 0.01 10*3/MM3 (ref 0–0.2)
BASOPHILS NFR BLD AUTO: 0.3 % (ref 0–1.5)
BUN SERPL-MCNC: 19 MG/DL (ref 6–20)
BUN/CREAT SERPL: 17 (ref 7–25)
CALCIUM SPEC-SCNC: 9.5 MG/DL (ref 8.6–10.5)
CHLORIDE SERPL-SCNC: 102 MMOL/L (ref 98–107)
CO2 SERPL-SCNC: 27 MMOL/L (ref 22–29)
CREAT SERPL-MCNC: 1.12 MG/DL (ref 0.76–1.27)
CRP SERPL-MCNC: <0.3 MG/DL (ref 0–0.5)
DEPRECATED RDW RBC AUTO: 41.7 FL (ref 37–54)
DEPRECATED RDW RBC AUTO: 41.7 FL (ref 37–54)
EGFRCR SERPLBLD CKD-EPI 2021: 75.7 ML/MIN/1.73
EOSINOPHIL # BLD AUTO: 0.09 10*3/MM3 (ref 0–0.4)
EOSINOPHIL NFR BLD AUTO: 2.7 % (ref 0.3–6.2)
ERYTHROCYTE [DISTWIDTH] IN BLOOD BY AUTOMATED COUNT: 12.1 % (ref 12.3–15.4)
ERYTHROCYTE [DISTWIDTH] IN BLOOD BY AUTOMATED COUNT: 12.2 % (ref 12.3–15.4)
ERYTHROCYTE [SEDIMENTATION RATE] IN BLOOD: 5 MM/HR (ref 0–20)
GLUCOSE SERPL-MCNC: 87 MG/DL (ref 65–99)
HBA1C MFR BLD: 5.1 % (ref 4.8–5.6)
HCT VFR BLD AUTO: 34.1 % (ref 37.5–51)
HCT VFR BLD AUTO: 34.4 % (ref 37.5–51)
HGB BLD-MCNC: 12.1 G/DL (ref 13–17.7)
HGB BLD-MCNC: 12.2 G/DL (ref 13–17.7)
IMM GRANULOCYTES # BLD AUTO: 0.01 10*3/MM3 (ref 0–0.05)
IMM GRANULOCYTES NFR BLD AUTO: 0.3 % (ref 0–0.5)
INR PPP: 1 (ref 0.84–1.13)
LYMPHOCYTES # BLD AUTO: 0.93 10*3/MM3 (ref 0.7–3.1)
LYMPHOCYTES NFR BLD AUTO: 27.8 % (ref 19.6–45.3)
MCH RBC QN AUTO: 33.6 PG (ref 26.6–33)
MCH RBC QN AUTO: 34 PG (ref 26.6–33)
MCHC RBC AUTO-ENTMCNC: 35.2 G/DL (ref 31.5–35.7)
MCHC RBC AUTO-ENTMCNC: 35.8 G/DL (ref 31.5–35.7)
MCV RBC AUTO: 95 FL (ref 79–97)
MCV RBC AUTO: 95.6 FL (ref 79–97)
MONOCYTES # BLD AUTO: 0.3 10*3/MM3 (ref 0.1–0.9)
MONOCYTES NFR BLD AUTO: 9 % (ref 5–12)
NEUTROPHILS NFR BLD AUTO: 2.01 10*3/MM3 (ref 1.7–7)
NEUTROPHILS NFR BLD AUTO: 59.9 % (ref 42.7–76)
NRBC BLD AUTO-RTO: 0 /100 WBC (ref 0–0.2)
PLATELET # BLD AUTO: 225 10*3/MM3 (ref 140–450)
PLATELET # BLD AUTO: 281 10*3/MM3 (ref 140–450)
PMV BLD AUTO: 8.6 FL (ref 6–12)
PMV BLD AUTO: 9 FL (ref 6–12)
POTASSIUM SERPL-SCNC: 4.3 MMOL/L (ref 3.5–5.2)
PROTHROMBIN TIME: 13.1 SECONDS (ref 11.4–14.4)
QT INTERVAL: 374 MS
QTC INTERVAL: 382 MS
RBC # BLD AUTO: 3.59 10*6/MM3 (ref 4.14–5.8)
RBC # BLD AUTO: 3.6 10*6/MM3 (ref 4.14–5.8)
SODIUM SERPL-SCNC: 137 MMOL/L (ref 136–145)
WBC NRBC COR # BLD: 3.29 10*3/MM3 (ref 3.4–10.8)
WBC NRBC COR # BLD: 3.35 10*3/MM3 (ref 3.4–10.8)

## 2023-03-14 PROCEDURE — 86140 C-REACTIVE PROTEIN: CPT

## 2023-03-14 PROCEDURE — 36415 COLL VENOUS BLD VENIPUNCTURE: CPT

## 2023-03-14 PROCEDURE — 93005 ELECTROCARDIOGRAM TRACING: CPT

## 2023-03-14 PROCEDURE — 93010 ELECTROCARDIOGRAM REPORT: CPT | Performed by: INTERNAL MEDICINE

## 2023-03-14 PROCEDURE — 83036 HEMOGLOBIN GLYCOSYLATED A1C: CPT

## 2023-03-14 PROCEDURE — 85025 COMPLETE CBC W/AUTO DIFF WBC: CPT

## 2023-03-14 PROCEDURE — 80048 BASIC METABOLIC PNL TOTAL CA: CPT

## 2023-03-14 PROCEDURE — 85652 RBC SED RATE AUTOMATED: CPT

## 2023-03-14 PROCEDURE — 85027 COMPLETE CBC AUTOMATED: CPT

## 2023-03-14 PROCEDURE — 85610 PROTHROMBIN TIME: CPT

## 2023-03-14 PROCEDURE — 85730 THROMBOPLASTIN TIME PARTIAL: CPT

## 2023-03-14 RX ORDER — LANOLIN ALCOHOL/MO/W.PET/CERES
2000 CREAM (GRAM) TOPICAL WEEKLY
COMMUNITY

## 2023-03-14 NOTE — PAT
An arrival time for procedure was not provided during PAT visit. If patient had any questions or concerns about their arrival time, they were instructed to contact their surgeon/physician.  Additionally, if the patient referred to an arrival time that was acquired from their my chart account, patient was encouraged to verify that time with their surgeon/physician. Arrival times are NOT provided in Pre Admission Testing Department.    Patient viewed general PAT education video as instructed in their preoperative information received from their surgeon.  Patient stated the general PAT education video was viewed in its entirety and survey completed.  Copies of PAT general education handouts (Incentive Spirometry, Meds to Beds Program, Patient Belongings, Pre-op skin preparation instructions, Blood Glucose testing, Visitor policy, Surgery FAQ, Code H) distributed to patient if not printed. Education related to the PAT pass and skin preparation for surgery (if applicable) completed in PAT as a reinforcement to PAT education video. Patient instructed to return PAT pass provided today as well as completed skin preparation sheet (if applicable) on the day of procedure.     Additionally if patient had not viewed video yet but intended to view it at home or in our waiting area, then referred them to the handout with QR code/link provided during PAT visit.  Instructed patient to complete survey after viewing the video in its entirety.  Encouraged patient/family to read PAT general education handouts thoroughly and notify PAT staff with any questions or concerns. Patient verbalized understanding of all information and priority content.    Per Anesthesia Request, patient instructed not to take their ACE/ARB medications on the AM of surgery.    Patient instructed to drink 20 ounces of Gatorade and it needs to be completed 1 hour (for Main OR patients) or 2 hours (scheduled  section & [facility] patients) before given  arrival time for procedure (NO RED Gatorade)    Patient verbalized understanding.    Patient to apply Chlorhexadine wipes  to surgical area (as instructed) the night before procedure and the AM of procedure. Wipes provided.    [employer] (by [employer]) pain pump patient informational handout given to patient.  Instructed patient to watch [employer] Patient Education Video regarding Peripheral Nerve Catheter that will be in place for upcoming surgery unless contraindicated. The video can be accessed using QR code noted on handout.  Patient agreed to watch video.  Stressed to patient to call [facility] Nursing Hotline 24/7 if patient has any questions or concerns after discharge.     Patient denies any current skin issues.     Prescription for Bactroban (if prescribed) and Chlorhexidine shower called into patient's pharmacy or [facility] pharmacy by patient's surgeon.  Reinforced with patient to  the prescription from applicable pharmacy if they haven't already.  Verbal and written instructions given regarding proper use of the Bactroban (if prescribed) and Chlorhexidine to patient and/or famlily during PAT visit. Patient/family also instructed to complete checklist and return it to Pre-op on the day of surgery.  Patient and/or family verbalized understanding.    Clean catch urinalysis not indicated because patient denied recent urinary frequency, urinary urgency, burning/pain upon urination, or flank pain. No recent UTIs.    Discussed with patient options for receiving total joint replacement education and assessed patient's ability and preference. Joint Replacement Guide given to patient during PAT visit since not received a copy within the last year. Encouraged patient/family to read guide thoroughly and notify PAT staff with any questions or concerns. Handout provided directing patient to links to watch online videos related to joint replacement surgery on the [facility] website. The handout gives detailed  instructions for joining an online joint replacement class through Zoom or phone conference offered on Thursdays. Patient agreed to participate by watching videos online. Patient verbalized understanding of instructions and to complete the online learning tool survey. Encouraged to share information with family and/or . An overview of the joint replacement education was provided during the visit including general perioperative instructions that are routine for all surgical patients (PAT PASS, wipes, directions to pre-op, etc.).      EKG in chart.

## 2023-03-28 ENCOUNTER — ANESTHESIA (OUTPATIENT)
Dept: PERIOP | Facility: HOSPITAL | Age: 58
End: 2023-03-28
Payer: COMMERCIAL

## 2023-03-28 ENCOUNTER — HOSPITAL ENCOUNTER (OUTPATIENT)
Facility: HOSPITAL | Age: 58
Discharge: HOME | End: 2023-03-28
Attending: ORTHOPAEDIC SURGERY | Admitting: ORTHOPAEDIC SURGERY
Payer: COMMERCIAL

## 2023-03-28 ENCOUNTER — ANESTHESIA EVENT (OUTPATIENT)
Dept: PERIOP | Facility: HOSPITAL | Age: 58
End: 2023-03-28
Payer: COMMERCIAL

## 2023-03-28 ENCOUNTER — APPOINTMENT (OUTPATIENT)
Dept: GENERAL RADIOLOGY | Facility: HOSPITAL | Age: 58
End: 2023-03-28
Payer: COMMERCIAL

## 2023-03-28 ENCOUNTER — ANESTHESIA EVENT CONVERTED (OUTPATIENT)
Dept: ANESTHESIOLOGY | Facility: HOSPITAL | Age: 58
End: 2023-03-28
Payer: COMMERCIAL

## 2023-03-28 VITALS
DIASTOLIC BLOOD PRESSURE: 79 MMHG | WEIGHT: 243 LBS | RESPIRATION RATE: 16 BRPM | OXYGEN SATURATION: 96 % | HEIGHT: 73 IN | TEMPERATURE: 98.3 F | BODY MASS INDEX: 32.2 KG/M2 | HEART RATE: 57 BPM | SYSTOLIC BLOOD PRESSURE: 118 MMHG

## 2023-03-28 DIAGNOSIS — Z96.651 S/P TOTAL KNEE REPLACEMENT, RIGHT: Primary | ICD-10-CM

## 2023-03-28 DIAGNOSIS — M17.11 PRIMARY OSTEOARTHRITIS OF RIGHT KNEE: ICD-10-CM

## 2023-03-28 PROBLEM — E66.9 OBESITY: Status: ACTIVE | Noted: 2023-03-28

## 2023-03-28 PROBLEM — K21.9 GERD (GASTROESOPHAGEAL REFLUX DISEASE): Status: ACTIVE | Noted: 2023-03-28

## 2023-03-28 LAB — GLUCOSE BLDC GLUCOMTR-MCNC: 91 MG/DL (ref 70–130)

## 2023-03-28 PROCEDURE — C1713 ANCHOR/SCREW BN/BN,TIS/BN: HCPCS | Performed by: ORTHOPAEDIC SURGERY

## 2023-03-28 PROCEDURE — 97110 THERAPEUTIC EXERCISES: CPT

## 2023-03-28 PROCEDURE — 25010000002 PROPOFOL 10 MG/ML EMULSION: Performed by: ANESTHESIOLOGY

## 2023-03-28 PROCEDURE — 25010000002 ONDANSETRON PER 1 MG: Performed by: ANESTHESIOLOGY

## 2023-03-28 PROCEDURE — 82962 GLUCOSE BLOOD TEST: CPT

## 2023-03-28 PROCEDURE — 25010000002 ROPIVACAINE PER 1 MG: Performed by: ORTHOPAEDIC SURGERY

## 2023-03-28 PROCEDURE — 25010000002 ROPIVACAINE PER 1 MG: Performed by: NURSE ANESTHETIST, CERTIFIED REGISTERED

## 2023-03-28 PROCEDURE — C1776 JOINT DEVICE (IMPLANTABLE): HCPCS | Performed by: ORTHOPAEDIC SURGERY

## 2023-03-28 PROCEDURE — 25010000002 CEFAZOLIN IN DEXTROSE 2-4 GM/100ML-% SOLUTION: Performed by: ORTHOPAEDIC SURGERY

## 2023-03-28 PROCEDURE — 25010000002 DEXAMETHASONE PER 1 MG: Performed by: ANESTHESIOLOGY

## 2023-03-28 PROCEDURE — 73560 X-RAY EXAM OF KNEE 1 OR 2: CPT

## 2023-03-28 PROCEDURE — C1755 CATHETER, INTRASPINAL: HCPCS | Performed by: ORTHOPAEDIC SURGERY

## 2023-03-28 PROCEDURE — 97161 PT EVAL LOW COMPLEX 20 MIN: CPT

## 2023-03-28 DEVICE — LEGION CRUCIATE RETAINING OXINIUM                                    FEMORAL SIZE 8 RIGHT
Type: IMPLANTABLE DEVICE | Site: KNEE | Status: FUNCTIONAL
Brand: LEGION

## 2023-03-28 DEVICE — DEV CONTRL TISS STRATAFIX SYMM PDS PLUS VIL CT-1 45CM: Type: IMPLANTABLE DEVICE | Site: KNEE | Status: FUNCTIONAL

## 2023-03-28 DEVICE — CMT BONE PALACOS R HI/VISC 1X40: Type: IMPLANTABLE DEVICE | Site: KNEE | Status: FUNCTIONAL

## 2023-03-28 DEVICE — GEN II 7.5MM RESUR PAT 35MM
Type: IMPLANTABLE DEVICE | Site: KNEE | Status: FUNCTIONAL
Brand: GENESIS II

## 2023-03-28 DEVICE — GENESIS II NON-POROUS TIBIAL                                    BASEPLATE SIZE 7 RIGHT
Type: IMPLANTABLE DEVICE | Site: KNEE | Status: FUNCTIONAL
Brand: GENESIS II

## 2023-03-28 DEVICE — IMPLANTABLE DEVICE: Type: IMPLANTABLE DEVICE | Site: KNEE | Status: FUNCTIONAL

## 2023-03-28 DEVICE — LEGION CR HIGH FLEX XLPE SZ 7-8 12MM
Type: IMPLANTABLE DEVICE | Site: KNEE | Status: FUNCTIONAL
Brand: LEGION

## 2023-03-28 DEVICE — DEV CONTRL TISS STRATAFIX SPIRAL MNCRYL UD 3/0 PLS 60CM: Type: IMPLANTABLE DEVICE | Site: KNEE | Status: FUNCTIONAL

## 2023-03-28 RX ORDER — MORPHINE SULFATE 4 MG/ML
4 INJECTION, SOLUTION INTRAMUSCULAR; INTRAVENOUS
Status: DISCONTINUED | OUTPATIENT
Start: 2023-03-28 | End: 2023-03-28 | Stop reason: HOSPADM

## 2023-03-28 RX ORDER — PHENYLEPHRINE HCL IN 0.9% NACL 1 MG/10 ML
SYRINGE (ML) INTRAVENOUS AS NEEDED
Status: DISCONTINUED | OUTPATIENT
Start: 2023-03-28 | End: 2023-03-28 | Stop reason: SURG

## 2023-03-28 RX ORDER — OXYCODONE HYDROCHLORIDE 5 MG/1
5 TABLET ORAL EVERY 4 HOURS PRN
Status: DISCONTINUED | OUTPATIENT
Start: 2023-03-28 | End: 2023-03-28 | Stop reason: HOSPADM

## 2023-03-28 RX ORDER — ROPIVACAINE HYDROCHLORIDE 2 MG/ML
INJECTION, SOLUTION EPIDURAL; INFILTRATION; PERINEURAL CONTINUOUS
Status: DISCONTINUED | OUTPATIENT
Start: 2023-03-28 | End: 2023-03-28 | Stop reason: HOSPADM

## 2023-03-28 RX ORDER — PREGABALIN 150 MG/1
150 CAPSULE ORAL ONCE
Status: COMPLETED | OUTPATIENT
Start: 2023-03-28 | End: 2023-03-28

## 2023-03-28 RX ORDER — LIDOCAINE HYDROCHLORIDE 10 MG/ML
INJECTION, SOLUTION EPIDURAL; INFILTRATION; INTRACAUDAL; PERINEURAL AS NEEDED
Status: DISCONTINUED | OUTPATIENT
Start: 2023-03-28 | End: 2023-03-28 | Stop reason: SURG

## 2023-03-28 RX ORDER — FAMOTIDINE 20 MG/1
20 TABLET, FILM COATED ORAL
Status: COMPLETED | OUTPATIENT
Start: 2023-03-28 | End: 2023-03-28

## 2023-03-28 RX ORDER — OXYCODONE HYDROCHLORIDE 5 MG/1
5 TABLET ORAL EVERY 4 HOURS PRN
Qty: 40 TABLET | Refills: 0 | Status: SHIPPED | OUTPATIENT
Start: 2023-03-28

## 2023-03-28 RX ORDER — ROPIVACAINE HYDROCHLORIDE 5 MG/ML
INJECTION, SOLUTION EPIDURAL; INFILTRATION; PERINEURAL AS NEEDED
Status: DISCONTINUED | OUTPATIENT
Start: 2023-03-28 | End: 2023-03-28 | Stop reason: HOSPADM

## 2023-03-28 RX ORDER — ONDANSETRON 2 MG/ML
INJECTION INTRAMUSCULAR; INTRAVENOUS AS NEEDED
Status: DISCONTINUED | OUTPATIENT
Start: 2023-03-28 | End: 2023-03-28 | Stop reason: SURG

## 2023-03-28 RX ORDER — CEFAZOLIN SODIUM 2 G/100ML
2 INJECTION, SOLUTION INTRAVENOUS EVERY 8 HOURS
Status: DISCONTINUED | OUTPATIENT
Start: 2023-03-28 | End: 2023-03-28 | Stop reason: HOSPADM

## 2023-03-28 RX ORDER — DEXAMETHASONE SODIUM PHOSPHATE 4 MG/ML
INJECTION, SOLUTION INTRA-ARTICULAR; INTRALESIONAL; INTRAMUSCULAR; INTRAVENOUS; SOFT TISSUE AS NEEDED
Status: DISCONTINUED | OUTPATIENT
Start: 2023-03-28 | End: 2023-03-28 | Stop reason: SURG

## 2023-03-28 RX ORDER — SODIUM CHLORIDE 9 MG/ML
40 INJECTION, SOLUTION INTRAVENOUS AS NEEDED
Status: DISCONTINUED | OUTPATIENT
Start: 2023-03-28 | End: 2023-03-28 | Stop reason: HOSPADM

## 2023-03-28 RX ORDER — TRANEXAMIC ACID 10 MG/ML
1000 INJECTION, SOLUTION INTRAVENOUS ONCE
Status: COMPLETED | OUTPATIENT
Start: 2023-03-28 | End: 2023-03-28

## 2023-03-28 RX ORDER — PROPOFOL 10 MG/ML
VIAL (ML) INTRAVENOUS AS NEEDED
Status: DISCONTINUED | OUTPATIENT
Start: 2023-03-28 | End: 2023-03-28 | Stop reason: SURG

## 2023-03-28 RX ORDER — ONDANSETRON 2 MG/ML
4 INJECTION INTRAMUSCULAR; INTRAVENOUS EVERY 6 HOURS PRN
Status: DISCONTINUED | OUTPATIENT
Start: 2023-03-28 | End: 2023-03-28 | Stop reason: HOSPADM

## 2023-03-28 RX ORDER — POLYETHYLENE GLYCOL 3350 17 G/17G
17 POWDER, FOR SOLUTION ORAL DAILY
Qty: 255 G | Refills: 0 | Status: SHIPPED | OUTPATIENT
Start: 2023-03-28 | End: 2023-04-12

## 2023-03-28 RX ORDER — MELOXICAM 15 MG/1
15 TABLET ORAL DAILY
Status: DISCONTINUED | OUTPATIENT
Start: 2023-03-29 | End: 2023-03-28 | Stop reason: HOSPADM

## 2023-03-28 RX ORDER — LABETALOL HYDROCHLORIDE 5 MG/ML
10 INJECTION, SOLUTION INTRAVENOUS EVERY 4 HOURS PRN
Status: DISCONTINUED | OUTPATIENT
Start: 2023-03-28 | End: 2023-03-28 | Stop reason: HOSPADM

## 2023-03-28 RX ORDER — EPHEDRINE SULFATE 50 MG/ML
INJECTION INTRAVENOUS AS NEEDED
Status: DISCONTINUED | OUTPATIENT
Start: 2023-03-28 | End: 2023-03-28 | Stop reason: SURG

## 2023-03-28 RX ORDER — SODIUM CHLORIDE 0.9 % (FLUSH) 0.9 %
10 SYRINGE (ML) INJECTION EVERY 12 HOURS SCHEDULED
Status: DISCONTINUED | OUTPATIENT
Start: 2023-03-28 | End: 2023-03-28 | Stop reason: HOSPADM

## 2023-03-28 RX ORDER — SODIUM CHLORIDE 0.9 % (FLUSH) 0.9 %
1-10 SYRINGE (ML) INJECTION AS NEEDED
Status: DISCONTINUED | OUTPATIENT
Start: 2023-03-28 | End: 2023-03-28 | Stop reason: HOSPADM

## 2023-03-28 RX ORDER — SODIUM CHLORIDE 9 MG/ML
120 INJECTION, SOLUTION INTRAVENOUS CONTINUOUS
Status: DISCONTINUED | OUTPATIENT
Start: 2023-03-28 | End: 2023-03-28 | Stop reason: HOSPADM

## 2023-03-28 RX ORDER — MELOXICAM 15 MG/1
15 TABLET ORAL ONCE
Status: COMPLETED | OUTPATIENT
Start: 2023-03-28 | End: 2023-03-28

## 2023-03-28 RX ORDER — NALOXONE HCL 0.4 MG/ML
0.1 VIAL (ML) INJECTION
Status: DISCONTINUED | OUTPATIENT
Start: 2023-03-28 | End: 2023-03-28 | Stop reason: HOSPADM

## 2023-03-28 RX ORDER — MAGNESIUM HYDROXIDE 1200 MG/15ML
LIQUID ORAL AS NEEDED
Status: DISCONTINUED | OUTPATIENT
Start: 2023-03-28 | End: 2023-03-28 | Stop reason: HOSPADM

## 2023-03-28 RX ORDER — SODIUM CHLORIDE 0.9 % (FLUSH) 0.9 %
10 SYRINGE (ML) INJECTION AS NEEDED
Status: DISCONTINUED | OUTPATIENT
Start: 2023-03-28 | End: 2023-03-28 | Stop reason: HOSPADM

## 2023-03-28 RX ORDER — ONDANSETRON 4 MG/1
4 TABLET, FILM COATED ORAL EVERY 6 HOURS PRN
Status: DISCONTINUED | OUTPATIENT
Start: 2023-03-28 | End: 2023-03-28 | Stop reason: HOSPADM

## 2023-03-28 RX ORDER — ROPIVACAINE HYDROCHLORIDE 2 MG/ML
1 INJECTION, SOLUTION EPIDURAL; INFILTRATION; PERINEURAL CONTINUOUS
Start: 2023-03-28

## 2023-03-28 RX ORDER — BUPIVACAINE HYDROCHLORIDE 5 MG/ML
INJECTION, SOLUTION PERINEURAL
Status: COMPLETED | OUTPATIENT
Start: 2023-03-28 | End: 2023-03-28

## 2023-03-28 RX ORDER — MIDAZOLAM HYDROCHLORIDE 1 MG/ML
1 INJECTION INTRAMUSCULAR; INTRAVENOUS
Status: DISCONTINUED | OUTPATIENT
Start: 2023-03-28 | End: 2023-03-28 | Stop reason: HOSPADM

## 2023-03-28 RX ORDER — LIDOCAINE HYDROCHLORIDE 10 MG/ML
0.5 INJECTION, SOLUTION EPIDURAL; INFILTRATION; INTRACAUDAL; PERINEURAL ONCE AS NEEDED
Status: COMPLETED | OUTPATIENT
Start: 2023-03-28 | End: 2023-03-28

## 2023-03-28 RX ORDER — SODIUM CHLORIDE, SODIUM LACTATE, POTASSIUM CHLORIDE, CALCIUM CHLORIDE 600; 310; 30; 20 MG/100ML; MG/100ML; MG/100ML; MG/100ML
9 INJECTION, SOLUTION INTRAVENOUS CONTINUOUS PRN
Status: DISCONTINUED | OUTPATIENT
Start: 2023-03-28 | End: 2023-03-28

## 2023-03-28 RX ORDER — BUPIVACAINE HYDROCHLORIDE 2.5 MG/ML
INJECTION, SOLUTION EPIDURAL; INFILTRATION; INTRACAUDAL
Status: DISCONTINUED | OUTPATIENT
Start: 2023-03-28 | End: 2023-03-28 | Stop reason: SURG

## 2023-03-28 RX ORDER — ACETAMINOPHEN 500 MG
1000 TABLET ORAL ONCE
Status: COMPLETED | OUTPATIENT
Start: 2023-03-28 | End: 2023-03-28

## 2023-03-28 RX ORDER — MELOXICAM 15 MG/1
15 TABLET ORAL DAILY
Qty: 90 TABLET | Refills: 0 | Status: SHIPPED | OUTPATIENT
Start: 2023-03-28

## 2023-03-28 RX ORDER — NALOXONE HCL 0.4 MG/ML
0.4 VIAL (ML) INJECTION
Status: DISCONTINUED | OUTPATIENT
Start: 2023-03-28 | End: 2023-03-28 | Stop reason: HOSPADM

## 2023-03-28 RX ORDER — ASPIRIN 325 MG
325 TABLET, DELAYED RELEASE (ENTERIC COATED) ORAL DAILY
Status: DISCONTINUED | OUTPATIENT
Start: 2023-03-29 | End: 2023-03-28 | Stop reason: HOSPADM

## 2023-03-28 RX ORDER — CEFAZOLIN SODIUM 2 G/100ML
2 INJECTION, SOLUTION INTRAVENOUS ONCE
Status: COMPLETED | OUTPATIENT
Start: 2023-03-28 | End: 2023-03-28

## 2023-03-28 RX ORDER — ASPIRIN 325 MG
325 TABLET, DELAYED RELEASE (ENTERIC COATED) ORAL DAILY
Qty: 30 TABLET | Refills: 0 | Status: SHIPPED | OUTPATIENT
Start: 2023-03-29 | End: 2023-04-28

## 2023-03-28 RX ADMIN — TRANEXAMIC ACID 1000 MG: 10 INJECTION, SOLUTION INTRAVENOUS at 11:27

## 2023-03-28 RX ADMIN — TRANEXAMIC ACID 1000 MG: 10 INJECTION, SOLUTION INTRAVENOUS at 10:23

## 2023-03-28 RX ADMIN — ACETAMINOPHEN 1000 MG: 500 TABLET ORAL at 08:41

## 2023-03-28 RX ADMIN — LIDOCAINE HYDROCHLORIDE 0.5 ML: 10 INJECTION, SOLUTION EPIDURAL; INFILTRATION; INTRACAUDAL; PERINEURAL at 08:23

## 2023-03-28 RX ADMIN — LIDOCAINE HYDROCHLORIDE 50 MG: 10 INJECTION, SOLUTION EPIDURAL; INFILTRATION; INTRACAUDAL; PERINEURAL at 10:08

## 2023-03-28 RX ADMIN — SODIUM CHLORIDE, POTASSIUM CHLORIDE, SODIUM LACTATE AND CALCIUM CHLORIDE: 600; 310; 30; 20 INJECTION, SOLUTION INTRAVENOUS at 11:35

## 2023-03-28 RX ADMIN — FAMOTIDINE 20 MG: 20 TABLET ORAL at 08:41

## 2023-03-28 RX ADMIN — OXYCODONE 5 MG: 5 TABLET ORAL at 16:01

## 2023-03-28 RX ADMIN — Medication 50 MCG: at 11:05

## 2023-03-28 RX ADMIN — CEFAZOLIN SODIUM 2 G: 2 INJECTION, SOLUTION INTRAVENOUS at 10:17

## 2023-03-28 RX ADMIN — PROPOFOL 50 MG: 10 INJECTION, EMULSION INTRAVENOUS at 10:08

## 2023-03-28 RX ADMIN — MELOXICAM 15 MG: 15 TABLET ORAL at 08:41

## 2023-03-28 RX ADMIN — EPHEDRINE SULFATE 5 MG: 50 INJECTION INTRAVENOUS at 11:38

## 2023-03-28 RX ADMIN — EPHEDRINE SULFATE 5 MG: 50 INJECTION INTRAVENOUS at 11:42

## 2023-03-28 RX ADMIN — Medication 50 MCG: at 11:14

## 2023-03-28 RX ADMIN — Medication 2 G: at 17:29

## 2023-03-28 RX ADMIN — Medication 50 MCG: at 11:22

## 2023-03-28 RX ADMIN — PROPOFOL 60 MCG/KG/MIN: 10 INJECTION, EMULSION INTRAVENOUS at 10:15

## 2023-03-28 RX ADMIN — SODIUM CHLORIDE, POTASSIUM CHLORIDE, SODIUM LACTATE AND CALCIUM CHLORIDE 9 ML/HR: 600; 310; 30; 20 INJECTION, SOLUTION INTRAVENOUS at 08:23

## 2023-03-28 RX ADMIN — BUPIVACAINE HYDROCHLORIDE 5 ML: 2.5 INJECTION, SOLUTION EPIDURAL; INFILTRATION; INTRACAUDAL at 12:25

## 2023-03-28 RX ADMIN — Medication 50 MCG: at 11:33

## 2023-03-28 RX ADMIN — PROPOFOL 50 MG: 10 INJECTION, EMULSION INTRAVENOUS at 10:14

## 2023-03-28 RX ADMIN — ONDANSETRON 4 MG: 2 INJECTION INTRAMUSCULAR; INTRAVENOUS at 11:45

## 2023-03-28 RX ADMIN — BUPIVACAINE HYDROCHLORIDE 1.8 ML: 5 INJECTION, SOLUTION PERINEURAL at 10:13

## 2023-03-28 RX ADMIN — DEXAMETHASONE SODIUM PHOSPHATE 4 MG: 4 INJECTION, SOLUTION INTRAMUSCULAR; INTRAVENOUS at 11:45

## 2023-03-28 RX ADMIN — PREGABALIN 150 MG: 150 CAPSULE ORAL at 08:41

## 2023-03-28 RX ADMIN — PROPOFOL 90 MCG/KG/MIN: 10 INJECTION, EMULSION INTRAVENOUS at 11:05

## 2023-03-28 RX ADMIN — Medication 1000 MG: at 12:32

## 2023-03-28 NOTE — OP NOTE
DATE OF PROCEDURE: 03/28/23    PREOPERATIVE DIAGNOSIS: right knee arthritis      POSTOPERATIVE DIAGNOSIS:  right knee arthritis    PROCEDURES PERFORMED:   right total knee arthroplasty with [employer] components (#8 cruciate retaining femur, #7 tibia, 12 mm polyethylene, with 35 three peg patella) with CORI robotic assistance     SURGEON: Gage Britt MD    ASSISTANT: Emy Mao PA-C  (Emy Mao PA-C was present and necessary for positioning, draping, retraction, instrumentation and closure.)    SPECIMENS: None    IMPLANTS:   Implant Name Type Inv. Item Serial No.  Lot No. LRB No. Used Action   CMT BONE PALACOS R HI/VISC 1X40 - [id number] Implant CMT BONE PALACOS R HI/VISC 1X40  [employer] [id number] Right 2 Implanted   CMT BONE PALACOS R HI/VISC 1X40 - [id number] Implant CMT BONE PALACOS R HI/VISC 1X40  [employer]  Right 1 Implanted   DEV CONTRL TISS STRATAFIX SPIRAL MNCRYL UD 3/0 PLS 60CM - [id number] Implant DEV CONTRL TISS STRATAFIX SPIRAL MNCRYL UD 3/0 PLS 60CM  [employer]  DIV OF J AND J [id number] Right 1 Implanted   DEV CONTRL TISS STRATAFIX SYMM PDS PLUS OWEN CT-1 45CM - [id number] Implant DEV CONTRL TISS STRATAFIX SYMM PDS PLUS OWEN CT-1 45CM  [employer]  DIV OF J AND J [id number] Right 1 Implanted   PATELLA RESRF GEN2 7.5X35MM - [id number] Implant PATELLA RESRF GEN2 7.5X35MM  [employer] [id number] Right 1 Implanted   COMP FEM/KN LEGION OXINIUM CR SZ8 RT - [id number] Implant COMP FEM/KN LEGION OXINIUM CR SZ8 RT  [employer] [id number] Right 1 Implanted   BASE TIB/KN GEN2 NONPOR TI SZ7 RT - [id number] Implant BASE TIB/KN GEN2 NONPOR TI SZ7 RT  [employer] [id number] Right 1 Implanted   INSRT ART/KN LEGION CR HF XLPE SZ7TO8 12MM - [id number] Implant INSRT ART/KN LEGION CR HF XLPE SZ7TO8 12MM  [employer] [id number] Right 1 Implanted   DEV CONTRL TISS STRATAFIX SYMM PDS PLUS OWEN CT-1 45CM - [id number] Implant DEV CONTRL TISS STRATAFIX SYMM PDS PLUS  OWEN CT-1 45CM  [employer]  DIV OF KARTHIK REYES Right 1 Implanted         ANESTHESIA:  Spinal    STAFF:  Circulator: Elma Mcclelland, TIERNEY; Jessica Soto RN  Scrub Person: Eleazar Vasques Amy  Nursing Assistant: Adal Garcia PCT  Assistant: Emy Mao PA-C    TOURNIQUET TIME: 15 minutes    ESTIMATED BLOOD LOSS: 100 mL     COMPLICATIONS: None    PREOPERATIVE ANTIBIOTICS: Ancef 2 g    INDICATIONS: The patient is a 57 y.o. male with debilitating right knee pain secondary to posttraumatic arthritis that failed to improve in spite of conservative treatment .  Options have been discussed at length with the patient and the patient has had an extended course of conservative treatment without long-term benefit. The patient has reached the point where the patient desires total knee arthroplasty surgery and understands the risks, benefits, and alternatives. Consent was obtained. Please see my office notes for details with regard to preoperative counseling and operative rationale.     DESCRIPTION OF PROCEDURE: The patient was positively identified in the preoperative holding area and brought to the operating suite and placed in a supine position. After adequate spinal anesthetic had been achieved, the right lower extremity was prepped and draped in the usual sterile fashion.  After application of a tourniquet to the right upper thigh, which was used during the procedure for a total 15 minutes during the cementation process only. Landmarks of the knee were identified and timeout procedure was performed to confirm the operative site, as well as other parameters. Following the sterile prep and drape, a skin incision was made just off the medial aspect of midline for a medial parapatellar approach. Following a sharp skin incision, dissection was carried down to the level of the extensor mechanism and a medial parapatellar arthrotomy was made and the patella was tucked into the lateral gutter.  Anterior horns of  the medial and lateral menisci were removed, and ACL transected.  Osteophytes were also removed.  Description of arthritis: Bone-on-bone contact in a tricompartmental fashion, with large tricompartmental osteophytes, especially  the patella and medially, with valgus alignment.    Blockchain robotic system was then employed to assist with preparation of the femoral and tibial bone surfaces.  Femoral and tibial arrays were placed, as well as markers in both the femur and tibia.  System was registered in a systematic fashion, and 3D models created of both the femoral and tibial aspects.  Range of motion and gap assessments were also performed, and implants were selected and appropriately sized and oriented both the femur and tibial aspects starting with the flexion gap, and then progressing to the extension gap.  Planning was made for a 8 cruciate retaining femoral component and a 7 tibial component with a 9 mm insert.  Once balancing had been achieved, distal femoral surface was then prepared with the CORI kash, followed by preparation for the 4-in-1 cutting block.  Proximal tibial cut was then performed in a guided fashion, posterior condyles were freed of osteophytes, and trial implants placed, namely a 8 cruciate retaining femur with a 7 tibia and a 11 mm trial insert.  Range of motion and gap assessments were again performed, and excellent balancing was noted.  The patella was then prepared for a 35 three peg patella which had excellent tracking.  Of note, the previous hardware did not impede any of the implants.    Therefore the trial components were removed, and preparations made for final placement, and final components were cemented in place with namely a #7 tibia, #8 cruciate retaining femur, and a 35 three peg patella with a trial 12 mm insert for cement compression. All the excess cement was removed from the bone implant interface and allowed to harden. Tourniquet was deflated. Hemostasis was obtained  with electrocautery. There was no brisk bleeding noted in the popliteal fossa in particular. Therefore, the knee was copiously irrigated as it was between major steps, and the final 12 mm insert was placed as this was deemed appropriate for the patient's anatomy with full flexion and extension and no instability and attention was then directed towards closure. The medial parapatellar arthrotomy was closed with #1 Vicryl in an interrupted figure-of-eight fashion in 4 strategic locations followed by oversewing this from proximal to distal with a #1 StrataFix symmetric, which nicely sealed the joint, followed by closure of the deep fascial layer with #1 Vicryl in a buried interrupted fashion, followed by closure of the subcutaneous layer with 2-0 Vicryl and the skin with 3-0 Stratafix in a running subcuticular fashion.  Adhesive wound closure dressing was applied followed by a sterile dressing with 4 x 4's, abdominal pad, soft roll and Ace wrap. The patient tolerated the procedure well and was brought to the recovery room in good condition.     PLAN:  1.  The patient will begin early range of motion and weight-bearing per the post total knee arthroplasty protocol.   2.  I anticipate brief hospitalization for initial rehabilitation and pain control followed by continued rehabilitation home health/outpatient physical therapy setting.  Patient may barrier for discharge later today if he is cleared medically and by physical therapy.  Follow-up in 3 weeks as planned.   3.  Postoperative medical management with Dr. Phelps.  4.  Aspirin will be utilized for DVT prophylaxis.       Gage Britt MD  03/28/23  12:10 EDT

## 2023-03-28 NOTE — CASE MANAGEMENT/SOCIAL WORK
Discharge Planning Assessment  [facility]     Patient Name: Nicholas Renee  MRN: [id number]  Today's Date: 3/28/2023    Admit Date: 3/28/2023    Plan: Home with [facility]PP[facility]   Discharge Needs Assessment     Row Name 03/28/23 1419       Living Environment    People in Home spouse;child(lora), adult;grandchild(lora)    Current Living Arrangements home    Potentially Unsafe Housing Conditions none    Primary Care Provided by self    Provides Primary Care For no one    Family Caregiver if Needed spouse    Able to Return to Prior Arrangements yes       Resource/Environmental Concerns    Resource/Environmental Concerns none    Transportation Concerns none       Transition Planning    Patient/Family Anticipates Transition to home with help/services    Patient/Family Anticipated Services at Transition none    Transportation Anticipated family or friend will provide       Discharge Needs Assessment    Readmission Within the Last 30 Days no previous admission in last 30 days    Equipment Currently Used at Home walker, rolling    Concerns to be Addressed denies needs/concerns at this time    Anticipated Changes Related to Illness none    Equipment Needed After Discharge none    Outpatient/Agency/Support Group Needs outpatient therapy    Provided Post Acute Provider List? Yes    Post Acute Provider List Outpatient Therapy    Provided Post Acute Provider Quality & Resource List? Yes    Delivered To Patient    Method of Delivery In person    [facility]               Discharge Plan     Row Name 03/28/23 1421       Plan    Plan Home with [facility] OPP[facility]    Plan Comments Spoke to patient at bedside. Lives with Soniya Renee in [facility]. IS independent with ADL's. No problems with [place] [employer] or medications. Has a rolling walker at home. No advanced directives. PCP is Giovanni Gibson MD. Plan is Home with [facility]PP[facility]. Elena with [facility] was notified and she will arrange OPPT and call patient if a  time for tomorrow. CM will continue to follow.    Final Discharge Disposition Code 01 - home or self-care              Continued Care and Services - Admitted Since 3/28/2023     Therapy     Service Provider Request Status Selected Services Address Phone Fax Patient Preferred    [facility] [place] Pending - No [facility] Sent N/A [address] [facility] [phone] [phone] --    [place] [facility] Pending - No Request Sent N/A [address] [address] [phone] --                 Demographic Summary     Row Name 03/28/23 1418       General Information    Admission Type same day    Arrived From PACU/recovery room    Referral Source admission list    Reason for Consult discharge planning    Preferred Language English       Contact Information    Permission Granted to Share Info With     Contact Information Obtained for                Functional Status     Row Name 03/28/23 1418       Functional Status    Usual Activity Tolerance good    Current Activity Tolerance good       Functional Status, IADL    Medications independent    Meal Preparation independent    Housekeeping independent    Laundry independent    Shopping independent       Mental Status    General Appearance WDL WDL       Mental Status Summary    Recent Changes in Mental Status/Cognitive Functioning no changes       Employment/    Employment Status employed full-time               Psychosocial    No documentation.                Abuse/Neglect    No documentation.                Legal    No documentation.                Substance Abuse    No documentation.                Patient Forms    No documentation.                   Orlando Morelos RN

## 2023-03-28 NOTE — CASE MANAGEMENT/SOCIAL WORK
Case Management Discharge Note      Final Note: Plan is [facility] at Home. Spouse will transport. Spoke with Elena at [facility] and they will see the patient at home this week then transition to OPPT due to staffing constraints at [facility]. Patient has a rolling walker at home.    Provided Post Acute Provider List?: Yes  Post Acute Provider List: Outpatient Therapy  Provided Post Acute Provider Quality & Resource List?: Yes  Delivered To: Patient  Method of Delivery: In person    Selected Continued Care - Admitted Since 3/28/2023     Destination    No services have been selected for the patient.              Durable Medical Equipment    No services have been selected for the patient.              Dialysis/Infusion    No services have been selected for the patient.              Home Medical Care    No services have been selected for the patient.              Therapy Coordination complete.    Service Provider Selected Services Address Phone Fax Patient Preferred    [facility] [address] [place] [id number] [phone] --          Community Resources    No services have been selected for the patient.              Community & DME    No services have been selected for the patient.                       Final Discharge Disposition Code: 01 - home or self-care

## 2023-03-28 NOTE — THERAPY EVALUATION
Patient Name: Nicholas Renee  : 1965    MRN: [id number]                              Today's Date: 3/28/2023       Admit Date: 3/28/2023    Visit Dx:     ICD-10-CM ICD-9-CM   1. S/P total knee replacement, right  Z96.651 V43.65   2. 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 difficile diarrhea    • Diverticulitis    • Esophagitis    • GERD (gastroesophageal reflux disease)    • History of radiation therapy 2022    oropharynx, bilateral necks   • Hypertension    • Oropharynx cancer (HCC) 2021   • Osteoarthritis of both knees    • Wears glasses      Past Surgical History:   Procedure Laterality Date   • ABDOMINAL SURGERY      INFANT (6wk)- STOMACH BLOCKAGE    • COLON RESECTION N/A 2019    Procedure: COLON RESECTION LAPAROSCOPIC LOW ANTERIOR;  Surgeon: Magan Blum MD;  Location:  [facility];  Service: General   • COLONOSCOPY N/A 2017    Procedure: COLONOSCOPY;  Surgeon: Magan Blum MD;  Location:  [facility];  Service:    • ENDOSCOPY     • FOOT SURGERY      BILAT    • KNEE SURGERY Bilateral     x 5   • PERIPHERALLY INSERTED CENTRAL CATHETER INSERTION  2019    right subclavian now removed      General Information     Row Name 23 0469          Physical Therapy Time and  Intention    Document Type evaluation  -     Mode of Treatment physical therapy  -     Row Name 03/28/23 1539          General Information    Patient Profile Reviewed yes  -     Prior Level of Function min assist:;all household mobility;community mobility;gait;transfer;ADL's;bed mobility  -     Existing Precautions/Restrictions fall;other (see comments)  adductor canal nerve cath  -     Barriers to Rehab none identified  -     Row Name 03/28/23 1539          Living Environment    People in Home child(lora), adult;spouse;grandchild(lora)  -     Row Name 03/28/23 1539          Home Main Entrance    Number of Stairs, Main Entrance one  -     Row Name 03/28/23 1539          Stairs Within Home, Primary    Number of Stairs, Within Home, Primary none  -     Row Name 03/28/23 1539          Cognition    Orientation Status (Cognition) oriented x 3  -     Row Name 03/28/23 1539          Safety Issues, Functional Mobility    Safety Issues Affecting Function (Mobility) insight into deficits/self-awareness;awareness of need for assistance;safety precaution awareness  -     Impairments Affecting Function (Mobility) balance;endurance/activity tolerance;pain;strength;sensation/sensory awareness;range of motion (ROM)  -           User Key  (r) = Recorded By, (t) = Taken By, (c) = Cosigned By    Initials Name Provider Type     Radha Sprague PT Physical Therapist               Mobility     Row Name 03/28/23 1540          Bed Mobility    Bed Mobility supine-sit  -     Supine-Sit [place] (Bed Mobility) supervision  -     Comment, (Bed Mobility) VC for line management  -     Row Name 03/28/23 1540          Transfers    Comment, (Transfers) VC for hand placement  -     Row Name 03/28/23 1540          Sit-Stand Transfer    Sit-Stand [place] (Transfers) contact guard  -     Assistive Device (Sit-Stand Transfers) walker, front-wheeled  -     Row Name 03/28/23 1540          Gait/Stairs  (Locomotion)    [place] Level (Gait) contact guard;2 person assist  -HP     Assistive Device (Gait) walker, front-wheeled  -HP     Ambulated day of surgery or within 4 hours of PACU discharge yes  -HP     Distance in Feet (Gait) 320  -HP     Deviations/Abnormal Patterns (Gait) bilateral deviations;base of support, narrow;weight shifting decreased;stride length decreased;gait speed decreased  -HP     Bilateral Gait Deviations forward flexed posture;heel strike decreased  -HP     [place] Level (Stairs) contact guard;2 person assist  -HP     Handrail Location (Stairs) both sides  -HP     Number of Steps (Stairs) 1  -HP     Ascending Technique (Stairs) step-to-step  -HP     Descending Technique (Stairs) step-to-step  -HP     Comment, (Gait/Stairs) Pt amb in halls with FWW aned CGAx2. No knee buckling noted. Step through gait pattern with narrowed LORRAINE and forward flexed posture. Pt navigated 1 step with CGAx2 and VC for sequencing. Activity limited by fatigue.  -     Row Name 03/28/23 1540          Mobility    Extremity Weight-bearing Status right lower extremity  -     Right Lower Extremity (Weight-bearing Status) weight-bearing as tolerated (WBAT)  -           User Key  (r) = Recorded By, (t) = Taken By, (c) = Cosigned By    Initials Name Provider Type     Radha Sprague PT Physical Therapist               Obj/Interventions     Row Name 03/28/23 1548          Range of Motion Comprehensive    General Range of Motion lower extremity range of motion deficits identified  -     Comment, General Range of Motion R knee ROM 8-80  -     Row Name 03/28/23 1540          Strength Comprehensive (MMT)    General Manual Muscle Testing (MMT) Assessment lower extremity strength deficits identified  -     Comment, General Manual Muscle Testing (MMT) Assessment Pt able to perform B active ankle DF and SLR  -     Row Name 03/28/23 154          Motor Skills    Therapeutic Exercise knee;ankle  -     Row Name  03/28/23 1546          Knee (Therapeutic Exercise)    Knee (Therapeutic Exercise) strengthening exercise;isometric exercises  -     Knee Isometrics (Therapeutic Exercise) bilateral;quad sets;10 repetitions  -     Knee Strengthening (Therapeutic Exercise) right;SLR (straight leg raise);LAQ (long arc quad);heel slides;10 repetitions  -     Row Name 03/28/23 1546          Ankle (Therapeutic Exercise)    Ankle (Therapeutic Exercise) AROM (active range of motion)  -     Ankle AROM (Therapeutic Exercise) bilateral;dorsiflexion;plantarflexion;10 repetitions  -     Row Name 03/28/23 1546          Balance    Balance Assessment sitting static balance;sitting dynamic balance;sit to stand dynamic balance;standing static balance;standing dynamic balance  -     Static Sitting Balance standby assist  -     Dynamic Sitting Balance standby assist  -     Position, Sitting Balance sitting edge of bed  -     Static Standing Balance contact guard  -     Dynamic Standing Balance contact guard  -     Position/Device Used, Standing Balance supported  -     Balance Interventions sitting;standing;sit to stand;occupation based/functional task  -     Row Name 03/28/23 1546          Sensory Assessment (Somatosensory)    Sensory Assessment (Somatosensory) LE sensation intact  -           User Key  (r) = Recorded By, (t) = Taken By, (c) = Cosigned By    Initials Name Provider Type     Radha Sprague, PT Physical Therapist               Goals/Plan     Row Name 03/28/23 0319          Bed Mobility Goal 1 (PT)    Activity/Assistive Device (Bed Mobility Goal 1, PT) sit to supine/supine to sit  -     [place] Level/Cues Needed (Bed Mobility Goal 1, PT) modified independence  -     Time Frame (Bed Mobility Goal 1, PT) long term goal (LTG);3 days  -     Progress/Outcomes (Bed Mobility Goal 1, PT) goal ongoing  -     Row Name 03/28/23 9992          Transfer Goal 1 (PT)    Activity/Assistive Device (Transfer Goal  1, PT) sit-to-stand/stand-to-sit  -HP     [place] Level/Cues Needed (Transfer Goal 1, PT) modified independence  -HP     Time Frame (Transfer Goal 1, PT) long term goal (LTG);3 days  -HP     Progress/Outcome (Transfer Goal 1, PT) goal ongoing  -     Row Name 03/28/23 1550          Gait Training Goal 1 (PT)    Activity/Assistive Device (Gait Training Goal 1, PT) gait (walking locomotion)  -HP     [place] Level (Gait Training Goal 1, PT) modified independence  -HP     Distance (Gait Training Goal 1, PT) 500  -HP     Time Frame (Gait Training Goal 1, PT) long term goal (LTG);3 days  -HP     Progress/Outcome (Gait Training Goal 1, PT) goal ongoing  -     Row Name 03/28/23 1550          ROM Goal 1 (PT)    ROM Goal 1 (PT) R knee ROM 0-90  -HP     Time Frame (ROM Goal 1, PT) long-term goal (LTG);3 days  -HP     Progress/Outcome (ROM Goal 1, PT) goal ongoing  -     Row Name 03/28/23 1550          Stairs Goal 1 (PT)    Activity/Assistive Device (Stairs Goal 1, PT) ascending stairs;descending stairs  -HP     [place] Level/Cues Needed (Stairs Goal 1, PT) modified independence  -HP     Number of Stairs (Stairs Goal 1, PT) 1  -HP     Time Frame (Stairs Goal 1, PT) long term goal (LTG);3 days  -HP     Progress/Outcome (Stairs Goal 1, PT) goal ongoing  -     Row Name 03/28/23 1550          Therapy Assessment/Plan (PT)    Planned Therapy Interventions (PT) balance training;bed mobility training;gait training;home exercise program;patient/family education;transfer training;stretching;strengthening;stair training;ROM (range of motion)  -HP           User Key  (r) = Recorded By, (t) = Taken By, (c) = Cosigned By    Initials Name Provider Type    HP Radha Sprague PT Physical Therapist               Clinical Impression     Row Name 03/28/23 1544          Pain    Pretreatment Pain Rating 2/10  -HP     Posttreatment Pain Rating 3/10  -HP     Pain Location - Side/Orientation Right  -HP     Pain Location generalized   -HP     Pain Location - knee  -HP     Pain Intervention(s) Repositioned;Cold applied;Ambulation/increased activity;Elevated  -HP     Row Name 03/28/23 1547          Plan of Care Review    Plan of Care Reviewed With patient;spouse  -HP     Progress no change  -HP     Outcome Evaluation PT eval complete. Pt amb in halls with FWW and CGAx2. Pt navigated 1 step with CGAx2. No knee buckling or LOB noted. HEP and precautions reviewed with pt. Recommend d/c home with assist and OPPT. Pt cleared to d/c today from PT standpoint.  -HP     Row Name 03/28/23 1547          Therapy Assessment/Plan (PT)    Rehab Potential (PT) good, to achieve stated therapy goals  -HP     Criteria for Skilled Interventions Met (PT) yes;meets criteria;skilled treatment is necessary  -HP     Therapy Frequency (PT) 2 times/day  -HP     Row Name 03/28/23 1547          Vital Signs    Pre Systolic BP Rehab --  VSS  -HP     Pre Patient Position Supine  -HP     Intra Patient Position Standing  -HP     Post Patient Position Sitting  -HP     Row Name 03/28/23 1547          Positioning and Restraints    Pre-Treatment Position in bed  -HP     Post Treatment Position chair  -HP     In Chair notified nsg;reclined;sitting;call light within reach;encouraged to call for assist;exit alarm on;with family/caregiver;legs elevated;waffle cushion;compression device  -HP           User Key  (r) = Recorded By, (t) = Taken By, (c) = Cosigned By    Initials Name Provider Type    HP Radha Sprague, PT Physical Therapist               Outcome Measures     Row Name 03/28/23 4236          How much help from another person do you currently need...    Turning from your back to your side while in flat bed without using bedrails? 4  -HP     Moving from lying on back to sitting on the side of a flat bed without bedrails? 4  -HP     Moving to and from a bed to a chair (including a wheelchair)? 3  -HP     Standing up from a chair using your arms (e.g., wheelchair, bedside chair)? 3   -HP     Climbing 3-5 steps with a railing? 3  -HP     To walk in hospital room? 3  -HP     AM-PAC 6 Clicks Score (PT) 20  -HP     Highest level of mobility 6 --> Walked 10 steps or more  -     Row Name 03/28/23 1550          PADD    Diagnosis 1  -HP     Gender 2  -     Age Group 1  -HP     Gait Distance 1  -HP     Assist Level 1  -HP     Home Support 3  -HP     PADD Score 10  -HP     Patient Preference home with outpatient rehab  -     Prediction by PADD Score directly home (with home health or out-patient rehab)  -     Row Name 03/28/23 1550          Functional Assessment    Outcome Measure Options AM-PAC 6 Clicks Basic Mobility (PT);PADD  -HP           User Key  (r) = Recorded By, (t) = Taken By, (c) = Cosigned By    Initials Name Provider Type     Radha Sprague, ISAEL Physical Therapist                             Physical Therapy Education     Title: PT OT SLP Therapies (Done)     Topic: Physical Therapy (Done)     Point: Mobility training (Done)     Learning Progress Summary           Patient Acceptance, E,D, VU,DU by  at 3/28/2023 1554                   Point: Home exercise program (Done)     Learning Progress Summary           Patient Acceptance, E,D, VU,DU by  at 3/28/2023 1554                   Point: Body mechanics (Done)     Learning Progress Summary           Patient Acceptance, E,D, VU,DU by  at 3/28/2023 1554                   Point: Precautions (Done)     Learning Progress Summary           Patient Acceptance, E,D, VU,DU by  at 3/28/2023 1554                               User Key     Initials Effective Dates Name Provider Type Discipline     06/01/21 -  Radha Sprague, ISAEL Physical Therapist PT              PT Recommendation and Plan  Planned Therapy Interventions (PT): balance training, bed mobility training, gait training, home exercise program, patient/family education, transfer training, stretching, strengthening, stair training, ROM (range of motion)  Plan of Care Reviewed With:  patient, spouse  Progress: no change  Outcome Evaluation: PT eval complete. Pt amb in halls with FWW and CGAx2. Pt navigated 1 step with CGAx2. No knee buckling or LOB noted. HEP and precautions reviewed with pt. Recommend d/c home with assist and OPPT. Pt cleared to d/c today from PT standpoint.     Time Calculation:    PT Charges     Row Name 03/28/23 1508             Time Calculation    Start Time 1508  -HP      PT Received On 03/28/23  -HP      PT Goal Re-Cert Due Date 04/07/23  -HP         Timed Charges    10162 - PT Therapeutic Exercise Minutes 9  -HP         Untimed Charges    PT Eval/Re-eval Minutes 47  -HP         Total Minutes    Timed Charges Total Minutes 9  -HP      Untimed Charges Total Minutes 47  -HP       Total Minutes 56  -HP            User Key  (r) = Recorded By, (t) = Taken By, (c) = Cosigned By    Initials Name Provider Type    HP Radha Sprague, PT Physical Therapist              Therapy Charges for Today     Code Description Service Date Service Provider Modifiers Qty    63785700219 HC PT THER PROC EA 15 MIN 3/28/2023 Radha Sprague, PT GP 1    68206274787 HC PT EVAL LOW COMPLEXITY 4 3/28/2023 Radha Sprague, PT GP 1    37944810251 HC PT THER SUPP EA 15 MIN 3/28/2023 Radha Sprague, PT GP 3          PT G-Codes  Outcome Measure Options: AM-PAC 6 Clicks Basic Mobility (PT), PADD  AM-PAC 6 Clicks Score (PT): 20  PT Discharge Summary  Anticipated Discharge Disposition (PT): home with assist, home with outpatient therapy services    Radha Sprague PT  3/28/2023

## 2023-03-28 NOTE — ANESTHESIA PREPROCEDURE EVALUATION
Anesthesia Evaluation     Patient summary reviewed and Nursing notes reviewed   no history of anesthetic complications:  NPO Solid Status: > 8 hours  NPO Liquid Status: > 2 hours           Airway   Mallampati: II  TM distance: >3 FB  Neck ROM: full  No difficulty expected  Dental - normal exam     Pulmonary     breath sounds clear to auscultation  (+) a smoker Former Abstained day of surgery,   Cardiovascular   Exercise tolerance: good (4-7 METS)    ECG reviewed  Rhythm: regular  Rate: normal    (+) hypertension well controlled 2 medications or greater,       Neuro/Psych  GI/Hepatic/Renal/Endo    (+) obesity,  GERD well controlled,  diabetes mellitus type 2 well controlled,     Musculoskeletal     Abdominal   (+) obese,     Abdomen: soft.   Substance History      OB/GYN          Other   arthritis, blood dyscrasia anemia,   history of cancer                    Anesthesia Plan    ASA 3     spinal     intravenous induction     Anesthetic plan, risks, benefits, and alternatives have been provided, discussed and informed consent has been obtained with: patient.    Plan discussed with CRNA.        CODE STATUS:

## 2023-03-28 NOTE — PLAN OF CARE
Goal Outcome Evaluation:  Plan of Care Reviewed With: patient, spouse        Progress: no change  Outcome Evaluation: PT eval complete. Pt amb in halls with FWW and CGAx2. Pt navigated 1 step with CGAx2. No knee buckling or LOB noted. HEP and precautions reviewed with pt. Recommend d/c home with assist and OPPT. Pt cleared to d/c today from PT standpoint.

## 2023-03-28 NOTE — ANESTHESIA POSTPROCEDURE EVALUATION
Patient: Nicholas Renee    Procedure Summary     Date: 03/28/23 [facility]    Anesthesia Start: 1005 Anesthesia Stop: 1222    Procedure: TOTAL KNEE ARTHROPLASTY WITH CORI ROBOT - RIGHT (Right: Knee) Diagnosis:       Primary osteoarthritis of right knee      (Primary osteoarthritis of right knee [M17.11])    Surgeons: Gage Britt MD Provider: Jhonathan King MD    Anesthesia Type: spinal ASA Status: 3          Anesthesia Type: spinal    Vitals  Vitals Value Taken Time   /76 03/28/23 1235   Temp 98 °F (36.7 °C) 03/28/23 1216   Pulse 49 03/28/23 1237   Resp 16 03/28/23 1216   SpO2 98 % 03/28/23 1237   Vitals shown include unvalidated device data.        Post Anesthesia Care and Evaluation    Patient location during evaluation: PACU  Patient participation: complete - patient participated  Level of consciousness: awake and alert  Pain score: 0  Pain management: adequate    Airway patency: patent  Anesthetic complications: No anesthetic complications  PONV Status: none  Cardiovascular status: hemodynamically stable and acceptable  Respiratory status: nonlabored ventilation, acceptable and nasal cannula  Hydration status: acceptable

## 2023-03-28 NOTE — ANESTHESIA PROCEDURE NOTES
Peripheral Block      Patient reassessed immediately prior to procedure    Reason for block: at surgeon's request and post-op pain management  Performed by  CRNA/CAA: Ed Gandhi CRNA  Preanesthetic Checklist  Completed: patient identified, IV checked, site marked, risks and benefits discussed, surgical consent, monitors and equipment checked, pre-op evaluation and timeout performed  Prep:  Pt Position: supine  Sterile barriers:cap, gloves, mask, sterile barriers and washed/disinfected hands  Prep: ChloraPrep  Patient monitoring: blood pressure monitoring, continuous pulse oximetry and EKG  Procedure  Performed under: spinal  Guidance:ultrasound guided    ULTRASOUND INTERPRETATION.  Using ultrasound guidance a 20 G gauge needle was placed in close proximity to the nerve, at which point, under ultrasound guidance anesthetic was injected in the area of the nerve and spread of the anesthesia was seen on ultrasound in close proximity thereto.  There were no abnormalities seen on ultrasound; a digital image was taken; and the patient tolerated the procedure with no complications. Images:still images obtained, printed/placed on chart    Laterality:right  Block Type:adductor canal block  Injection Technique:catheter  Needle Type:Tuohy and echogenic  Needle Gauge:18 G  Resistance on Injection: none  Catheter Size:20 G (20g)  Cath Depth at skin: 10 cm    Medications Used: bupivacaine PF (MARCAINE) 0.25 % injection - Injection   5 mL - 3/28/2023 12:25:00 PM      Post Assessment  Injection Assessment: negative aspiration for heme, incremental injection and no paresthesia on injection  Patient Tolerance:comfortable throughout block  Complications:no  Additional Notes  CATHETER   A high-frequency linear transducer, with sterile cover, was placed on the anterior mid-thigh (between the anterior superior iliac spine and patella). The transducer was then moved medially to identify the Sartorius muscle (Dorinda), Vastus Medialis  "muscle (VMM), Superficial Femoral Artery (SFA) and Vein. The transducer was then moved cephalad or caudad to position the SFA in the middle of the Dorinda. The insertion site was prepped and draped in sterile fashion. Skin and cutaneous tissue was infiltrated with 2-5 ml of 1% Lidocaine. Using ultrasound-guidance, an 18-gauge Contiplex Ultra 360 Touhy needle was advanced in plane from lateral to medial. Preservative-free normal saline was utilized for hydro-dissection of tissue, advancement of Touhy, and to confirm needle placement below the fascial plane of the Dorinda where the Nerve to the VMM is located. Local anesthetic (LA) 5 ml deposited here. The Touhy needle continues its path lateral to the SFA at the level of the Saphenous Nerve. The remainder of the LA was deposited at the 10-11 o'clock position of the SFA. This injection created a space between the Dorinda and the SFA. Aspiration every 5 ml to prevent intravascular injection. Injection was completed with negative aspiration of blood and negative intravascular injection. Injection pressures were normal with minimal resistance. A 20-gauge [employer]iplex Echo catheter was placed through the needle and advance out the tip of the Touhy 3-5 cm anterior to the SFA. The Touhy needle was then removed, and final catheter position verified at the 12 o'clock position to the SFA. The catheter was secured in the usual fashion with skin glue, benzoin, steri-strips, CHG tegaderm and label noting \"Nerve Block Catheter\". Jerk tape applied at yellow connector and catheter connection.           "

## 2023-03-28 NOTE — H&P
Pre-Op H&P  Nicholas Renee  [id number]  1965      Chief complaint: Right knee pain      Subjective:  Patient is a 57 y.o.male presents for scheduled surgery by Dr. Britt. He anticipates a TOTAL KNEE ARTHROPLASTY WITH CORI ROBOT - RIGHT today. His right knee has been painful for many years. He denies use of assistive device for ambulation or recent falls. The pain is worse with walking, climbing stairs, working, leisure and any movement of the joint; resting, sitting, heat and lying down improve the pain. Previous treatments have included: NSAIDS. Conservative treatments failed.      Review of Systems:  Constitutional-- No fever, chills or sweats. No fatigue.  CV-- No chest pain, palpitation or syncope. +HTN  Resp-- No SOB, cough, hemoptysis  Skin--No rashes or lesions      Allergies:   Allergies   Allergen Reactions    Alpha-Lipoic Acid Rash    Levaquin [Levofloxacin] Myalgia    Sulfa Antibiotics Hives and Rash         Home Meds:  Medications Prior to Admission   Medication Sig Dispense Refill Last Dose    acetaminophen (TYLENOL) 500 MG tablet Take 2 tablets by mouth Every 4 (Four) Hours As Needed.   3/27/2023    cetirizine (zyrTEC) 10 MG tablet Take 1 tablet by mouth Daily As Needed.   Past Week    Chlorhexidine Gluconate 4 % solution Apply  topically to the appropriate area as directed Daily. 237 mL 0 3/27/2023    gemfibrozil (LOPID) 600 MG tablet Take 1 tablet by mouth 2 (Two) Times a Day. 180 tablet 3 3/27/2023    losartan (COZAAR) 100 MG tablet Take 1 tablet by mouth Daily. 90 tablet 3 3/27/2023    meloxicam (MOBIC) 15 MG tablet Take 1 tablet by mouth Daily. 90 tablet 0 3/27/2023    METAMUCIL FIBER PO    3/27/2023    Multiple Vitamins-Minerals (MULTIVITAMIN ADULT PO) Take 1 tablet by mouth Daily.   3/27/2023    mupirocin (BACTROBAN) 2 % ointment Apply into the nostril(s) as directed by provider 2 (Two) Times a Day as directed. 22 g 0 3/28/2023 at 0630    pantoprazole (PROTONIX) 40 MG EC tablet Take 1 tablet  "by mouth 2 (Two) Times a Day. 180 tablet 3 3/27/2023    vitamin B-12 (CYANOCOBALAMIN) 1000 MCG tablet Take 2 tablets by mouth 1 (One) Time Per Week.   3/27/2023    pilocarpine (SALAGEN) 5 MG tablet Take 1 tablet by mouth 3 (Three) Times a Day As Needed. 60 tablet 1 More than a month         PMH:   Past Medical History:   Diagnosis Date    C. difficile diarrhea     Diverticulitis     Esophagitis     GERD (gastroesophageal reflux disease)     History of radiation therapy 2022    oropharynx, bilateral necks    Hypertension     Oropharynx cancer (HCC) 2021    Osteoarthritis of both knees     Wears glasses      PSH:    Past Surgical History:   Procedure Laterality Date    ABDOMINAL SURGERY      INFANT (6wk)- STOMACH BLOCKAGE     COLON RESECTION N/A 2019    Procedure: COLON RESECTION LAPAROSCOPIC LOW ANTERIOR;  Surgeon: Magan Blum MD;  Location:  [facility];  Service: General    COLONOSCOPY N/A 2017    Procedure: COLONOSCOPY;  Surgeon: Magan Blum MD;  Location:  [facility];  Service:     ENDOSCOPY      FOOT SURGERY      BILAT     KNEE SURGERY Bilateral     x 5    PERIPHERALLY INSERTED CENTRAL CATHETER INSERTION  2019    right subclavian now removed       Immunization History:  Influenza:   Pneumococcal: No  Tetanus: No  Covid : x2    Social History:   Tobacco:   Social History     Tobacco Use   Smoking Status Former    Packs/day: 0.50    Years: 20.00    Pack years: 10.00    Types: Cigarettes    Quit date: 2003    Years since quittin.7   Smokeless Tobacco Former    Types: Snuff      Alcohol:     Social History     Substance and Sexual Activity   Alcohol Use Yes    Comment: socially, \"rarely\"         Physical Exam:BP (!) 157/101 (BP Location: Right arm, Patient Position: Lying)   Pulse 62   Temp 97.6 °F (36.4 °C) (Temporal)   Resp 18   Ht 185.4 cm (73\")   Wt 110 kg (243 lb)   SpO2 96%   BMI 32.06 kg/m²       General Appearance:    Alert, cooperative, no " distress, appears stated age   Head:    Normocephalic, without obvious abnormality, atraumatic   Lungs:     Clear to auscultation bilaterally, respirations unlabored    Heart:   Regular rate and rhythm, S1 and S2 normal    Abdomen:    Soft without tenderness   Extremities:   Extremities normal, atraumatic, no cyanosis or edema   Skin:   Skin color, texture, turgor normal, no rashes or lesions   Neurologic:   Grossly intact     Results Review:     LABS:  Lab Results   Component Value Date    WBC 3.35 (L) 03/14/2023    WBC 3.29 (L) 03/14/2023    HGB 12.1 (L) 03/14/2023    HGB 12.2 (L) 03/14/2023    HCT 34.4 (L) 03/14/2023    HCT 34.1 (L) 03/14/2023    MCV 95.6 03/14/2023    MCV 95.0 03/14/2023     03/14/2023     03/14/2023    NEUTROABS 2.01 03/14/2023    GLUCOSE 87 03/14/2023    BUN 19 03/14/2023    CREATININE 1.12 03/14/2023    EGFRIFNONA 74 02/08/2022     03/14/2023    K 4.3 03/14/2023     03/14/2023    CO2 27.0 03/14/2023    MG 1.7 02/08/2022    CALCIUM 9.5 03/14/2023    ALBUMIN 4.50 11/29/2022    AST 16 11/29/2022    ALT 14 11/29/2022    BILITOT 0.3 11/29/2022       RADIOLOGY:  Imaging Results (Last 72 Hours)       ** No results found for the last 72 hours. **            I reviewed the patient's new clinical results.    Cancer Staging (if applicable)  Cancer Patient: __ yes __no __unknown; If yes, clinical stage T:__ N:__M:__, stage group or __N/A      Impression: Primary osteoarthritis of right knee      Plan: TOTAL KNEE ARTHROPLASTY WITH CORI ROBOT - RIGHT      Nneka Jane, REBEKAH   3/28/2023   08:49 EDT      Agree with above - plan for right TKA    Gage Britt MD  03/28/23  09:15 EDT

## 2023-03-28 NOTE — H&P
Patient Name: Nicholas Renee  MRN: [id number]  : 1965  DOS: 3/28/2023    Attending: Gage Britt MD    Primary Care Provider: aJck Gibson MD      Chief complaint:  Right Knee Pain.    Subjective   Patient is a pleasant 57 y.o. male presented for scheduled surgery by .    Per his note ( The patient is a 57 y.o. male with debilitating right knee pain secondary to posttraumatic arthritis that failed to improve in spite of conservative treatment .  Options have been discussed at length with the patient and the patient has had an extended course of conservative treatment without long-term benefit. The patient has reached the point where the patient desires total knee arthroplasty surgery and understands the risks, benefits, and alternatives. Consent was obtained. Please see my office notes for details with regard to preoperative counseling and operative rationale. )    He underwent right total knee arthroplasty under spinal anesthesia, tolerated surgery well.  Adductor canal nerve block catheter was placed by acute pain service.    Seen in PACU, doing fairly well, no complains of nausea, vomiting, or shortness of breath.  Dorsum is are still under the effects of spinal anesthesia.    He has no history of DVT or PE.       Allergies   Allergen Reactions   • Alpha-Lipoic Acid Rash   • Levaquin [Levofloxacin] Myalgia   • Sulfa Antibiotics Hives and Rash       Meds:  Medications Prior to Admission   Medication Sig Dispense Refill Last Dose   • acetaminophen (TYLENOL) 500 MG tablet Take 2 tablets by mouth Every 4 (Four) Hours As Needed.   3/27/2023   • cetirizine (zyrTEC) 10 MG tablet Take 1 tablet by mouth Daily As Needed.   Past Week   • Chlorhexidine Gluconate 4 % solution Apply  topically to the appropriate area as directed Daily. 237 mL 0 3/27/2023   • gemfibrozil (LOPID) 600 MG tablet Take 1 tablet by mouth 2 (Two) Times a Day. 180 tablet 3 3/27/2023   • losartan (COZAAR) 100 MG tablet Take 1  tablet by mouth Daily. 90 tablet 3 3/27/2023   • meloxicam (MOBIC) 15 MG tablet Take 1 tablet by mouth Daily. 90 tablet 0 3/27/2023   • METAMUCIL FIBER PO    3/27/2023   • Multiple Vitamins-Minerals (MULTIVITAMIN ADULT PO) Take 1 tablet by mouth Daily.   3/27/2023   • mupirocin (BACTROBAN) 2 % ointment Apply into the nostril(s) as directed by provider 2 (Two) Times a Day as directed. 22 g 0 3/28/2023 at 0630   • pantoprazole (PROTONIX) 40 MG EC tablet Take 1 tablet by mouth 2 (Two) Times a Day. 180 tablet 3 3/27/2023   • vitamin B-12 (CYANOCOBALAMIN) 1000 MCG tablet Take 2 tablets by mouth 1 (One) Time Per Week.   3/27/2023   • pilocarpine (SALAGEN) 5 MG tablet Take 1 tablet by mouth 3 (Three) Times a Day As Needed. 60 tablet 1 More than a month          Past Medical History:   Diagnosis Date   • C. difficile diarrhea    • Diverticulitis    • Esophagitis    • GERD (gastroesophageal reflux disease)    • History of radiation therapy 02/09/2022    oropharynx, bilateral necks   • Hypertension    • Oropharynx cancer (HCC) 12/01/2021   • Osteoarthritis of both knees    • Wears glasses      Past Surgical History:   Procedure Laterality Date   • ABDOMINAL SURGERY      INFANT (6wk)- STOMACH BLOCKAGE    • COLON RESECTION N/A 07/02/2019    Procedure: COLON RESECTION LAPAROSCOPIC LOW ANTERIOR;  Surgeon: Magan Blum MD;  Location: [facility] OR;  Service: General   • COLONOSCOPY N/A 07/11/2017    Procedure: COLONOSCOPY;  Surgeon: Magan Blum MD;  Location:  [facility];  Service:    • ENDOSCOPY     • FOOT SURGERY      BILAT    • KNEE SURGERY Bilateral     x 5   • PERIPHERALLY INSERTED CENTRAL CATHETER INSERTION  06/13/2019    right subclavian now removed     Family History   Problem Relation Age of Onset   • Diabetes Father    • Heart disease Father    • Hypertension Father    • Heart attack Father    • Rheum arthritis Other    • No Known Problems Mother      Social History     Tobacco Use   • Smoking status: Former      "Packs/day: 0.50     Years: 20.00     Pack years: 10.00     Types: Cigarettes     Quit date: 2003     Years since quittin.7   • Smokeless tobacco: Former     Types: Snuff   Vaping Use   • Vaping Use: Never used   Substance Use Topics   • Alcohol use: Yes     Comment: socially, \"rarely\"   • Drug use: No   .    Review of Systems  Pertinent items are noted in HPI, all other systems reviewed and negative    Vital Signs  /70   Pulse 60   Temp 98 °F (36.7 °C) (Temporal)   Resp 16   Ht 185.4 cm (73\")   Wt 110 kg (243 lb)   SpO2 97%   BMI 32.06 kg/m²     Physical Exam:    General Appearance:    Alert, cooperative, in no acute distress   Head:    Normocephalic, without obvious abnormality, atraumatic   Eyes:            Lids and lashes normal, conjunctivae and sclerae normal, no   icterus, no pallor, corneas clear    Ears:    Ears appear intact with no abnormalities noted   Throat:   No oral lesions, no thrush, oral mucosa moist   Neck:   No adenopathy, supple, trachea midline, no thyromegaly         Lungs:     Clear to auscultation,respirations regular, even and                   unlabored    Heart:    Regular rhythm and normal rate, normal S1 and S2, no       murmur, no gallop   Abdomen:     Normal bowel sounds, no masses, no organomegaly, soft        non-tender, non-distended, no guarding, no rebound                 tenderness   Genitalia:    Deferred   Extremities:  RLE, CDI dressing on knee, PNB cath present.    Pulses:   Pulses palpable and equal bilaterally   Skin:   No bleeding, bruising or rash   Neurologic:   Cranial nerves 2 - 12 grossly intact, decreased movement of the LE under SA effects when seen.      I reviewed the patient's new clinical results.             Invalid input(s): NEUTOPHILPCT,  EOSPCT        Invalid input(s): LABALBU, PROT  Lab Results   Component Value Date    HGBA1C 5.10 2023      Latest Reference Range & Units 23 10:39   Glucose 65 - 99 mg/dL 87 "   Sodium 136 - 145 mmol/L 137   Potassium 3.5 - 5.2 mmol/L 4.3   CO2 22.0 - 29.0 mmol/L 27.0   Chloride 98 - 107 mmol/L 102   Anion Gap 5.0 - 15.0 mmol/L 8.0   Creatinine 0.76 - 1.27 mg/dL 1.12   BUN 6 - 20 mg/dL 19   BUN/Creatinine Ratio 7.0 - 25.0  17.0   Calcium 8.6 - 10.5 mg/dL 9.5   eGFR >60.0 mL/min/1.73 75.7      [facility] Reference Range & Units 03/14/23 10:39   WBC 3.40 - 10.80 10*3/mm3  3.40 - 10.80 10*3/mm3 3.29 (L)  3.35 (L)   RBC 4.14 - 5.80 10*6/mm3  4.14 - 5.80 10*6/mm3 3.59 (L)  3.60 (L)   Hemoglobin 13.0 - 17.7 g/dL  13.0 - 17.7 g/dL 12.2 (L)  12.1 (L)   Hematocrit 37.5 - 51.0 %  37.5 - 51.0 % 34.1 (L)  34.4 (L)   RDW 12.3 - 15.4 %  12.3 - 15.4 % 12.1 (L)  12.2 (L)   MCV 79.0 - 97.0 fL  79.0 - 97.0 fL 95.0  95.6   MCH 26.6 - 33.0 pg  26.6 - 33.0 pg 34.0 (H)  33.6 (H)   MCHC 31.5 - 35.7 g/dL  31.5 - 35.7 g/dL 35.8 (H)  35.2   MPV 6.0 - 12.0 fL  6.0 - 12.0 fL 9.0  8.6   Platelets 140 - 450 10*3/mm3  140 - 450 10*3/mm3 281  225   (L): Data is abnormally low  (H): Data is abnormally high    Assessment and Plan:       S/P total knee replacement, right    HTN (hypertension)    Primary osteoarthritis of right knee    Primary osteoarthritis of both knees    Obesity    GERD (gastroesophageal reflux disease)      Plan  1. PT/OT,  Weight bearing as tolerated RLE  2. Pain control-prns, ACB cath with ropivacaine infusion.  3. IS-encourage  4. DVT proph- Mechanicals and ASA.  5. Bowel regimen  6. Resume home medications as appropriate  7. DC planning for home.     Patient is very motivated to work with physical therapy and achieve  mobility and pain control among other goals for possible discharge home later in the day.    We reviewed these goals and discussed with patient tracking  progress for the next few hours and if all is achieved to receive next antibiotic prophylactic dose and be discharged home.     We discussed medications and precriptions at time of discharge including DVT prophylaxis, pain control, and  bowel regimen.  All questions were answered .    Patient expressed understanding and agreement.wy.    Dragon disclaimer:  Part of this encounter note is an electronic transcription/translation of spoken language to printed text. The electronic translation of spoken language may permit erroneous, or at times, nonsensical words or phrases to be inadvertently transcribed; Although I have reviewed the note for such errors, some may still exist.    Rylee Phelps MD  03/28/23  12:37 EDT

## 2023-03-28 NOTE — ADDENDUM NOTE
Addendum  created 03/28/23 1703 by Jhonathan Alvarez CRNA    Cosign clinical note with attestation

## 2023-03-28 NOTE — DISCHARGE INSTRUCTIONS
EXOFIN CARING FOR YOUR WOUND    AFTER exofin Fusion SKIN CLOSURE SYSTEM HAS BEEN APPLIED    YOUR HEALTHCARE PROFESSIONAL HAS CHOSEN TO USE exofin Fusion SKIN CLOSURE SYSTEM TO CLOSE YOUR WOUND.    exocin Fusion is the combination of a mesh and liquid adhesive that allows the incision or wound to be held together during the healing process.    exocin Fusion should remain in place until your healthcare professional; has determined that adequate healing has occurred, which is usually anywhere between 7 to 14 days. In most cases, exofin Fusion is easily removed with little or no discomfort.    In the event that you notice that exofin Fusion is beginning to loosen or may be coming off, contact your healthcare professional.    The following information is provided to help you understand how to care for your incision and is based on the FDA-cleared product labeling.    You should always follow the instructions of your healthcare provider.    THINGS TO KNOW    BATHING OR SHOWERING    If directed by your healthcare professional, you may occasionally and briefly wet your incision or wound that was treated with exofin Fusion in the shower or bath. Do not soak or scrub your incision or wound. Do not swim or soak your incision or wound in water. After showering or bathing, gently blot your incision or wound dry with a soft towel. If a dry protective dressing is being used over exofin Fusion, it should be replaced with a fresh, dry protective dressing after showering or bathing as directed by your healthcare practitioner. Care should also be taken so that any tape that may be part of the dry protective dressing does not come into contact with exofin Fusion because when the tape is removed, it may also remove exofin Fusion.    WOUND HEALING  If you experience any redness, swelling, discomfort, warmth or pus, contact your healthcare professional and he or she will determine how your incision or wound is healing and take the  necessary steps to address any issues.    EXERCISE  Do not engage in strenuous exercise that may cause additional stress on your incision or wound. Follow your healthcare professional's guidance about when you can return to your normal activities.    OINTMENTS OR LIQUIDS  Topical ointments, liquids or any other products (other than dry bandages) should not be applied to the incision while exofin Fusion is in place. These may loosen exofin Fusion from the skin before it has completely healed.    REMOVING exofin Fusion  Your healthcare professional will determine when the healing process of your incision or wound has been completed and exofin Fusion is ready to be removed, which is usually between 7 to 14 days. When healing is complete, your healthcare professional will carefully peel off the exofin Fusion.    Prior to removal, do not scratch, rub or pick at the mesh. This may loosen the adhesive and mesh before the skin is healed.  In the event that you notice the exofin Fusion is beginning to loosen and may be coming off or comes off with the skin/wound, contract your healthcare professional.    For complete directions on the use of exofin Fusion, please refer to instructions for Use (IFU) included in the product packaging.  IF YOU HAVE ANY QUESTIONS OR CONCERNS ABOUT exofin Fusion PLEASE CONTACT YOUR HEALTHCARE PROFESSIONAL. InfuBLOCK - Patient Information    What is a pain pump?  InfuBLOCK is a postoperative, non-narcotic pain relief system that delivers local anesthetic to or near the surgical site. This is a pain minimizing therapy that delivers an anesthetic (numbing) medicine to the nerve.    The InfuBLOCK pain pump will continuously deliver a local anesthetic medication to block the pain in the area of your procedure.    Where can I find information about my pain pump?           For more information about your pain pump, scan the QR code.  For additional patient resources, visit  [email or web address].                                                                                             The InfuSystem Nursing Hotline is Here for You 24/7.     Call [phone] for Assistance.  While your physician is your primary source for information about your treatment., there may be times during your treatment that you need assistance with your infusion pump. Our team of compassionate and knowledgeable Registered Nursed (RN) is here to assist every step of the way.    Answers to questions about your infusion pump                 Tubing disconnect  Assistance with pump alarms                                                      Dislodged catheter  Excessive leakage noted from pump                                         Inadequate pain control   Nerve Catheter Removal Instructions  When your device is empty:    Remove your catheter by pulling the dressing off slowly (like you would remove a regular bandage). The catheter should pull right out of the skin.  Check that the BLUE tip is intact.                                                                                     If the catheter is stuck, reposition your   extremity and pull slowly until removed.  *If catheter is HURTING and WON'T come out, stop and call [phone] for further assistance.    Remove medication bag from the black carrying case.  Cut the tubing on right and left side of pump, and discard the medication bag and tubing into garbage.  Place the pump and black carrying case into the plastic bag and then place this into the return box.  Seal box with blue stickers and return to [employer] postal service.    THIS IS PRE-PAID POSTAGE. [facility] COLD THERAPY - PATIENT INSTRUCTION SHEET    Cold Compression Therapy for your comfort and rehabilitation  Your caregivers want you to be productive in your rehab and comfortable during your stay. In keeping with those goals, you will be receiving an [facility] Cold Therapy Wrap to help  ease post-operative pain and swelling that might keep you from getting back on track! Your SMI Cold Therapy Wrap is effective and simple-to-use, and you will be encouraged to apply it throughout your hospital stay and at home through the duration of your recovery.    When you are ready to go home  Be sure to take your SMI Cold Therapy Wrap and both sets of Gel Bags with you for continued comfort and use throughout your rehabilitation. If you don't already have them, ask your nurse or aide to retrieve your SMI Gel Bags from the patient freezer.    Home use precautions  Always follow your medical professional's application instructions upon discharge. Your SMI Cold Therapy Wrap and Gel Bags are designed to last for months following your surgery. Never heat the Gel Bags unless specified by your healthcare provider. Supervision is advised when using this product on children or geriatric patients. To avoid danger of suffocation, please keep the outer plastic packaging away from children & pets.    Cold Therapy Instructions  Place Gel Bags in a freezer set ¾ of the way to max temperature for at least (4) hours. For best results, lay the Gel Bags flat and [id number] in the freezer. Once frozen, slide Gel Bags into the gel pouch and secure your wrap to the affected area with the straps.  Gel wraps that have been stored in a freezer for an extended period of time may require a (10) minute period of softening up in a room temperature environment before application.  The gel pouch acts as a protective barrier. NEVER place frozen bags directly onto skin, as this may cause frostbite injury.  The SMI Cold Therapy Wrap is designed to be able to be worm while ambulating. The compression straps can be secured well enough so that the Wrap won't fall off while moving.  Wrap Application Videos can be viewed at [email or web address].  An additional protective barrier such as clothing, a washcloth, hand-towel or pillowcase may be  used during prolonged treatment applications.  The Gel-Pouch and Wrap are both Latex-Free and the Gel Bag ingredients are non toxic.    [facility] Wrap care instructions  The [facility] Cold Therapy Wrap may be hand washed and hung to dry when needed.    [facility] re-order information  Additional [facility] body specific wraps and/or Gel Bags can be re-ordered from [email or web address] or call [email or web address] ([phone])        Tomorrow morning remove the ace wrap and the white batting from your leg. You will then place the white compression sleeve on your leg and keep it there until your follow up with Dr. Britt except for bathing and washing the sleeve.  You may shower after you remove the nerve catheter but no tub bathing ot immersing your incision in water of any kind.

## 2023-03-28 NOTE — ANESTHESIA PROCEDURE NOTES
Spinal Block      Patient reassessed immediately prior to procedure    Patient location during procedure: OR  Indication:at surgeon's request  Performed By  MONICA/CAA: Jhonathan Alvarez CRNA  Preanesthetic Checklist  Completed: patient identified, IV checked, site marked, risks and benefits discussed, surgical consent, monitors and equipment checked, pre-op evaluation and timeout performed  Spinal Block Prep:  Patient Position:sitting  Sterile Tech:cap, gloves, sterile barriers and mask  Prep:Chloraprep  Patient Monitoring:blood pressure monitoring, continuous pulse oximetry and EKG    Spinal Block Procedure  Approach:midline  Guidance:landmark technique and palpation technique  Location:L4-L5  Needle Type:Sera  Needle Gauge:25 G  Placement of Spinal needle event:cerebrospinal fluid aspirated  Paresthesia: no  Fluid Appearance:clear  Medications: bupivacaine (MARCAINE) 0.5 % injection - Injection   1.8 mL - 3/28/2023 10:13:00 AM   Post Assessment  Patient Tolerance:patient tolerated the procedure well with no apparent complications  Complications no  Additional Notes  Procedure:  Pt assisted to sitting position, with legs in position of comfort over side of bed.  Pt. instructed in optimal spine presentation, the spine was prepped/ Draped and the skin at insertion site was anesthetized with 1% Lidocaine 2 ml.  The spinal needle was then advanced until CSF flow was obtained and LA was injected:

## 2023-03-31 ENCOUNTER — TELEPHONE (OUTPATIENT)
Dept: ORTHOPEDIC SURGERY | Facility: CLINIC | Age: 58
End: 2023-03-31
Payer: COMMERCIAL

## 2023-03-31 NOTE — TELEPHONE ENCOUNTER
Spoke to pt's wife who reports the pt has had the fever, chills, night sweats, small amount of dark red serous drainage; Incision is not warm to the touch, but approximately 3 cm below the incision does have some warmth and redness; Denies any increase in pain however.      Patient using incentive spirometer maybe every 2 hours and is up moving a lot. They are just wanting to make sure they shouldn't be concerned.    Spoke with one of Dr. Britt's partners in office who advises the pt use his incentive spirometer more often (every hour instead of every 2), ice the knee, and monitor the fever and/or any increase in pain. If the fever persists, despite using the incentive spirometer more frequently or his pain increases, it is recommended they present to the ED for further evaluation.    Patient's wife verbalized understanding and will let us know if they have any further questions or concerns.    Izabella LOCKE CMA ([facility]), ROT

## 2023-03-31 NOTE — TELEPHONE ENCOUNTER
ATTEMPTED TO WARM TRANSFER    Provider:  DR. ROBSON JOHNSON  Caller:  DEMARCUS SIMON  Relationship to Patient: WIFE     Phone Number: [phone]  Reason for Call: PATIENT HAD RIGHT TKA ON 3/28/23. YESTERDAY PATIENT RAN A FEVER .1 AND TOOK TYLENOL. REDUCED .2. TODAY PATIENT IS RUNNING FEVER 100.8. PLEASE ADVISE.

## 2023-04-19 ENCOUNTER — OFFICE VISIT (OUTPATIENT)
Dept: ORTHOPEDIC SURGERY | Facility: CLINIC | Age: 58
End: 2023-04-19
Payer: COMMERCIAL

## 2023-04-19 VITALS — TEMPERATURE: 97.1 F

## 2023-04-19 DIAGNOSIS — Z96.651 STATUS POST RIGHT KNEE REPLACEMENT: Primary | ICD-10-CM

## 2023-04-19 DIAGNOSIS — Z47.89 ORTHOPEDIC AFTERCARE: ICD-10-CM

## 2023-04-19 PROCEDURE — 99024 POSTOP FOLLOW-UP VISIT: CPT | Performed by: ORTHOPAEDIC SURGERY

## 2023-04-19 NOTE — PROGRESS NOTES
[facility] [place] [facility] [place]    Subjective     Chief Complaint   Patient presents with   • Post-op     3 weeks Status post TOTAL KNEE ARTHROPLASTY WITH CORI ROBOT - RIGHT 3/28/23        HPI    It has been 3  week(s) since Mr. Renee's last visit. He returns to clinic today for postoperative follow-up of right knee arthroplasty. The issue has been ongoing for 3 week(s). He rates his pain a 4/10 on the pain scale. Previous/current treatments: cane/walker and physical therapy. Current symptoms: pain, swelling and stiffness. The pain is worse with sleeping and lying on affected side; resting and ice improve the pain. Overall, he is doing better.  90% improvement compared to his preoperative symptoms.  Fully ambulatory without external aids.    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 difficile diarrhea    • Diverticulitis    • Esophagitis    • GERD (gastroesophageal reflux disease)    • History of radiation therapy 02/09/2022    oropharynx, bilateral necks   • Hypertension    • Oropharynx cancer 12/01/2021   • Osteoarthritis of both knees    • Wears glasses       Past Surgical History:   Procedure Laterality Date   • ABDOMINAL SURGERY       "INFANT (6wk)- STOMACH BLOCKAGE    • COLON RESECTION N/A 2019    Procedure: COLON RESECTION LAPAROSCOPIC LOW ANTERIOR;  Surgeon: Magan Blum MD;  Location:  [facility];  Service: General   • COLONOSCOPY N/A 2017    Procedure: COLONOSCOPY;  Surgeon: Magan Blum MD;  Location:  [facility];  Service:    • ENDOSCOPY     • FOOT SURGERY      BILAT    • KNEE SURGERY Bilateral     x 5   • PERIPHERALLY INSERTED CENTRAL CATHETER INSERTION  2019    right subclavian now removed   • TOTAL KNEE ARTHROPLASTY Right 3/28/2023    Procedure: TOTAL KNEE ARTHROPLASTY WITH CORI ROBOT - RIGHT;  Surgeon: Gage Britt MD;  Location:  [facility];  Service: Robotics - Ortho;  Laterality: Right;      Family History   Problem Relation Age of Onset   • Diabetes Father    • Heart disease Father    • Hypertension Father    • Heart attack Father    • Rheum arthritis Other    • No Known Problems Mother      Social History     Socioeconomic History   • Marital status:    Tobacco Use   • Smoking status: Former     Packs/day: 0.50     Years: 20.00     Pack years: 10.00     Types: Cigarettes     Quit date: 2003     Years since quittin.7   • Smokeless tobacco: Former     Types: Snuff   Vaping Use   • Vaping Use: Never used   Substance and Sexual Activity   • Alcohol use: Yes     Comment: socially, \"rarely\"   • Drug use: No   • Sexual activity: Defer      Current Outpatient Medications on File Prior to Visit   Medication Sig Dispense Refill   • acetaminophen (TYLENOL) 500 MG tablet Take 2 tablets by mouth Every 4 (Four) Hours As Needed.     • aspirin EC (aspirin) 325 MG tablet Take 1 tablet by mouth Daily for 30 days. 30 tablet 0   • cetirizine (zyrTEC) 10 MG tablet Take 1 tablet by mouth Daily As Needed.     • gemfibrozil (LOPID) 600 MG tablet Take 1 tablet by mouth 2 (Two) Times a Day. 180 tablet 3   • losartan (COZAAR) 100 MG tablet Take 1 tablet by mouth Daily. 90 tablet 3   • meloxicam (MOBIC) 15 MG " tablet Take 1 tablet by mouth Daily. 90 tablet 0   • METAMUCIL FIBER PO      • Multiple Vitamins-Minerals (MULTIVITAMIN ADULT PO) Take 1 tablet by mouth Daily.     • pantoprazole (PROTONIX) 40 MG EC tablet Take 1 tablet by mouth 2 (Two) Times a Day. 180 tablet 3   • pilocarpine (SALAGEN) 5 MG tablet Take 1 tablet by mouth 3 (Three) Times a Day As Needed. 60 tablet 1   • vitamin B-12 (CYANOCOBALAMIN) 1000 MCG tablet Take 2 tablets by mouth 1 (One) Time Per Week.     • [DISCONTINUED] oxyCODONE (Roxicodone) 5 MG immediate release tablet Take 1 tablet by mouth Every 4 (Four) Hours As Needed for Moderate Pain. (Patient not taking: Reported on 4/19/2023) 40 tablet 0   • [DISCONTINUED] ropivacaine (NAROPIN) 0.2 % infusion (INFUSYSTEM) 2 mg/hr by Peripheral Nerve route Continuous. (Patient not taking: Reported on 4/19/2023)       No current facility-administered medications on file prior to visit.      Allergies   Allergen Reactions   • Alpha-Lipoic Acid Rash   • Levaquin [Levofloxacin] Myalgia   • Sulfa Antibiotics Hives and Rash        Review of Systems   Constitutional: Negative for activity change, appetite change, chills, diaphoresis, fatigue, fever and unexpected weight change.   HENT: Negative for congestion, dental problem, drooling, ear discharge, ear pain, facial swelling, hearing loss, mouth sores, nosebleeds, postnasal drip, rhinorrhea, sinus pressure, sneezing, sore throat, tinnitus, trouble swallowing and voice change.    Eyes: Negative for photophobia, pain, discharge, redness, itching and visual disturbance.   Respiratory: Negative for apnea, cough, choking, chest tightness, shortness of breath, wheezing and stridor.    Cardiovascular: Negative for chest pain, palpitations and leg swelling.   Gastrointestinal: Negative for abdominal distention, abdominal pain, anal bleeding, blood in stool, constipation, diarrhea, nausea, rectal pain and vomiting.   Endocrine: Negative for cold intolerance, heat intolerance,  polydipsia, polyphagia and polyuria.   Genitourinary: Negative for decreased urine volume, difficulty urinating, dysuria, enuresis, flank pain, frequency, genital sores, hematuria and urgency.   Musculoskeletal: Positive for arthralgias. Negative for back pain, gait problem, joint swelling, myalgias, neck pain and neck stiffness.   Skin: Negative for color change, pallor, rash and wound.   Allergic/Immunologic: Negative for environmental allergies, food allergies and immunocompromised state.   Neurological: Negative for dizziness, tremors, seizures, syncope, facial asymmetry, speech difficulty, weakness, light-headedness, numbness and headaches.   Hematological: Negative for adenopathy. Does not bruise/bleed easily.   Psychiatric/Behavioral: Negative for agitation, behavioral problems, confusion, decreased concentration, dysphoric mood, hallucinations, self-injury, sleep disturbance and suicidal ideas. The patient is not nervous/anxious and is not hyperactive.         Objective      Physical Exam  Temp 97.1 °F (36.2 °C)     There is no height or weight on file to calculate BMI.    General:   Mental Status:  Alert   Appearance: Cooperative, in no acute distress   Build and Nutrition: Well-nourished male   Orientation: Alert and oriented to person, place and time   Posture: Normal   Gait: Nonantalgic    Integument:   Right knee: Wound is well-healed with no signs of infection    Lower Extremities:   Right Knee:    Tenderness:  None    Effusion:  1+    Swelling: None    Crepitus:  None    Range of motion:  Extension: 0°       Flexion: 120°  Instability:  No varus laxity, no valgus laxity, negative anterior drawer  Deformities:  None      Imaging/Studies  Imaging Results (Last 24 Hours)     Procedure Component Value Units Date/Time    XR Knee 3+ View With [place] Right [[id number]] Resulted: 04/19/23 1033     Updated: 04/19/23 1034    Narrative:      Right Knee Radiographs  Indication: status-post right total knee  arthroplasty  Views: AP, lateral, and sunrise views of the right knee    Comparison: no change compared to prior study, 3/28/2023    Findings:   The components are well aligned, with no signs of loosening or failure.            Assessment and Plan     Diagnoses and all orders for this visit:    1. Status post right knee replacement (Primary)  -     XR Knee 3+ View With [place] Right    2. Orthopedic aftercare        1. Status post right knee replacement    2. Orthopedic aftercare        I reviewed my findings with the patient.  His right total knee arthroplasty is functioning well, and he is pleased with results.  I will see him back in 6 weeks, no x-rays required.  I will see him back sooner for any problems.  He may return to work on 4/24/2023, and a work note was provided today    Return in about 6 weeks (around 5/31/2023).      Gage Britt MD  04/19/23  10:46 EDT

## 2023-04-26 ENCOUNTER — TELEPHONE (OUTPATIENT)
Dept: ORTHOPEDIC SURGERY | Facility: CLINIC | Age: 58
End: 2023-04-26
Payer: COMMERCIAL

## 2023-04-26 ENCOUNTER — OFFICE VISIT (OUTPATIENT)
Dept: ORTHOPEDIC SURGERY | Facility: CLINIC | Age: 58
End: 2023-04-26
Payer: COMMERCIAL

## 2023-04-26 DIAGNOSIS — M17.0 PRIMARY OSTEOARTHRITIS OF BOTH KNEES: ICD-10-CM

## 2023-04-26 DIAGNOSIS — Z96.651 STATUS POST RIGHT KNEE REPLACEMENT: Primary | ICD-10-CM

## 2023-04-26 DIAGNOSIS — Z47.89 ORTHOPEDIC AFTERCARE: ICD-10-CM

## 2023-04-26 PROCEDURE — 99024 POSTOP FOLLOW-UP VISIT: CPT | Performed by: ORTHOPAEDIC SURGERY

## 2023-04-26 NOTE — PROGRESS NOTES
[facility] [place] [facility] Note    Subjective     Chief Complaint   Patient presents with   • Post-op     1 week f/u; 4 weeks s/p right total knee arthroplasty with CORI robotic assistance DOS 03.28.2023        HPI    It has been 1  week(s) since Mr. Renee's last visit. He returns to clinic today for postoperative follow-up of right knee arthroplasty. The issue has been ongoing for 4 week(s). He rates his pain a 4/10 on the pain scale. Previous/current treatments: NSAIDS and physical therapy. Current symptoms: swelling, popping and stiffness. The pain is worse with walking, climbing stairs and sleeping; resting improve the pain. Overall, he is doing worse.  He lifted 50 pounds yesterday, and then noted some swelling in the knee afterwards and some popping that he wanted to have checked out.    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 difficile diarrhea    • Diverticulitis    • Esophagitis    • GERD (gastroesophageal reflux disease)    • History of radiation therapy 02/09/2022    oropharynx, bilateral necks   • Hypertension    • Oropharynx cancer 12/01/2021   • Osteoarthritis of both knees    • Wears glasses       Past Surgical History:  "  Procedure Laterality Date   • ABDOMINAL SURGERY      INFANT (6wk)- STOMACH BLOCKAGE    • COLON RESECTION N/A 2019    Procedure: COLON RESECTION LAPAROSCOPIC LOW ANTERIOR;  Surgeon: Magan Blum MD;  Location:  [facility];  Service: General   • COLONOSCOPY N/A 2017    Procedure: COLONOSCOPY;  Surgeon: Magan Blum MD;  Location:  [facility];  Service:    • ENDOSCOPY     • FOOT SURGERY      BILAT    • KNEE SURGERY Bilateral     x 5   • PERIPHERALLY INSERTED CENTRAL CATHETER INSERTION  2019    right subclavian now removed   • TOTAL KNEE ARTHROPLASTY Right 3/28/2023    Procedure: TOTAL KNEE ARTHROPLASTY WITH CORI ROBOT - RIGHT;  Surgeon: Gage Britt MD;  Location:  [facility];  Service: Robotics - Ortho;  Laterality: Right;      Family History   Problem Relation Age of Onset   • Diabetes Father    • Heart disease Father    • Hypertension Father    • Heart attack Father    • Rheum arthritis Other    • No Known Problems Mother      Social History     Socioeconomic History   • Marital status:    Tobacco Use   • Smoking status: Former     Packs/day: 0.50     Years: 20.00     Pack years: 10.00     Types: Cigarettes     Quit date: 2003     Years since quittin.8   • Smokeless tobacco: Former     Types: Snuff   Vaping Use   • Vaping Use: Never used   Substance and Sexual Activity   • Alcohol use: Yes     Comment: socially, \"rarely\"   • Drug use: No   • Sexual activity: Defer      Current Outpatient Medications on File Prior to Visit   Medication Sig Dispense Refill   • acetaminophen (TYLENOL) 500 MG tablet Take 2 tablets by mouth Every 4 (Four) Hours As Needed.     • aspirin EC (aspirin) 325 MG tablet Take 1 tablet by mouth Daily for 30 days. 30 tablet 0   • cetirizine (zyrTEC) 10 MG tablet Take 1 tablet by mouth Daily As Needed.     • gemfibrozil (LOPID) 600 MG tablet Take 1 tablet by mouth 2 (Two) Times a Day. 180 tablet 3   • losartan (COZAAR) 100 MG tablet Take 1 tablet by " mouth Daily. 90 tablet 3   • meloxicam (MOBIC) 15 MG tablet Take 1 tablet by mouth Daily. 90 tablet 0   • METAMUCIL FIBER PO      • Multiple Vitamins-Minerals (MULTIVITAMIN ADULT PO) Take 1 tablet by mouth Daily.     • pantoprazole (PROTONIX) 40 MG EC tablet Take 1 tablet by mouth 2 (Two) Times a Day. 180 tablet 3   • pilocarpine (SALAGEN) 5 MG tablet Take 1 tablet by mouth 3 (Three) Times a Day As Needed. 60 tablet 1   • vitamin B-12 (CYANOCOBALAMIN) 1000 MCG tablet Take 2 tablets by mouth 1 (One) Time Per Week.       No current facility-administered medications on file prior to visit.      Allergies   Allergen Reactions   • Alpha-Lipoic Acid Rash   • Levaquin [Levofloxacin] Myalgia   • Sulfa Antibiotics Hives and Rash        Review of Systems   Constitutional: Negative for activity change, appetite change, chills, diaphoresis, fatigue, fever and unexpected weight change.   HENT: Negative for congestion, dental problem, drooling, ear discharge, ear pain, facial swelling, hearing loss, mouth sores, nosebleeds, postnasal drip, rhinorrhea, sinus pressure, sneezing, sore throat, tinnitus, trouble swallowing and voice change.    Eyes: Negative for photophobia, pain, discharge, redness, itching and visual disturbance.   Respiratory: Negative for apnea, cough, choking, chest tightness, shortness of breath, wheezing and stridor.    Cardiovascular: Negative for chest pain, palpitations and leg swelling.   Gastrointestinal: Negative for abdominal distention, abdominal pain, anal bleeding, blood in stool, constipation, diarrhea, nausea, rectal pain and vomiting.   Endocrine: Negative for cold intolerance, heat intolerance, polydipsia, polyphagia and polyuria.   Genitourinary: Negative for decreased urine volume, difficulty urinating, dysuria, enuresis, flank pain, frequency, genital sores, hematuria and urgency.   Musculoskeletal: Positive for arthralgias. Negative for back pain, gait problem, joint swelling, myalgias, neck  pain and neck stiffness.   Skin: Negative for color change, pallor, rash and wound.   Allergic/Immunologic: Negative for environmental allergies, food allergies and immunocompromised state.   Neurological: Negative for dizziness, tremors, seizures, syncope, facial asymmetry, speech difficulty, weakness, light-headedness, numbness and headaches.   Hematological: Negative for adenopathy. Does not bruise/bleed easily.   Psychiatric/Behavioral: Negative for agitation, behavioral problems, confusion, decreased concentration, dysphoric mood, hallucinations, self-injury, sleep disturbance and suicidal ideas. The patient is not nervous/anxious and is not hyperactive.         Objective      Physical Exam  There were no vitals taken for this visit.    There is no height or weight on file to calculate BMI.    General:   Mental Status:  Alert   Appearance: Cooperative, in no acute distress   Build and Nutrition: Well-nourished male   Orientation: Alert and oriented to person, place and time   Posture: Normal   Gait: Nonantalgic/normal    Integument:              Right knee: Wound is well-healed with no signs of infection     Lower Extremities:              Right Knee:                          Tenderness:    None                          Effusion:          1-2+                          Swelling:          None                          Crepitus:          None                          Range of motion:        Extension:       0°                                                              Flexion:           125°  Instability:        No varus laxity, no valgus laxity, negative anterior drawer  Deformities:     None    Imaging/Studies  Imaging Results (Last 24 Hours)     Procedure Component Value Units Date/Time    XR Knee 3+ View With [place] Right [[id number]] Resulted: 04/26/23 1305     Updated: 04/26/23 1305    Narrative:      Right Knee Radiographs  Indication: status-post right total knee arthroplasty  Views: AP, lateral, and  sunrise views of the right knee    Comparison: no change compared to prior study, 4/19/2023    Findings:   The components are well aligned, with no signs of loosening or failure.            Assessment and Plan     Diagnoses and all orders for this visit:    1. Status post right knee replacement (Primary)  -     XR Knee 3+ View With [place] Right    2. Orthopedic aftercare        1. Status post right knee replacement    2. Orthopedic aftercare        I reviewed my findings with the patient.  He overdid it yesterday and noted some increase swelling and popping afterwards.  Components all look well aligned, and examination shows a stable knee.  I will see him back as planned in about 5 weeks, but I will be happy to see him back sooner for any problems.  Recommended avoid any lifting over 20 pounds.    Return for At regularly scheduled appointment.      Gage Britt MD  04/26/23  13:12 EDT

## 2023-04-26 NOTE — TELEPHONE ENCOUNTER
Caller: Nicholas Renee    Relationship to patient: Self    Best call back number: [phone]    Patient is needing: PATIENT LIFTED 50 POUNDS 04/25/2023 AFTER RIGHT KNEE SURGERY NOW THERE IS A CLICKING NOISE WHEN WALKING AND SOME SWELLING NEAR THE TOP OF THE KNEE CAP WITHOUT PAIN

## 2023-04-26 NOTE — TELEPHONE ENCOUNTER
Would you like to see patient or have him rest through the weekend and call back if no better on Monday? He is having no pain, just small swelling and clicking that was not present prior.    Nneka

## 2023-04-28 ENCOUNTER — PREP FOR SURGERY (OUTPATIENT)
Dept: OTHER | Facility: HOSPITAL | Age: 58
End: 2023-04-28
Payer: COMMERCIAL

## 2023-04-28 PROBLEM — Z96.651 STATUS POST RIGHT KNEE REPLACEMENT: Status: ACTIVE | Noted: 2023-04-28

## 2023-04-28 RX ORDER — PREGABALIN 150 MG/1
150 CAPSULE ORAL ONCE
Status: CANCELLED | OUTPATIENT
Start: 2023-04-28 | End: 2023-04-28

## 2023-04-28 RX ORDER — MELOXICAM 7.5 MG/1
15 TABLET ORAL ONCE
Status: CANCELLED | OUTPATIENT
Start: 2023-04-28 | End: 2023-04-28

## 2023-04-28 RX ORDER — ACETAMINOPHEN 325 MG/1
1000 TABLET ORAL ONCE
Status: CANCELLED | OUTPATIENT
Start: 2023-04-28 | End: 2023-04-28

## 2023-05-04 ENCOUNTER — TRANSCRIBE ORDERS (OUTPATIENT)
Dept: ADMINISTRATIVE | Facility: HOSPITAL | Age: 58
End: 2023-05-04
Payer: COMMERCIAL

## 2023-05-04 DIAGNOSIS — M51.36 DEGENERATION OF LUMBAR INTERVERTEBRAL DISC: Primary | ICD-10-CM

## 2023-05-05 ENCOUNTER — TELEPHONE (OUTPATIENT)
Dept: ORTHOPEDIC SURGERY | Facility: CLINIC | Age: 58
End: 2023-05-05
Payer: COMMERCIAL

## 2023-05-05 NOTE — TELEPHONE ENCOUNTER
This patient has an upcoming post op with Dr. Britt on 6-5-23, however he will be out of office for that day. Could I move him to see a PA?

## 2023-05-25 ENCOUNTER — HOSPITAL ENCOUNTER (OUTPATIENT)
Dept: MRI IMAGING | Facility: HOSPITAL | Age: 58
Discharge: HOME | End: 2023-05-25
Payer: COMMERCIAL

## 2023-05-25 DIAGNOSIS — M51.36 DEGENERATION OF LUMBAR INTERVERTEBRAL DISC: ICD-10-CM

## 2023-05-25 PROCEDURE — 72148 MRI LUMBAR SPINE W/O DYE: CPT

## 2023-05-31 ENCOUNTER — OFFICE VISIT (OUTPATIENT)
Dept: ONCOLOGY | Facility: CLINIC | Age: 58
End: 2023-05-31

## 2023-05-31 ENCOUNTER — LAB (OUTPATIENT)
Dept: LAB | Facility: HOSPITAL | Age: 58
End: 2023-05-31

## 2023-05-31 VITALS
RESPIRATION RATE: 16 BRPM | BODY MASS INDEX: 32.07 KG/M2 | HEART RATE: 70 BPM | WEIGHT: 242 LBS | HEIGHT: 73 IN | DIASTOLIC BLOOD PRESSURE: 81 MMHG | SYSTOLIC BLOOD PRESSURE: 129 MMHG | TEMPERATURE: 97.3 F | OXYGEN SATURATION: 96 %

## 2023-05-31 DIAGNOSIS — C10.9 OROPHARYNX CANCER: ICD-10-CM

## 2023-05-31 DIAGNOSIS — D64.9 ANEMIA, UNSPECIFIED TYPE: Primary | ICD-10-CM

## 2023-05-31 LAB
ALBUMIN SERPL-MCNC: 4.4 G/DL (ref 3.5–5.2)
ALBUMIN/GLOB SERPL: 1.6 G/DL
ALP SERPL-CCNC: 80 U/L (ref 39–117)
ALT SERPL W P-5'-P-CCNC: 12 U/L (ref 1–41)
ANION GAP SERPL CALCULATED.3IONS-SCNC: 11 MMOL/L (ref 5–15)
AST SERPL-CCNC: 17 U/L (ref 1–40)
BASOPHILS # BLD AUTO: 0.01 10*3/MM3 (ref 0–0.2)
BASOPHILS NFR BLD AUTO: 0.3 % (ref 0–1.5)
BILIRUB SERPL-MCNC: 0.3 MG/DL (ref 0–1.2)
BUN SERPL-MCNC: 24 MG/DL (ref 8–23)
BUN/CREAT SERPL: 22.2 (ref 7–25)
CALCIUM SPEC-SCNC: 9.9 MG/DL (ref 8.6–10.5)
CHLORIDE SERPL-SCNC: 106 MMOL/L (ref 98–107)
CO2 SERPL-SCNC: 25 MMOL/L (ref 22–29)
CREAT SERPL-MCNC: 1.08 MG/DL (ref 0.76–1.27)
DEPRECATED RDW RBC AUTO: 44.6 FL (ref 37–54)
EGFRCR SERPLBLD CKD-EPI 2021: 78.6 ML/MIN/1.73
EOSINOPHIL # BLD AUTO: 0.11 10*3/MM3 (ref 0–0.4)
EOSINOPHIL NFR BLD AUTO: 2.8 % (ref 0.3–6.2)
ERYTHROCYTE [DISTWIDTH] IN BLOOD BY AUTOMATED COUNT: 12.3 % (ref 12.3–15.4)
FERRITIN SERPL-MCNC: 128.5 NG/ML (ref 30–400)
GLOBULIN UR ELPH-MCNC: 2.7 GM/DL
GLUCOSE SERPL-MCNC: 91 MG/DL (ref 65–99)
HCT VFR BLD AUTO: 33.8 % (ref 37.5–51)
HGB BLD-MCNC: 11.7 G/DL (ref 13–17.7)
IMM GRANULOCYTES # BLD AUTO: 0 10*3/MM3 (ref 0–0.05)
IMM GRANULOCYTES NFR BLD AUTO: 0 % (ref 0–0.5)
IRON 24H UR-MRATE: 83 MCG/DL (ref 59–158)
IRON SATN MFR SERPL: 18 % (ref 20–50)
LYMPHOCYTES # BLD AUTO: 1.02 10*3/MM3 (ref 0.7–3.1)
LYMPHOCYTES NFR BLD AUTO: 25.8 % (ref 19.6–45.3)
MCH RBC QN AUTO: 34 PG (ref 26.6–33)
MCHC RBC AUTO-ENTMCNC: 34.6 G/DL (ref 31.5–35.7)
MCV RBC AUTO: 98.3 FL (ref 79–97)
MONOCYTES # BLD AUTO: 0.38 10*3/MM3 (ref 0.1–0.9)
MONOCYTES NFR BLD AUTO: 9.6 % (ref 5–12)
NEUTROPHILS NFR BLD AUTO: 2.44 10*3/MM3 (ref 1.7–7)
NEUTROPHILS NFR BLD AUTO: 61.5 % (ref 42.7–76)
PLATELET # BLD AUTO: 214 10*3/MM3 (ref 140–450)
PMV BLD AUTO: 8.6 FL (ref 6–12)
POTASSIUM SERPL-SCNC: 4.6 MMOL/L (ref 3.5–5.2)
PROT SERPL-MCNC: 7.1 G/DL (ref 6–8.5)
RBC # BLD AUTO: 3.44 10*6/MM3 (ref 4.14–5.8)
SODIUM SERPL-SCNC: 142 MMOL/L (ref 136–145)
TIBC SERPL-MCNC: 469 MCG/DL (ref 298–536)
TRANSFERRIN SERPL-MCNC: 315 MG/DL (ref 200–360)
TSH SERPL DL<=0.05 MIU/L-ACNC: 1.84 UIU/ML (ref 0.27–4.2)
WBC NRBC COR # BLD: 3.96 10*3/MM3 (ref 3.4–10.8)

## 2023-05-31 PROCEDURE — 80050 GENERAL HEALTH PANEL: CPT

## 2023-05-31 PROCEDURE — 36415 COLL VENOUS BLD VENIPUNCTURE: CPT

## 2023-05-31 PROCEDURE — 84466 ASSAY OF TRANSFERRIN: CPT | Performed by: INTERNAL MEDICINE

## 2023-05-31 PROCEDURE — 82728 ASSAY OF FERRITIN: CPT | Performed by: INTERNAL MEDICINE

## 2023-05-31 PROCEDURE — 83540 ASSAY OF IRON: CPT | Performed by: INTERNAL MEDICINE

## 2023-05-31 NOTE — PROGRESS NOTES
"      PROBLEM LIST:  1. 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  Biopsy of left neck mass on 11/8/2021 showed squamous cell carcinoma.  P16 stain inconclusive.  Repeat biopsy done 11/24/21 - excision of left lingual tonsil, showed SCC, p16+  B) begin weekly cisplatin with radiation 12/23/2021. Completed cisplatin 2/9/2022.   2. Hypertension  3. GERd  4. Hyperlipidemia    Subjective     CHIEF COMPLAINT: p16+ oropharynx cancer    HISTORY OF PRESENT ILLNESS:   Nicholas Renee returns for follow-up.   He had a right knee replacement a few months ago.  He has been having some back pain and had an MRI done last week.    He says he can eat and swallow anything, but he does have to make sure the food either has a sauce or is moist.  He has gained some weight.          Objective       /81   Pulse 70   Temp 97.3 °F (36.3 °C) (Infrared)   Resp 16   Ht 185.4 cm (72.99\")   Wt 110 kg (242 lb)   SpO2 96%   BMI 31.94 kg/m²    Vitals:    05/31/23 1043   PainSc: 0-No pain                 ECOG score: 0   General: well appearing male in no acute distress  Neuro: alert and oriented  HEENT: sclerae anicteric.  Oropharynx is clear no edema in the neck  Lymphatics:  No palpable cervical adenopathy bilaterally.  Cardiovascular: regular rate and rhythm, no murmurs  Lungs: clear to auscultation bilaterally  Abdomen: soft, nontender, nondistended.  No palpable organomegaly  Extremities: no lower extremity edema  Skin: no rashes, lesions, bruising, or petechiae  Psych: mood and affect appropriate      RECENT LABS:  Lab Results   Component Value Date    WBC 3.96 05/31/2023    HGB 11.7 (L) 05/31/2023    HCT 33.8 (L) 05/31/2023    MCV 98.3 (H) 05/31/2023     05/31/2023       Lab Results   Component Value Date    GLUCOSE 87 03/14/2023    BUN 19 03/14/2023    CREATININE 1.12 03/14/2023    " EGFRIFNONA 74 02/08/2022    BCR 17.0 03/14/2023    K 4.3 03/14/2023    CO2 27.0 03/14/2023    CALCIUM 9.5 03/14/2023    ALBUMIN 4.50 11/29/2022    AST 16 11/29/2022    ALT 14 11/29/2022                 Assessment & Plan                Nicholas Renee is a 58 y.o. male with a cT1-2N1M0 p16+ SCC of the left base of tongue, with > 3 cm cervical LN.    CT neck and chest in February showed no evidence of disease recurrence.  Repeat imaging yearly.  He continues to see Dr. Manuel every 3 to 4 months.  He is scheduled to see Dr. Zambrano in September.    Labs show mild anemia which is stable.  I will add on iron levels to today's blood work.  He reports that he started taking a B12 supplement a few months ago.  TSH is pending.    F/u in December with CBC, CMP and TSH on return.          Georgiana Buck MD  [facility] Hematology [facility] Oncology    5/31/2023

## 2023-06-14 ENCOUNTER — OFFICE VISIT (OUTPATIENT)
Dept: ORTHOPEDIC SURGERY | Facility: CLINIC | Age: 58
End: 2023-06-14
Payer: COMMERCIAL

## 2023-06-14 DIAGNOSIS — Z47.89 ORTHOPEDIC AFTERCARE: ICD-10-CM

## 2023-06-14 DIAGNOSIS — Z96.651 STATUS POST RIGHT KNEE REPLACEMENT: Primary | ICD-10-CM

## 2023-06-14 PROCEDURE — 99024 POSTOP FOLLOW-UP VISIT: CPT | Performed by: ORTHOPAEDIC SURGERY

## 2023-06-14 NOTE — PROGRESS NOTES
[facility] [place] [facility]    Subjective     Chief Complaint   Patient presents with   • Post-op     7 week follow up; 2.5 months s/p right total knee arthroplasty with CORI robotic assistance DOS 03.28.2023        HPI    It has been 7  week(s) since Mr. Renee's last visit. He returns to clinic today for postoperative follow-up of right knee arthroplasty. The issue has been ongoing for 2.5 month(s). He rates his pain a 3/10 on the pain scale. Previous/current treatments: NSAIDS and physical therapy. Current symptoms: pain, swelling, and popping. The pain is worse with sleeping and rising from seated position; resting, sitting, ice, and pain medication and/or NSAID improve the pain. Overall, he is doing better.  90% improvement compared to his preoperative symptoms.  Fully ambulatory without external aids.  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 difficile diarrhea    • Cervical disc disorder    • Diverticulitis    • Esophagitis    • GERD (gastroesophageal reflux  disease)    • Hip arthrosis 3/5/23   • History of radiation therapy 2022    oropharynx, bilateral necks   • Hypertension    • Knee sprain    • Low back strain    • Lumbosacral disc disease    • Neck strain    • Oropharynx cancer 2021   • Osteoarthritis of both knees    • Periarthritis of shoulder Left shoulder   • Rotator cuff syndrome 22   • Tear of meniscus of knee    • Tennis elbow 1999   • Thoracic disc disorder    • Wears glasses       Past Surgical History:   Procedure Laterality Date   • ABDOMINAL SURGERY      INFANT (6wk)- STOMACH BLOCKAGE    • COLON RESECTION N/A 2019    Procedure: COLON RESECTION LAPAROSCOPIC LOW ANTERIOR;  Surgeon: Magan Blum MD;  Location:  [facility];  Service: General   • COLONOSCOPY N/A 2017    Procedure: COLONOSCOPY;  Surgeon: Magan Blum MD;  Location:  [facility];  Service:    • ENDOSCOPY     • FOOT SURGERY      BILAT    • JOINT REPLACEMENT  3/28/23   • KNEE SURGERY Bilateral     x 5   • PERIPHERALLY INSERTED CENTRAL CATHETER INSERTION  2019    right subclavian now removed   • TOTAL KNEE ARTHROPLASTY Right 2023    Procedure: TOTAL KNEE ARTHROPLASTY WITH CORI ROBOT - RIGHT;  Surgeon: Gage Britt MD;  Location:  [facility];  Service: Robotics - Ortho;  Laterality: Right;   • TRIGGER POINT INJECTION  22      Family History   Problem Relation Age of Onset   • Diabetes Father    • Heart disease Father    • Hypertension Father    • Heart attack Father    • Rheum arthritis Other    • No Known Problems Mother      Social History     Socioeconomic History   • Marital status:    Tobacco Use   • Smoking status: Former     Packs/day: 0.50     Years: 20.00     Pack years: 10.00     Types: Cigarettes     Start date: 1978     Quit date: 2003     Years since quittin.1   • Smokeless tobacco: Former     Types: Snuff   • Tobacco comments:     Quit dipping 10/25/2021   Vaping Use   • Vaping Use: Never used  "  Substance and Sexual Activity   • Alcohol use: Not Currently     Comment: socially, \"rarely\"   • Drug use: Never   • Sexual activity: Yes     Partners: Female     Birth control/protection: None      Current Outpatient Medications on File Prior to Visit   Medication Sig Dispense Refill   • acetaminophen (TYLENOL) 500 MG tablet Take 2 tablets by mouth Every 4 (Four) Hours As Needed.     • cetirizine (zyrTEC) 10 MG tablet Take 1 tablet by mouth Daily As Needed.     • gemfibrozil (LOPID) 600 MG tablet Take 1 tablet by mouth 2 (Two) Times a Day. 180 tablet 1   • losartan (COZAAR) 100 MG tablet Take 1 tablet by mouth Daily. 90 tablet 3   • meloxicam (MOBIC) 15 MG tablet Take 1 tablet by mouth Daily. 90 tablet 0   • METAMUCIL FIBER PO      • Multiple Vitamins-Minerals (MULTIVITAMIN ADULT PO) Take 1 tablet by mouth Daily.     • pantoprazole (PROTONIX) 40 MG EC tablet Take 1 tablet by mouth 2 (Two) Times a Day. 180 tablet 3   • pilocarpine (SALAGEN) 5 MG tablet Take 1 tablet by mouth 3 (Three) Times a Day As Needed. 60 tablet 1   • vitamin B-12 (CYANOCOBALAMIN) 1000 MCG tablet Take 2 tablets by mouth 1 (One) Time Per Week.       No current facility-administered medications on file prior to visit.      Allergies   Allergen Reactions   • Alpha-Lipoic Acid Rash   • Levaquin [Levofloxacin] Myalgia   • Sulfa Antibiotics Hives and Rash        Review of Systems   Constitutional:  Negative for activity change, appetite change, chills, diaphoresis, fatigue, fever and unexpected weight change.   HENT:  Negative for congestion, dental problem, drooling, ear discharge, ear pain, facial swelling, hearing loss, mouth sores, nosebleeds, postnasal drip, rhinorrhea, sinus pressure, sneezing, sore throat, tinnitus, trouble swallowing and voice change.    Eyes:  Negative for photophobia, pain, discharge, redness, itching and visual disturbance.   Respiratory:  Negative for apnea, cough, choking, chest tightness, shortness of breath, wheezing " and stridor.    Cardiovascular:  Negative for chest pain, palpitations and leg swelling.   Gastrointestinal:  Negative for abdominal distention, abdominal pain, anal bleeding, blood in stool, constipation, diarrhea, nausea, rectal pain and vomiting.   Endocrine: Negative for cold intolerance, heat intolerance, polydipsia, polyphagia and polyuria.   Genitourinary:  Negative for decreased urine volume, difficulty urinating, dysuria, enuresis, flank pain, frequency, genital sores, hematuria and urgency.   Musculoskeletal:  Positive for arthralgias. Negative for back pain, gait problem, joint swelling, myalgias, neck pain and neck stiffness.   Skin:  Negative for color change, pallor, rash and wound.   Allergic/Immunologic: Negative for environmental allergies, food allergies and immunocompromised state.   Neurological:  Negative for dizziness, tremors, seizures, syncope, facial asymmetry, speech difficulty, weakness, light-headedness, numbness and headaches.   Hematological:  Negative for adenopathy. Does not bruise/bleed easily.   Psychiatric/Behavioral:  Negative for agitation, behavioral problems, confusion, decreased concentration, dysphoric mood, hallucinations, self-injury, sleep disturbance and suicidal ideas. The patient is not nervous/anxious and is not hyperactive.       Objective      Physical Exam  There were no vitals taken for this visit.    There is no height or weight on file to calculate BMI.    General:   Mental Status:  Alert   Appearance: Cooperative, in no acute distress   Build and Nutrition: Overweight by BMI male   Orientation: Alert and oriented to person, place and time   Posture: Normal   Gait: Normal/nonantalgic    Integument:              Right knee: Wound is well-healed with no signs of infection     Lower Extremities:              Right Knee:                          Tenderness:    None                          Effusion:           1+                          Swelling:          None                          Crepitus:          None                          Range of motion:        Extension:       0°                                                              Flexion:           130°  Instability:        No varus laxity, no valgus laxity, negative anterior drawer  Deformities:     None    Imaging/Studies  Imaging Results (Last 24 Hours)     ** No results found for the last 24 hours. **        No new imaging today.    Assessment and Plan     Diagnoses and all orders for this visit:    1. Status post right knee replacement (Primary)    2. Orthopedic aftercare        1. Status post right knee replacement    2. Orthopedic aftercare        I reviewed my findings with the patient.  His right total knee arthroplasty is functioning well, and I will see him back on the anniversary of his replacement next March.  I will see him back sooner for any problems.    Return in about 9 months (around 3/14/2024) for Recheck with X-Rays.      Gage Britt MD  06/14/23  10:59 EDT

## 2023-08-18 DIAGNOSIS — K21.9 GERD WITHOUT ESOPHAGITIS: ICD-10-CM

## 2023-08-18 RX ORDER — PANTOPRAZOLE SODIUM 40 MG/1
40 TABLET, DELAYED RELEASE ORAL 2 TIMES DAILY
Qty: 180 TABLET | Refills: 3 | Status: SHIPPED | OUTPATIENT
Start: 2023-08-18

## 2023-09-08 ENCOUNTER — OFFICE VISIT (OUTPATIENT)
Dept: RADIATION ONCOLOGY | Facility: HOSPITAL | Age: 58
End: 2023-09-08
Payer: COMMERCIAL

## 2023-09-08 ENCOUNTER — HOSPITAL ENCOUNTER (OUTPATIENT)
Dept: RADIATION ONCOLOGY | Facility: HOSPITAL | Age: 58
Setting detail: RADIATION/ONCOLOGY SERIES
Discharge: HOME | End: 2023-09-08
Payer: COMMERCIAL

## 2023-09-08 VITALS
HEART RATE: 81 BPM | TEMPERATURE: 97.3 F | WEIGHT: 242 LBS | OXYGEN SATURATION: 96 % | BODY MASS INDEX: 31.94 KG/M2 | SYSTOLIC BLOOD PRESSURE: 135 MMHG | RESPIRATION RATE: 18 BRPM | DIASTOLIC BLOOD PRESSURE: 83 MMHG

## 2023-09-08 DIAGNOSIS — C10.9 OROPHARYNX CANCER: Primary | ICD-10-CM

## 2023-09-08 PROBLEM — M48.062 LUMBAR STENOSIS WITH NEUROGENIC CLAUDICATION: Status: ACTIVE | Noted: 2023-09-08

## 2023-09-08 PROBLEM — R53.81 PHYSICAL DECONDITIONING: Status: ACTIVE | Noted: 2023-09-08

## 2023-09-08 PROBLEM — R26.9 GAIT DISTURBANCE: Status: ACTIVE | Noted: 2023-09-08

## 2023-09-08 PROBLEM — M43.16 SPONDYLOLISTHESIS OF LUMBAR REGION: Status: ACTIVE | Noted: 2023-09-08

## 2023-09-08 PROBLEM — M24.28 LIGAMENTUM FLAVUM HYPERTROPHY: Status: ACTIVE | Noted: 2023-09-08

## 2023-09-08 PROBLEM — M51.379 DEGENERATION OF LUMBAR OR LUMBOSACRAL INTERVERTEBRAL DISC: Status: ACTIVE | Noted: 2023-09-08

## 2023-09-08 PROBLEM — M47.816 SPONDYLOSIS OF LUMBAR REGION WITHOUT MYELOPATHY OR RADICULOPATHY: Status: ACTIVE | Noted: 2023-09-08

## 2023-09-08 PROBLEM — M51.37 DEGENERATION OF LUMBAR OR LUMBOSACRAL INTERVERTEBRAL DISC: Status: ACTIVE | Noted: 2023-09-08

## 2023-09-08 PROCEDURE — G0463 HOSPITAL OUTPT CLINIC VISIT: HCPCS

## 2023-09-08 NOTE — PROGRESS NOTES
FOLLOW UP NOTE    PATIENT:                                                      Nicholas Renee  MEDICAL RECORD #:                        [id number]  :                                                          1965  COMPLETION DATE:    2022  DIAGNOSIS:     Oropharynx cancer  - Stage I (cT2, cN1, cM0, p16+)      BRIEF HISTORY:  Mr. 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 He completed treatment with definitive chemoradiation in 2022. He has done well over the past year and a half. His symptoms of xerostomia and poor sense of taste have gradually improved and at this point he says he is able to enjoy most all foods, albeit with some difficulty with more dry things such as sticky rice.  He does have dry mouth at night time, and uses Biotene once nightly.  Since we last saw him, he underwent a right total knee replacement and he is finally getting to the point where his pain is under control and he feels like he is able to do all of the activities that he wants.     MEDICATIONS: Medication reconciliation for the patient was reviewed and confirmed in the electronic medical record.    Review of Systems   HENT:   Positive for trouble swallowing (dry foods).         Dry mouth, reports 50% taste back   All other systems reviewed and are negative.    Karnofsky score: 90     Physical Exam  Vitals and nursing note reviewed.   Constitutional:       General: He is not in acute distress.     Appearance: He is well-developed.   HENT:      Head: Normocephalic and atraumatic.      Mouth/Throat:      Comments: Appropriate moisture throughout the mouth.  No aggressive lesions are visible or palpable.  The tongue base is soft throughout.  Good dentition without any visible caries or decay.  Eyes:      Conjunctiva/sclera: Conjunctivae normal.      Pupils: Pupils are equal, round, and reactive to light.   Neck:       Comments: No palpable adenopathy  Cardiovascular:      Rate and Rhythm: Normal rate and regular rhythm.      Heart sounds: No murmur heard.    No friction rub.   Pulmonary:      Effort: Pulmonary effort is normal.      Breath sounds: Normal breath sounds. No wheezing.   Abdominal:      General: Bowel sounds are normal. There is no distension.      Palpations: Abdomen is soft. There is no mass.      Tenderness: There is no abdominal tenderness.   Musculoskeletal:         General: Normal range of motion.      Cervical back: Normal range of motion and neck supple.   Lymphadenopathy:      Cervical: No cervical adenopathy.   Skin:     General: Skin is warm and dry.   Neurological:      Mental Status: He is alert and oriented to person, place, and time.   Psychiatric:         Behavior: Behavior normal.         Thought Content: Thought content normal.         Judgment: Judgment normal.       VITAL SIGNS:   Vitals:    09/08/23 1011   BP: 135/83   Pulse: 81   Resp: 18   Temp: 97.3 °F (36.3 °C)   TempSrc: Temporal   SpO2: 96%   Weight: 110 kg (242 lb)   PainSc: 0-No pain             Karnofsky score: 90         The following portions of the patient's history were reviewed and updated as appropriate: allergies, current medications, past family history, past medical history, past social history, past surgical history and problem list.         Diagnoses and all orders for this visit:    1. 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  He is now 1-1/2 years out from completion of therapy, and clinically is doing very well at this point.  He has no evidence of recurrent disease and he seems to have very good salivary function as well as swallowing.  I counseled him today related to dietary recommendations as well as ongoing weight loss and with exercise goals.   He will be due to have repeat CT scans in March 2024, so I will see him back in 6 months once those scans are performed.    RECOMMENDATIONS: Return to clinic in 6 months with repeat CT scans    I spent a total of 30 minutes on todays visit, with more than 20 minutes in direct face to face communication, and the remainder of the time spent in reviewing the relevant history, records, available imaging, and for documentation.    Return in about 6 months (around 3/8/2024) for Office Visit, Imaging - See orders.    Brandt Zambrano MD

## 2023-09-09 NOTE — PROGRESS NOTES
"Chief Complaint: \"Lower back and left gluteal pain. Left knee pain. Neck pain.\"        History of Present Illness:   Patient: Mr. Nicholas Renee, 58 y.o. male   Referring Physician: Dr. Fermín Acosta  Reason for Referral: Consultation for a new problem; chronic intractable lower back pain. Nicholas Renee is an established patient. last seen by Georgiana on 08/29/2022 for postprocedure follow-up of cervical epidural steroid injection performed on June 27, 2022, from which he has experienced 75% ongoing pain relief and functional improvement.  He is here for evaluation of chronic lower back pain.     Problem #1: Chronic Lower Back Pain  Pain History: Patient reports a longstanding history of chronic intractable lower back pain, which began without incident. Nicholas Renee reports a history of chronic lower back, buttock, hip and left leg pain with some neurogenic claudication. He denies any bowel or bladder problems. He has failed conservative measures including physical therapy without relief.  He was evaluated by Dr. Acosta on 07/06/2023, and was found not to be a surgical candidate.  Dr. Acosta recommended conservative management along with interventional pain management measures. Pain has progressed in intensity over the past several months. MRI of the lumbar spine w/o contrast on 05/25/2023 revealed multilevel spondylosis. Grade 1 anterolisthesis of L4 on L5.  At L3-L4: Disc bulge, ligamentum flavum hypertrophy, facet arthropathy. Moderate to severe spinal canal stenosis. Mild bilateral neuroforaminal stenosis. At L4-L5: Disc bulge, ligamentum flavum hypertrophy, facet arthropathy. Moderate spinal canal stenosis. Mild to moderate bilateral neuroforaminal stenosis. At L5-S1: Disc bulge, ligamentum flavum hypertrophy, facet arthropathy. Mild spinal canal stenosis. Moderate to severe left and mild to moderate right neuroforaminal stenosis. Nicholas Renee has failed to obtain pain relief with conservative measures for more than " 6 months including oral analgesics, topical analgesics, ice, heat, physical therapy (last visit within the past 6 months), physical therapist directed home exercise program HEP (ongoing), chiropractic therapy, independent exercise program (ongoing), to name a few  Pain Description: Constant lower back pain with intermittent exacerbation, described as aching, dull, sharp, throbbing, and burning sensation.   Radiation of Pain: The pain radiates into the gluteal area   Pain intensity today: 7/10   Average pain intensity last week: 7/10  Pain intensity ranges from: 5/10 to 9/10  Aggravating factors: Pain increases with arching the back, twisting. Patient describes neurogenic claudication.    Alleviating factors: Pain decreases with sitting down, sitting on a recliner, lying down  Associated Symptoms:   Patient denies pain, numbness, weakness in the lower extremities  Patient denies any new bladder or bowel problems.   Patient reports difficulties with his balance but denies recent falls.   Pain interferes with general activities (ability to walk, stand, transition from different positions), and affects patient's quality of life  Pain interferes with sleep falling asleep and causing sleep fragmentation   Muscle spasms: No  Stiffness: LB      Problem #2: Chronic Posterior Neck Pain  Pain History: Patient reports a longstanding history of chronic intractable lower back pain, which began without incident. Mr. Nicholas Renee was originally referred by Dr. Fermín Acosta in consultation for chronic intractable neck and left shoulder pain. He presented with 1 year history then of posterior neck and left shoulder pain, which began after doing yard work while weed eating. He underwent consultation with Dr. Souza in orthopedics on 9/20/2021 for left shoulder pain and underwent left subacromial injections without relief. MRI of the cervical spine revealed multilevel spondylitic changes most significant at C5-C6, with a  large disc osteophyte complex contributing to lateral recess stenosis and central spinal canal stenosis. He underwent neurosurgical consultation with Dr. Fermín Acosta and was found not to be surgical a candidate.  On June 27, 2022, he underwent a cervical epidural steroid injection, from which he reports experiencing 75% ongoing pain relief and functional improvement.  He was seen by Georgiana for postprocedure follow-up on 08/29/2022.  Pain Description: Constant low grade posterior neck pain with intermittent exacerbation, described as aching, burning, throbbing and dull sensation.   Radiation of Pain: The pain does not radiate   Pain intensity today: 3/10   Average pain intensity last week: 2/10  Pain intensity ranges from: 2/10 to 4/10  Aggravating factors: Pain increases with extension, rotation of the cervical spine   Alleviating factors: Pain decreases with rest, sitting down, lying down, ice  Associated Symptoms:   Patient denies pain, numbness, or weakness in the upper extremities.   Stiffness: Neck    Problem #3: Chronic Left Knee Joint Pain/OA  Pain History: Patient reports a several year history of chronic left knee joint pain. He has a history of RT TKR  Pain Description: Constant left knee pain with intermittent exacerbation, described as aching, sharp, and dull sensation.   Radiation of Pain: The pain does not radiate   Pain intensity today: 4/10   Average pain intensity last week: 5/10  Pain intensity ranges from: 3/10 to 8/10  Aggravating factors: Pain increases with standing, walking  Alleviating factors: Pain decreases with rest    Review of previous therapies and additional medical records:  Nicholas Renee has already failed the following measures, including:   Conservative Measures: Oral analgesics, topical analgesics, ice, heat, chiropractic therapy, physical therapy   Interventional Measures:   Left shoulder injections with Dr Malone  6/27/2022: ABEL  Surgical Measures: No history of previous  cervical spine or shoulder surgery. No history of previous lumbar spine or hip surgery. Right total knee arthroplasty 03/28/2023  Nicholas Renee underwent neurosurgical consultation with Dr. Fermín Acosta on 07/06/2023, and was found not to be a surgical candidate  Nicholas Renee underwent orthopedic consultation with Dr. Souza on 09/20/2021 and was found not to be a surgical candidate for his shoulder issues.  Nicholas Renee presents with significant comorbidities including diverticulitis, GERD, hypertension, prediabetes, history of oral cancer status postradiation  In terms of current analgesics, Nicholas Renee takes: Acetaminophen, meloxicam   I have reviewed Pasquale Report consistent with medication reconciliation.  SOAPP/ORT: Low Risk     PHQ-2 Depression Screening  Little interest or pleasure in doing things? 0-->not at all   Feeling down, depressed, or hopeless? 0-->not at all   PHQ-2 Total Score 0     Pain Self-Efficacy Questionnaire (PSEQ)  ITEM 09-12 2023        I can enjoy things despite the pain. 5        I can do most of the household chores (tidying up, washing dishes, etc), despite the pain. 5        I can socialize with my friends or family members as often as I used to do, despite the pain. 5        I can cope with my pain in most situations. 4        I can do some form of work, despite the pain (includes housework, paid, and unpaid work). 4        I can still do many of the things I enjoy doing, such as hobbies or leisure activity despite pain. 3        I can cope with my pain without medications. 5        I can accomplish most of my goals in life despite the pain. 4        I can live in a normal lifestyle, despite the pain. 4        I can gradually become more active, despite the pain. 4        TOTAL SCORE 43/60            Global Pain Scale 09-12 2023          Pain 18          Feelings 2          Clinical outcomes 10          Activities 8          GPS Total: 38            NECK PAIN DISABILITY  INDEX QUESTIONNAIRE  DATE 09-12 2023           Pain intensity  0: No pain  1: Mild  2: Moderate  3: Fairly severe  4: very severe  5: Worst imaginable 2            Personal Care   0: I can look after myself normally without causing extra pain.  1: I can look after myself normally, but it causes extra pain.  2: It is painful to look after myself and I am slow and careful.  3: I need some help, but manage most of my personal care.  4: I need help every day in most aspects of self-care.  5: I do not get dressed; I wash with difficulty and stay in bed. 1           Lifting  0: I can lift heavy weights without extra pain.  1: I can lift heavy weights, but it gives extra pain.  2: Pain prevents me from lifting heavy weights off the floor but I can manage if they are conveniently positioned, for example, on a table.  3: Pain prevents me from lifting heavy weights, but I can manage light to medium weights if they are conveniently positioned.  4: I can lift very light weights.  5: I cannot lift or carry anything at all. 1           Reading  0: I can read as much as I want to with no pain in my neck.  1: I can read as much as I want to with slight pain in my neck.  2: I can read as much as I want to with moderate pain in my neck.  3: I cannot read as much as I want because of moderate pain in my neck.  4: I cannot read as much as I want because of severe pain in my neck.  5: I cannot read at all.  0           Headaches  0: I have no headaches at all.  1: I have slight headaches which come infrequently.  2: I have moderate headaches which come infrequently.  3: I have moderate headaches which come frequently.  4: I have severe headaches which come frequently.  5: I have headaches almost all the time.  0           Concentration  0: I can concentrate fully when I want to with no difficulty.  1: I can concentrate fully when I want to with slight difficulty.  2: I have a fair degree of difficulty in concentrating when I want  to.  3: I have a lot of difficulty in concentrating when I want to.  4: I have a great deal of difficulty in concentrating when I want to.  5: I cannot concentrate at all.  0           Work  0: I can do as much work as I want to.  1: I can only do my usual work, but no more.  2: I can do most of my usual work, but no more.  3: I cannot do my usual work.  4: I can hardly do any work at all.  5: I cannot do any work at all.  1           Driving  0: I can drive my car without any neck pain.  1: I can drive my car as long as I want with slight pain in my neck.  2: I can drive my car as long as I want with moderate pain in my   neck.  3: I cannot drive my car as long as I want because of moderate pain   in my neck.  4: I can hardly drive at all because of severe pain in my neck.  5: I cannot drive my car at all.  1           Sleeping  0: I have no trouble sleeping.  1: My sleep is slightly disturbed (less than 1 hour sleepless).  2: My sleep is mildly disturbed (1-2 hours sleepless).  3: My sleep is moderately disturbed (2-3 hours sleepless).  4: My sleep is greatly disturbed (3-5 hours sleepless).  5: My sleep is completely disturbed (5-7 hours)  1           Recreation  0: I am able to engage in all of my recreational activities with no neck pain at all.  1: I am able to engage in all of my recreational activities with some pain in my neck.  2: I am able to engage in most, but not all of my recreational activities because of pain in my neck.  3: I am able to engage in a few of my recreational activities because of pain in my neck.  4: I can hardly do any recreational activities because of pain in my neck.  5: I cannot do any recreational activities at all.  1           TOTAL SCORE 8/50               Shoulder Pain and Disability Index (SPADI)   PAIN SCALE:   How severe is your pain?   Pain scale 0/10 to 10/10 09-12  2023      What number describes your pain at its worst?  4      When lying on the involved side? 4       Reaching for something on a high shelf?  1      Touching the back of your neck? 1      Pushing with the involved arm? 2      Total Pain Score (MAX 50) 12             DISABILITY SCALE:   How much difficulty do you have?  Difficulty scale 0/10 to 10/10        Washing your hair? 0      Washing your back? 0      Putting on an undershirt or jumper?  0      Putting on a shirt that buttons down the front?  0      Putting on your pants?  0      Placing an object on a high shelf?  0      Carrying a heavy object of 10 pounds (4.5 KG) 1      Removing something from your back pocket?  1      Total Disability Score (MAX 80) 2             TOTAL SPADI SCORE () 14          The [place] Back Pain Disability Scale   DATE 09-12 2023          Sleep through the night 2          Turn over in bed 2          Get out of bed 2          Make your bed 1          Put on socks (pantyhose) 2          Ride in a car 1          Sit in a chair for several hours 2          Stand up for 20-30 minutes 1          Climb one flight of stairs 1          Walk a few blocks (200-300 yards)  2          Walk several miles 3          Run one block (about 50 yards) 3          Take food out of the refrigerator 1          Reach up to high shelves 1          Move a chair 1          Pull or push heavy doors 1          Bend over to clean the bathtub 2          Throw a ball 2          Carry two bags of groceries 1          Lift and carry a heavy suitcase 2          Total score 33            The [employer] Osteoarthritis Index (WOMAC)   to assess pain, stiffness, and physical function in patients with hip osteoarthritis    CATEGORIES  Rate: 0 = None 1 = Slight 2 = Moderate 3 = Very 4 = Extremely 09-12 2023       PAIN: Think about the pain you felt during the last 48 hours caused by the arthritis in your hip.         1. Walking in a flat surface?  2       2. When going up or down stairs?  2       3. At night while in bed/pain that  disturbs your sleep?  3       4. While sitting or lying down?  2       5. While standing?  1       Total Pain Score (Max 20): 10       STIFFNESS: Think about the stiffness (not pain) you felt during the last 48 hours caused by the arthritis in your hip.         1. How severe has your stiffness been after you first woke up   in the morning?  2       2. How severe has your stiffness been after sitting or lying   down or while resting later in the day?  2       Total Stiffness Score (Max 8): 4       PHYSICAL FUNCTION: Think about the difficulty you had in doing the following daily physical activities during the last 48 hours caused by the arthritis in your hip.        1. When going down the stairs?  2       2. When going up the stairs?  1       3. When getting up from a sitting position?  2         4. While standing?  1       5. When bending to the floor?  1       6. When walking on a flat surface?  1       7. Getting in or out of a car, or getting on or off a bus?  2       8. While going shopping?  1       9. When putting on your socks or panty hose or stockings?  2       10. When getting out of bed?  2       11. When taking off your socks or panty hose or stockings?  2       12. When lying in bed?  1       13. When getting in or out of the bathtub?  2         14. While sitting?  1       15. When getting on or off the toilet?  2       16. While doing heavy household chores?  1       17. While doing light household chores?  1       Total Difficulty Score (Max 68): 25       Total WOMAC Score: __ /96 X100 = __% 39           Review of New Diagnostic Studies:  I have independently reviewed and interpreted the images with the patient and used the images and a tridimensional spine model to explain findings. I have also reviewed the reports.  MRI LUMBAR SPINE WO CONTRAST 05/25/2023 revealed multilevel spondylosis. Grade 1 anterolisthesis of L4 on L5. Transitional anatomy with a lumbarized S1 and well-formed S1-S2  intervertebral disc. The conus medullaris and cauda equina nerve roots are satisfactory in appearance. Axial imaging:  L1-L2, L2-L3: No significant canal or NF stenosis  L3-L4: Disc bulge, ligamentum flavum hypertrophy, facet arthropathy. Moderate to severe spinal canal stenosis. Mild bilateral neuroforaminal stenosis.  L4-L5: Disc bulge, ligamentum flavum hypertrophy, facet arthropathy. Moderate spinal canal stenosis. Mild to moderate bilateral neuroforaminal stenosis.  L5-S1: Disc bulge, ligamentum flavum hypertrophy, facet arthropathy. Mild spinal canal stenosis. Moderate to severe left and mild to moderate right neuroforaminal stenosis.     Review of Previous Diagnostic Studies: I have independently reviewed and interpreted the images with the patient and used the images and a tridimensional spine model to explain findings. I have also reviewed the reports.  MRI of the cervical spine without contrast 9/27/2021: Vertebral body heights are well-maintained without evidence of malalignment.  The spinal cord appears normal throughout.    C2-C3: Disc osteophyte complex with left lateralizing features, correlating to mild left paracentral spinal canal stenosis and left foraminal stenosis  C3-C4: Disc osteophyte complex, uncovertebral hypertrophy contributing to mild canal stenosis and mild to moderate left foraminal stenosis  C4-C5: Disc osteophyte complex, uncovertebral hypertrophy contributing to mild canal stenosis and mild to moderate left neuroforaminal stenosis  C5-C6: Large disc osteophyte complex, uncovertebral hypertrophy contributing to moderate canal stenosis greatest in lateral recess and left paracentral region with there is mild indentation of the left hemicord without cord edema.  Uncovertebral hypertrophy contributing to moderate to severe right and moderate left foraminal stenosis  C6-C7: Disc osteophyte complex, facet hypertrophy contributing to mild canal stenosis and foraminal stenosis  C7-T1: No  significant canal or foraminal stenosis  MRI of the left shoulder without contrast 9/27/2021: moderate to severe degenerative joint disease of the AC joint, evidence of subdeltoid bursitis.  Moderate degenerative joint disease of the glenohumeral joint with joint space narrowing.  Increased signal of the articular sided fibers of the supraspinatus with partial tear and or tendinopathy without retraction.  Infraspinatus has minimal partial-thickness tearing without retraction.  The subscapularis is normal.  Long head of the biceps well seated within the bicipital groove.  Teres minor is intact.  Mild to moderate inferior capsular thickening.     The following portions of the patient's history were reviewed and updated as appropriate: problem list, past medical history, past surgery history, social history, family history, medication reconciliation, and allergies    Review of Systems      Patient Active Problem List   Diagnosis    HTN (hypertension)    Prediabetes    Diverticulitis    S/P low anterior colon resection    Acute blood loss anemia, mild, asymptomatic    Acute postoperative pain    Primary osteoarthritis of right knee    Oropharynx cancer    Cervical disc disorder at C5-C6 level with radiculopathy    Connective tissue stenosis of neural canal of cervical region    Primary osteoarthritis of left shoulder    Rotator cuff tendinitis, right    Primary osteoarthritis of both knees    Cervical spondylosis    S/P total knee replacement, right    Obesity    GERD (gastroesophageal reflux disease)    Status post right knee replacement    Lumbar stenosis with neurogenic claudication    Spondylosis of lumbar region without myelopathy or radiculopathy    Degeneration of lumbar or lumbosacral intervertebral disc    Ligamentum flavum hypertrophy    Spondylolisthesis of lumbar region    Gait disturbance    Physical deconditioning    Primary osteoarthritis of left knee    Lateral collateral ligament deficiency of left knee        Past Medical History:   Diagnosis Date    Acromioclavicular separation 11/16/22    Arthritis of back     Arthritis of neck 12/1/22    C. difficile diarrhea     Cervical disc disorder     Diverticulitis     Esophagitis     GERD (gastroesophageal reflux disease)     Hip arthrosis 3/5/23    History of radiation therapy 02/09/2022    oropharynx, bilateral necks    Hypertension     Joint pain Both knees    Knee sprain     Low back pain Lower back    Low back strain     Lumbosacral disc disease     Neck strain     Oropharynx cancer 12/01/2021    Osteoarthritis of both knees     Periarthritis of shoulder Left shoulder    Rotator cuff syndrome 11/16/22    Tear of meniscus of knee     Tennis elbow 9/1/1999    Thoracic disc disorder     Wears glasses          Past Surgical History:   Procedure Laterality Date    ABDOMINAL SURGERY      INFANT (6wk)- STOMACH BLOCKAGE     COLON RESECTION N/A 07/02/2019    Procedure: COLON RESECTION LAPAROSCOPIC LOW ANTERIOR;  Surgeon: Magan Blum MD;  Location:  [facility];  Service: General    COLONOSCOPY N/A 07/11/2017    Procedure: COLONOSCOPY;  Surgeon: Magan Blum MD;  Location:  [facility];  Service:     ENDOSCOPY      FOOT SURGERY      BILAT     JOINT REPLACEMENT  3/28/23    KNEE SURGERY Bilateral     x 5    ORTHOPEDIC SURGERY  Both knees    PERIPHERALLY INSERTED CENTRAL CATHETER INSERTION  06/13/2019    right subclavian now removed    TOTAL KNEE ARTHROPLASTY Right 03/28/2023    Procedure: TOTAL KNEE ARTHROPLASTY WITH CORI ROBOT - RIGHT;  Surgeon: Gage Britt MD;  Location:  [facility];  Service: Robotics - Ortho;  Laterality: Right;    TRIGGER POINT INJECTION  6/1/22         Family History   Problem Relation Age of Onset    Diabetes Father     Heart disease Father     Hypertension Father     Heart attack Father     Arthritis Father     Rheum arthritis Other     No Known Problems Mother          Social History     Socioeconomic History    Marital status:   "  Tobacco Use    Smoking status: Former     Packs/day: 0.50     Years: 20.00     Pack years: 10.00     Types: Cigarettes     Start date: 1978     Quit date: 2003     Years since quittin.4    Smokeless tobacco: Former     Types: Snuff    Tobacco comments:     Quit dipping 10/25/2021   Vaping Use    Vaping Use: Never used   Substance and Sexual Activity    Alcohol use: Not Currently     Comment: socially, \"rarely\"    Drug use: Never    Sexual activity: Yes     Partners: Female     Birth control/protection: None           Current Outpatient Medications:     acetaminophen (TYLENOL) 500 MG tablet, Take 2 tablets by mouth Every 4 (Four) Hours As Needed., Disp: , Rfl:     cetirizine (zyrTEC) 10 MG tablet, Take 1 tablet by mouth Daily As Needed., Disp: , Rfl:     gemfibrozil (LOPID) 600 MG tablet, Take 1 tablet by mouth 2 (Two) Times a Day., Disp: 180 tablet, Rfl: 1    losartan (COZAAR) 100 MG tablet, Take 1 tablet by mouth Daily., Disp: 90 tablet, Rfl: 3    meloxicam (MOBIC) 15 MG tablet, Take 1 tablet by mouth Daily., Disp: 90 tablet, Rfl: 0    METAMUCIL FIBER PO, , Disp: , Rfl:     Multiple Vitamins-Minerals (MULTIVITAMIN ADULT PO), Take 1 tablet by mouth Daily., Disp: , Rfl:     pantoprazole (PROTONIX) 40 MG EC tablet, Take 1 tablet by mouth 2 (Two) Times a Day., Disp: 180 tablet, Rfl: 3    pilocarpine (SALAGEN) 5 MG tablet, Take 1 tablet by mouth 3 (Three) Times a Day As Needed., Disp: 60 tablet, Rfl: 1    vitamin B-12 (CYANOCOBALAMIN) 1000 MCG tablet, Take 2 tablets by mouth 1 (One) Time Per Week., Disp: , Rfl:       Allergies   Allergen Reactions    Alpha-Lipoic Acid Rash    Levaquin [Levofloxacin] Myalgia    Sulfa Antibiotics Hives and Rash         Pulse 67   Temp 96 °F (35.6 °C)   Ht 185.4 cm (73\")   Wt 110 kg (241 lb 12.8 oz)   SpO2 98%   BMI 31.90 kg/m²       Physical Exam:  Constitutional: Patient appears well-developed, well-nourished, well-hydrated, appears younger than stated age  HEENT: " Head: Normocephalic and atraumatic  Eyes: Conjunctivae and lids are normal  Pupils: Equal, round, reactive to light  Neck: Trachea normal. Neck supple. No JVD present.   Lymphatic: No cervical adenopathy  Peripheral vascular exam: Femoral: right 2+, left 2+.  Posterior tibialis: right 2+ and left 2+. Dorsalis pedis: right 2+ and left 2+. 1+ edema RLE.   Musculoskeletal   Gait and station: Gait evaluation demonstrated a normal gait. Able to walk on heels, toes, tandem walking   Cervical Spine Passive and active range of motion are limited secondary to pain. Extension, flexion, lateral flexion, rotation of the cervical spine increased and reproduced pain. Cervical facet joint loading maneuvers are positive.  Muscles: Presence of active trigger points left levator scapulae   Lumbar Spine: Passive and active range of motion are limited secondary to pain. Extension, rotation of the lumbar spine increased and reproduced left gluteal pain pain. Lumbar facet joint loading maneuvers are positive.  Sacroiliac Joints: Rohit's test, Gaenslen's test are negative   Piriformis maneuvers: Negative   Right Hip Joint: The range of motion of the hip joint is almost full and without pain   Left Hip Joint: The range of motion of the hip joint is almost full and without pain   Palpation of the bilateral ischial tuberosities: Unrevealing   Palpation of the bilateral psoas tendons and iliopsoas bursas: Unrevealing   Palpation of the bilateral greater trochanters: Unrevealing   Examination of the Iliotibial band: Unrevealing   Right knee: s/p RTKR  Left knee: Varus deformity. Range of motion: -5 to 110. No ACL, PCL laxity. Some laxity of LCL > MCL. Tenderness found lateral LCL. Crepitus.   Neurological:   Patient is alert and oriented to person, place, and time.   Speech: Normal.   Cortical function: Normal mental status.   Reflex Scores:  Right brachioradialis: 2+  Left brachioradialis: 2+  Right biceps: 2+  Left biceps: 2+  Right  triceps: 2+  Left triceps: 2+  Right patellar: 2+  Left patellar: 2+  Right Achilles: 1+  Left Achilles: 1+  Motor strength: 5/5  Motor Tone: Normal  Involuntary movements: None.   Superficial/Primitive Reflexes: Primitive reflexes were absent.   Right Casas: Absent  Left Casas: Absent  Right ankle clonus: Absent  Left ankle clonus: Absent   Babinsky: Absent  Spurling sign: Negative. Neck tornado test: Negative. Lhermitte sign: Negative. Long tract signs: Negative. Straight leg raising test: Negative. Femoral stretch sign: Negative.   Sensory exam: Intact to light touch, intact pain and temperature sensation, intact vibration sensation and normal proprioception  Coordination:  Finger to nose: Normal. Heel to shin: Normal. Balance: Normal Romberg's sign: Negative  Skin and subcutaneous tissue: Skin is warm and intact. No rash noted. No cyanosis.   Psychiatric: Judgment and insight: Normal. Recent and remote memory: Intact. Mood and affect: Normal.     ASSESSMENT:   1. Lumbar stenosis with neurogenic claudication    2. Spondylosis of lumbar region without myelopathy or radiculopathy    3. Degeneration of lumbar or lumbosacral intervertebral disc    4. Ligamentum flavum hypertrophy    5. Spondylolisthesis of lumbar region    6. Primary osteoarthritis of left knee    7. 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 Status post right knee replacement    9. Cervical disc disorder at C5-C6 level with radiculopathy    10. Connective tissue stenosis of neural canal of cervical region    11. Prediabetes    12. Gait disturbance    13. Physical deconditioning        PLAN/MEDICAL DECISION MAKING:  Mr. Nicholas Renee, 58 y.o. male presents with a longstanding history of chronic intractable lower back pain. Nicholas Renee reports a history of chronic lower back, buttock, hip and left leg pain with some neurogenic claudication. He denies cauda equina symptoms. Nicholas Renee has failed conservative measures including  physical therapy without relief. He was evaluated by Dr. Acosta on 07/06/2023, and was found not to be a surgical candidate.  Dr. Acosta recommended conservative management along with interventional pain management measures. Pain has progressed in intensity over the past several months. MRI of the lumbar spine w/o contrast on 05/25/2023 revealed multilevel spondylosis. Grade 1 anterolisthesis of L4 on L5.  At L3-L4: Disc bulge, ligamentum flavum hypertrophy, facet arthropathy. Moderate to severe spinal canal stenosis. Mild bilateral neuroforaminal stenosis. At L4-L5: Disc bulge, ligamentum flavum hypertrophy, facet arthropathy. Moderate spinal canal stenosis. Mild to moderate bilateral neuroforaminal stenosis. At L5-S1: Disc bulge, ligamentum flavum hypertrophy, facet arthropathy. Mild spinal canal stenosis. Moderate to severe left and mild to moderate right neuroforaminal stenosis. Nicholas Renee has failed to obtain pain relief with conservative measures for more than 6 months including oral analgesics, topical analgesics, ice, heat, physical therapy (last visit within the past 6 months), physical therapist directed home exercise program HEP (ongoing), chiropractic therapy, independent exercise program (ongoing), to name a few. A comprehensive evaluation including history and physical exam along with pertinent physiologic and functional assessment was performed. Patient presents with intractable pain due to the diagnoses listed above. Patient has failed to respond to conservative modalities, as referenced under HPI. Patient has responded well to minimally invasive interventional pain management measures, as referenced from previous notes and under HPI. I have documented the impact of patient's moderate-to-severe pain contributing to significant impairment in daily activities, ADLs, and a negative impact on the patient's quality of life, as reflected on Global Pain Scale 38/100; Neck pain disability index  questionnaire 8/50; Shoulder Pain and Disability Index (SPADI) 14/130;  The [place] Back Pain Disability Scale 33/100; The [employer] Osteoarthritis Index (WOMAC) to assess pain, stiffness, and physical function in patients with hip osteoarthritis 39/96. I have reviewed pertinent supporting diagnostic studies of patient's chronic pain condition as well as all available pertinent medical records to patient's chronic pain condition including previous therapies, as referenced above. PHQ-2 Depression Screening 0; Pain Self-Efficacy Questionnaire (PSEQ) 43/60. I had a lengthy conversation with Mr. Nicholas Renee regarding his chronic pain condition and potential therapeutic options including risks, benefits, alternative therapies, to name a few. We have discussed using a stepwise approach starting with the least intense level of care as determined by the extent required to diagnose and or treat a patient's condition. The treatments proposed are consistent with the patient's medical condition and are known to be as safe and effective by current guidelines and standard of care. These treatments are not considered experimental or investigational. The duration and frequency proposed are considered appropriate for the service in accordance with accepted standards of medical practice for the diagnosis and treatment of the patient's condition and intended to improve the patient's level of function. These services will be furnished in a setting appropriate to the patient's medical needs and condition. Therefore, I have proposed the following plan:    1. Interventional pain management measures: Patient does not take blood thinners.   A. Treatment of Chronic Lower Back Pain: Patient will be scheduled for diagnostic and therapeutic left L5-S1 and left S1 transforaminal epidural steroid injections using the lowest effective dose of steroids, under C-arm fluoroscopic guidance, with the use of contrast dye  (unless contraindicated) to confirm appropriate needle placement and spread of contrast dye. We may repeat therapeutic left L5-S1 and left S1 transforaminal epidural steroid injections depending on patient's outcome and following current guidelines: Epidurals will be limited to a maximum of 4 sessions per spinal region in a rolling twelve (12) month period. Continuation of epidural steroid injections over 12 months would only be considered under the following provisions;  Patient is a high-risk surgical candidate, or the patient does not desire surgery, or recurrence of pain in the same location relieved with ESIs for at least three months and epidural provides at least 50% sustained improvement of pain and/or 50% objective improvement in function (using same scale as baseline)  Pain is severe enough to cause a significant degree of functional disability or vocational disability  The primary care provider will be notified regarding continuation of procedures and repeat steroid use   Other options for treatment would include MILD, Vertiflex, PNS, SCS, regenerative therapies    B. Treatment of Chronic Left Knee Pain/OA: After completing treatment of his lower back, patient will be scheduled for a first set of diagnostic left superior medial genicular nerve block, left superior lateral genicular nerve block, left inferior medial genicular nerve block, left infrapatellar branch of the saphenous nerve block, left recurrent fibular nerve block. If patient experiences more than 50% pain relief along with significant improvement in the range of motion of the knee joint, then, patient will be scheduled for a second set of diagnostic blocks, to then, proceed with bipolar radiofrequency ablation of the left superomedial, superolateral and inferomedial genicular nerves, left infrapatellar branch of the saphenous nerve, left recurrent fibular nerve.      C. Treatment of Chronic Posterior Neck Pain: we have discussed prospects of  repeating a cervical epidural steroid injection by interlaminar approach using the lowest effective dose of steroids, under C-arm fluoroscopic guidance, with the use of contrast dye to confirm appropriate needle placement and spread of contrast dye. We may repeat cervical epidural steroid injection by interlaminar approach depending on patient's outcome.  As per current guidelines, epidurals will be limited to a maximum of 4 sessions per spinal region in a rolling twelve (12) month period. Continuation of epidural steroid injections over 12 months would only be considered under the following provisions;  Patient is a high-risk surgical candidate, or the patient does not desire surgery, or recurrence of pain in the same location relieved with ESIs for at least three months and epidural provides at least 50% sustained improvement of pain and/or 50% objective improvement in function (using same scale as baseline)  Pain is severe enough to cause a significant degree of functional disability or vocational disability  The primary care provider will be notified regarding continuation of procedures and repeat steroid use     2. Diagnostic studies:   A. Flexion and extension X-rays of the lumbar spine to assess lumbar stability  B. Bilateral left knee X-rays, full views  C. CBC, PT, PTT prior to SCS trial  D. Prior to spinal cord stimulator trial and implant: MRI of the thoracic spine without contrast to assess capacity and patency of the spinal canal and epidural space     3. Pharmacological measures: Reviewed and discussed; Patient takes Acetaminophen, meloxicam      4. Long-term rehabilitation efforts:  A. The patient does not have a history of falls. Also, I performed a risk assessment for falls using the Tinetti gait & balance assessment tool (scored 27/28; low risk for falls).   B. Patient will start a comprehensive physical therapy program at [facility] for Alter-G, gait and balance training,  neurodynamics, core strengthening, ASTYM, E-STIM, myofascial release, cupping, dry needling, home exercise program, 2-3 x per week for 8 weeks  C. Start a low impact exercise program such as yoga, Pilates, Manuel Chi, water therapy, swimming  D. Contrast therapy: Apply ice-packs for 15-20 minutes, followed by heating pads for 15-20 minutes to affected area   E. Prior to SCS: Patient will need a referral to Dr. Brandt Alvarez for psychological screening for spinal cord stimulation and intrathecal therapies.  F.  Rx for left knee brace for stabilization of lateral compartment. Patient has been prescribed a knee brace due to severe osteoarthritis of the knee with lateral collateral ligament instability. The brace will help support the knee and limit instability.  SANGITA Renee  reports that he quit smoking about 20 years ago. His smoking use included cigarettes. He started smoking about 45 years ago. He has a 10.00 pack-year smoking history. He has quit using smokeless tobacco.  His smokeless tobacco use included snuff.    5. The patient has been instructed to contact my office with any questions or difficulties. The patient understands the plan and agrees to proceed accordingly.    The patient has a documented plan of care to address chronic pain. Nicholas Renee reports a pain score of 7/10.  Given his pain assessment as noted, treatment options were discussed and the following options were decided upon as a follow-up plan to address the patient's pain: continuation of current treatment plan for pain, educational materials on pain management, home exercises and therapy, prescription for non-opiod analgesics, referral to Physical Therapy, referral to specialist for assistance in pain treatment guidance, steroid injections, use of non-medical modalities (ice, heat, stretching and/or behavior modifications), and interventional pain management measures .          Pain Management Panel           No data to display                  TIGIST query complete. TIGIST reviewed by Dimitri Mesa MD.     Pain Medications               acetaminophen (TYLENOL) 500 MG tablet Take 2 tablets by mouth Every 4 (Four) Hours As Needed.    meloxicam (MOBIC) 15 MG tablet Take 1 tablet by mouth Daily.             No orders of the defined types were placed in this encounter.     Please note that portions of this note were completed with a voice recognition program.   Any copied data in any portion of my note has been reviewed by myself and accurate.     The 21st Century Cures Act makes medical notes like this available to patients in the interest of transparency. This is a medical document intended as peer to peer communication. It is written in medical language and may contain abbreviations or verbiage that are unfamiliar. It may appear blunt or direct. Medical documents are intended to carry relevant information, facts as evident, and the clinical opinion of the practitioner.     Dimitri Mesa MD    Patient Care Team:  Jack Gibson MD as PCP - General  Jack Gibson MD as PCP - Family Medicine  Nicholas Manuel MD as Referring Physician (Otolaryngology)  Brandt Zambrano MD as Consulting Physician (Radiation Oncology)  Georgiana Buck MD as Consulting Physician (Hematology and Oncology)  Dimitri Mesa MD as Consulting Physician (Pain Medicine)  Georgiana Yeh APRN as Nurse Practitioner (Pain Medicine)     No orders of the defined types were placed in this encounter.        Future Appointments   Date Time Provider Department Center   10/9/2023  2:30 PM Fermín Acosta MD [facility]   10/12/2023 12:00 AM [facility] XR 1 [facility] XRAY [facility]   12/6/2023 10:30 AM Della Kay APRN [facility]C [facility]

## 2023-09-12 ENCOUNTER — OFFICE VISIT (OUTPATIENT)
Dept: PAIN MEDICINE | Facility: CLINIC | Age: 58
End: 2023-09-12
Payer: COMMERCIAL

## 2023-09-12 ENCOUNTER — HOSPITAL ENCOUNTER (OUTPATIENT)
Dept: GENERAL RADIOLOGY | Facility: HOSPITAL | Age: 58
Discharge: HOME | End: 2023-09-12
Payer: COMMERCIAL

## 2023-09-12 VITALS
BODY MASS INDEX: 32.05 KG/M2 | TEMPERATURE: 96 F | HEART RATE: 67 BPM | OXYGEN SATURATION: 98 % | HEIGHT: 73 IN | WEIGHT: 241.8 LBS

## 2023-09-12 DIAGNOSIS — M43.16 SPONDYLOLISTHESIS OF LUMBAR REGION: ICD-10-CM

## 2023-09-12 DIAGNOSIS — M99.41 CONNECTIVE TISSUE STENOSIS OF NEURAL CANAL OF CERVICAL REGION: ICD-10-CM

## 2023-09-12 DIAGNOSIS — M17.12 PRIMARY OSTEOARTHRITIS OF LEFT KNEE: ICD-10-CM

## 2023-09-12 DIAGNOSIS — M48.062 LUMBAR STENOSIS WITH NEUROGENIC CLAUDICATION: Primary | ICD-10-CM

## 2023-09-12 DIAGNOSIS — R26.9 GAIT DISTURBANCE: ICD-10-CM

## 2023-09-12 DIAGNOSIS — R53.81 PHYSICAL DECONDITIONING: ICD-10-CM

## 2023-09-12 DIAGNOSIS — M47.816 SPONDYLOSIS OF LUMBAR REGION WITHOUT MYELOPATHY OR RADICULOPATHY: ICD-10-CM

## 2023-09-12 DIAGNOSIS — Z96.651 H/O TOTAL KNEE REPLACEMENT, RIGHT: ICD-10-CM

## 2023-09-12 DIAGNOSIS — M50.122 CERVICAL DISC DISORDER AT C5-C6 LEVEL WITH RADICULOPATHY: ICD-10-CM

## 2023-09-12 DIAGNOSIS — M51.36 DDD (DEGENERATIVE DISC DISEASE), LUMBAR: ICD-10-CM

## 2023-09-12 DIAGNOSIS — M23.8X2 LATERAL COLLATERAL LIGAMENT DEFICIENCY OF LEFT KNEE: ICD-10-CM

## 2023-09-12 DIAGNOSIS — M51.37 DEGENERATION OF LUMBAR OR LUMBOSACRAL INTERVERTEBRAL DISC: ICD-10-CM

## 2023-09-12 DIAGNOSIS — M24.28 LIGAMENTUM FLAVUM HYPERTROPHY: ICD-10-CM

## 2023-09-12 DIAGNOSIS — R73.03 PREDIABETES: ICD-10-CM

## 2023-09-12 DIAGNOSIS — Z96.651 STATUS POST RIGHT KNEE REPLACEMENT: ICD-10-CM

## 2023-09-12 DIAGNOSIS — M48.062 LUMBAR STENOSIS WITH NEUROGENIC CLAUDICATION: ICD-10-CM

## 2023-09-12 DIAGNOSIS — M47.819 SPINAL ARTHRITIS: ICD-10-CM

## 2023-09-12 PROCEDURE — 72120 X-RAY BEND ONLY L-S SPINE: CPT

## 2023-09-12 PROCEDURE — 73562 X-RAY EXAM OF KNEE 3: CPT

## 2023-09-27 ENCOUNTER — OUTSIDE FACILITY SERVICE (OUTPATIENT)
Dept: PAIN MEDICINE | Facility: CLINIC | Age: 58
End: 2023-09-27
Payer: COMMERCIAL

## 2023-09-27 PROCEDURE — 99152 MOD SED SAME PHYS/QHP 5/>YRS: CPT | Performed by: ANESTHESIOLOGY

## 2023-09-27 PROCEDURE — 64483 NJX AA&/STRD TFRM EPI L/S 1: CPT | Performed by: ANESTHESIOLOGY

## 2023-09-27 PROCEDURE — 64484 NJX AA&/STRD TFRM EPI L/S EA: CPT | Performed by: ANESTHESIOLOGY

## 2023-10-09 ENCOUNTER — OFFICE VISIT (OUTPATIENT)
Dept: NEUROSURGERY | Facility: CLINIC | Age: 58
End: 2023-10-09
Payer: COMMERCIAL

## 2023-10-09 VITALS — BODY MASS INDEX: 32.2 KG/M2 | RESPIRATION RATE: 18 BRPM | HEIGHT: 73 IN | WEIGHT: 243 LBS

## 2023-10-09 DIAGNOSIS — M54.16 LUMBAR RADICULOPATHY: Primary | ICD-10-CM

## 2023-10-09 PROCEDURE — 99214 OFFICE O/P EST MOD 30 MIN: CPT | Performed by: NEUROLOGICAL SURGERY

## 2023-10-09 NOTE — PROGRESS NOTES
NAME: HOUSTON SIMON   DOS: 10/9/2023  : 1965  PCP: Jack Gibson MD    Chief Complaint:    Chief Complaint   Patient presents with    Low back & left buttock pain       History of Present Illness:  58 y.o. male   I saw a 58-year-old [place] in neurosurgical follow-up he was here with low back pain and some neck issues he is doing better overall underwent lumbar epidural steroid injections and conservatism he is here for evaluation    PMHX  Allergies:  Allergies   Allergen Reactions    Alpha-Lipoic Acid Rash    Levaquin [Levofloxacin] Myalgia    Sulfa Antibiotics Hives and Rash     Medications    Current Outpatient Medications:     acetaminophen (TYLENOL) 500 MG tablet, Take 2 tablets by mouth Every 4 (Four) Hours As Needed., Disp: , Rfl:     cetirizine (zyrTEC) 10 MG tablet, Take 1 tablet by mouth Daily As Needed., Disp: , Rfl:     gemfibrozil (LOPID) 600 MG tablet, Take 1 tablet by mouth 2 (Two) Times a Day., Disp: 180 tablet, Rfl: 1    losartan (COZAAR) 100 MG tablet, Take 1 tablet by mouth Daily., Disp: 90 tablet, Rfl: 3    meloxicam (MOBIC) 15 MG tablet, Take 1 tablet by mouth Daily., Disp: 90 tablet, Rfl: 0    METAMUCIL FIBER PO, , Disp: , Rfl:     Multiple Vitamins-Minerals (MULTIVITAMIN ADULT PO), Take 1 tablet by mouth Daily., Disp: , Rfl:     pantoprazole (PROTONIX) 40 MG EC tablet, Take 1 tablet by mouth 2 (Two) Times a Day., Disp: 180 tablet, Rfl: 3    pilocarpine (SALAGEN) 5 MG tablet, Take 1 tablet by mouth 3 (Three) Times a Day As Needed., Disp: 60 tablet, Rfl: 1    vitamin B-12 (CYANOCOBALAMIN) 1000 MCG tablet, Take 2 tablets by mouth 1 (One) Time Per Week., Disp: , Rfl:   Past Medical History:  Past Medical History:   Diagnosis Date    Acromioclavicular separation 22    Arthritis of back     Arthritis of neck 22    C. difficile diarrhea     Cervical disc disorder     Diverticulitis     Esophagitis     GERD (gastroesophageal reflux disease)     Hip arthrosis 3/5/23    History  "of radiation therapy 2022    oropharynx, bilateral necks    Hypertension     Joint pain Both knees    Knee sprain     Low back pain Lower back    Low back strain     Lumbosacral disc disease     Neck strain     Oropharynx cancer 2021    Osteoarthritis of both knees     Periarthritis of shoulder Left shoulder    Rotator cuff syndrome 20    Tear of meniscus of knee     Tennis elbow 1999    Thoracic disc disorder     Wears glasses      Past Surgical History:  Past Surgical History:   Procedure Laterality Date    ABDOMINAL SURGERY      INFANT (6wk)- STOMACH BLOCKAGE     COLON RESECTION N/A 2019    Procedure: COLON RESECTION LAPAROSCOPIC LOW ANTERIOR;  Surgeon: Magan Blum MD;  Location:  "[facility] OR;  Service: General    COLONOSCOPY N/A 2017    Procedure: COLONOSCOPY;  Surgeon: Magan Blum MD;  Location:  "[facility] ENDOSCOPY;  Service:     ENDOSCOPY      FOOT SURGERY      BILAT     JOINT REPLACEMENT  3/28/23    KNEE SURGERY Bilateral     x 5    ORTHOPEDIC SURGERY  Both knees    PERIPHERALLY INSERTED CENTRAL CATHETER INSERTION  2019    right subclavian now removed    TOTAL KNEE ARTHROPLASTY Right 2023    Procedure: TOTAL KNEE ARTHROPLASTY WITH CORI ROBOT - RIGHT;  Surgeon: Gage Britt MD;  Location:  "[facility] OR;  Service: Robotics - Ortho;  Laterality: Right;    TRIGGER POINT INJECTION  22     Social Hx:  Social History     Tobacco Use    Smoking status: Former     Packs/day: 0.50     Years: 20.00     Additional pack years: 0.00     Total pack years: 10.00     Types: Cigarettes     Start date: 1978     Quit date: 2003     Years since quittin.4    Smokeless tobacco: Former     Types: Snuff    Tobacco comments:     Quit dipping 10/25/2021   Vaping Use    Vaping Use: Never used   Substance Use Topics    Alcohol use: Not Currently     Comment: socially, \"rarely\"    Drug use: Never     Family Hx:  Family History   Problem Relation Age of Onset    Diabetes " Father     Heart disease Father     Hypertension Father     Heart attack Father     Arthritis Father     Rheum arthritis Other     No Known Problems Mother      Review of Systems:        Review of Systems   Constitutional:  Negative for activity change, appetite change, chills, diaphoresis, fatigue, fever and unexpected weight change.   HENT:  Negative for congestion, dental problem, drooling, ear discharge, ear pain, facial swelling, hearing loss, mouth sores, nosebleeds, postnasal drip, rhinorrhea, sinus pressure, sneezing, sore throat, tinnitus, trouble swallowing and voice change.    Eyes:  Negative for photophobia, pain, discharge, redness, itching and visual disturbance.   Respiratory:  Negative for apnea, cough, choking, chest tightness, shortness of breath, wheezing and stridor.    Cardiovascular:  Negative for chest pain, palpitations and leg swelling.   Gastrointestinal:  Negative for abdominal distention, abdominal pain, anal bleeding, blood in stool, constipation, diarrhea, nausea, rectal pain and vomiting.   Endocrine: Negative for cold intolerance, heat intolerance, polydipsia, polyphagia and polyuria.   Genitourinary:  Negative for decreased urine volume, difficulty urinating, dysuria, enuresis, flank pain, frequency, genital sores, hematuria and urgency.   Musculoskeletal:  Positive for back pain. Negative for arthralgias, gait problem, joint swelling, myalgias, neck pain and neck stiffness.   Skin:  Negative for color change, pallor, rash and wound.   Allergic/Immunologic: Negative for environmental allergies, food allergies and immunocompromised state.   Neurological:  Negative for dizziness, tremors, seizures, syncope, facial asymmetry, speech difficulty, weakness, light-headedness, numbness and headaches.   Hematological:  Negative for adenopathy. Does not bruise/bleed easily.   Psychiatric/Behavioral:  Negative for agitation, behavioral problems, confusion, decreased concentration, dysphoric  mood, hallucinations, self-injury, sleep disturbance and suicidal ideas. The patient is not nervous/anxious and is not hyperactive.    All other systems reviewed and are negative.       Review of I have reviewed this note template and all pertinent parts of the review of systems social, family history, surgical history and medication listImaging/DATA:      Physical Examination:  Vitals:    10/09/23 1430   Resp: 18      General Appearance:   Well developed, well nourished, well groomed, alert, and cooperative.  Neurological examination:  Neurologic Exam  He is wide awake alert and follows commands.    He appears in no apparent distress    Gait and station are normal he appears at his neurologic baseline  Imaging data input-    Imaging data-    Patient had a x-ray lumbar flexion-extension film that shows a slight grade 1 listhesis mostly stable at 3 on 4 he is got bone-on-bone with disc osteophyte complexes multiple levels with what I would consider on his lumbar spinal films to show moderate lumbar spinal stenosis  Diagnoses/Plan:    Mr. Renee is a 58 y.o. male   1.  Lumbar degenerative disc disease-improvement of left lower extremity pain with lumbar epidural steroid block    2.  He is a [place]    3.  Slight spondylolisthesis 3 on 4    I counseled him and encouraged conservatism.  I explained that currently I would defer surgical intervention unless he had progressive refractory neurogenic claudication, we may contemplate lumbar myelogram should his symptoms persist I be happy to see him back anytime in the future but right now I think he can be managed conservatively

## 2023-12-06 ENCOUNTER — LAB (OUTPATIENT)
Dept: LAB | Facility: HOSPITAL | Age: 58
End: 2023-12-06
Payer: COMMERCIAL

## 2023-12-06 ENCOUNTER — OFFICE VISIT (OUTPATIENT)
Dept: ONCOLOGY | Facility: CLINIC | Age: 58
End: 2023-12-06
Payer: COMMERCIAL

## 2023-12-06 VITALS
DIASTOLIC BLOOD PRESSURE: 91 MMHG | HEART RATE: 70 BPM | BODY MASS INDEX: 32.74 KG/M2 | TEMPERATURE: 98.2 F | WEIGHT: 247 LBS | HEIGHT: 73 IN | RESPIRATION RATE: 16 BRPM | OXYGEN SATURATION: 98 % | SYSTOLIC BLOOD PRESSURE: 148 MMHG

## 2023-12-06 DIAGNOSIS — C10.9 OROPHARYNX CANCER: Primary | ICD-10-CM

## 2023-12-06 DIAGNOSIS — D64.9 ANEMIA, UNSPECIFIED TYPE: ICD-10-CM

## 2023-12-06 LAB
ALBUMIN SERPL-MCNC: 4.4 G/DL (ref 3.5–5.2)
ALBUMIN/GLOB SERPL: 1.5 G/DL
ALP SERPL-CCNC: 78 U/L (ref 39–117)
ALT SERPL W P-5'-P-CCNC: 12 U/L (ref 1–41)
ANION GAP SERPL CALCULATED.3IONS-SCNC: 11 MMOL/L (ref 5–15)
AST SERPL-CCNC: 16 U/L (ref 1–40)
BASOPHILS # BLD AUTO: 0.02 10*3/MM3 (ref 0–0.2)
BASOPHILS NFR BLD AUTO: 0.4 % (ref 0–1.5)
BILIRUB SERPL-MCNC: 0.4 MG/DL (ref 0–1.2)
BUN SERPL-MCNC: 21 MG/DL (ref 8–23)
BUN/CREAT SERPL: 18.6 (ref 7–25)
CALCIUM SPEC-SCNC: 9.4 MG/DL (ref 8.6–10.5)
CHLORIDE SERPL-SCNC: 105 MMOL/L (ref 98–107)
CO2 SERPL-SCNC: 24 MMOL/L (ref 22–29)
CREAT SERPL-MCNC: 1.13 MG/DL (ref 0.76–1.27)
DEPRECATED RDW RBC AUTO: 43.9 FL (ref 37–54)
EGFRCR SERPLBLD CKD-EPI 2021: 74.4 ML/MIN/1.73
EOSINOPHIL # BLD AUTO: 0.12 10*3/MM3 (ref 0–0.4)
EOSINOPHIL NFR BLD AUTO: 2.6 % (ref 0.3–6.2)
ERYTHROCYTE [DISTWIDTH] IN BLOOD BY AUTOMATED COUNT: 12.1 % (ref 12.3–15.4)
FERRITIN SERPL-MCNC: 115.1 NG/ML (ref 30–400)
FOLATE SERPL-MCNC: 18 NG/ML (ref 4.78–24.2)
GLOBULIN UR ELPH-MCNC: 2.9 GM/DL
GLUCOSE SERPL-MCNC: 98 MG/DL (ref 65–99)
HCT VFR BLD AUTO: 37.1 % (ref 37.5–51)
HGB BLD-MCNC: 12.8 G/DL (ref 13–17.7)
IMM GRANULOCYTES # BLD AUTO: 0.02 10*3/MM3 (ref 0–0.05)
IMM GRANULOCYTES NFR BLD AUTO: 0.4 % (ref 0–0.5)
LYMPHOCYTES # BLD AUTO: 1.3 10*3/MM3 (ref 0.7–3.1)
LYMPHOCYTES NFR BLD AUTO: 28.3 % (ref 19.6–45.3)
MCH RBC QN AUTO: 33.9 PG (ref 26.6–33)
MCHC RBC AUTO-ENTMCNC: 34.5 G/DL (ref 31.5–35.7)
MCV RBC AUTO: 98.1 FL (ref 79–97)
MONOCYTES # BLD AUTO: 0.4 10*3/MM3 (ref 0.1–0.9)
MONOCYTES NFR BLD AUTO: 8.7 % (ref 5–12)
NEUTROPHILS NFR BLD AUTO: 2.73 10*3/MM3 (ref 1.7–7)
NEUTROPHILS NFR BLD AUTO: 59.6 % (ref 42.7–76)
PLATELET # BLD AUTO: 233 10*3/MM3 (ref 140–450)
PMV BLD AUTO: 8.6 FL (ref 6–12)
POTASSIUM SERPL-SCNC: 4.5 MMOL/L (ref 3.5–5.2)
PROT SERPL-MCNC: 7.3 G/DL (ref 6–8.5)
RBC # BLD AUTO: 3.78 10*6/MM3 (ref 4.14–5.8)
SODIUM SERPL-SCNC: 140 MMOL/L (ref 136–145)
TSH SERPL DL<=0.05 MIU/L-ACNC: 1.86 UIU/ML (ref 0.27–4.2)
VIT B12 BLD-MCNC: 526 PG/ML (ref 211–946)
WBC NRBC COR # BLD AUTO: 4.59 10*3/MM3 (ref 3.4–10.8)

## 2023-12-06 PROCEDURE — 82607 VITAMIN B-12: CPT

## 2023-12-06 PROCEDURE — 36415 COLL VENOUS BLD VENIPUNCTURE: CPT

## 2023-12-06 PROCEDURE — 82728 ASSAY OF FERRITIN: CPT

## 2023-12-06 PROCEDURE — 80050 GENERAL HEALTH PANEL: CPT

## 2023-12-06 PROCEDURE — 82746 ASSAY OF FOLIC ACID SERUM: CPT

## 2023-12-06 PROCEDURE — 99213 OFFICE O/P EST LOW 20 MIN: CPT | Performed by: NURSE PRACTITIONER

## 2023-12-06 NOTE — PROGRESS NOTES
"      PROBLEM LIST:  1. 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  Biopsy of left neck mass on 11/8/2021 showed squamous cell carcinoma.  P16 stain inconclusive.  Repeat biopsy done 11/24/21 - excision of left lingual tonsil, showed SCC, p16+  B) begin weekly cisplatin with radiation 12/23/2021. Completed cisplatin 2/9/2022.   2. Hypertension  3. GERd  4. Hyperlipidemia    Subjective     CHIEF COMPLAINT: p16+ oropharynx cancer    HISTORY OF PRESENT ILLNESS:   Nicholas Renee returns for follow-up.   Overall he has been well since his last visit.  He denies any pain or difficulty with swallowing.  He does have to be careful eating rice he feels like it gets stuck in his throat.  He continues to have dry mouth and taste changes that have been present since treatment.          Objective       /91   Pulse 70   Temp 98.2 °F (36.8 °C)   Resp 16   Ht 185.4 cm (73\")   Wt 112 kg (247 lb)   SpO2 98%   BMI 32.59 kg/m²    Vitals:    12/06/23 1038   PainSc: 0-No pain                   ECOG score: 0   General: well appearing male in no acute distress  Neuro: alert and oriented  HEENT: sclerae anicteric.  Oropharynx is clear no edema in the neck  Lymphatics:  No palpable cervical adenopathy bilaterally.  Cardiovascular: regular rate and rhythm, no murmurs  Lungs: clear to auscultation bilaterally  Abdomen: soft, nontender, nondistended.  No palpable organomegaly  Extremities: no lower extremity edema  Skin: no rashes, lesions, bruising, or petechiae  Psych: mood and affect appropriate      RECENT LABS:  Lab Results   Component Value Date    WBC 4.59 12/06/2023    HGB 12.8 (L) 12/06/2023    HCT 37.1 (L) 12/06/2023    MCV 98.1 (H) 12/06/2023     12/06/2023       Lab Results   Component Value Date    GLUCOSE 91 05/31/2023    BUN 24 (H) 05/31/2023    CREATININE 1.08 05/31/2023    " EGFRIFNONA 74 02/08/2022    BCR 22.2 05/31/2023    K 4.6 05/31/2023    CO2 25.0 05/31/2023    CALCIUM 9.9 05/31/2023    ALBUMIN 4.4 05/31/2023    AST 17 05/31/2023    ALT 12 05/31/2023                 Assessment & Plan                Nicholas Renee is a 58 y.o. male with a cT1-2N1M0 p16+ SCC of the left base of tongue, with > 3 cm cervical LN.    CT neck and chest in February 2023 showed no evidence of disease recurrence.  He has scheduled follow-up scans March 12, 2024 and will see Dr. Zambrano the same day.  He continues to see Dr. Manuel and is scheduled to see him January 2024.     CBC from today is improved.  Hemoglobin is 12.8.  CMP and TSH are pending.  Will review when available.    Follow-up in 6 months with CBC, CMP and TSH on return.        Della Kay APRN  [facility]    12/6/2023

## 2023-12-13 ENCOUNTER — TRANSCRIBE ORDERS (OUTPATIENT)
Dept: ADMINISTRATIVE | Facility: HOSPITAL | Age: 58
End: 2023-12-13
Payer: COMMERCIAL

## 2023-12-13 DIAGNOSIS — R31.29 MICROSCOPIC HEMATURIA: Primary | ICD-10-CM

## 2023-12-23 ENCOUNTER — HOSPITAL ENCOUNTER (OUTPATIENT)
Dept: CT IMAGING | Facility: HOSPITAL | Age: 58
Discharge: HOME | End: 2023-12-23
Admitting: NURSE PRACTITIONER
Payer: COMMERCIAL

## 2023-12-23 DIAGNOSIS — R31.29 MICROSCOPIC HEMATURIA: ICD-10-CM

## 2023-12-23 PROCEDURE — 74176 CT ABD & PELVIS W/O CONTRAST: CPT

## 2023-12-28 ENCOUNTER — TELEPHONE (OUTPATIENT)
Dept: PAIN MEDICINE | Facility: CLINIC | Age: 58
End: 2023-12-28
Payer: COMMERCIAL

## 2023-12-28 NOTE — TELEPHONE ENCOUNTER
Caller: Nicholas Renee    Relationship to patient: Self    Best call back number: [phone]    Patient is needing: PATIENT RECEIVED A [facility] MESSAGE THAT HE NEEDS LUMBAR FLEX XRAY BUT HE HAD THIS DONE ON 09/12/23 - PLEASE REACH OUT AND ADVISE

## 2024-01-29 NOTE — PROGRESS NOTES
"Chief Complaint: \"Pain in my lower back. Pain in my left knee. My neck pain is better since my procedure.\"        History of Present Illness:  Mr. Nicholas Renee, 58 y.o. male originally referred by Dr Fermín Acosta in consultation for chronic intractable lower back pain and chronic neck pain. Nicholas Renee was last seen on 09/27/2023, when he underwent diagnostic and therapeutic left L5-S1 and left S1 TFESI, from which he experienced almost 100% pain relief and functional improvement that lasted for more than 3 weeks followed by 50% for 2 additional weeks. Nicholas Renee underwent follow up neurosurgical consultation with Dr Acosta on 10/09/2023 and reported that he has been doing well after his lumbar epidural steroid injections, and therefore, Dr. Acosta encouraged to continue conservatism. Prior to that, Nicholas Renee had been seen by [facility] on 08/29/2022 for postprocedure follow-up of cervical epidural steroid injection performed on June 27, 2022, from which he has experienced 75% ongoing pain relief and functional improvement.      Problem #1: Chronic Lower Back Pain  Pain History: Nicholas Renee reports a longstanding history of chronic intractable lower back pain, buttock pain, hip pain and left leg pain associated with neurogenic claudication, which began without incident. He denies bowel or bladder problems. He was evaluated by Dr. Acosta on 07/06/2023, and was found not to be a surgical candidate.  Dr. Acosta recommended conservative management along with interventional pain management measures. MRI of the lumbar spine w/o contrast on 05/25/2023 revealed multilevel spondylosis. Grade 1 anterolisthesis of L4 on L5.  At L3-L4: Disc bulge, ligamentum flavum hypertrophy, facet arthropathy. Moderate to severe spinal canal stenosis. Mild bilateral neuroforaminal stenosis. At L4-L5: Disc bulge, ligamentum flavum hypertrophy, facet arthropathy. Moderate spinal canal stenosis. Mild to moderate bilateral neuroforaminal " stenosis. At L5-S1: Disc bulge, ligamentum flavum hypertrophy, facet arthropathy. Mild spinal canal stenosis. Moderate to severe left and mild to moderate right neuroforaminal stenosis. Nicholas Renee failed to obtain pain relief with conservative measures for more than 6 months including oral analgesics, topical analgesics, ice, heat, physical therapy, physical therapist directed home exercise program HEP (ongoing), chiropractic therapy, independent exercise program, to name a few. Since last visit patient underwent flexion and extension X-rays of the lumbar spine on 09/12/2023: Severe degenerative changes. Grade 1 spondylolisthesis of L3 on L4 which appears similar with flexion and extension.   Pain Description: Constant lower back pain with intermittent exacerbation, described as aching, dull, sharp, throbbing, and burning sensation.   Radiation of Pain: The pain radiates into the gluteal area  Pain intensity today: 5/10   Average pain intensity last week: 6/10  Pain intensity ranges from: 4/10 to 7/10  Aggravating factors: Pain increases with arching the back, twisting, walking. Patient denies neurogenic claudication.    Alleviating factors: Pain decreases with sitting down, sitting on a recliner, lying down  Associated Symptoms:   Patient reports pain (LLE), and numbness RT>LT, but denies weakness in the lower extremities  Patient denies any new bladder or bowel problems.   Patient denies difficulties with his balance or recent falls.   Pain interferes with general activities (ability to walk, stand, transition from different positions), and affects patient's quality of life  Pain interferes with sleep falling asleep and causing sleep fragmentation   Muscle spasms: No  Stiffness: LB      Problem #2: Chronic Posterior Neck Pain  Pain History: Nicholas Renee reports a longstanding history of chronic intractable posterior neck pain. Mr. Nicholas Renee was referred by Dr. Fermín Acosta in consultation for chronic  intractable neck and left shoulder pain, which began after doing yard work while weed eating. He underwent consultation with Dr. Souza in orthopedics on 09/20/2021 for left shoulder pain and underwent left subacromial injections without relief. MRI of the cervical spine revealed multilevel spondylitic changes most significant at C5-C6, with a large disc osteophyte complex contributing to lateral recess stenosis and central spinal canal stenosis. He underwent neurosurgical consultation with Dr. Fermín Acosta and was found not to be surgical a candidate.  On June 27, 2022, he underwent a cervical epidural steroid injection, from which he experienced 75% ongoing pain relief and functional improvement.    Pain Description: Constant low grade posterior neck pain with intermittent exacerbation, described as a stiffness sensation.   Radiation of Pain: The pain does not radiate   Pain intensity today: 0/10   Average pain intensity last week: 0/10  Pain intensity ranges from: 0/10 to 1/10  Aggravating factors: Pain increases with extension, rotation of the cervical spine   Alleviating factors: Pain decreases with rest, sitting down, lying down, ice  Associated Symptoms:   Patient denies pain, numbness, or weakness in the upper extremities.   Stiffness: Neck     Problem #3: Chronic Left Knee Joint Pain/OA  Pain History: Nicholas Renee reports a several year history of chronic left knee joint pain. He has a history of RT TKR. Since last visit, patient underwent bilateral knee X-rays on 09/12/2023: Right knee: Total knee arthroplasty in normal anatomic alignment. No hardware complications. Left knee: Severe degenerative tricompartmental osteoarthritis with severe joint narrowing. Large osteophytes from the posterior aspect of the patella. Small calcifications in the suprapatellar bursa. Slight lateral subluxation of the distal femur in relation to the proximal tibia.  Pain Description: Constant left knee pain with  intermittent exacerbation, described as aching, sharp, and dull sensation.   Radiation of Pain: The pain does not radiate   Pain intensity today: 7/10   Average pain intensity last week: 6/10  Pain intensity ranges from: 5/10 to 8/10  Aggravating factors: Pain increases with standing, walking  Alleviating factors: Pain decreases with rest     Review of previous therapies and additional medical records:  Nicholas Renee has already failed the following measures, including:   Conservative Measures: Oral analgesics, topical analgesics, ice, heat, chiropractic therapy, physical therapy   Interventional Measures:   09/27/2023: Diagnostic and therapeutic left L5-S1 and left S1 TFESI  6/27/2022: ABEL  Left shoulder injections with Dr Malone  Surgical Measures: No history of previous cervical spine or shoulder surgery. No history of previous lumbar spine or hip surgery. 03/28/2023: Right total knee arthroplasty   Nicholas Renee underwent neurosurgical consultation with Dr. Fermín Acosta on 07/06/2023, and was found not to be a surgical candidate  Nicholas Renee underwent orthopedic consultation with Dr. Souza on 09/20/2021 and was found not to be a surgical candidate for his shoulder issues.  Nicholas Renee presents with significant comorbidities including GERD, hypertension, prediabetes, history of oropharynx cancer (12/01/2021) status postradiation  In terms of current analgesics, Nicholas Renee takes:  acetaminophen, meloxicam, Norco  I have reviewed Pasquale Report consistent with medication reconciliation.  SOAPP/ORT: Low Risk     PHQ-2 Depression Screening  Little interest or pleasure in doing things? 0-->not at all   Feeling down, depressed, or hopeless? 0-->not at all   PHQ-2 Total Score 0     Pain Self-Efficacy Questionnaire (PSEQ)  ITEM 09-12 2023 01-30 2024           I can enjoy things despite the pain. 5  4           I can do most of the household chores (tidying up, washing dishes, etc), despite the pain. 5  5            I can socialize with my friends or family members as often as I used to do, despite the pain. 5  5           I can cope with my pain in most situations. 4  5           I can do some form of work, despite the pain (includes housework, paid, and unpaid work). 4  5           I can still do many of the things I enjoy doing, such as hobbies or leisure activity despite pain. 3  5           I can cope with my pain without medications. 5  5           I can accomplish most of my goals in life despite the pain. 4  5           I can live in a normal lifestyle, despite the pain. 4  5           I can gradually become more active, despite the pain. 4  5           TOTAL SCORE 43/60  49/60                 Global Pain Scale 09-12 2023 01-30 2024               Pain 18  15               Feelings 2  0               Clinical outcomes 10  5               Activities 8  0               GPS Total: 38  20                  NECK PAIN DISABILITY INDEX QUESTIONNAIRE  DATE 09-12 2023                  Pain intensity  0: No pain  1: Mild  2: Moderate  3: Fairly severe  4: very severe  5: Worst imaginable 2                    Personal Care   0: I can look after myself normally without causing extra pain.  1: I can look after myself normally, but it causes extra pain.  2: It is painful to look after myself and I am slow and careful.  3: I need some help, but manage most of my personal care.  4: I need help every day in most aspects of self-care.  5: I do not get dressed; I wash with difficulty and stay in bed. 1                   Lifting  0: I can lift heavy weights without extra pain.  1: I can lift heavy weights, but it gives extra pain.  2: Pain prevents me from lifting heavy weights off the floor but I can manage if they are conveniently positioned, for example, on a table.  3: Pain prevents me from lifting heavy weights, but I can manage light to medium weights if they are conveniently positioned.  4: I can lift very light weights.  5:  I cannot lift or carry anything at all. 1                   Reading  0: I can read as much as I want to with no pain in my neck.  1: I can read as much as I want to with slight pain in my neck.  2: I can read as much as I want to with moderate pain in my neck.  3: I cannot read as much as I want because of moderate pain in my neck.  4: I cannot read as much as I want because of severe pain in my neck.  5: I cannot read at all.  0                   Headaches  0: I have no headaches at all.  1: I have slight headaches which come infrequently.  2: I have moderate headaches which come infrequently.  3: I have moderate headaches which come frequently.  4: I have severe headaches which come frequently.  5: I have headaches almost all the time.  0                   Concentration  0: I can concentrate fully when I want to with no difficulty.  1: I can concentrate fully when I want to with slight difficulty.  2: I have a fair degree of difficulty in concentrating when I want to.  3: I have a lot of difficulty in concentrating when I want to.  4: I have a great deal of difficulty in concentrating when I want to.  5: I cannot concentrate at all.  0                   Work  0: I can do as much work as I want to.  1: I can only do my usual work, but no more.  2: I can do most of my usual work, but no more.  3: I cannot do my usual work.  4: I can hardly do any work at all.  5: I cannot do any work at all.  1                   Driving  0: I can drive my car without any neck pain.  1: I can drive my car as long as I want with slight pain in my neck.  2: I can drive my car as long as I want with moderate pain in my   neck.  3: I cannot drive my car as long as I want because of moderate pain   in my neck.  4: I can hardly drive at all because of severe pain in my neck.  5: I cannot drive my car at all.  1                   Sleeping  0: I have no trouble sleeping.  1: My sleep is slightly disturbed (less than 1 hour sleepless).  2: My  sleep is mildly disturbed (1-2 hours sleepless).  3: My sleep is moderately disturbed (2-3 hours sleepless).  4: My sleep is greatly disturbed (3-5 hours sleepless).  5: My sleep is completely disturbed (5-7 hours)  1                   Recreation  0: I am able to engage in all of my recreational activities with no neck pain at all.  1: I am able to engage in all of my recreational activities with some pain in my neck.  2: I am able to engage in most, but not all of my recreational activities because of pain in my neck.  3: I am able to engage in a few of my recreational activities because of pain in my neck.  4: I can hardly do any recreational activities because of pain in my neck.  5: I cannot do any recreational activities at all.  1                   TOTAL SCORE 8/50                         Shoulder Pain and Disability Index (SPADI)   PAIN SCALE:   How severe is your pain?   Pain scale 0/10 to 10/10 09-12 2023        What number describes your pain at its worst?  4        When lying on the involved side? 4         Reaching for something on a high shelf?  1         Touching the back of your neck? 1         Pushing with the involved arm? 2         Total Pain Score (MAX 50) 12                     DISABILITY SCALE:   How much difficulty do you have?  Difficulty scale 0/10 to 10/10            Washing your hair? 0         Washing your back? 0         Putting on an undershirt or jumper?  0         Putting on a shirt that buttons down the front?  0         Putting on your pants?  0         Placing an object on a high shelf?  0         Carrying a heavy object of 10 pounds (4.5 KG) 1         Removing something from your back pocket?  1         Total Disability Score (MAX 80) 2                     TOTAL SPADI SCORE () 14               The [place] Back Pain Disability Scale   DATE 09-12 2023 01-30 2024               Sleep through the night 2 3                Turn over in bed 2  2               Get out of bed 2  2                Make your bed 1  1               Put on socks (pantyhose) 2  2               Ride in a car 1  1               Sit in a chair for several hours 2  1               Stand up for 20-30 minutes 1  1               Climb one flight of stairs 1  1               Walk a few blocks (200-300 yards)  2  2               Walk several miles 3  3               Run one block (about 50 yards) 3  3               Take food out of the refrigerator 1  0               Reach up to high shelves 1  0               Move a chair 1  0               Pull or push heavy doors 1  0               Bend over to clean the bathtub 2  0               Throw a ball 2  0               Carry two bags of groceries 1  0               Lift and carry a heavy suitcase 2  0               Total score 33  22                  The [employer] Osteoarthritis Index (WOMAC)   to assess pain, stiffness, and physical function in patients with hip osteoarthritis    CATEGORIES  Rate: 0 = None 1 = Slight 2 = Moderate 3 = Very 4 = Extremely 09-12 2023          PAIN: Think about the pain you felt during the last 48 hours caused by the arthritis in your hip.              1. Walking in a flat surface?  2           2. When going up or down stairs?  2           3. At night while in bed/pain that disturbs your sleep?  3           4. While sitting or lying down?  2           5. While standing?  1           Total Pain Score (Max 20): 10           STIFFNESS: Think about the stiffness (not pain) you felt during the last 48 hours caused by the arthritis in your hip.              1. How severe has your stiffness been after you first woke up   in the morning?  2           2. How severe has your stiffness been after sitting or lying   down or while resting later in the day?  2           Total Stiffness Score (Max 8): 4           PHYSICAL FUNCTION: Think about the difficulty you had in doing the following daily physical activities during the last 48  hours caused by the arthritis in your hip.             1. When going down the stairs?  2           2. When going up the stairs?  1           3. When getting up from a sitting position?  2              4. While standing?  1           5. When bending to the floor?  1           6. When walking on a flat surface?  1           7. Getting in or out of a car, or getting on or off a bus?  2           8. While going shopping?  1           9. When putting on your socks or panty hose or stockings?  2           10. When getting out of bed?  2           11. When taking off your socks or panty hose or stockings?  2           12. When lying in bed?  1           13. When getting in or out of the bathtub?  2              14. While sitting?  1           15. When getting on or off the toilet?  2           16. While doing heavy household chores?  1           17. While doing light household chores?  1           Total Difficulty Score (Max 68): 25           Total WOMAC Score: __ /96 X100 = __% 39              Review of New Diagnostic Studies: I have independently reviewed and interpreted the images with the patient and used the images and a tridimensional model to explain findings. I have also reviewed the reports.  Bilateral knee X-rays on 09/12/2023:   Right knee: Total knee arthroplasty in normal anatomic alignment. No hardware complications.   Left knee: Severe degenerative tricompartmental osteoarthritis with severe joint narrowing. Large osteophytes from the posterior aspect of the patella. Small calcifications in the suprapatellar bursa. Slight lateral subluxation of the distal femur in relation to the proximal tibia.  Flexion and extension X-rays of the lumbar spine on 09/12/2023: Severe narrowing of the L4-5 disc with vacuum phenomenon and spurring. Severe narrowing of the L5-S1 disc. Moderate narrowing of the L3-L4 disc with slight grade 1 spondylolisthesis of L3 on L4 which appears similar with flexion and extension. Mild  narrowing of the L2-3 disc with mild spurring.     Review of Previous Diagnostic Studies:  I have independently reviewed and interpreted the images with the patient and used the images and a tridimensional spine model to explain findings. I have also reviewed the reports.  MRI LUMBAR SPINE WO CONTRAST 05/25/2023 revealed multilevel spondylosis. Grade 1 anterolisthesis of L4 on L5. Transitional anatomy with a lumbarized S1 and well-formed S1-S2 intervertebral disc. The conus medullaris and cauda equina nerve roots are satisfactory in appearance. Axial imaging:  L1-L2, L2-L3: No significant canal or NF stenosis  L3-L4: Disc bulge, ligamentum flavum hypertrophy, facet arthropathy. Moderate to severe spinal canal stenosis. Mild bilateral neuroforaminal stenosis.  L4-L5: Disc bulge, ligamentum flavum hypertrophy, facet arthropathy. Moderate spinal canal stenosis. Mild to moderate bilateral neuroforaminal stenosis.  L5-S1: Disc bulge, ligamentum flavum hypertrophy, facet arthropathy. Mild spinal canal stenosis. Moderate to severe left and mild to moderate right neuroforaminal stenosis.  MRI of the cervical spine without contrast 9/27/2021: Vertebral body heights are well-maintained without evidence of malalignment.  The spinal cord appears normal throughout.    C2-C3: Disc osteophyte complex with left lateralizing features, correlating to mild left paracentral spinal canal stenosis and left foraminal stenosis  C3-C4: Disc osteophyte complex, uncovertebral hypertrophy contributing to mild canal stenosis and mild to moderate left foraminal stenosis  C4-C5: Disc osteophyte complex, uncovertebral hypertrophy contributing to mild canal stenosis and mild to moderate left neuroforaminal stenosis  C5-C6: Large disc osteophyte complex, uncovertebral hypertrophy contributing to moderate canal stenosis greatest in lateral recess and left paracentral region with there is mild indentation of the left hemicord without cord edema.   Uncovertebral hypertrophy contributing to moderate to severe right and moderate left foraminal stenosis  C6-C7: Disc osteophyte complex, facet hypertrophy contributing to mild canal stenosis and foraminal stenosis  C7-T1: No significant canal or foraminal stenosis  MRI of the left shoulder without contrast 9/27/2021: moderate to severe degenerative joint disease of the AC joint, evidence of subdeltoid bursitis.  Moderate degenerative joint disease of the glenohumeral joint with joint space narrowing.  Increased signal of the articular sided fibers of the supraspinatus with partial tear and or tendinopathy without retraction.  Infraspinatus has minimal partial-thickness tearing without retraction.  The subscapularis is normal.  Long head of the biceps well seated within the bicipital groove.  Teres minor is intact.  Mild to moderate inferior capsular thickening.    The following portions of the patient's history were reviewed and updated as appropriate: problem list, past medical history, past surgery history, social history, family history, medication reconciliation, and allergies    Review of Systems      Patient Active Problem List   Diagnosis    HTN (hypertension)    Prediabetes    Diverticulitis    S/P low anterior colon resection    Acute blood loss anemia, mild, asymptomatic    Acute postoperative pain    Primary osteoarthritis of right knee    Oropharynx cancer    Cervical disc disorder at C5-C6 level with radiculopathy    Connective tissue stenosis of neural canal of cervical region    Primary osteoarthritis of left shoulder    Rotator cuff tendinitis, right    Primary osteoarthritis of both knees    Cervical spondylosis    S/P total knee replacement, right    Obesity    GERD (gastroesophageal reflux disease)    Status post right knee replacement    Lumbar stenosis with neurogenic claudication    Spondylosis of lumbar region without myelopathy or radiculopathy    Degeneration of lumbar or lumbosacral  intervertebral disc    Ligamentum flavum hypertrophy    Spondylolisthesis of lumbar region    Gait disturbance    Physical deconditioning    Primary osteoarthritis of left knee    Lateral collateral ligament deficiency of left knee       Past Medical History:   Diagnosis Date    Acromioclavicular separation 11/16/22    Arthritis of back     Arthritis of neck 12/1/22    C. difficile diarrhea     Cervical disc disorder     Diverticulitis     Esophagitis     GERD (gastroesophageal reflux disease)     Hip arthrosis 3/5/23    History of radiation therapy 02/09/2022    oropharynx, bilateral necks    Hypertension     Joint pain Both knees    Knee sprain     Low back pain Lower back    Low back strain     Lumbar stenosis with neurogenic claudication 09/08/2023    Lumbosacral disc disease     Neck strain     Oropharynx cancer 12/01/2021    Osteoarthritis of both knees     Periarthritis of shoulder Left shoulder    Rotator cuff syndrome 11/16/22    Tear of meniscus of knee     Tennis elbow 9/1/1999    Thoracic disc disorder     Wears glasses          Past Surgical History:   Procedure Laterality Date    ABDOMINAL SURGERY      INFANT (6wk)- STOMACH BLOCKAGE     COLON RESECTION N/A 07/02/2019    Procedure: COLON RESECTION LAPAROSCOPIC LOW ANTERIOR;  Surgeon: Magan Blum MD;  Location:  [facility] OR;  Service: General    COLONOSCOPY N/A 07/11/2017    Procedure: COLONOSCOPY;  Surgeon: Magan Blum MD;  Location:  [facility];  Service:     ENDOSCOPY      FOOT SURGERY      BILAT     JOINT REPLACEMENT  3/28/23    KNEE SURGERY Bilateral     x 5    ORTHOPEDIC SURGERY  Both knees    PERIPHERALLY INSERTED CENTRAL CATHETER INSERTION  06/13/2019    right subclavian now removed    TOTAL KNEE ARTHROPLASTY Right 03/28/2023    Procedure: TOTAL KNEE ARTHROPLASTY WITH CORI ROBOT - RIGHT;  Surgeon: Gage Britt MD;  Location:  [facility] OR;  Service: Robotics - Ortho;  Laterality: Right;    TRIGGER POINT INJECTION  6/1/22         Family  "History   Problem Relation Age of Onset    Diabetes Father     Heart disease Father     Hypertension Father     Heart attack Father     Arthritis Father     Rheum arthritis Other     No Known Problems Mother          Social History     Socioeconomic History    Marital status:    Tobacco Use    Smoking status: Former     Packs/day: 0.50     Years: 20.00     Additional pack years: 0.00     Total pack years: 10.00     Types: Cigarettes     Start date: 1978     Quit date: 2003     Years since quittin.7    Smokeless tobacco: Former     Types: Snuff    Tobacco comments:     Quit dipping 10/25/2021   Vaping Use    Vaping Use: Never used   Substance and Sexual Activity    Alcohol use: Not Currently     Comment: socially, \"rarely\"    Drug use: Never    Sexual activity: Yes     Partners: Female     Birth control/protection: None           Current Outpatient Medications:     acetaminophen (TYLENOL) 500 MG tablet, Take 2 tablets by mouth Every 4 (Four) Hours As Needed., Disp: , Rfl:     cetirizine (zyrTEC) 10 MG tablet, Take 1 tablet by mouth Daily As Needed., Disp: , Rfl:     gemfibrozil (LOPID) 600 MG tablet, Take 1 tablet by mouth 2 (Two) Times a Day., Disp: 180 tablet, Rfl: 4    HYDROcodone-acetaminophen (NORCO) 5-325 MG per tablet, Take 1 tablet by mouth Every 6 (Six) Hours As Needed for pain., Disp: 12 tablet, Rfl: 0    losartan (COZAAR) 100 MG tablet, Take 1 tablet by mouth Daily., Disp: 90 tablet, Rfl: 3    meloxicam (MOBIC) 15 MG tablet, Take 1 tablet by mouth Daily., Disp: 90 tablet, Rfl: 4    METAMUCIL FIBER PO, , Disp: , Rfl:     Multiple Vitamins-Minerals (MULTIVITAMIN ADULT PO), Take 1 tablet by mouth Daily., Disp: , Rfl:     pantoprazole (PROTONIX) 40 MG EC tablet, Take 1 tablet by mouth 2 (Two) Times a Day., Disp: 180 tablet, Rfl: 3    vitamin B-12 (CYANOCOBALAMIN) 1000 MCG tablet, Take 2 tablets by mouth 1 (One) Time Per Week., Disp: , Rfl:       Allergies   Allergen Reactions    " "Alpha-Lipoic Acid Rash    Levaquin [Levofloxacin] Myalgia    Sulfa Antibiotics Hives and Rash         Ht 185.4 cm (73\")   Wt 112 kg (246 lb 3.2 oz)   BMI 32.48 kg/m²       Physical Exam:  Constitutional: Patient appears well-developed, well-nourished, well-hydrated, appears younger than stated age  HEENT: Head: Normocephalic and atraumatic  Eyes: Conjunctivae and lids are normal  Pupils: Equal, round, reactive to light  Neck: Trachea normal. Neck supple. No JVD present.   Lymphatic: No cervical adenopathy  Peripheral vascular exam: Posterior tibialis: right 2+ and left 2+. Dorsalis pedis: right 2+ and left 2+. 1+ edema RLE.   Musculoskeletal   Gait and station: Gait evaluation demonstrated a normal gait. Able to walk on heels, toes, tandem walking   Cervical Spine: Passive and active range of motion are improved and without pain. Extension, flexion, lateral flexion, rotation of the cervical spine did not increase or reproduce pain. Cervical facet joint loading maneuvers are negative at this time.  Muscles: Presence of active trigger points at the levator scapulae   Right Shoulder: The range of motion of the right glenohumeral joint is almost full and without pain. Rotator cuff strength is 5/5.   Left Shoulder: The range of motion of the left glenohumeral joint is almost full and without pain. Rotator cuff strength is 5/5. Subacromial Impingement   Lumbar Spine: Passive and active range of motion are limited secondary to pain. Extension, rotation of the lumbar spine increased and reproduced pain. Lumbar facet joint loading maneuvers are positive.  Sacroiliac Joints: Rohit's test; Gaenslen's test; thigh thrust test; SI compression test; posterior shear test; SI distraction test; pelvic rock test; Yeoman's test: Negative   Piriformis maneuvers: Negative   Right Hip Joint: The range of motion of the hip joint is limited to flexion and internal rotation but without pain   Left Hip Joint: The range of motion of the " hip joint is limited to flexion and internal rotation but without pain   Palpation of the bilateral ischial tuberosities: Unrevealing   Palpation of the bilateral psoas tendons and iliopsoas bursas: Unrevealing   Palpation of the bilateral greater trochanters: Unrevealing   Examination of the Iliotibial band: Unrevealing   Right knee: s/p RTKR  Left knee: Varus deformity. Range of motion: -5 to 110. No ACL, PCL laxity. There is laxity of LCL > MCL. Tenderness found lateral LCL. Crepitus.   Neurological:   Patient is alert and oriented to person, place, and time.   Speech: Normal.   Cortical function: Normal mental status.   Reflex Scores:  Right brachioradialis: 2+  Left brachioradialis: 2+  Right biceps: 2+  Left biceps: 2+  Right triceps: 2+  Left triceps: 2+  Right patellar: 2+  Left patellar: 1+  Right Achilles: 1+  Left Achilles: 1+  Motor strength: 5/5  Motor Tone: Normal  Involuntary movements: None.   Superficial/Primitive Reflexes: Primitive reflexes were absent.   Right Casas: Absent  Left Casas: Absent  Right ankle clonus: Absent  Left ankle clonus: Absent   Babinsky: Absent  Spurling sign: Negative. Neck tornado test: Negative. Lhermitte sign: Negative. Long tract signs: Negative. Straight leg raising test: Negative. Femoral stretch sign: Negative.   Sensory exam: Intact to light touch, intact pain and temperature sensation, intact vibration sensation and normal proprioception  Coordination: Finger to nose: Normal. Balance: Normal Romberg's sign: Negative   Skin and subcutaneous tissue: Skin is warm and intact. No rash noted. No cyanosis.   Psychiatric: Judgment and insight: Normal. Recent and remote memory: Intact. Mood and affect: Normal.     ASSESSMENT:   1. Spondylosis of lumbar region without myelopathy or radiculopathy    2. Spondylolisthesis of lumbar region    3. Degeneration of lumbar or lumbosacral intervertebral disc    4. Lumbar stenosis with neurogenic claudication    5. Ligamentum  flavum hypertrophy    6. Primary osteoarthritis of left knee    7. Status post right knee replacement    8. 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 Cervical disc disorder at C5-C6 level with radiculopathy    10. Connective tissue stenosis of neural canal of cervical region    11. Prediabetes    12. Gait disturbance    13. Physical deconditioning          PLAN/MEDICAL DECISION MAKING:  Mr. Nicholas Renee, 58 y.o. male originally referred by Dr Fermín Acosta in consultation for chronic intractable lower back pain and chronic neck pain. Nicholas Renee was last seen on 09/27/2023, when he underwent diagnostic and therapeutic left L5-S1 and left S1 TFESI, from which he experienced almost 100% pain relief and functional improvement that lasted for more than 3 weeks followed by 50% for 2 additional weeks. Nicholas Renee underwent follow up neurosurgical consultation with Dr Acosta on 10/09/2023 and reported that he has been doing well after his lumbar epidural steroid injections, and therefore, Dr. Acosta encouraged to continue conservatism. Prior to that, Nicholas Renee had been seen by [facility]iana on 08/29/2022 for postprocedure follow-up of cervical epidural steroid injection performed on June 27, 2022, from which he has experienced 75% ongoing pain relief and functional improvement. MRI of the lumbar spine w/o contrast on 05/25/2023 revealed multilevel spondylosis. Grade 1 anterolisthesis of L4 on L5.  At L3-L4: Disc bulge, ligamentum flavum hypertrophy, facet arthropathy. Moderate to severe spinal canal stenosis. Mild bilateral neuroforaminal stenosis. At L4-L5: Disc bulge, ligamentum flavum hypertrophy, facet arthropathy. Moderate spinal canal stenosis. Mild to moderate bilateral neuroforaminal stenosis. At L5-S1: Disc bulge, ligamentum flavum hypertrophy, facet arthropathy. Mild spinal canal stenosis. Moderate to severe left and mild to moderate right neuroforaminal stenosis. Nicholas Renee failed to obtain pain  relief with conservative measures for more than 6 months including oral analgesics, topical analgesics, ice, heat, physical therapy, physical therapist directed home exercise program HEP (ongoing), chiropractic therapy, independent exercise program, to name a few. Since last visit patient underwent flexion and extension X-rays of the lumbar spine on 09/12/2023: Severe degenerative changes. Grade 1 spondylolisthesis of L3 on L4 which appears similar with flexion and extension.  Regarding his neck pain, MRI of the cervical spine revealed multilevel spondylitic changes most significant at C5-C6, with a large disc osteophyte complex contributing to lateral recess stenosis and central spinal canal stenosis. He underwent neurosurgical consultation with Dr. Fermín Acosta and was found not to be surgical a candidate.  On June 27, 2022, he underwent a cervical epidural steroid injection, from which he experienced 75% ongoing pain relief and functional improvement. Nicholas Renee reports a several year history of chronic left knee joint pain. He has a history of RT TKR. Since last visit, patient underwent bilateral knee X-rays on 09/12/2023: Right knee: Total knee arthroplasty in normal anatomic alignment. No hardware complications. Left knee: Severe degenerative tricompartmental osteoarthritis with severe joint narrowing. Large osteophytes from the posterior aspect of the patella. Small calcifications in the suprapatellar bursa. Slight lateral subluxation of the distal femur in relation to the proximal tibia. A comprehensive evaluation including history and physical exam along with pertinent physiologic and functional assessment was performed. Patient presents with intractable pain due to the diagnoses listed above. Patient has failed to respond to conservative modalities, as referenced under HPI. Patient has responded well to minimally invasive interventional pain management measures, as referenced from previous notes and  under HPI. I have documented the impact of patient's moderate-to-severe pain contributing to significant impairment in daily activities, ADLs, and a negative impact on the patient's quality of life, as reflected on Global Pain Scale 20/100 (down38/100); Neck pain disability index questionnaire 8/50; Shoulder Pain and Disability Index (SPADI) 14/130; The [place] Back Pain Disability Scale 22/100 (down 33/100); The [employer] Osteoarthritis Index (WOMAC) to assess pain, stiffness, and physical function in patients with knee osteoarthritis 39/96; Tinetti Gait & Balance Assessment Tool  (low risk for falls). I have reviewed pertinent supporting diagnostic studies of patient's chronic pain condition as well as all available pertinent medical records to patient's chronic pain condition including previous therapies, as referenced above. PHQ-2 Depression Screening 0; Pain Self-Efficacy Questionnaire (PSEQ) 49/60 (improved from 43/60). I had a lengthy conversation with Mr. Nicholas Renee regarding his chronic pain condition and potential therapeutic options including risks, benefits, alternative therapies, to name a few. We have discussed using a stepwise approach starting with the least intense level of care as determined by the extent required to diagnose and or treat a patient's condition. The proposed treatments are consistent with the patient's medical condition and known to be safe and effective by current guidelines and the standard of care. The duration and frequency proposed are considered appropriate for the service in accordance with accepted standards of medical practice for the diagnosis and treatment of the patient's condition and intended to improve the patient's level of function. These services will be furnished in a setting appropriate to the patient's medical needs and condition. Therefore, I have proposed the following plan:    1.  Interventional pain management measures:  Patient does not take blood thinners     A. Treatment of Chronic Lower Back Pain:  Patient presents with chronic unrelenting moderate to severe axial mechanical lumbar spine facetogenic pain without evidence of underlying or untreated radiculopathy and that causes functional deficit measured on pain scales as well as functional and disability scales. Pain has been present for several years. Nicholas Renee average pain level for the past months has been rated as 5/10 ranging from 4/10 to 9/10. Patient has failed to obtain pain relief or functional improvement from conservative measures, as referenced under HPI. Pain assessment has been performed and documented at baseline, and will be reassessed after each diagnostic procedure using the same pain scale for each assessment. A disability scale has also been obtained at baseline and will be used for functional assessment.  Diagnostic interventions will be performed without sedation or with the use of light sedation (but without the use of opioids) depending on patient's specific medical requirements. For radiofrequency procedures; we may proceed without sedation or with various degrees of sedation according to patient's specific requirements. All bilateral procedures will be done in one encounter. Patient will be scheduled for a first set of diagnostic bilateral lumbar medial branch blocks at L3, L4, L5; for bilateral lumbar facet joints at L4-L5, and L5-S1 to clarify the origin of chronic refractory mechanical lower back pain. If patient experiences 80% or more relief along with significant functional improvement and increase in the range of motion of the lumbar spine, then, patient will be scheduled for a second set of diagnostic bilateral lumbar medial branch blocks, to then, proceed with bilateral lumbar medial branch rhizotomies. If patient experiences 50% or more pain relief and functional improvement for at least six months, we could repeat the procedure. If more  than 2 years elapse since last RFTC, then, patient will be required to undergo diagnostic blocks prior to repeating RFTC.   Otherwise, we may repeat therapeutic left L5-S1 and left S1 transforaminal epidural steroid injections using the lowest effective dose of steroids vs MILD procedure as ligamentum flavum hypertrophy is the main component of central canal stenosis, vs Vertiflex vs PNS vs a spinal cord stimulator trial with Nevro. Patient has received formal education from me including risks, benefits, and alternative treatments, as well as detailed information regarding the procedure and specific goals for the trial. Patient has also received didactic materials including educational booklets and DVDs on spinal cord stimulation therapies.     B. Treatment of Chronic Left Knee Pain/OA: None indicated at this time. After completing treatment for his lower back pain, patient will be scheduled for a first set of diagnostic left superior medial genicular nerve block, left superior lateral genicular nerve block, left inferior medial genicular nerve block, left infrapatellar branch of the saphenous nerve block, left recurrent fibular nerve block. If patient experiences more than 50% pain relief along with significant improvement in the range of motion of the knee joint, then, patient will be scheduled for a second set of diagnostic blocks, to then, proceed with bipolar radiofrequency ablation of the left superomedial, superolateral and inferomedial genicular nerves, left infrapatellar branch of the saphenous nerve, left recurrent fibular nerve.       C. Treatment of Chronic Posterior Neck Pain: None indicated at this time. We have discussed prospects of repeating a cervical epidural steroid injection by interlaminar approach using the lowest effective dose of steroids, under C-arm fluoroscopic guidance, with the use of contrast dye to confirm appropriate needle placement and spread of contrast dye. We may repeat cervical  epidural steroid injection by interlaminar approach depending on patient's outcome.  As per current guidelines, epidurals will be limited to a maximum of 4 sessions per spinal region in a rolling twelve (12) month period. Continuation of epidural steroid injections over 12 months would only be considered under the following provisions;  · Patient is a high-risk surgical candidate, or the patient does not desire surgery, or recurrence of pain in the same location relieved with ESIs for at least three months and epidural provides at least 50% sustained improvement of pain and/or 50% objective improvement in function (using same scale as baseline)  · Pain is severe enough to cause a significant degree of functional disability or vocational disability  · The primary care provider will be notified regarding continuation of procedures and repeat steroid use   Other options for treatment of patient's pain would include PNS Sprint, SCS    2. Diagnostic studies: None indicated at this time  A. CBC, PT, PTT prior to SCS trial  B. Prior to spinal cord stimulator trial and implant: MRI of the thoracic spine without contrast to assess capacity and patency of the spinal canal and epidural space      3. Pharmacological measures: Reviewed and discussed; Patient takes Acetaminophen, meloxicam       4. Long-term rehabilitation efforts:  A. The patient does not have a history of falls. Also, I performed a risk assessment for falls using the Tinetti gait & balance assessment tool (scored low risk for falls).   B. Patient will start a comprehensive physical therapy program at [facility], gait and balance training, neurodynamics, core strengthening, gluteal and abductor strengthening, VMO/quadriceps/hamstrings strengthening, ultrasound, ASTYM, E-STIM, myofascial release, cupping, dry needling, home exercise program, 2-3 x per week for 8 weeks  C. Continue contrast therapy: Apply ice-packs for 15-20 minutes,  followed by heating pads for 15-20 minutes to affected area   D. Start a low impact exercise program such as water therapy, swimming, yoga, Pilates, Manuel Chi  E. I have prescribed a home exercise program with instructions. I have spent a significant amount of time talking with the patient and providing specific recommendations regarding lifestyle modification, preventive measures, and self-care management of chronic pain.  In addition, I have provided a copy of the booklet Care of the neck and Care of the back by Jose Eduardo Sands MD and Ashley Villeda MD to be used as a physician supervised home exercise program  F.  Prescription for left knee brace for stabilization of lateral compartment. Patient has been prescribed a knee brace due to severe osteoarthritis of the knee with lateral collateral ligament instability. The brace will help support the knee and limit instability.  G. Prior to SCS: Referral to Dr. Brandt Alvarez for psychological screening for spinal cord stimulation and intrathecal therapies.  H. Referral to [facility]. Patient's Body mass index is 32.48 kg/m². Patient counseled on the importance of weight loss to help with overall health and pain control.   I . Nicholas Renee  reports that he quit smoking about 20 years ago. His smoking use included cigarettes. He started smoking about 45 years ago. He has a 10.00 pack-year smoking history. He has quit using smokeless tobacco.  His smokeless tobacco use included snuff.    5. The patient has been instructed to contact my office with any questions or difficulties. The patient understands the plan and agrees to proceed accordingly.    The patient has a documented plan of care to address chronic pain. Nicholas Renee reports a pain score of 6/10.  Given his pain assessment as noted, treatment options were discussed and the following options were decided upon as a follow-up plan to address the patient's pain: continuation of current  treatment plan for pain, educational materials on pain management, home exercises and therapy, prescription for non-opiod analgesics, referral to Physical Therapy, referral to specialist for assistance in pain treatment guidance, steroid injections, use of non-medical modalities (ice, heat, stretching and/or behavior modifications), and interventional pain management measures .               Pain Management Panel           No data to display                 TIGIST query complete. TIGIST reviewed by Dimitri Mesa MD.     Pain Medications               acetaminophen (TYLENOL) 500 MG tablet Take 2 tablets by mouth Every 4 (Four) Hours As Needed.    HYDROcodone-acetaminophen (NORCO) 5-325 MG per tablet Take 1 tablet by mouth Every 6 (Six) Hours As Needed for pain.    meloxicam (MOBIC) 15 MG tablet Take 1 tablet by mouth Daily.             No orders of the defined types were placed in this encounter.       Please note that portions of this note were completed with a voice recognition program.   Any copied data in any portion of my note has been reviewed by myself and accurate.     The 21st Century Cures Act makes medical notes like this available to patients in the interest of transparency. This is a medical document intended as peer to peer communication. It is written in medical language and may contain abbreviations or verbiage that are unfamiliar. It may appear blunt or direct. Medical documents are intended to carry relevant information, facts as evident, and the clinical opinion of the practitioner.     Dimitri Mesa MD    Patient Care Team:  Jack Gibson MD as PCP - General  Jack Gibson MD as PCP - Family Medicine  Nicholas Manuel MD as Referring Physician (Otolaryngology)  Brandt Zambrano MD as Consulting Physician (Radiation Oncology)  Georgiana Buck MD as Consulting Physician (Hematology and Oncology)  Dimitri Mesa MD as Consulting Physician (Pain Medicine)  Ruba  REBEKAH Rodriguez as Nurse Practitioner (Pain Medicine)     No orders of the defined types were placed in this encounter.        Future Appointments   Date Time Provider Department Center   3/15/2024  8:30 AM [facility] BRAN CT 1 [facility]   3/15/2024 10:30 AM Brandt Zambrano MD [facility] None   6/5/2024 10:30 AM Georgiana Buck MD [facility]

## 2024-01-30 ENCOUNTER — OFFICE VISIT (OUTPATIENT)
Dept: PAIN MEDICINE | Facility: CLINIC | Age: 59
End: 2024-01-30
Payer: COMMERCIAL

## 2024-01-30 VITALS — HEIGHT: 73 IN | WEIGHT: 246.2 LBS | BODY MASS INDEX: 32.63 KG/M2

## 2024-01-30 DIAGNOSIS — R26.9 GAIT DISTURBANCE: ICD-10-CM

## 2024-01-30 DIAGNOSIS — Z96.651 STATUS POST RIGHT KNEE REPLACEMENT: ICD-10-CM

## 2024-01-30 DIAGNOSIS — M51.37 DEGENERATION OF LUMBAR OR LUMBOSACRAL INTERVERTEBRAL DISC: ICD-10-CM

## 2024-01-30 DIAGNOSIS — M43.16 SPONDYLOLISTHESIS OF LUMBAR REGION: ICD-10-CM

## 2024-01-30 DIAGNOSIS — R73.03 PREDIABETES: ICD-10-CM

## 2024-01-30 DIAGNOSIS — R53.81 PHYSICAL DECONDITIONING: ICD-10-CM

## 2024-01-30 DIAGNOSIS — M47.816 SPONDYLOSIS OF LUMBAR REGION WITHOUT MYELOPATHY OR RADICULOPATHY: ICD-10-CM

## 2024-01-30 DIAGNOSIS — M23.8X2 LATERAL COLLATERAL LIGAMENT DEFICIENCY OF LEFT KNEE: ICD-10-CM

## 2024-01-30 DIAGNOSIS — M48.062 LUMBAR STENOSIS WITH NEUROGENIC CLAUDICATION: ICD-10-CM

## 2024-01-30 DIAGNOSIS — M17.12 PRIMARY OSTEOARTHRITIS OF LEFT KNEE: ICD-10-CM

## 2024-01-30 DIAGNOSIS — M50.122 CERVICAL DISC DISORDER AT C5-C6 LEVEL WITH RADICULOPATHY: ICD-10-CM

## 2024-01-30 DIAGNOSIS — M99.41 CONNECTIVE TISSUE STENOSIS OF NEURAL CANAL OF CERVICAL REGION: ICD-10-CM

## 2024-01-30 DIAGNOSIS — M24.28 LIGAMENTUM FLAVUM HYPERTROPHY: ICD-10-CM

## 2024-01-30 PROCEDURE — 99214 OFFICE O/P EST MOD 30 MIN: CPT | Performed by: ANESTHESIOLOGY

## 2024-01-31 DIAGNOSIS — R73.03 PREDIABETES: ICD-10-CM

## 2024-01-31 DIAGNOSIS — Z00.8 PRE-SURGICAL PSYCHOLOGICAL ASSESSMENT, ENCOUNTER FOR: Primary | ICD-10-CM

## 2024-01-31 DIAGNOSIS — M47.816 SPONDYLOSIS OF LUMBAR REGION WITHOUT MYELOPATHY OR RADICULOPATHY: ICD-10-CM

## 2024-02-14 ENCOUNTER — OUTSIDE FACILITY SERVICE (OUTPATIENT)
Dept: PAIN MEDICINE | Facility: CLINIC | Age: 59
End: 2024-02-14
Payer: COMMERCIAL

## 2024-02-14 PROCEDURE — 64493 INJ PARAVERT F JNT L/S 1 LEV: CPT | Performed by: ANESTHESIOLOGY

## 2024-02-14 PROCEDURE — 99152 MOD SED SAME PHYS/QHP 5/>YRS: CPT | Performed by: ANESTHESIOLOGY

## 2024-02-14 PROCEDURE — 64494 INJ PARAVERT F JNT L/S 2 LEV: CPT | Performed by: ANESTHESIOLOGY

## 2024-02-16 ENCOUNTER — TELEPHONE (OUTPATIENT)
Dept: PAIN MEDICINE | Facility: CLINIC | Age: 59
End: 2024-02-16
Payer: COMMERCIAL

## 2024-02-16 DIAGNOSIS — M47.816 SPONDYLOSIS OF LUMBAR REGION WITHOUT MYELOPATHY OR RADICULOPATHY: Primary | ICD-10-CM

## 2024-02-26 DIAGNOSIS — M47.816 SPONDYLOSIS OF LUMBAR REGION WITHOUT MYELOPATHY OR RADICULOPATHY: Primary | ICD-10-CM

## 2024-02-28 ENCOUNTER — OUTSIDE FACILITY SERVICE (OUTPATIENT)
Dept: PAIN MEDICINE | Facility: CLINIC | Age: 59
End: 2024-02-28
Payer: COMMERCIAL

## 2024-02-28 PROCEDURE — 64493 INJ PARAVERT F JNT L/S 1 LEV: CPT | Performed by: ANESTHESIOLOGY

## 2024-02-28 PROCEDURE — 64494 INJ PARAVERT F JNT L/S 2 LEV: CPT | Performed by: ANESTHESIOLOGY

## 2024-02-28 PROCEDURE — 99152 MOD SED SAME PHYS/QHP 5/>YRS: CPT | Performed by: ANESTHESIOLOGY

## 2024-03-04 ENCOUNTER — TELEPHONE (OUTPATIENT)
Dept: PAIN MEDICINE | Facility: CLINIC | Age: 59
End: 2024-03-04
Payer: COMMERCIAL

## 2024-03-04 DIAGNOSIS — M47.816 SPONDYLOSIS OF LUMBAR REGION WITHOUT MYELOPATHY OR RADICULOPATHY: Primary | ICD-10-CM

## 2024-03-04 NOTE — TELEPHONE ENCOUNTER
FOLLOW-UP CALL AFTER PROCEDURE  I spoke with Nicholas Renee regarding how they're feeling after yesterday's procedure with Dr. Mesa. Patient reports that he is doing well.   Pt underwent a diagnostic procedure (see procedure note for details) on 2/28/2024 . Patient reported 100% pain relief at the time of after procedure exam with Dr. Mesa. In addition, patient experienced significant functional improvement (performing activities without experiencing pain in comparison with examination prior to the procedure that reproduced pain with those activities).   Pain level before procedure: 5/10   Pain level after procedure: 0/10  Patient reports that the pain relief lasted for 24 hours. Today, Nicholas Renee reports 50% ongoing pain relief pain (pain level 5/10) OR  pain has returned to baseline after 24 hours   Patient denies side effects or complications  Patient does not have any questions or concerns at this time    Disposition:   I provided information regarding date and time of next procedure: 3/27/2024 with 0700 am arrival.   Reminded that the procedure is in the [address], 3rd floor.   I advised that patient must have a , and that the  must remain in the premises until after the procedure is completed and the patient is ready to be discharged.   Reminded NPO status: Nothing by mouth (eat or drink) for at least 8 hours prior to the procedure. Patient can take medications with a a sip of water.     Understanding of above information verbalized.

## 2024-03-15 ENCOUNTER — OFFICE VISIT (OUTPATIENT)
Dept: RADIATION ONCOLOGY | Facility: HOSPITAL | Age: 59
End: 2024-03-15
Payer: COMMERCIAL

## 2024-03-15 ENCOUNTER — HOSPITAL ENCOUNTER (OUTPATIENT)
Dept: CT IMAGING | Facility: HOSPITAL | Age: 59
Discharge: HOME | End: 2024-03-15
Admitting: RADIOLOGY
Payer: COMMERCIAL

## 2024-03-15 ENCOUNTER — HOSPITAL ENCOUNTER (OUTPATIENT)
Dept: RADIATION ONCOLOGY | Facility: HOSPITAL | Age: 59
Setting detail: RADIATION/ONCOLOGY SERIES
End: 2024-03-15
Payer: COMMERCIAL

## 2024-03-15 VITALS
OXYGEN SATURATION: 97 % | RESPIRATION RATE: 18 BRPM | TEMPERATURE: 96.7 F | SYSTOLIC BLOOD PRESSURE: 144 MMHG | HEART RATE: 77 BPM | WEIGHT: 245.9 LBS | DIASTOLIC BLOOD PRESSURE: 88 MMHG | BODY MASS INDEX: 32.44 KG/M2

## 2024-03-15 DIAGNOSIS — C10.9 OROPHARYNX CANCER: ICD-10-CM

## 2024-03-15 DIAGNOSIS — C10.9 OROPHARYNX CANCER: Primary | ICD-10-CM

## 2024-03-15 LAB — CREAT BLDA-MCNC: 1.2 MG/DL (ref 0.6–1.3)

## 2024-03-15 PROCEDURE — 82565 ASSAY OF CREATININE: CPT

## 2024-03-15 PROCEDURE — 25510000001 IOPAMIDOL 61 % SOLUTION: Performed by: RADIOLOGY

## 2024-03-15 PROCEDURE — 70491 CT SOFT TISSUE NECK W/DYE: CPT

## 2024-03-15 PROCEDURE — 71260 CT THORAX DX C+: CPT

## 2024-03-15 PROCEDURE — G0463 HOSPITAL OUTPT CLINIC VISIT: HCPCS

## 2024-03-15 RX ADMIN — IOPAMIDOL 85 ML: 612 INJECTION, SOLUTION INTRAVENOUS at 08:42

## 2024-03-15 NOTE — PROGRESS NOTES
FOLLOW UP NOTE    PATIENT:                                                      Nicholas Renee  MEDICAL RECORD #:                        [id number]  :                                                          1965  COMPLETION DATE:    2022  DIAGNOSIS:     Oropharynx cancer  - Stage I (cT2, cN1, cM0, p16+)      BRIEF HISTORY:  Mr. 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 He completed treatment with definitive chemoradiation in 2022. He has done well over the past 2 years. His symptoms of xerostomia and poor sense of taste have gradually improved and at this point he says he is able to enjoy most all foods, albeit with some difficulty with more dry things such as sticky rice.  He does have dry mouth at night time, and uses Biotene once nightly, but even admits most nights he doesn't use it at all.  He underwent a right total knee replacement las year, which has helped with his discomfort, and he continues to now experience pain and reduced mobility in the left knee.  This has been persistent despite injections and physical therapy exercises.  He is hoping he may be able to be fitted for a brace for the left knee very soon. He denies any other symptoms.     His last laryngoscopic exam was 2024 with Dr. Manuel - which revealed no evidence of recurrent disease at the tongue base oral pharynx or neck     MEDICATIONS: Medication reconciliation for the patient was reviewed and confirmed in the electronic medical record.    Review of Systems   HENT:           Dry mouth-usually at night   All other systems reviewed and are negative.      Karnofsky score: 90     Physical Exam  Vitals and nursing note reviewed.   Constitutional:       General: He is not in acute distress.     Appearance: He is well-developed.   HENT:      Head: Normocephalic and atraumatic.      Mouth/Throat:      Comments: Oropharynx  clear.  Minimal radiation changes involving the left posterior oropharynx.  Base of tongue, floor of mouth, alveoli, buccal mucosa, and palate are soft without abnormal nodules or induration.    Eyes:      Conjunctiva/sclera: Conjunctivae normal.      Pupils: Pupils are equal, round, and reactive to light.   Neck:      Comments: No cervical or supraclavicular adenopathy  Cardiovascular:      Rate and Rhythm: Normal rate and regular rhythm.      Heart sounds: No murmur heard.     No friction rub.   Pulmonary:      Effort: Pulmonary effort is normal.      Breath sounds: Normal breath sounds. No wheezing.   Abdominal:      General: Bowel sounds are normal. There is no distension.      Palpations: Abdomen is soft. There is no mass.      Tenderness: There is no abdominal tenderness.   Musculoskeletal:         General: Normal range of motion.      Cervical back: Normal range of motion and neck supple.   Lymphadenopathy:      Cervical: No cervical adenopathy.   Skin:     General: Skin is warm and dry.   Neurological:      Mental Status: He is alert and oriented to person, place, and time.   Psychiatric:         Behavior: Behavior normal.         Thought Content: Thought content normal.         Judgment: Judgment normal.         VITAL SIGNS:   Vitals:    03/15/24 1029   BP: 144/88   Pulse: 77   Resp: 18   Temp: 96.7 °F (35.9 °C)   TempSrc: Temporal   SpO2: 97%   Weight: 112 kg (245 lb 14.4 oz)   PainSc: 0-No pain             Karnofsky score: 90     IMAGING  I have personally reviewed the relevant imaging studies, as follows:  CT Soft Tissue Neck With Contrast    Result Date: 3/15/2024  Impression: Nonspecific and potentially physiologic asymmetric fullness is seen at the level of the left piriform sinus within the supraglottic larynx. No discrete mass is evident and this finding is likely benign/transient, however consider correlation with direct visualization/laryngoscopy. There is no worrisome cervical adenopathy. No  acute findings or evidence of metastatic involvement in the chest. Electronically Signed: Saeed Jaramillo MD  3/15/2024 9:09 AM EDT  Workstation ID: [id number]    CT Chest With Contrast Diagnostic    Result Date: 3/15/2024  Impression: Nonspecific and potentially physiologic asymmetric fullness is seen at the level of the left piriform sinus within the supraglottic larynx. No discrete mass is evident and this finding is likely benign/transient, however consider correlation with direct visualization/laryngoscopy. There is no worrisome cervical adenopathy. No acute findings or evidence of metastatic involvement in the chest. Electronically Signed: Saeed Jaramillo MD  3/15/2024 9:09 AM EDT  Workstation ID: [id number]    CT Abdomen Pelvis Without Contrast    Result Date: 12/25/2023  1. Large simple appearing cyst on noncontrast imaging is similar to previous studies given differences in technique. 2. No renal calculi. Electronically Signed: Brandt Keen MD  12/25/2023 6:56 PM EST  Workstation ID: [id number]       The following portions of the patient's history were reviewed and updated as appropriate: allergies, current medications, past family history, past medical history, past social history, past surgical history and problem list.         Diagnoses and all orders for this visit:    1. Oropharynx cancer (Primary)  -     CT Chest With Contrast; Future  -     CT Soft Tissue Neck With Contrast; Future         IMPRESSION:  Mr. Renee is a 58 year old gentleman with a know history of a T2N1 p-16 positive base of tongue cancer.  He is 2 years out from treatment and doing exceptionally well.  His scans are stable, as is his most recent endoscopic exam.  We discussed the long term survivorship model today.  He will be seeing Dr. Manuel again in 6 months time.  I will plan to see him back in my clinic in 1 year.  He will be due to have repeat CT scans at the time of that visit.    RECOMMENDATIONS: Return to clinic in 1 year    I  spent a total of 30 minutes on todays visit, with more than 20 minutes in direct face to face communication, and the remainder of the time spent in reviewing the relevant history, records, available imaging, and for documentation.    Return in about 1 year (around 3/15/2025) for Office Visit.    Brandt Zambrano MD

## 2024-03-25 DIAGNOSIS — M47.816 SPONDYLOSIS OF LUMBAR REGION WITHOUT MYELOPATHY OR RADICULOPATHY: Primary | ICD-10-CM

## 2024-03-27 ENCOUNTER — OUTSIDE FACILITY SERVICE (OUTPATIENT)
Dept: PAIN MEDICINE | Facility: CLINIC | Age: 59
End: 2024-03-27
Payer: COMMERCIAL

## 2024-03-29 ENCOUNTER — TELEPHONE (OUTPATIENT)
Dept: PAIN MEDICINE | Facility: CLINIC | Age: 59
End: 2024-03-29
Payer: COMMERCIAL

## 2024-03-29 NOTE — TELEPHONE ENCOUNTER
FOLLOW-UP CALL AFTER PROCEDURE  I spoke with Nicholas Renee regarding how they're feeling after yesterday's procedure with Dr. Mesa. Patient reports that  he  is doing well.   Patient bilateral lumbar medial branch rhizotomies at L3, L4, L5; for bilateral lumbar facet joints at L4-L5, and L5-S1 on 3/27 . Patient reported 100% pain relief at the time of after procedure exam with Dr. Mesa. In addition, patient experienced significant functional improvement (performing activities without experiencing pain in comparison with examination prior to the procedure that reproduced pain with those activities).   Pain level before procedure: 7/10   Pain level after procedure: 4/10  Patient reports that the pain relief lasted for 24 hours. Today, Nicholas Renee reports 100 % ongoing pain relief pain (pain level /410) OR  pain has returned to baseline after  hours   Patient denies side effects or complications  Patient does not have any questions or concerns at this time.  Advised pt of next follow up with REBEKAH Shankar

## 2024-05-08 ENCOUNTER — OFFICE VISIT (OUTPATIENT)
Dept: ORTHOPEDIC SURGERY | Facility: CLINIC | Age: 59
End: 2024-05-08
Payer: COMMERCIAL

## 2024-05-08 VITALS
SYSTOLIC BLOOD PRESSURE: 140 MMHG | WEIGHT: 241.4 LBS | BODY MASS INDEX: 31.99 KG/M2 | HEIGHT: 73 IN | DIASTOLIC BLOOD PRESSURE: 90 MMHG

## 2024-05-08 DIAGNOSIS — Z96.651 STATUS POST RIGHT KNEE REPLACEMENT: Primary | ICD-10-CM

## 2024-05-08 DIAGNOSIS — M17.12 PRIMARY OSTEOARTHRITIS OF LEFT KNEE: ICD-10-CM

## 2024-05-08 DIAGNOSIS — K21.9 GERD WITHOUT ESOPHAGITIS: ICD-10-CM

## 2024-05-08 RX ORDER — PANTOPRAZOLE SODIUM 40 MG/1
40 TABLET, DELAYED RELEASE ORAL 2 TIMES DAILY
Qty: 180 TABLET | Refills: 3 | Status: CANCELLED | OUTPATIENT
Start: 2024-05-08

## 2024-05-08 RX ADMIN — TRIAMCINOLONE ACETONIDE 40 MG: 40 INJECTION, SUSPENSION INTRA-ARTICULAR; INTRAMUSCULAR at 09:49

## 2024-05-08 RX ADMIN — ROPIVACAINE HYDROCHLORIDE 4 ML: 5 INJECTION, SOLUTION EPIDURAL; INFILTRATION; PERINEURAL at 09:49

## 2024-05-09 RX ORDER — ROPIVACAINE HYDROCHLORIDE 5 MG/ML
4 INJECTION, SOLUTION EPIDURAL; INFILTRATION; PERINEURAL
Status: COMPLETED | OUTPATIENT
Start: 2024-05-08 | End: 2024-05-08

## 2024-05-09 RX ORDER — TRIAMCINOLONE ACETONIDE 40 MG/ML
40 INJECTION, SUSPENSION INTRA-ARTICULAR; INTRAMUSCULAR
Status: COMPLETED | OUTPATIENT
Start: 2024-05-08 | End: 2024-05-08

## 2024-05-31 DIAGNOSIS — K21.9 GERD WITHOUT ESOPHAGITIS: ICD-10-CM

## 2024-05-31 RX ORDER — PANTOPRAZOLE SODIUM 40 MG/1
40 TABLET, DELAYED RELEASE ORAL 2 TIMES DAILY
Qty: 180 TABLET | Refills: 3 | Status: CANCELLED | OUTPATIENT
Start: 2024-05-08

## 2024-06-05 ENCOUNTER — LAB (OUTPATIENT)
Dept: LAB | Facility: HOSPITAL | Age: 59
End: 2024-06-05
Payer: COMMERCIAL

## 2024-06-05 ENCOUNTER — OFFICE VISIT (OUTPATIENT)
Dept: ONCOLOGY | Facility: CLINIC | Age: 59
End: 2024-06-05
Payer: COMMERCIAL

## 2024-06-05 VITALS
TEMPERATURE: 97.6 F | RESPIRATION RATE: 18 BRPM | OXYGEN SATURATION: 97 % | SYSTOLIC BLOOD PRESSURE: 124 MMHG | HEIGHT: 73 IN | HEART RATE: 69 BPM | DIASTOLIC BLOOD PRESSURE: 89 MMHG | BODY MASS INDEX: 31.94 KG/M2 | WEIGHT: 241 LBS

## 2024-06-05 DIAGNOSIS — C10.9 OROPHARYNX CANCER: Primary | ICD-10-CM

## 2024-06-05 DIAGNOSIS — C10.9 OROPHARYNX CANCER: ICD-10-CM

## 2024-06-05 LAB
ALBUMIN SERPL-MCNC: 4.3 G/DL (ref 3.5–5.2)
ALBUMIN/GLOB SERPL: 1.4 G/DL
ALP SERPL-CCNC: 73 U/L (ref 39–117)
ALT SERPL W P-5'-P-CCNC: 13 U/L (ref 1–41)
ANION GAP SERPL CALCULATED.3IONS-SCNC: 9 MMOL/L (ref 5–15)
AST SERPL-CCNC: 18 U/L (ref 1–40)
BASOPHILS # BLD AUTO: 0.01 10*3/MM3 (ref 0–0.2)
BASOPHILS NFR BLD AUTO: 0.2 % (ref 0–1.5)
BILIRUB SERPL-MCNC: 0.5 MG/DL (ref 0–1.2)
BUN SERPL-MCNC: 22 MG/DL (ref 8–23)
BUN/CREAT SERPL: 22.4 (ref 7–25)
CALCIUM SPEC-SCNC: 9.1 MG/DL (ref 8.6–10.5)
CHLORIDE SERPL-SCNC: 102 MMOL/L (ref 98–107)
CO2 SERPL-SCNC: 28 MMOL/L (ref 22–29)
CREAT SERPL-MCNC: 0.98 MG/DL (ref 0.76–1.27)
DEPRECATED RDW RBC AUTO: 42.2 FL (ref 37–54)
EGFRCR SERPLBLD CKD-EPI 2021: 87.7 ML/MIN/1.73
EOSINOPHIL # BLD AUTO: 0.09 10*3/MM3 (ref 0–0.4)
EOSINOPHIL NFR BLD AUTO: 2 % (ref 0.3–6.2)
ERYTHROCYTE [DISTWIDTH] IN BLOOD BY AUTOMATED COUNT: 12.1 % (ref 12.3–15.4)
GLOBULIN UR ELPH-MCNC: 3.1 GM/DL
GLUCOSE SERPL-MCNC: 107 MG/DL (ref 65–99)
HCT VFR BLD AUTO: 36.3 % (ref 37.5–51)
HGB BLD-MCNC: 12.9 G/DL (ref 13–17.7)
IMM GRANULOCYTES # BLD AUTO: 0.02 10*3/MM3 (ref 0–0.05)
IMM GRANULOCYTES NFR BLD AUTO: 0.4 % (ref 0–0.5)
LYMPHOCYTES # BLD AUTO: 1.11 10*3/MM3 (ref 0.7–3.1)
LYMPHOCYTES NFR BLD AUTO: 24.7 % (ref 19.6–45.3)
MCH RBC QN AUTO: 33.9 PG (ref 26.6–33)
MCHC RBC AUTO-ENTMCNC: 35.5 G/DL (ref 31.5–35.7)
MCV RBC AUTO: 95.3 FL (ref 79–97)
MONOCYTES # BLD AUTO: 0.41 10*3/MM3 (ref 0.1–0.9)
MONOCYTES NFR BLD AUTO: 9.1 % (ref 5–12)
NEUTROPHILS NFR BLD AUTO: 2.86 10*3/MM3 (ref 1.7–7)
NEUTROPHILS NFR BLD AUTO: 63.6 % (ref 42.7–76)
PLATELET # BLD AUTO: 223 10*3/MM3 (ref 140–450)
PMV BLD AUTO: 8.5 FL (ref 6–12)
POTASSIUM SERPL-SCNC: 4.5 MMOL/L (ref 3.5–5.2)
PROT SERPL-MCNC: 7.4 G/DL (ref 6–8.5)
RBC # BLD AUTO: 3.81 10*6/MM3 (ref 4.14–5.8)
SODIUM SERPL-SCNC: 139 MMOL/L (ref 136–145)
TSH SERPL DL<=0.05 MIU/L-ACNC: 1.47 UIU/ML (ref 0.27–4.2)
WBC NRBC COR # BLD AUTO: 4.5 10*3/MM3 (ref 3.4–10.8)

## 2024-06-05 PROCEDURE — 99213 OFFICE O/P EST LOW 20 MIN: CPT | Performed by: INTERNAL MEDICINE

## 2024-06-05 PROCEDURE — 80050 GENERAL HEALTH PANEL: CPT

## 2024-06-05 PROCEDURE — 36415 COLL VENOUS BLD VENIPUNCTURE: CPT

## 2024-06-05 NOTE — PROGRESS NOTES
"      PROBLEM LIST:  1. 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  Biopsy of left neck mass on 11/8/2021 showed squamous cell carcinoma.  P16 stain inconclusive.  Repeat biopsy done 11/24/21 - excision of left lingual tonsil, showed SCC, p16+  B) begin weekly cisplatin with radiation 12/23/2021. Completed cisplatin 2/9/2022.   2. Hypertension  3. GERd  4. Hyperlipidemia    Subjective     CHIEF COMPLAINT: p16+ oropharynx cancer    HISTORY OF PRESENT ILLNESS:   Nicholas Renee returns for follow-up.   He is doing okay.  For about the past 6 or 7 weeks he has been having some lower abdominal pain.  He says it felt a little bit like diverticulitis that he had had previously but he has since had a sigmoid colon resection.  He also just feels tired and rundown.  He notices that when he is active or distracted the symptoms are better.  He was put on a course of antibiotics and it seemed to help some.  He is scheduled to meet with Dr. Blum and he is due for a colonoscopy.          Objective       /89   Pulse 69   Temp 97.6 °F (36.4 °C)   Resp 18   Ht 185.4 cm (73\")   Wt 109 kg (241 lb)   SpO2 97%   BMI 31.80 kg/m²    Vitals:    06/05/24 1005   PainSc: 0-No pain                   ECOG score: 0   General: well appearing male in no acute distress  Neuro: alert and oriented  HEENT: sclerae anicteric.  Oropharynx is clear   Lymphatics:  No palpable cervical adenopathy bilaterally.  Cardiovascular: regular rate and rhythm, no murmurs  Lungs: clear to auscultation bilaterally  Abdomen: soft, nontender, nondistended.  No palpable organomegaly  Extremities: no lower extremity edema  Skin: no rashes, lesions, bruising, or petechiae  Psych: mood and affect appropriate      RECENT LABS:  Lab Results   Component Value Date    WBC 4.50 06/05/2024    HGB 12.9 (L) 06/05/2024    HCT 36.3 (L) " 06/05/2024    MCV 95.3 06/05/2024     06/05/2024       Lab Results   Component Value Date    GLUCOSE 98 12/06/2023    BUN 21 12/06/2023    CREATININE 1.20 03/15/2024    EGFRIFNONA 74 02/08/2022    BCR 18.6 12/06/2023    K 4.5 12/06/2023    CO2 24.0 12/06/2023    CALCIUM 9.4 12/06/2023    ALBUMIN 4.4 12/06/2023    AST 16 12/06/2023    ALT 12 12/06/2023                 Assessment & Plan                Nicholas Renee is a 59 y.o. male with a cT1-2N1M0 p16+ SCC of the left base of tongue, with > 3 cm cervical LN.    He is doing well with no evidence of disease recurrence.  He has CT imaging scheduled with Dr. Zambrano in March.  He will see Dr. Manuel in July.  We will see him back in 6 months with repeat labs.    His labs today show mild anemia with a hemoglobin of 12.9 which is stable.  CMP and TSH are pending and we will contact him with any abnormal results.    Follow-up in 6 months with CBC, CMP and TSH on return.  We will continue to follow him until 5 years from diagnosis.        Georgiana Buck MD  [facility] On[facility]    6/5/2024

## 2024-06-18 ENCOUNTER — TRANSCRIBE ORDERS (OUTPATIENT)
Dept: ADMINISTRATIVE | Facility: HOSPITAL | Age: 59
End: 2024-06-18
Payer: COMMERCIAL

## 2024-06-18 DIAGNOSIS — R10.32 LEFT LOWER QUADRANT PAIN: Primary | ICD-10-CM

## 2024-06-23 ENCOUNTER — HOSPITAL ENCOUNTER (OUTPATIENT)
Facility: HOSPITAL | Age: 59
Discharge: HOME | End: 2024-06-23
Admitting: COLON & RECTAL SURGERY
Payer: COMMERCIAL

## 2024-06-23 DIAGNOSIS — R10.32 LEFT LOWER QUADRANT PAIN: ICD-10-CM

## 2024-06-23 PROCEDURE — 74177 CT ABD & PELVIS W/CONTRAST: CPT

## 2024-06-23 PROCEDURE — 25510000001 IOPAMIDOL 61 % SOLUTION: Performed by: COLON & RECTAL SURGERY

## 2024-06-23 RX ADMIN — IOPAMIDOL 100 ML: 612 INJECTION, SOLUTION INTRAVENOUS at 11:35

## 2024-09-09 ENCOUNTER — OFFICE VISIT (OUTPATIENT)
Dept: ORTHOPEDIC SURGERY | Facility: CLINIC | Age: 59
End: 2024-09-09
Payer: COMMERCIAL

## 2024-09-09 VITALS
DIASTOLIC BLOOD PRESSURE: 82 MMHG | HEIGHT: 73 IN | SYSTOLIC BLOOD PRESSURE: 128 MMHG | WEIGHT: 248 LBS | BODY MASS INDEX: 32.87 KG/M2

## 2024-09-09 DIAGNOSIS — M17.12 PRIMARY OSTEOARTHRITIS OF LEFT KNEE: Primary | ICD-10-CM

## 2024-09-09 RX ORDER — CHOLECALCIFEROL (VITAMIN D3) 25 MCG
TABLET ORAL
COMMUNITY

## 2024-09-09 NOTE — PROGRESS NOTES
[facility] [place]    Subjective     Chief Complaint   Patient presents with    Follow-up     4 month follow up -- Primary osteoarthritis of left knee        HPI    It has been 4  month(s) since Mr. Renee's last visit. He returns to clinic today for follow-up of left knee pain. The issue has been ongoing for 40 year(s). He rates his pain a 6/10 on the pain scale. Previous/current treatments: bracing and physical therapy. Current symptoms: pain, swelling, popping, grinding, stiffness, and giving way/buckling. The pain is worse with climbing stairs; sitting improve the pain. Overall, he is doing the same.  Last injection helped for about a month, and he would like a repeat injection today.  He is thinking about surgery next March.      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    Primary osteoarthritis of left shoulder    Rotator cuff tendinitis, right    Primary osteoarthritis of both knees    Cervical spondylosis    S/P total knee replacement, right    Obesity    GERD (gastroesophageal reflux disease)    Status post right knee replacement    Lumbar stenosis with neurogenic claudication    Spondylosis of lumbar region without myelopathy or radiculopathy    Degeneration of lumbar or lumbosacral intervertebral disc    Ligamentum flavum hypertrophy    Spondylolisthesis of lumbar region    Gait disturbance    Physical deconditioning    Primary osteoarthritis of left knee    Lateral collateral ligament deficiency of left knee      Past Medical History:   Diagnosis Date    Acromioclavicular separation 11/16/22    Arthritis of back     Arthritis of neck 12/1/22    C. difficile diarrhea     Cervical disc disorder     Diverticulitis     Esophagitis     GERD (gastroesophageal reflux disease)     Hip arthrosis 3/5/23    History of radiation therapy 02/09/2022    oropharynx, bilateral necks    Hypertension     Joint pain Both knees    Knee sprain     Low back pain Lower back    Low back strain     Lumbar stenosis with neurogenic claudication 09/08/2023    Lumbosacral disc disease     Neck strain     Oropharynx cancer 12/01/2021    Osteoarthritis of both knees     Periarthritis of shoulder Left shoulder    Rotator cuff syndrome 11/16/22    Tear of meniscus of knee     Tennis elbow 9/1/1999    Thoracic disc disorder     Wears glasses       Past Surgical History:   Procedure Laterality Date    ABDOMINAL SURGERY      INFANT (6wk)- STOMACH BLOCKAGE     COLON RESECTION N/A 07/02/2019    Procedure: COLON RESECTION LAPAROSCOPIC LOW ANTERIOR;  Surgeon: Magan Blum MD;  Location:  [facility] OR;  Service: General    COLONOSCOPY N/A 07/11/2017    Procedure: COLONOSCOPY;  Surgeon: Magan Blum MD;  Location:  [facility];  Service:     ENDOSCOPY      FOOT SURGERY      BILAT     JOINT REPLACEMENT  3/28/23    KNEE SURGERY Bilateral     x 5    ORTHOPEDIC SURGERY  Both knees    PERIPHERALLY INSERTED CENTRAL CATHETER INSERTION  06/13/2019    right subclavian now removed    TOTAL KNEE ARTHROPLASTY Right 03/28/2023    Procedure: TOTAL KNEE ARTHROPLASTY WITH CORI ROBOT - RIGHT;  Surgeon: Gage Britt MD;  Location:  [facility] OR;  Service: Robotics - Ortho;  Laterality: Right;    TRIGGER POINT INJECTION  6/1/22      Family History   Problem Relation Age of Onset    Diabetes Father     Heart disease Father     Hypertension Father     Heart attack Father     Arthritis Father     Rheum arthritis Other     No Known Problems Mother      Social History  "    Socioeconomic History    Marital status:    Tobacco Use    Smoking status: Former     Current packs/day: 0.00     Average packs/day: 0.5 packs/day for 24.9 years (12.5 ttl pk-yrs)     Types: Cigarettes     Start date: 1978     Quit date: 2003     Years since quittin.4    Smokeless tobacco: Former     Types: Snuff    Tobacco comments:     Quit dipping 10/25/2021   Vaping Use    Vaping status: Never Used   Substance and Sexual Activity    Alcohol use: Not Currently     Comment: socially, \"rarely\"    Drug use: Never    Sexual activity: Yes     Partners: Female     Birth control/protection: None      Current Outpatient Medications on File Prior to Visit   Medication Sig Dispense Refill    acetaminophen (TYLENOL) 500 MG tablet Take 2 tablets by mouth Every 4 (Four) Hours As Needed.      cetirizine (zyrTEC) 10 MG tablet Take 1 tablet by mouth Daily As Needed.      cholecalciferol 25 MCG (1000 UT) tablet Take 2 tablets every day by oral route.      gemfibrozil (LOPID) 600 MG tablet Take 1 tablet by mouth 2 (Two) Times a Day. 180 tablet 4    losartan (COZAAR) 100 MG tablet Take 1 tablet by mouth Daily. 90 tablet 3    meloxicam (MOBIC) 15 MG tablet Take 1 tablet by mouth Daily. 90 tablet 1    METAMUCIL FIBER PO       Multiple Vitamins-Minerals (MULTIVITAMIN ADULT PO) Take 1 tablet by mouth Daily.      pantoprazole (PROTONIX) 40 MG EC tablet Take 1 tablet by mouth 2 (Two) Times a Day. 180 tablet 0    rosuvastatin (CRESTOR) 10 MG tablet Take 1 tablet by mouth Daily. 90 tablet 4    vitamin B-12 (CYANOCOBALAMIN) 1000 MCG tablet Take 2 tablets by mouth 1 (One) Time Per Week.      [DISCONTINUED] sodium-potassium-magnesium sulfates (Suprep Bowel Prep Kit) 17.5-3.13-1.6 GM/177ML solution oral solution Take  by mouth as directed per your physician's instructions. 354 mL 0     No current facility-administered medications on file prior to visit.      Allergies   Allergen Reactions    Alpha-Lipoic Acid Rash    " "Levaquin [Levofloxacin] Myalgia    Sulfa Antibiotics Hives and Rash        Review of Systems     Objective      Physical Exam  /82   Ht 185.4 cm (73\")   Wt 112 kg (248 lb)   BMI 32.72 kg/m²     Body mass index is 32.72 kg/m².         General:   Mental Status:  Alert   Appearance: Cooperative, in no acute distress   Build and Nutrition: Well-nourished well-developed male   Orientation: Alert and oriented to person, place and time   Posture: Normal   Gait: Nonantalgic    Integument:   Left knee: No skin lesions, no rash, no ecchymosis    Lower Extremities:   Left Knee:    Tenderness:  Medial/lateral joint line tenderness    Effusion:  1+    Swelling:  None    Crepitus: Positive    Range of motion:  Extension: 0°       Flexion: 135°  Instability: No varus laxity, no valgus laxity, negative anterior drawer  Deformities:  Valgus      Imaging/Studies  Imaging Results (Last 24 Hours)       ** No results found for the last 24 hours. **              Assessment and Plan     Diagnoses and all orders for this visit:    1. Primary osteoarthritis of left knee (Primary)        1. Primary osteoarthritis of left knee          I reviewed my findings with the patient.  He is interested in a repeat injection today for his left knee, and may be interested in surgery next March.  I will see him back in 4 months, but I will be happy to see him back sooner for any problems.    Procedure Note:  The potential benefits of performing a therapeutic left knee joint injection, as well as potential risks (including, but not limited to infection, swelling, pain, bleeding, bruising, nerve/blood vessel damage, skin color changes, transient elevation in blood glucose levels, and fat atrophy) were discussed with the patient.  After informed consent, timeout procedure was performed, and the skin on the left knee was prepped with chlorhexidine soap and alcohol, after which ethyl chloride was applied to the skin at the injection site. Via the " anterolateral approach, 1ml of Kenalog 40mg/ml mixed with 4ml 0.5% ropivacaine plain was injected into the knee joint.  The patient tolerated the procedure well, experiencing 80% improvement a few minutes following the injection. There were no complications.  Band-Aid was applied to the injection site. Post-procedural instructions were given to the patient and/or their caregiver.      Return in about 4 months (around 1/9/2025).      Gage Britt MD  09/09/24  08:36 EDT    Dictated Utilizing Dragon Dictation

## 2024-09-12 RX ADMIN — TRIAMCINOLONE ACETONIDE 40 MG: 40 INJECTION, SUSPENSION INTRA-ARTICULAR; INTRAMUSCULAR at 15:24

## 2024-09-12 RX ADMIN — ROPIVACAINE HYDROCHLORIDE 4 ML: 5 INJECTION, SOLUTION EPIDURAL; INFILTRATION; PERINEURAL at 15:24

## 2024-09-12 NOTE — PROGRESS NOTES
Procedure   - Large Joint Arthrocentesis: L knee on 9/12/2024 3:24 PM  Indications: pain  Details: 21 G needle, anterolateral approach  Medications: 4 mL ropivacaine 0.5 %; 40 mg triamcinolone acetonide 40 MG/ML  Outcome: tolerated well, no immediate complications  Procedure, treatment alternatives, risks and benefits explained, specific risks discussed. Consent was given by the patient. Immediately prior to procedure a time out was called to verify the correct patient, procedure, equipment, support staff and site/side marked as required. Patient was prepped and draped in the usual sterile fashion.

## 2024-09-16 RX ORDER — TRIAMCINOLONE ACETONIDE 40 MG/ML
40 INJECTION, SUSPENSION INTRA-ARTICULAR; INTRAMUSCULAR
Status: COMPLETED | OUTPATIENT
Start: 2024-09-12 | End: 2024-09-12

## 2024-09-16 RX ORDER — ROPIVACAINE HYDROCHLORIDE 5 MG/ML
4 INJECTION, SOLUTION EPIDURAL; INFILTRATION; PERINEURAL
Status: COMPLETED | OUTPATIENT
Start: 2024-09-12 | End: 2024-09-12

## 2024-11-05 ENCOUNTER — OFFICE VISIT (OUTPATIENT)
Dept: PAIN MEDICINE | Facility: CLINIC | Age: 59
End: 2024-11-05
Payer: COMMERCIAL

## 2024-11-05 VITALS — BODY MASS INDEX: 33.53 KG/M2 | HEIGHT: 73 IN | WEIGHT: 253 LBS

## 2024-11-05 DIAGNOSIS — M50.122 CERVICAL DISC DISORDER AT C5-C6 LEVEL WITH RADICULOPATHY: ICD-10-CM

## 2024-11-05 DIAGNOSIS — M48.062 LUMBAR STENOSIS WITH NEUROGENIC CLAUDICATION: ICD-10-CM

## 2024-11-05 DIAGNOSIS — R26.9 GAIT DISTURBANCE: ICD-10-CM

## 2024-11-05 DIAGNOSIS — M17.12 PRIMARY OSTEOARTHRITIS OF LEFT KNEE: ICD-10-CM

## 2024-11-05 DIAGNOSIS — M51.372 DEGENERATION OF INTERVERTEBRAL DISC OF LUMBOSACRAL REGION WITH DISCOGENIC BACK PAIN AND LOWER EXTREMITY PAIN: ICD-10-CM

## 2024-11-05 DIAGNOSIS — Z96.651 STATUS POST RIGHT KNEE REPLACEMENT: ICD-10-CM

## 2024-11-05 DIAGNOSIS — M48.062 LUMBAR STENOSIS WITH NEUROGENIC CLAUDICATION: Primary | ICD-10-CM

## 2024-11-05 DIAGNOSIS — M24.28 LIGAMENTUM FLAVUM HYPERTROPHY: ICD-10-CM

## 2024-11-05 DIAGNOSIS — M43.16 SPONDYLOLISTHESIS OF LUMBAR REGION: ICD-10-CM

## 2024-11-05 PROCEDURE — 99214 OFFICE O/P EST MOD 30 MIN: CPT | Performed by: NURSE PRACTITIONER

## 2024-11-05 NOTE — PROGRESS NOTES
"Chief Complaint: \" Lower back and gluteal pain.\"        History of Present Illness:  Mr. Nicholas Renee, 59 y.o. male originally referred by Dr Fermín Acosta in consultation for chronic intractable lower back pain and chronic neck pain. On 09/27/2023, he underwent diagnostic and therapeutic left L5-S1 and left S1 transforaminal epidural steroid injection, from which he experienced almost 100% pain relief and functional improvement that lasted for more than 3 weeks followed by 50% for 2 additional weeks. He underwent follow up neurosurgical consultation with Dr Acosta on 10/09/2023 and reported that he has been doing well after his lumbar epidural steroid injections, and therefore, Dr. Acosta encouraged to continue conservatism.  He also has a history of neck difficulties, and on June 27, 2000 he underwent a cervical epidural steroid injection that provided him with 75% pain relief and functional improvement.  Prior to that, Nicholas Renee had been seen by [facility] on 08/29/2022 for postprocedure follow-up of cervical epidural steroid injection performed on June 27, 2022, from which he has experienced 75% ongoing pain relief and functional improvement.  When he last presented for follow-up consultation, he was primarily complaining of mechanical/axial spine pain.  Therefore on 3/27/2024, he underwent bilateral lumbar medial branch rhizotomies at L3, L4 and L5.  He reports experiencing no real difference in his pain, although upon further conversation appears as though he was able to tolerate activities, with less pain.  He tells me he is better throughout the day.  He is experiencing more symptoms radiating into his gluteal region, and extending into his right posterior thigh.  He experienced similar symptoms last year although on his left side predominantly.    Problem #1: Chronic Lower Back Pain  Pain History: He reports a longstanding history of chronic intractable lower back pain, buttock pain, hip pain and left leg " pain associated with neurogenic claudication,. He denies bowel or bladder problems. He was evaluated by Dr. Acosta on 07/06/2023, and was found not to be a surgical candidate.  Dr. Acosta recommended conservative management along with interventional pain management measures. MRI of the lumbar spine w/o contrast on 05/25/2023 revealed multilevel spondylosis. Grade 1 anterolisthesis of L4 on L5.  At L3-L4: Disc bulge, ligamentum flavum hypertrophy, facet arthropathy. Moderate to severe spinal canal stenosis. Mild bilateral neuroforaminal stenosis. At L4-L5: Disc bulge, ligamentum flavum hypertrophy, facet arthropathy. Moderate spinal canal stenosis. Mild to moderate bilateral neuroforaminal stenosis. At L5-S1: Disc bulge, ligamentum flavum hypertrophy, facet arthropathy. Mild spinal canal stenosis. Moderate to severe left and mild to moderate right neuroforaminal stenosis. Nicholas Renee failed to obtain pain relief with conservative measures including oral analgesics, topical analgesics, ice, heat, physical therapy, physical therapist directed home exercise program HEP (ongoing), chiropractic therapy, independent exercise program, to name a few. Since last visit patient underwent flexion and extension X-rays of the lumbar spine on 09/12/2023: Severe degenerative changes. Grade 1 spondylolisthesis of L3 on L4 which appears similar with flexion and extension.   Pain Description: Constant lower back pain with intermittent exacerbation, described as aching, dull, sharp, throbbing, and burning sensation.   Radiation of Pain: The pain radiates into the gluteal region and into the right posterior thigh  Pain intensity today: 6/10   Average pain intensity last week: 6/10  Pain intensity ranges from: 6/10 to 9/10  Aggravating factors: Pain increases with extension, twisting, walking. Patient denies neurogenic claudication.    Alleviating factors: Pain decreases with sitting down, sitting on a recliner, lying down  Associated  Symptoms:   Patient reports pain (LLE), and numbness RT>LT, but denies weakness in the lower extremities  Patient denies any new bladder or bowel problems.   Patient denies difficulties with his balance or recent falls.   Pain interferes with general activities (ability to walk, stand, transition from different positions), and affects patient's quality of life  Pain interferes with sleep falling asleep and causing sleep fragmentation   Stiffness        Problem #2: Chronic Posterior Neck Pain  Pain History: He reports a longstanding history of chronic intractable posterior neck pain. He was referred by Dr. Fermín Acosta in consultation for chronic intractable neck and left shoulder pain, which began after doing yard work while weed eating. He underwent consultation with Dr. Souza in orthopedics on 09/20/2021 for left shoulder pain and underwent left subacromial injections without relief. MRI of the cervical spine revealed multilevel spondylitic changes most significant at C5-C6, with a large disc osteophyte complex contributing to lateral recess stenosis and central spinal canal stenosis. He underwent neurosurgical consultation with Dr. Fermín Acosta and was found not to be surgical a candidate.  On June 27, 2022, he underwent a cervical epidural steroid injection, from which he experienced 75% ongoing pain relief and functional improvement.  He is reporting some return of the symptoms as well.  Pain Description: Constant low grade posterior neck pain with intermittent exacerbation, described as a stiffness sensation.   Radiation of Pain: The pain does not radiate   Pain intensity today: 3/10   Average pain intensity last week: 3/10  Pain intensity ranges from: 0/10 to 5/10  Aggravating factors: Pain increases with extension, rotation of the cervical spine   Alleviating factors: Pain decreases with rest, sitting down, lying down, ice  Associated Symptoms:   Patient denies pain, numbness, or weakness in the  upper extremities.   Stiffness: Neck        Review of previous therapies and additional medical records:  Nicholas Renee has already failed the following measures, including:   Conservative Measures: Oral analgesics, topical analgesics, ice, heat, chiropractic therapy, physical therapy   Interventional Measures:   09/27/2023: Diagnostic and therapeutic left L5-S1 and left S1 TFESI  6/27/2022: ABEL  03/27/2024: Bilateral lumbar RFA  Left shoulder injections with Dr Malone  Left knee injections with Dr. Britt  Surgical Measures: No history of previous cervical spine or shoulder surgery. No history of previous lumbar spine or hip surgery. 03/28/2023: Right total knee arthroplasty   Nicholas Renee underwent neurosurgical consultation with Dr. Fermín Acosta on 07/06/2023, and was found not to be a surgical candidate  Nicholas Renee underwent orthopedic consultation with Dr. Souza on 09/20/2021 and was found not to be a surgical candidate for his shoulder issues.  Nicholas Renee presents with significant comorbidities including GERD, hypertension, prediabetes, history of oropharynx cancer (12/01/2021) status postradiation  In terms of current analgesics, Nicholas Renee takes:  acetaminophen, meloxicam, Norco  I have reviewed Pasquale Report consistent with medication reconciliation.  SOAPP/ORT: Low Risk       Global Pain Scale 09-12 2023 01-30 2024               Pain 18  15               Feelings 2  0               Clinical outcomes 10  5               Activities 8  0               GPS Total: 38  20                  NECK PAIN DISABILITY INDEX QUESTIONNAIRE  DATE 09-12 2023                  Pain intensity  0: No pain  1: Mild  2: Moderate  3: Fairly severe  4: very severe  5: Worst imaginable 2                    Personal Care   0: I can look after myself normally without causing extra pain.  1: I can look after myself normally, but it causes extra pain.  2: It is painful to look after myself and I am slow and careful.  3:  I need some help, but manage most of my personal care.  4: I need help every day in most aspects of self-care.  5: I do not get dressed; I wash with difficulty and stay in bed. 1                   Lifting  0: I can lift heavy weights without extra pain.  1: I can lift heavy weights, but it gives extra pain.  2: Pain prevents me from lifting heavy weights off the floor but I can manage if they are conveniently positioned, for example, on a table.  3: Pain prevents me from lifting heavy weights, but I can manage light to medium weights if they are conveniently positioned.  4: I can lift very light weights.  5: I cannot lift or carry anything at all. 1                   Reading  0: I can read as much as I want to with no pain in my neck.  1: I can read as much as I want to with slight pain in my neck.  2: I can read as much as I want to with moderate pain in my neck.  3: I cannot read as much as I want because of moderate pain in my neck.  4: I cannot read as much as I want because of severe pain in my neck.  5: I cannot read at all.  0                   Headaches  0: I have no headaches at all.  1: I have slight headaches which come infrequently.  2: I have moderate headaches which come infrequently.  3: I have moderate headaches which come frequently.  4: I have severe headaches which come frequently.  5: I have headaches almost all the time.  0                   Concentration  0: I can concentrate fully when I want to with no difficulty.  1: I can concentrate fully when I want to with slight difficulty.  2: I have a fair degree of difficulty in concentrating when I want to.  3: I have a lot of difficulty in concentrating when I want to.  4: I have a great deal of difficulty in concentrating when I want to.  5: I cannot concentrate at all.  0                   Work  0: I can do as much work as I want to.  1: I can only do my usual work, but no more.  2: I can do most of my usual work, but no more.  3: I cannot do my  usual work.  4: I can hardly do any work at all.  5: I cannot do any work at all.  1                   Driving  0: I can drive my car without any neck pain.  1: I can drive my car as long as I want with slight pain in my neck.  2: I can drive my car as long as I want with moderate pain in my   neck.  3: I cannot drive my car as long as I want because of moderate pain   in my neck.  4: I can hardly drive at all because of severe pain in my neck.  5: I cannot drive my car at all.  1                   Sleeping  0: I have no trouble sleeping.  1: My sleep is slightly disturbed (less than 1 hour sleepless).  2: My sleep is mildly disturbed (1-2 hours sleepless).  3: My sleep is moderately disturbed (2-3 hours sleepless).  4: My sleep is greatly disturbed (3-5 hours sleepless).  5: My sleep is completely disturbed (5-7 hours)  1                   Recreation  0: I am able to engage in all of my recreational activities with no neck pain at all.  1: I am able to engage in all of my recreational activities with some pain in my neck.  2: I am able to engage in most, but not all of my recreational activities because of pain in my neck.  3: I am able to engage in a few of my recreational activities because of pain in my neck.  4: I can hardly do any recreational activities because of pain in my neck.  5: I cannot do any recreational activities at all.  1                   TOTAL SCORE 8/50                         Shoulder Pain and Disability Index (SPADI)   PAIN SCALE:   How severe is your pain?   Pain scale 0/10 to 10/10 09-12 2023        What number describes your pain at its worst?  4        When lying on the involved side? 4         Reaching for something on a high shelf?  1         Touching the back of your neck? 1         Pushing with the involved arm? 2         Total Pain Score (MAX 50) 12                     DISABILITY SCALE:   How much difficulty do you have?  Difficulty scale 0/10 to 10/10            Washing your hair?  0         Washing your back? 0         Putting on an undershirt or jumper?  0         Putting on a shirt that buttons down the front?  0         Putting on your pants?  0         Placing an object on a high shelf?  0         Carrying a heavy object of 10 pounds (4.5 KG) 1         Removing something from your back pocket?  1         Total Disability Score (MAX 80) 2                     TOTAL SPADI SCORE () 14               The [place] Back Pain Disability Scale   DATE 09-12 2023 01-30 2024               Sleep through the night 2 3                Turn over in bed 2  2               Get out of bed 2  2               Make your bed 1  1               Put on socks (pantyhose) 2  2               Ride in a car 1  1               Sit in a chair for several hours 2  1               Stand up for 20-30 minutes 1  1               Climb one flight of stairs 1  1               Walk a few blocks (200-300 yards)  2  2               Walk several miles 3  3               Run one block (about 50 yards) 3  3               Take food out of the refrigerator 1  0               Reach up to high shelves 1  0               Move a chair 1  0               Pull or push heavy doors 1  0               Bend over to clean the bathtub 2  0               Throw a ball 2  0               Carry two bags of groceries 1  0               Lift and carry a heavy suitcase 2  0               Total score 33  22                  The [employer] Osteoarthritis Index (WOMAC)   to assess pain, stiffness, and physical function in patients with hip osteoarthritis    CATEGORIES  Rate: 0 = None 1 = Slight 2 = Moderate 3 = Very 4 = Extremely 09-12 2023          PAIN: Think about the pain you felt during the last 48 hours caused by the arthritis in your hip.              1. Walking in a flat surface?  2           2. When going up or down stairs?  2           3. At night while in bed/pain that disturbs your sleep?  3           4.  While sitting or lying down?  2           5. While standing?  1           Total Pain Score (Max 20): 10           STIFFNESS: Think about the stiffness (not pain) you felt during the last 48 hours caused by the arthritis in your hip.              1. How severe has your stiffness been after you first woke up   in the morning?  2           2. How severe has your stiffness been after sitting or lying   down or while resting later in the day?  2           Total Stiffness Score (Max 8): 4           PHYSICAL FUNCTION: Think about the difficulty you had in doing the following daily physical activities during the last 48 hours caused by the arthritis in your hip.             1. When going down the stairs?  2           2. When going up the stairs?  1           3. When getting up from a sitting position?  2              4. While standing?  1           5. When bending to the floor?  1           6. When walking on a flat surface?  1           7. Getting in or out of a car, or getting on or off a bus?  2           8. While going shopping?  1           9. When putting on your socks or panty hose or stockings?  2           10. When getting out of bed?  2           11. When taking off your socks or panty hose or stockings?  2           12. When lying in bed?  1           13. When getting in or out of the bathtub?  2              14. While sitting?  1           15. When getting on or off the toilet?  2           16. While doing heavy household chores?  1           17. While doing light household chores?  1           Total Difficulty Score (Max 68): 25           Total WOMAC Score: __ /96 X100 = __% 39              Review of Diagnostic Studies:  Bilateral knee X-rays on 09/12/2023:   Right knee: Total knee arthroplasty in normal anatomic alignment. No hardware complications.   Left knee: Severe degenerative tricompartmental osteoarthritis with severe joint narrowing. Large osteophytes from the posterior aspect of the patella. Small  calcifications in the suprapatellar bursa. Slight lateral subluxation of the distal femur in relation to the proximal tibia.  Flexion and extension X-rays of the lumbar spine on 09/12/2023: Severe narrowing of the L4-5 disc with vacuum phenomenon and spurring. Severe narrowing of the L5-S1 disc. Moderate narrowing of the L3-L4 disc with slight grade 1 spondylolisthesis of L3 on L4 which appears similar with flexion and extension. Mild narrowing of the L2-3 disc with mild spurring.   MRI LUMBAR SPINE WO CONTRAST 05/25/2023 revealed multilevel spondylosis. Grade 1 anterolisthesis of L4 on L5. Transitional anatomy with a lumbarized S1 and well-formed S1-S2 intervertebral disc. The conus medullaris and cauda equina nerve roots are satisfactory in appearance. Axial imaging:  L1-L2, L2-L3: No significant canal or neuroforaminnal stenosis  L3-L4: Disc bulge, ligamentum flavum hypertrophy, facet arthropathy. Moderate to severe spinal canal stenosis. Mild bilateral neuroforaminal stenosis.  L4-L5: Disc bulge, ligamentum flavum hypertrophy, facet arthropathy. Moderate spinal canal stenosis. Mild to moderate bilateral neuroforaminal stenosis.  L5-S1: Disc bulge, ligamentum flavum hypertrophy, facet arthropathy. Mild spinal canal stenosis. Moderate to severe left and mild to moderate right neuroforaminal stenosis.  MRI of the cervical spine without contrast 9/27/2021: Vertebral body heights are well-maintained without evidence of malalignment.  The spinal cord appears normal throughout.    C2-C3: Disc osteophyte complex with left lateralizing features, correlating to mild left paracentral spinal canal stenosis and left foraminal stenosis  C3-C4: Disc osteophyte complex, uncovertebral hypertrophy contributing to mild canal stenosis and mild to moderate left foraminal stenosis  C4-C5: Disc osteophyte complex, uncovertebral hypertrophy contributing to mild canal stenosis and mild to moderate left neuroforaminal stenosis  C5-C6:  Large disc osteophyte complex, uncovertebral hypertrophy contributing to moderate canal stenosis greatest in lateral recess and left paracentral region with there is mild indentation of the left hemicord without cord edema.  Uncovertebral hypertrophy contributing to moderate to severe right and moderate left foraminal stenosis  C6-C7: Disc osteophyte complex, facet hypertrophy contributing to mild canal stenosis and foraminal stenosis  C7-T1: No significant canal or foraminal stenosis  MRI of the left shoulder without contrast 9/27/2021: moderate to severe degenerative joint disease of the AC joint, evidence of subdeltoid bursitis.  Moderate degenerative joint disease of the glenohumeral joint with joint space narrowing.  Increased signal of the articular sided fibers of the supraspinatus with partial tear and or tendinopathy without retraction.  Infraspinatus has minimal partial-thickness tearing without retraction.  The subscapularis is normal.  Long head of the biceps well seated within the bicipital groove.  Teres minor is intact.  Mild to moderate inferior capsular thickening.    The following portions of the patient's history were reviewed and updated as appropriate: problem list, past medical history, past surgery history, social history, family history, medication reconciliation, and allergies    Review of Systems   Constitutional:  Positive for activity change.   Musculoskeletal:  Positive for arthralgias, back pain, joint swelling, neck pain and neck stiffness.   Allergic/Immunologic: Positive for environmental allergies.   All other systems reviewed and are negative.        Patient Active Problem List   Diagnosis    HTN (hypertension)    Prediabetes    Diverticulitis    S/P low anterior colon resection    Acute blood loss anemia, mild, asymptomatic    Acute postoperative pain    Primary osteoarthritis of right knee    Oropharynx cancer    Cervical disc disorder at C5-C6 level with radiculopathy     Connective tissue stenosis of neural canal of cervical region    Primary osteoarthritis of left shoulder    Rotator cuff tendinitis, right    Primary osteoarthritis of both knees    Cervical spondylosis    S/P total knee replacement, right    Obesity    GERD (gastroesophageal reflux disease)    Status post right knee replacement    Lumbar stenosis with neurogenic claudication    Spondylosis of lumbar region without myelopathy or radiculopathy    Degeneration of lumbar or lumbosacral intervertebral disc    Ligamentum flavum hypertrophy    Spondylolisthesis of lumbar region    Gait disturbance    Physical deconditioning    Primary osteoarthritis of left knee    Lateral collateral ligament deficiency of left knee       Past Medical History:   Diagnosis Date    Acromioclavicular separation 11/16/22    Arthritis of back     Arthritis of neck 12/1/22    C. difficile diarrhea     Cervical disc disorder     Diverticulitis     Esophagitis     GERD (gastroesophageal reflux disease)     Hip arthrosis 3/5/23    History of radiation therapy 02/09/2022    oropharynx, bilateral necks    Hypertension     Joint pain Both knees    Knee sprain     Low back pain Lower back    Low back strain     Lumbar stenosis with neurogenic claudication 09/08/2023    Lumbosacral disc disease     Neck strain     Oropharynx cancer 12/01/2021    Osteoarthritis of both knees     Periarthritis of shoulder Left shoulder    Rotator cuff syndrome 11/16/22    Tear of meniscus of knee     Tennis elbow 9/1/1999    Thoracic disc disorder     Wears glasses          Past Surgical History:   Procedure Laterality Date    ABDOMINAL SURGERY      INFANT (6wk)- STOMACH BLOCKAGE     COLON RESECTION N/A 07/02/2019    Procedure: COLON RESECTION LAPAROSCOPIC LOW ANTERIOR;  Surgeon: Magan Blum MD;  Location: [facility];  Service: General    COLONOSCOPY N/A 07/11/2017    Procedure: COLONOSCOPY;  Surgeon: Magan Blum MD;  Location:  [facility];  Service:      "ENDOSCOPY      FOOT SURGERY      BILAT     JOINT REPLACEMENT  3/28/23    KNEE SURGERY Bilateral     x 5    ORTHOPEDIC SURGERY  Both knees    PERIPHERALLY INSERTED CENTRAL CATHETER INSERTION  2019    right subclavian now removed    TOTAL KNEE ARTHROPLASTY Right 2023    Procedure: TOTAL KNEE ARTHROPLASTY WITH CORI ROBOT - RIGHT;  Surgeon: Gage Britt MD;  Location: [facility];  Service: Robotics - Ortho;  Laterality: Right;    TRIGGER POINT INJECTION  22         Family History   Problem Relation Age of Onset    Diabetes Father     Heart disease Father     Hypertension Father     Heart attack Father     Arthritis Father     Rheum arthritis Other     No Known Problems Mother          Social History     Socioeconomic History    Marital status:    Tobacco Use    Smoking status: Former     Current packs/day: 0.00     Average packs/day: 0.5 packs/day for 24.9 years (12.5 ttl pk-yrs)     Types: Cigarettes     Start date: 1978     Quit date: 2003     Years since quittin.5    Smokeless tobacco: Former     Types: Snuff    Tobacco comments:     Quit dipping 10/25/2021   Vaping Use    Vaping status: Never Used   Substance and Sexual Activity    Alcohol use: Not Currently     Comment: socially, \"rarely\"    Drug use: Never    Sexual activity: Yes     Partners: Female     Birth control/protection: None           Current Outpatient Medications:     acetaminophen (TYLENOL) 500 MG tablet, Take 2 tablets by mouth Every 4 (Four) Hours As Needed., Disp: , Rfl:     cetirizine (zyrTEC) 10 MG tablet, Take 1 tablet by mouth Daily As Needed., Disp: , Rfl:     cholecalciferol 25 MCG (1000 UT) tablet, Take 2 tablets every day by oral route., Disp: , Rfl:     gemfibrozil (LOPID) 600 MG tablet, Take 1 tablet by mouth 2 (Two) Times a Day., Disp: 180 tablet, Rfl: 4    losartan (COZAAR) 100 MG tablet, Take 1 tablet by mouth Daily., Disp: 90 tablet, Rfl: 3    meloxicam (MOBIC) 15 MG tablet, Take 1 tablet by " "mouth Daily., Disp: 90 tablet, Rfl: 1    METAMUCIL FIBER PO, , Disp: , Rfl:     methylPREDNISolone (Medrol) 4 MG dose pack, Take by mouth as directed on package for 6 days., Disp: 21 tablet, Rfl: 0    Multiple Vitamins-Minerals (MULTIVITAMIN ADULT PO), Take 1 tablet by mouth Daily., Disp: , Rfl:     pantoprazole (PROTONIX) 40 MG EC tablet, Take 1 tablet by mouth 2 (Two) Times a Day., Disp: 180 tablet, Rfl: 0    rosuvastatin (CRESTOR) 10 MG tablet, Take 1 tablet by mouth Daily., Disp: 90 tablet, Rfl: 4    vitamin B-12 (CYANOCOBALAMIN) 1000 MCG tablet, Take 2 tablets by mouth 1 (One) Time Per Week., Disp: , Rfl:       Allergies   Allergen Reactions    Alpha-Lipoic Acid Rash    Levaquin [Levofloxacin] Myalgia    Sulfa Antibiotics Hives and Rash         Ht 185.4 cm (73\")   Wt 115 kg (253 lb)   BMI 33.38 kg/m²       Physical Exam:  Constitutional: Patient appears well-developed, well-nourished, well-hydrated, appears younger than stated age  HEENT: Head: Normocephalic and atraumatic  Eyes: Conjunctivae and lids are normal  Pupils: Equal, round, reactive to light  Neck: Trachea normal. Neck supple. No JVD present.   Lymphatic: No cervical adenopathy  Peripheral vascular exam: Posterior tibialis: right 2+ and left 2+. Dorsalis pedis: right 2+ and left 2+. No edema.   Musculoskeletal   Gait and station: Gait evaluation demonstrated a normal gait.   Cervical Spine: Passive and active range of motion are somewhat limited with pain. Extension, flexion, lateral flexion, rotation of the cervical spine increased and reproduced pain. Cervical facet joint loading maneuvers are equivocal.  Muscles: Presence of active trigger points at the levator scapulae   Shoulders: The range of motion of the glenohumeral joints is almost full and without pain. Rotator cuff strength is 5/5.   Lumbar Spine: Passive and active range of motion are limited secondary to pain. Extension, rotation of the lumbar spine increased and reproduced pain. " Lumbar facet joint loading maneuvers are positive.  Sacroiliac Joints: Rohit's test; Gaenslen's test; thigh thrust test; SI compression test; posterior shear test; SI distraction test; pelvic rock test; Yeoman's test: Negative   Piriformis maneuvers: Negative   Hip Joints: The range of motion of the hip joints is limited to flexion and internal rotation but without pain   Palpation of the bilateral ischial tuberosities: Unrevealing   Palpation of the bilateral psoas tendons and iliopsoas bursas: Unrevealing   Palpation of the bilateral greater trochanters: Unrevealing   Examination of the Iliotibial band: Unrevealing   Neurological:   Patient is alert and oriented to person, place, and time.   Speech: Normal.   Cortical function: Normal mental status.   Reflex Scores:  Right brachioradialis: 2+  Left brachioradialis: 2+  Right biceps: 2+  Left biceps: 2+  Right triceps: 2+  Left triceps: 2+  Right patellar: 2+  Left patellar: 1+  Right Achilles: 1+  Left Achilles: 1+  Motor strength: 5/5  Motor Tone: Normal  Involuntary movements: None.   Superficial/Primitive Reflexes: Primitive reflexes were absent.   Right Casas: Absent  Left Casas: Absent  Right ankle clonus: Absent  Left ankle clonus: Absent   Babinsky: Absent  Spurling sign: Negative. Neck tornado test: Negative. Lhermitte sign: Negative. Long tract signs: Negative. Straight leg raising test: Negative. Femoral stretch sign: Negative.   Sensory exam: Intact to light touch, intact pain and temperature sensation, intact vibration sensation and normal proprioception  Coordination: Finger to nose: Normal. Balance: Normal Romberg's sign: Negative   Skin and subcutaneous tissue: Skin is warm and intact. No rash noted. No cyanosis.   Psychiatric: Judgment and insight: Normal. Recent and remote memory: Intact. Mood and affect: Normal.     ASSESSMENT:   1. Lumbar stenosis with neurogenic claudication    2. Spondylolisthesis of lumbar region    3. Ligamentum flavum  hypertrophy    4. Degeneration of intervertebral disc of lumbosacral region with discogenic back pain and lower extremity pain    5. Primary osteoarthritis of left knee    6. Status post right knee replacement    7. Cervical disc disorder at C5-C6 level with radiculopathy    8. Gait disturbance            PLAN/MEDICAL DECISION MAKING:  Mr. Nicholas Renee, 59 y.o. male originally referred by Dr Fermín Acosta in consultation for chronic intractable lower back pain and chronic neck pain.  On 09/27/2023, he underwent diagnostic and therapeutic left L5-S1 and left S1 TFESI, from which he experienced almost 100% pain relief and functional improvement that lasted for more than 3 weeks followed by 50% for 2 additional weeks. Nicholas Renee underwent follow up neurosurgical consultation with Dr Acosta on 10/09/2023 and reported that he has been doing well after his lumbar epidural steroid injections, and therefore, Dr. Acosta encouraged to continue conservatism. MRI of the lumbar spine w/o contrast on 05/25/2023 revealed multilevel spondylosis. Grade 1 anterolisthesis of L4 on L5.  At L3-L4: Disc bulge, ligamentum flavum hypertrophy, facet arthropathy. Moderate to severe spinal canal stenosis. Mild bilateral neuroforaminal stenosis. At L4-L5: Disc bulge, ligamentum flavum hypertrophy, facet arthropathy. Moderate spinal canal stenosis. Mild to moderate bilateral neuroforaminal stenosis. At L5-S1: Disc bulge, ligamentum flavum hypertrophy, facet arthropathy. Mild spinal canal stenosis. Moderate to severe left and mild to moderate right neuroforaminal stenosis. Nicholas Renee failed to obtain pain relief with conservative measures including oral analgesics, topical analgesics, ice, heat, physical therapy, physical therapist directed home exercise program HEP (ongoing), chiropractic therapy, independent exercise program, to name a few. Since last visit patient underwent flexion and extension X-rays of the lumbar spine on  09/12/2023: Severe degenerative changes. Grade 1 spondylolisthesis of L3 on L4 which appears similar with flexion and extension.  Regarding his neck pain, MRI of the cervical spine revealed multilevel spondylitic changes most significant at C5-C6, with a large disc osteophyte complex contributing to lateral recess stenosis and central spinal canal stenosis. He underwent neurosurgical consultation with Dr. Fermín Acosta and was found not to be surgical a candidate.  On June 27, 2022, he underwent a cervical epidural steroid injection, from which he experienced 75% ongoing pain relief and functional improvement, he is reporting some return of the symptoms. When he last presented for follow-up consultation, he was primarily complaining of mechanical/axial spine pain.  Therefore on 3/27/2024, he underwent bilateral lumbar medial branch rhizotomies at L3, L4 and L5.  He reports experiencing no real difference in his pain, although upon further conversation appears as though he was able to tolerate activities, with less pain.  He tells me he is better throughout the day.  He is experiencing more symptoms radiating into his gluteal region, and extending into his right posterior thigh.  He experienced similar symptoms last year although on his left side predominantly. I had a lengthy conversation with Mr. Nicholas Renee regarding his chronic pain condition and potential therapeutic options including risks, benefits, alternative therapies, to name a few. We have discussed using a stepwise approach starting with the least intense level of care as determined by the extent required to diagnose and or treat a patient's condition. The proposed treatments are consistent with the patient's medical condition and known to be safe and effective by current guidelines and the standard of care. The duration and frequency proposed are considered appropriate for the service in accordance with accepted standards of medical practice for the  diagnosis and treatment of the patient's condition and intended to improve the patient's level of function. These services will be furnished in a setting appropriate to the patient's medical needs and condition. Therefore, I have proposed the following plan:  1.  Interventional pain management measures: Patient does not take blood thinners   A. Treatment of Chronic Lower Back Pain:  Patient will be scheduled for diagnostic and therapeutic bilateral L5-S1 transforaminal epidural steroid injections using the lowest effective dose of steroids.  We may repeat epidural pain on patient's outcome.  Other options include MILD procedure as ligamentum flavum hypertrophy is the main component of central canal stenosis, vs [place]eman vs PNS vs a spinal cord stimulator trial. Patient has received formal education including risks, benefits, and alternative treatments, as well as detailed information regarding the procedure and specific goals for the trial. Patient has also received didactic materials including educational booklets and DVDs on spinal cord stimulation therapies.  B. Treatment of Chronic Posterior Neck Pain: None indicated at this time. We have discussed repeating a cervical epidural steroid injection by interlaminar approach using the lowest effective dose of steroids, under C-arm fluoroscopic guidance, with the use of contrast dye to confirm appropriate needle placement and spread of contrast dye. We may repeat cervical epidural steroid injection by interlaminar approach depending on patient's outcome.  As per current guidelines, epidurals will be limited to a maximum of 4 sessions per spinal region in a rolling twelve (12) month period. Continuation of epidural steroid injections over 12 months would only be considered under the following provisions;  · Patient is a high-risk surgical candidate, or the patient does not desire surgery, or recurrence of pain in the same location relieved with ESIs for at least three  months and epidural provides at least 50% sustained improvement of pain and/or 50% objective improvement in function (using same scale as baseline)  · Pain is severe enough to cause a significant degree of functional disability or vocational disability  · The primary care provider will be notified regarding continuation of procedures and repeat steroid use   Other options for treatment of patient's pain would include PNS Sprint, SCS  2. Diagnostic studies: None indicated at this time  A. CBC, PT, PTT prior to SCS trial  B. Prior to spinal cord stimulator trial and implant: MRI of the thoracic spine without contrast to assess capacity and patency of the spinal canal and epidural space    3. Pharmacological measures: Reviewed and discussed; Patient takes Acetaminophen, meloxicam    4. Long-term rehabilitation efforts:  A. The patient does not have a history of falls.   B. Patient will start a comprehensive physical therapy program for Alter-G, gait and balance training, neurodynamics, core strengthening, gluteal and abductor strengthening, VMO/quadriceps/hamstrings strengthening, ultrasound, ASTYM, E-STIM, myofascial release, cupping, dry needling, home exercise program,  C. Continue contrast therapy: Apply ice-packs for 15-20 minutes, followed by heating pads for 15-20 minutes to affected area   D. Start a low impact exercise program such as water therapy, swimming, yoga, Pilates, Manuel Chi  E. Prior to SCS: Referral to Dr. Brandt Alvarez for psychological screening for spinal cord stimulation and intrathecal therapies.  F. Patient's Body mass index is 33.48 kg/m². Patient counseled on the importance of weight loss to help with overall health and pain control.   5. The patient has been instructed to contact my office with any questions or difficulties. The patient understands the plan and agrees to proceed accordingly.      Pain Medications               acetaminophen (TYLENOL) 500 MG tablet Take 2 tablets by mouth  Every 4 (Four) Hours As Needed.    meloxicam (MOBIC) 15 MG tablet Take 1 tablet by mouth Daily.    methylPREDNISolone (Medrol) 4 MG dose pack Take by mouth as directed on package for 6 days.             No orders of the defined types were placed in this encounter.       Please note that portions of this note were completed with a voice recognition program.   Any copied data in any portion of my note has been reviewed by myself and accurate.     The 21st Century Cures Act makes medical notes like this available to patients in the interest of transparency. This is a medical document intended as peer to peer communication. It is written in medical language and may contain abbreviations or verbiage that are unfamiliar. It may appear blunt or direct. Medical documents are intended to carry relevant information, facts as evident, and the clinical opinion of the practitioner.     REBEKAH Ojeda    Patient Care Team:  Jack Gibson MD as PCP - General  Jack Gibson MD as PCP - Family Medicine  Nicholas Manuel MD as Referring Physician (Otolaryngology)  Brandt Zambrano MD as Consulting Physician (Radiation Oncology)  Georgiana Buck MD as Consulting Physician (Hematology and Oncology)  Dimitri Mesa MD as Consulting Physician (Pain Medicine)  Georgiana Yeh APRN as Nurse Practitioner (Pain Medicine)     No orders of the defined types were placed in this encounter.        Future Appointments   Date Time Provider Department Center   12/4/2024 10:00 AM Della Kay APRN [facility]   1/13/2025  9:00 AM Gage Britt MD [facility]   3/14/2025  8:30 AM [facility] CT 1 BH KAIT CT [facility]   3/14/2025 11:00 AM Brandt Zambrano MD [facility] None

## 2024-12-04 ENCOUNTER — LAB (OUTPATIENT)
Dept: LAB | Facility: HOSPITAL | Age: 59
End: 2024-12-04
Payer: COMMERCIAL

## 2024-12-04 ENCOUNTER — OFFICE VISIT (OUTPATIENT)
Dept: ONCOLOGY | Facility: CLINIC | Age: 59
End: 2024-12-04
Payer: COMMERCIAL

## 2024-12-04 VITALS
TEMPERATURE: 97.6 F | SYSTOLIC BLOOD PRESSURE: 135 MMHG | BODY MASS INDEX: 33.93 KG/M2 | OXYGEN SATURATION: 96 % | DIASTOLIC BLOOD PRESSURE: 87 MMHG | WEIGHT: 256 LBS | HEART RATE: 96 BPM | RESPIRATION RATE: 18 BRPM | HEIGHT: 73 IN

## 2024-12-04 DIAGNOSIS — C10.9 OROPHARYNX CANCER: Primary | ICD-10-CM

## 2024-12-04 DIAGNOSIS — C10.9 OROPHARYNX CANCER: ICD-10-CM

## 2024-12-04 LAB
ALBUMIN SERPL-MCNC: 4.3 G/DL (ref 3.5–5.2)
ALBUMIN/GLOB SERPL: 1.7 G/DL
ALP SERPL-CCNC: 70 U/L (ref 39–117)
ALT SERPL W P-5'-P-CCNC: 16 U/L (ref 1–41)
ANION GAP SERPL CALCULATED.3IONS-SCNC: 10 MMOL/L (ref 5–15)
AST SERPL-CCNC: 19 U/L (ref 1–40)
BASOPHILS # BLD AUTO: 0.01 10*3/MM3 (ref 0–0.2)
BASOPHILS NFR BLD AUTO: 0.3 % (ref 0–1.5)
BILIRUB SERPL-MCNC: 0.5 MG/DL (ref 0–1.2)
BUN SERPL-MCNC: 19 MG/DL (ref 8–23)
BUN/CREAT SERPL: 19.2 (ref 7–25)
CALCIUM SPEC-SCNC: 9.2 MG/DL (ref 8.6–10.5)
CHLORIDE SERPL-SCNC: 104 MMOL/L (ref 98–107)
CO2 SERPL-SCNC: 26 MMOL/L (ref 22–29)
CREAT SERPL-MCNC: 0.99 MG/DL (ref 0.76–1.27)
DEPRECATED RDW RBC AUTO: 41.7 FL (ref 37–54)
EGFRCR SERPLBLD CKD-EPI 2021: 86.7 ML/MIN/1.73
EOSINOPHIL # BLD AUTO: 0.11 10*3/MM3 (ref 0–0.4)
EOSINOPHIL NFR BLD AUTO: 2.8 % (ref 0.3–6.2)
ERYTHROCYTE [DISTWIDTH] IN BLOOD BY AUTOMATED COUNT: 12 % (ref 12.3–15.4)
GLOBULIN UR ELPH-MCNC: 2.6 GM/DL
GLUCOSE SERPL-MCNC: 76 MG/DL (ref 65–99)
HCT VFR BLD AUTO: 36.9 % (ref 37.5–51)
HGB BLD-MCNC: 12.9 G/DL (ref 13–17.7)
IMM GRANULOCYTES # BLD AUTO: 0.01 10*3/MM3 (ref 0–0.05)
IMM GRANULOCYTES NFR BLD AUTO: 0.3 % (ref 0–0.5)
LYMPHOCYTES # BLD AUTO: 1.12 10*3/MM3 (ref 0.7–3.1)
LYMPHOCYTES NFR BLD AUTO: 28.6 % (ref 19.6–45.3)
MCH RBC QN AUTO: 33.1 PG (ref 26.6–33)
MCHC RBC AUTO-ENTMCNC: 35 G/DL (ref 31.5–35.7)
MCV RBC AUTO: 94.6 FL (ref 79–97)
MONOCYTES # BLD AUTO: 0.39 10*3/MM3 (ref 0.1–0.9)
MONOCYTES NFR BLD AUTO: 10 % (ref 5–12)
NEUTROPHILS NFR BLD AUTO: 2.27 10*3/MM3 (ref 1.7–7)
NEUTROPHILS NFR BLD AUTO: 58 % (ref 42.7–76)
PLATELET # BLD AUTO: 191 10*3/MM3 (ref 140–450)
PMV BLD AUTO: 8.5 FL (ref 6–12)
POTASSIUM SERPL-SCNC: 4.4 MMOL/L (ref 3.5–5.2)
PROT SERPL-MCNC: 6.9 G/DL (ref 6–8.5)
RBC # BLD AUTO: 3.9 10*6/MM3 (ref 4.14–5.8)
SODIUM SERPL-SCNC: 140 MMOL/L (ref 136–145)
TSH SERPL DL<=0.05 MIU/L-ACNC: 1.68 UIU/ML (ref 0.27–4.2)
WBC NRBC COR # BLD AUTO: 3.91 10*3/MM3 (ref 3.4–10.8)

## 2024-12-04 PROCEDURE — 99213 OFFICE O/P EST LOW 20 MIN: CPT | Performed by: NURSE PRACTITIONER

## 2024-12-04 PROCEDURE — 36415 COLL VENOUS BLD VENIPUNCTURE: CPT

## 2024-12-04 PROCEDURE — 80050 GENERAL HEALTH PANEL: CPT

## 2024-12-04 NOTE — PROGRESS NOTES
"      PROBLEM LIST:  1. 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  Biopsy of left neck mass on 11/8/2021 showed squamous cell carcinoma.  P16 stain inconclusive.  Repeat biopsy done 11/24/21 - excision of left lingual tonsil, showed SCC, p16+  B) begin weekly cisplatin with radiation 12/23/2021. Completed cisplatin 2/9/2022.   2. Hypertension  3. GERd  4. Hyperlipidemia    Subjective     CHIEF COMPLAINT: p16+ oropharynx cancer    HISTORY OF PRESENT ILLNESS:   Nicholas Renee returns for follow-up.   He has been feeling well overall since his last visit.  He had a colonoscopy in July per Dr. Blum.  His energy level is good overall.  He denies any changes with his swallowing.  At times he states difficulty swallowing meats, this is unchanged over the past few years.  He denies any pain with swallowing.        Objective       /87   Pulse 96   Temp 97.6 °F (36.4 °C)   Resp 18   Ht 185.4 cm (73\")   Wt 116 kg (256 lb)   SpO2 96%   BMI 33.78 kg/m²    Vitals:    12/04/24 0942   PainSc: 0-No pain                     ECOG score: 0   General: well appearing male in no acute distress  Neuro: alert and oriented  HEENT: sclerae anicteric.  Oropharynx is clear   Lymphatics:  No palpable cervical adenopathy bilaterally.  Cardiovascular: regular rate and rhythm, no murmurs  Lungs: clear to auscultation bilaterally  Abdomen: soft, nontender, nondistended.  No palpable organomegaly  Extremities: no lower extremity edema  Skin: no rashes, lesions, bruising, or petechiae  Psych: mood and affect appropriate      RECENT LABS:  Lab Results   Component Value Date    WBC 3.91 12/04/2024    HGB 12.9 (L) 12/04/2024    HCT 36.9 (L) 12/04/2024    MCV 94.6 12/04/2024     12/04/2024       Lab Results   Component Value Date    GLUCOSE 107 (H) 06/05/2024    BUN 22 06/05/2024    CREATININE 0.98 " 06/05/2024    EGFRIFNONA 74 02/08/2022    BCR 22.4 06/05/2024    K 4.5 06/05/2024    CO2 28.0 06/05/2024    CALCIUM 9.1 06/05/2024    ALBUMIN 4.3 06/05/2024    AST 18 06/05/2024    ALT 13 06/05/2024                 Assessment & Plan                Nicholas Renee is a 59 y.o. male with a cT1-2N1M0 p16+ SCC of the left base of tongue, with > 3 cm cervical LN.    He is doing well with no evidence of disease recurrence.  He has CT imaging scheduled with Dr. Zambrano in March 14, 2025.  He will see Dr. Manuel in January 2025.  We will see him back in 6 months with repeat labs.    His labs today show mild anemia with a hemoglobin of 12.9 which is stable.  CMP and TSH are pending and we will contact him with any abnormal results.    Follow-up in 6 months with CBC, CMP and TSH on return.  We will continue to follow him until 5 years from diagnosis.        Della Kay, REBEKAH  [facility]    12/4/2024

## 2024-12-11 ENCOUNTER — TELEPHONE (OUTPATIENT)
Dept: PAIN MEDICINE | Facility: CLINIC | Age: 59
End: 2024-12-11

## 2024-12-11 ENCOUNTER — OUTSIDE FACILITY SERVICE (OUTPATIENT)
Dept: PAIN MEDICINE | Facility: CLINIC | Age: 59
End: 2024-12-11
Payer: COMMERCIAL

## 2024-12-11 PROCEDURE — 64483 NJX AA&/STRD TFRM EPI L/S 1: CPT | Performed by: ANESTHESIOLOGY

## 2024-12-11 NOTE — TELEPHONE ENCOUNTER
DELETE AFTER REVIEWING: Send this encounter to the appropriate pool. See your Call Action Grid or Workflows for direction.    Caller:     Relationship to patient:     Best call back number: [phone] (home) [phone] (work)      Chief complaint: diagnostic and therapeutic bilateral L5-S1 transforaminal epidural steroid injections     Type of visit: FU TO IN OFFICE PROCEDURE    Requested date: TIME FRAME WAS NOT SPECIFIED      If rescheduling, when is the original appointment: NA      Additional notes: PATIENT HAD PROCEDURE THIS MORNING. HIS  DISCHARGE PAPERWORK STATES HE SHOULD HAVE A FU APPOINTMENT BUT NOTHING HAS BEEN SHEDULED. DISCHARGE PAPERWORK DOESNT SPECIFY WHEN PATIENT SHOULD BE SEEN FOR FU. AND NO DISCHARGE PAPERWORK IS IN MEDIA. PLEASE ADVISE.         PATIENT ALSO HAS QUESTIONS IN REGARDS TO DISCHARGE INSTRUCTIONS. PATIENT WANTS TO KNOW IF HE HAS ANY RESTRICTIONS?         DELETE AFTER READING TO PATIENT: “Thank you for sharing this information with me. I will send a message to the scheduling team. Please allow 48 hours for the  staff to follow up on this request.”    2

## 2024-12-12 ENCOUNTER — TELEPHONE (OUTPATIENT)
Dept: PAIN MEDICINE | Facility: CLINIC | Age: 59
End: 2024-12-12
Payer: COMMERCIAL

## 2024-12-12 NOTE — TELEPHONE ENCOUNTER
FOLLOW UP CALL AFTER PROCEDURE    I spoke with the patient regarding how he is feeling after his procedure with Dr Mesa on 12/11/24. Patient reports that he is doing well. He notice more functional improvement and stated he is able to twist and reach around more better.     Nicholas Renee underwent a diagnostic and therapeutic bilateral L5-S1 transforaminal epidural steroid injections  on 12/11/24. Patient reported 100% relief at the time of after procedure exam with Dr Mesa. In addition, patient experienced significant functional improvement (perforing activities without experiencing pain compared with examoination prior to procedure that produced these activities.)    Pain level before procedure: 6/10  Pain level after procedure: 0/10    Today, the patient reports 100% pain relief (pain level 0/10.)    Patient denies side effects or complications.  Patient does not have any questions or concerns at this time.    Advised patient of follow up with REBEKAH Shankar on 5/13/25.

## 2025-01-13 ENCOUNTER — OFFICE VISIT (OUTPATIENT)
Dept: ORTHOPEDIC SURGERY | Facility: CLINIC | Age: 60
End: 2025-01-13
Payer: COMMERCIAL

## 2025-01-13 VITALS
WEIGHT: 257 LBS | DIASTOLIC BLOOD PRESSURE: 84 MMHG | BODY MASS INDEX: 34.06 KG/M2 | SYSTOLIC BLOOD PRESSURE: 132 MMHG | HEIGHT: 73 IN

## 2025-01-13 DIAGNOSIS — M17.12 PRIMARY OSTEOARTHRITIS OF LEFT KNEE: Primary | ICD-10-CM

## 2025-01-13 PROCEDURE — 99214 OFFICE O/P EST MOD 30 MIN: CPT | Performed by: ORTHOPAEDIC SURGERY

## 2025-01-13 RX ORDER — CHLORHEXIDINE GLUCONATE 40 MG/ML
1 SOLUTION TOPICAL DAILY
Qty: 237 ML | Refills: 0 | Status: SHIPPED | OUTPATIENT
Start: 2025-01-13

## 2025-01-13 RX ORDER — PREGABALIN 150 MG/1
150 CAPSULE ORAL ONCE
OUTPATIENT
Start: 2025-01-13 | End: 2025-01-13

## 2025-01-13 RX ORDER — ACETAMINOPHEN 325 MG/1
1000 TABLET ORAL ONCE
OUTPATIENT
Start: 2025-01-13 | End: 2025-01-13

## 2025-01-13 NOTE — PROGRESS NOTES
[facility] [place] [facility] Note    Subjective     Chief Complaint   Patient presents with    Follow-up     4 month f/u -- Primary osteoarthritis of left knee        HPI    It has been 4  month(s) since Mr. Renee's last visit. He returns to clinic today for follow-up of left knee arthritis. The issue has been ongoing for 40 year(s). He rates his pain a 6/10 on the pain scale. Previous/current treatments: bracing, cane/walker, NSAIDS, physical therapy, and steroid injection (last injection 09/12/2024). Current symptoms: pain, grinding, stiffness, and giving way/buckling. The pain is worse with walking, sitting, climbing stairs, lying on affected side, and rising from seated position; resting, ice, and pain medication and/or NSAID improve the pain. Overall, he is doing the same.  He has reached point he would like to proceed with left total knee arthroplasty surgery.  He has exhausted conservative treatment.  His wife can help him out postoperatively.  He did well following right total knee arthroplasty surgery on 3/28/2023.  He was able to go home same day.  No history of clots or clotting disorders.  No blood thinners.      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    Primary osteoarthritis of left shoulder    Rotator cuff tendinitis, right    Primary osteoarthritis of both knees    Cervical spondylosis    S/P total knee replacement, right    Obesity    GERD (gastroesophageal reflux disease)    Status post right knee replacement     Lumbar stenosis with neurogenic claudication    Spondylosis of lumbar region without myelopathy or radiculopathy    Degeneration of lumbar or lumbosacral intervertebral disc    Ligamentum flavum hypertrophy    Spondylolisthesis of lumbar region    Gait disturbance    Physical deconditioning    Primary osteoarthritis of left knee    Lateral collateral ligament deficiency of left knee    Degenerative arthritis of left knee     Past Medical History:   Diagnosis Date    Acromioclavicular separation 11/16/22    Arthritis of back     Arthritis of neck 12/1/22    C. difficile diarrhea     Cervical disc disorder     Diverticulitis     Esophagitis     GERD (gastroesophageal reflux disease)     Hip arthrosis 3/5/23    History of radiation therapy 02/09/2022    oropharynx, bilateral necks    Hypertension     Joint pain Both knees    Knee sprain     Low back pain Lower back    Low back strain     Lumbar stenosis with neurogenic claudication 09/08/2023    Lumbosacral disc disease     Neck strain     Oropharynx cancer 12/01/2021    Osteoarthritis of both knees     Periarthritis of shoulder Left shoulder    Rotator cuff syndrome 11/16/22    Tear of meniscus of knee     Tennis elbow 9/1/1999    Thoracic disc disorder     Wears glasses       Past Surgical History:   Procedure Laterality Date    ABDOMINAL SURGERY      INFANT (6wk)- STOMACH BLOCKAGE     COLON RESECTION N/A 07/02/2019    Procedure: COLON RESECTION LAPAROSCOPIC LOW ANTERIOR;  Surgeon: Magan Blum MD;  Location:  [facility];  Service: General    COLONOSCOPY N/A 07/11/2017    Procedure: COLONOSCOPY;  Surgeon: Magan Blum MD;  Location:  [facility];  Service:     ENDOSCOPY      FOOT SURGERY      BILAT     JOINT REPLACEMENT  3/28/23    KNEE SURGERY Bilateral     x 5    ORTHOPEDIC SURGERY  Both knees    PERIPHERALLY INSERTED CENTRAL CATHETER INSERTION  06/13/2019    right subclavian now removed    TOTAL KNEE ARTHROPLASTY Right 03/28/2023    Procedure: TOTAL KNEE  "ARTHROPLASTY WITH CORI ROBOT - RIGHT;  Surgeon: Gage Britt MD;  Location: [facility];  Service: Robotics - Ortho;  Laterality: Right;    TRIGGER POINT INJECTION  22      Family History   Problem Relation Age of Onset    Diabetes Father     Heart disease Father     Hypertension Father     Heart attack Father     Arthritis Father     Rheum arthritis Other     No Known Problems Mother      Social History     Socioeconomic History    Marital status:    Tobacco Use    Smoking status: Former     Current packs/day: 0.00     Average packs/day: 0.5 packs/day for 24.9 years (12.5 ttl pk-yrs)     Types: Cigarettes     Start date: 1978     Quit date: 2003     Years since quittin.7    Smokeless tobacco: Former     Types: Snuff    Tobacco comments:     Quit dipping 10/25/2021   Vaping Use    Vaping status: Never Used   Substance and Sexual Activity    Alcohol use: Not Currently     Comment: socially, \"rarely\"    Drug use: Never    Sexual activity: Yes     Partners: Female     Birth control/protection: None      Current Outpatient Medications on File Prior to Visit   Medication Sig Dispense Refill    acetaminophen (TYLENOL) 500 MG tablet Take 2 tablets by mouth Every 4 (Four) Hours As Needed.      cetirizine (zyrTEC) 10 MG tablet Take 1 tablet by mouth Daily As Needed.      cholecalciferol 25 MCG (1000 UT) tablet Take 2 tablets every day by oral route.      gemfibrozil (LOPID) 600 MG tablet Take 1 tablet by mouth 2 (Two) Times a Day. 180 tablet 4    losartan (COZAAR) 100 MG tablet Take 1 tablet by mouth Daily. 90 tablet 3    meloxicam (MOBIC) 15 MG tablet Take 1 tablet by mouth Daily. 90 tablet 1    METAMUCIL FIBER PO       Multiple Vitamins-Minerals (MULTIVITAMIN ADULT PO) Take 1 tablet by mouth Daily.      pantoprazole (PROTONIX) 40 MG EC tablet Take 1 tablet by mouth 2 (Two) Times a Day. 180 tablet 0    rosuvastatin (CRESTOR) 10 MG tablet Take 1 tablet by mouth Daily. 90 tablet 4    vitamin B-12 " (CYANOCOBALAMIN) 1000 MCG tablet Take 2 tablets by mouth 1 (One) Time Per Week.       No current facility-administered medications on file prior to visit.      Allergies   Allergen Reactions    Alpha-Lipoic Acid Rash    Levaquin [Levofloxacin] Myalgia    Sulfa Antibiotics Hives and Rash        Review of Systems   Constitutional:  Negative for activity change, appetite change, chills, diaphoresis, fatigue, fever and unexpected weight change.   HENT:  Negative for congestion, dental problem, drooling, ear discharge, ear pain, facial swelling, hearing loss, mouth sores, nosebleeds, postnasal drip, rhinorrhea, sinus pressure, sneezing, sore throat, tinnitus, trouble swallowing and voice change.    Eyes:  Negative for photophobia, pain, discharge, redness, itching and visual disturbance.   Respiratory:  Negative for apnea, cough, choking, chest tightness, shortness of breath, wheezing and stridor.    Cardiovascular:  Negative for chest pain, palpitations and leg swelling.   Gastrointestinal:  Negative for abdominal distention, abdominal pain, anal bleeding, blood in stool, constipation, diarrhea, nausea, rectal pain and vomiting.   Endocrine: Negative for cold intolerance, heat intolerance, polydipsia, polyphagia and polyuria.   Genitourinary:  Negative for decreased urine volume, difficulty urinating, dysuria, enuresis, flank pain, frequency, genital sores, hematuria and urgency.   Musculoskeletal:  Positive for arthralgias. Negative for back pain, gait problem, joint swelling, myalgias, neck pain and neck stiffness.   Skin:  Negative for color change, pallor, rash and wound.   Allergic/Immunologic: Negative for environmental allergies, food allergies and immunocompromised state.   Neurological:  Negative for dizziness, tremors, seizures, syncope, facial asymmetry, speech difficulty, weakness, light-headedness, numbness and headaches.   Hematological:  Negative for adenopathy. Does not bruise/bleed easily.  "  Psychiatric/Behavioral:  Negative for agitation, behavioral problems, confusion, decreased concentration, dysphoric mood, hallucinations, self-injury, sleep disturbance and suicidal ideas. The patient is not nervous/anxious and is not hyperactive.         Objective      Physical Exam  /84   Ht 185.4 cm (72.99\")   Wt 117 kg (257 lb)   BMI 33.91 kg/m²     Body mass index is 33.91 kg/m².         General:   Mental Status:  Alert   Appearance: Cooperative, in no acute distress   Build and Nutrition: Well-nourished well-developed male   Orientation: Alert and oriented to person, place and time   Posture: Normal   Gait: Nonantalgic    Integument:              Left knee: No skin lesions, no rash, no ecchymosis     Lower Extremities:              Left Knee:                          Tenderness:    Medial/lateral joint line tenderness                          Effusion:          1+                          Swelling:          None                          Crepitus:          Positive                          Range of motion:        Extension:       0°                                                              Flexion:           135°  Instability:No varus laxity, no valgus laxity, negative anterior drawer  Deformities:     Valgus    Imaging/Studies  Imaging Results (Last 24 Hours)       Procedure Component Value Units Date/Time    XR Knee 4+ View Left [[id number]] Resulted: 01/13/25 0940     Updated: 01/13/25 0940    Narrative:      Left Knee Radiographs  Indication: left knee pain  Views: Standing AP's and skiers of both knees, with lateral and sunrise   views of the left knee    Comparison: 5/8/2024    Findings:    Bone-on-bone contact lateral compartment, advanced medial and medial   compartment arthritic changes, valgus alignment, no acute bony   abnormalities.  Mild worsening compared to the previous imaging.  Advanced   knee arthritis.                Assessment and Plan     Diagnoses and all orders for this " visit:    1. Primary osteoarthritis of left knee (Primary)  -     XR Knee 4+ View Left  -     Case Request; Standing  -     Instructions on coughing, deep breathing, and incentive spirometry.; Future  -     CBC and Differential; Future  -     Basic metabolic panel; Future  -     Protime-INR; Future  -     APTT; Future  -     Hemoglobin A1c; Future  -     Sedimentation rate; Future  -     C-reactive protein; Future  -     Tranexamic Acid 1,000 mg in sodium chloride 0.9 % 100 mL  -     Tranexamic Acid 1,000 mg in sodium chloride 0.9 % 100 mL  -     ethyl alcohol 62 % 2 each  -     ceFAZolin (ANCEF) 2 g in sodium chloride 0.9 % 100 mL IVPB  -     acetaminophen (TYLENOL) tablet 975 mg  -     pregabalin (LYRICA) capsule 150 mg  -     Case Request    Other orders  -     Outpatient In A Bed; Standing  -     Follow Anesthesia Guidelines / Protocol; Future  -     Follow Anesthesia Guidelines / Protocol; Standing  -     Nerve Block; Standing  -     Verify NPO Status; Standing  -     Verify The Time Patient Completed ERAS Hydration Drink; Standing  -     SCD (sequential compression device)- to be placed on patient in Pre-op; Standing  -     Clip operative site; Standing  -     Provide Patient With Carbo Loading Instructions  -     Provide Patient With ERAS Booklet(s)/Handout  -     Chlorhexidine Gluconate 4 % solution; Apply 1 Application topically to the appropriate area as directed Daily. Shower with hibiclens solution as directed for 5 days prior to surgery  Dispense: 237 mL; Refill: 0        1. Primary osteoarthritis of left knee          I reviewed my findings with the patient.  He has exhausted conservative treatment and has reached point where he would like to proceed with left total knee arthroplasty surgery.  Risks, benefits, alternatives been discussed.  He is a candidate for outpatient surgery.  Please see my counseling note for details.    Surgical Counseling     I have informed the patient of the diagnosis and the  prognosis.  Exhaustive conservative treatment modalities have not resulted in long term pain relief.  The symptoms have progressed to the point of daily pain and inability to perform activities of daily living without significant pain.  The patient has reached the point of desiring to proceed with total hip arthroplasty after discussing the risks, benefits and alternatives to the procedure.  The surgical procedure itself was discussed in detail.  Risks of the procedure were discussed, which included but are not limited to, bleeding, infection, damage to blood vessels and nerves, incomplete pain relief, loosening of the prosthesis (early or late), deep infection (early or late), need for further surgery, leg length discrepancy, hip dislocation, loss of limb, deep venous thrombosis, pulmonary embolus, death, heart attack, stroke, kidney failure, liver failure, and anesthetic complications.  In addition, the potential for deep infection developing in the future was discussed, which could require further surgery.  The hip would have to be re-opened, debrided, and potentially remove the prosthesis, which may or may not be replaced in the future.  Also, the possibility for loosening of the prosthesis has been mentioned.  If the prosthesis loosened, a revision arthroplasty could be performed, with results that are not as predictable compared to the original procedure.  The typical rehabilitative course has also been discussed, and full recovery may take up to a year to see the maximum benefit.  The importance of patient cooperation in the rehabilitative efforts has also been discussed.  No guarantees were given.  The patient understands the potential risks versus the benefits and desires to proceed with total hip arthroplasty at a mutually convenient time.     Return for surgery.      Gage Britt MD  01/13/25  09:43 EST    Dictated Utilizing Dragon Dictation     107

## 2025-03-07 DIAGNOSIS — M17.12 PRIMARY OSTEOARTHRITIS OF LEFT KNEE: Primary | ICD-10-CM

## 2025-03-14 ENCOUNTER — OFFICE VISIT (OUTPATIENT)
Dept: RADIATION ONCOLOGY | Facility: HOSPITAL | Age: 60
End: 2025-03-14
Payer: COMMERCIAL

## 2025-03-14 ENCOUNTER — HOSPITAL ENCOUNTER (OUTPATIENT)
Dept: CT IMAGING | Facility: HOSPITAL | Age: 60
Discharge: HOME | End: 2025-03-14
Payer: COMMERCIAL

## 2025-03-14 ENCOUNTER — PRE-ADMISSION TESTING (OUTPATIENT)
Dept: PREADMISSION TESTING | Facility: HOSPITAL | Age: 60
End: 2025-03-14
Payer: COMMERCIAL

## 2025-03-14 ENCOUNTER — HOSPITAL ENCOUNTER (OUTPATIENT)
Dept: RADIATION ONCOLOGY | Facility: HOSPITAL | Age: 60
Setting detail: RADIATION/ONCOLOGY SERIES
Discharge: HOME | End: 2025-03-14
Payer: COMMERCIAL

## 2025-03-14 VITALS
TEMPERATURE: 97 F | BODY MASS INDEX: 34.26 KG/M2 | SYSTOLIC BLOOD PRESSURE: 131 MMHG | OXYGEN SATURATION: 95 % | RESPIRATION RATE: 18 BRPM | HEART RATE: 85 BPM | WEIGHT: 259.6 LBS | DIASTOLIC BLOOD PRESSURE: 82 MMHG

## 2025-03-14 DIAGNOSIS — C10.9 OROPHARYNX CANCER: ICD-10-CM

## 2025-03-14 DIAGNOSIS — M17.12 PRIMARY OSTEOARTHRITIS OF LEFT KNEE: ICD-10-CM

## 2025-03-14 DIAGNOSIS — C10.9 OROPHARYNX CANCER: Primary | ICD-10-CM

## 2025-03-14 LAB
ANION GAP SERPL CALCULATED.3IONS-SCNC: 11 MMOL/L (ref 5–15)
APTT PPP: 28.2 SECONDS (ref 22–39)
BASOPHILS # BLD AUTO: 0.01 10*3/MM3 (ref 0–0.2)
BASOPHILS NFR BLD AUTO: 0.2 % (ref 0–1.5)
BUN SERPL-MCNC: 22 MG/DL (ref 8–23)
BUN/CREAT SERPL: 22.4 (ref 7–25)
CALCIUM SPEC-SCNC: 9.1 MG/DL (ref 8.6–10.5)
CHLORIDE SERPL-SCNC: 103 MMOL/L (ref 98–107)
CO2 SERPL-SCNC: 25 MMOL/L (ref 22–29)
CREAT SERPL-MCNC: 0.98 MG/DL (ref 0.76–1.27)
CRP SERPL-MCNC: <0.3 MG/DL (ref 0–0.5)
DEPRECATED RDW RBC AUTO: 41.1 FL (ref 37–54)
EGFRCR SERPLBLD CKD-EPI 2021: 87.7 ML/MIN/1.73
EOSINOPHIL # BLD AUTO: 0.11 10*3/MM3 (ref 0–0.4)
EOSINOPHIL NFR BLD AUTO: 2.6 % (ref 0.3–6.2)
ERYTHROCYTE [DISTWIDTH] IN BLOOD BY AUTOMATED COUNT: 12 % (ref 12.3–15.4)
ERYTHROCYTE [SEDIMENTATION RATE] IN BLOOD: 5 MM/HR (ref 0–20)
GLUCOSE SERPL-MCNC: 97 MG/DL (ref 65–99)
HBA1C MFR BLD: 5.4 % (ref 4.8–5.6)
HCT VFR BLD AUTO: 37.8 % (ref 37.5–51)
HGB BLD-MCNC: 13 G/DL (ref 13–17.7)
IMM GRANULOCYTES # BLD AUTO: 0.02 10*3/MM3 (ref 0–0.05)
IMM GRANULOCYTES NFR BLD AUTO: 0.5 % (ref 0–0.5)
INR PPP: 1.04 (ref 0.89–1.12)
LYMPHOCYTES # BLD AUTO: 1.32 10*3/MM3 (ref 0.7–3.1)
LYMPHOCYTES NFR BLD AUTO: 31.1 % (ref 19.6–45.3)
MCH RBC QN AUTO: 32.3 PG (ref 26.6–33)
MCHC RBC AUTO-ENTMCNC: 34.4 G/DL (ref 31.5–35.7)
MCV RBC AUTO: 93.8 FL (ref 79–97)
MONOCYTES # BLD AUTO: 0.35 10*3/MM3 (ref 0.1–0.9)
MONOCYTES NFR BLD AUTO: 8.2 % (ref 5–12)
NEUTROPHILS NFR BLD AUTO: 2.44 10*3/MM3 (ref 1.7–7)
NEUTROPHILS NFR BLD AUTO: 57.4 % (ref 42.7–76)
NRBC BLD AUTO-RTO: 0 /100 WBC (ref 0–0.2)
PLATELET # BLD AUTO: 176 10*3/MM3 (ref 140–450)
PMV BLD AUTO: 8.4 FL (ref 6–12)
POTASSIUM SERPL-SCNC: 4.6 MMOL/L (ref 3.5–5.2)
PROTHROMBIN TIME: 13.7 SECONDS (ref 12.2–14.5)
QT INTERVAL: 412 MS
QTC INTERVAL: 407 MS
RBC # BLD AUTO: 4.03 10*6/MM3 (ref 4.14–5.8)
SODIUM SERPL-SCNC: 139 MMOL/L (ref 136–145)
WBC NRBC COR # BLD AUTO: 4.25 10*3/MM3 (ref 3.4–10.8)

## 2025-03-14 PROCEDURE — 36415 COLL VENOUS BLD VENIPUNCTURE: CPT

## 2025-03-14 PROCEDURE — G0463 HOSPITAL OUTPT CLINIC VISIT: HCPCS

## 2025-03-14 PROCEDURE — 85652 RBC SED RATE AUTOMATED: CPT

## 2025-03-14 PROCEDURE — 93005 ELECTROCARDIOGRAM TRACING: CPT

## 2025-03-14 PROCEDURE — 83036 HEMOGLOBIN GLYCOSYLATED A1C: CPT

## 2025-03-14 PROCEDURE — 85610 PROTHROMBIN TIME: CPT

## 2025-03-14 PROCEDURE — 86140 C-REACTIVE PROTEIN: CPT

## 2025-03-14 PROCEDURE — 70491 CT SOFT TISSUE NECK W/DYE: CPT

## 2025-03-14 PROCEDURE — 71260 CT THORAX DX C+: CPT

## 2025-03-14 PROCEDURE — 93010 ELECTROCARDIOGRAM REPORT: CPT | Performed by: INTERNAL MEDICINE

## 2025-03-14 PROCEDURE — 85730 THROMBOPLASTIN TIME PARTIAL: CPT

## 2025-03-14 PROCEDURE — 85025 COMPLETE CBC W/AUTO DIFF WBC: CPT

## 2025-03-14 PROCEDURE — 80048 BASIC METABOLIC PNL TOTAL CA: CPT

## 2025-03-14 PROCEDURE — 25510000001 IOPAMIDOL 61 % SOLUTION: Performed by: RADIOLOGY

## 2025-03-14 RX ORDER — IOPAMIDOL 612 MG/ML
100 INJECTION, SOLUTION INTRAVASCULAR
Status: COMPLETED | OUTPATIENT
Start: 2025-03-14 | End: 2025-03-14

## 2025-03-14 RX ADMIN — IOPAMIDOL 100 ML: 612 INJECTION, SOLUTION INTRAVENOUS at 08:43

## 2025-03-14 NOTE — PROGRESS NOTES
FOLLOW UP NOTE    PATIENT:                                                      Nicholas Renee  MEDICAL RECORD #:                        [id number]  :                                                          1965  COMPLETION DATE:    2022  DIAGNOSIS:     Oropharynx cancer  - Stage I (cT2, cN1, cM0, p16+)      BRIEF HISTORY: Mr. Renee returns to clinic today for a scheduled follow-up visit.  He is a 59-year-old gentleman with a known history of a stage I, p16 positive left tonsillar cancer that we treated with chemoradiation.  He completed all of his treatments in 2022.  He has been without any evidence of recurrent disease since that time, and from a symptomatic standpoint, he states he feels like he continues to improve.  His saliva is modestly better, he describes a good range of foods without difficulty in terms of dysphagia or odynophagia.  He denies any dental problems.  He has actually gained just over 10 pounds since our last visit.  He returns to clinic today after undergoing repeat CT scans.  He continues to be seen by Dr. Manuel and his last endoscopic exam was 6 months ago which was normal.    His only other medical change is that he will be undergoing a left knee replacement in 2 weeks.    MEDICATIONS: Medication reconciliation for the patient was reviewed and confirmed in the electronic medical record.    Review of Systems   All other systems reviewed and are negative.      Karnofsky score: 90     Physical Exam  Vitals and nursing note reviewed.   Constitutional:       General: He is not in acute distress.     Appearance: He is well-developed.   HENT:      Head: Normocephalic and atraumatic.      Mouth/Throat:      Comments: Good moisture throughout the posterior oropharynx.  There is minimal scarring and no significant asymmetry.  On digital exam, the bilateral tonsils are soft, buccal mucosa bilaterally, alveolus bilaterally, floor of mouth, and base of tongue all palpate soft.   There is very minimal post radiation fibrosis.  Eyes:      Conjunctiva/sclera: Conjunctivae normal.      Pupils: Pupils are equal, round, and reactive to light.   Neck:      Comments: Very subtle swelling in the left neck, but otherwise no dominant nodules or masses.  Minimal fibrosis.  Good range of motion for all movements about the head and neck.  Cardiovascular:      Rate and Rhythm: Normal rate and regular rhythm.      Heart sounds: No murmur heard.     No friction rub.   Pulmonary:      Effort: Pulmonary effort is normal.      Breath sounds: Normal breath sounds. No wheezing.   Abdominal:      General: Bowel sounds are normal. There is no distension.      Palpations: Abdomen is soft. There is no mass.      Tenderness: There is no abdominal tenderness.   Musculoskeletal:         General: Normal range of motion.      Cervical back: Normal range of motion and neck supple.   Lymphadenopathy:      Cervical: No cervical adenopathy.   Skin:     General: Skin is warm and dry.   Neurological:      Mental Status: He is alert and oriented to person, place, and time.   Psychiatric:         Behavior: Behavior normal.         Thought Content: Thought content normal.         Judgment: Judgment normal.         VITAL SIGNS:   Vitals:    03/14/25 1053   BP: 131/82   Pulse: 85   Resp: 18   Temp: 97 °F (36.1 °C)   TempSrc: Temporal   SpO2: 95%   Weight: 118 kg (259 lb 9.6 oz)   PainSc: 0-No pain             Karnofsky score: 90     IMAGING  I have personally reviewed the relevant imaging studies, as follows:  CT Neck, Chest with Contrast 3/14/2025:  The official read is pending.  On my own personal review, there are no abnormal nodules or areas of contrast-enhancement.  Subtle asymmetry between the left and right posterior oropharynx which is stable.  The neck is normal without any adenopathy in the lung fields are clear with no abnormal nodules or masses.      The following portions of the patient's history were reviewed and  updated as appropriate: allergies, current medications, past family history, past medical history, past social history, past surgical history and problem list.         Diagnoses and all orders for this visit:    1. Oropharynx cancer (Primary)         IMPRESSION: Mr. 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  He is just over 3 years out from completion of therapy and clinically he is doing exceptionally well without any evidence of recurrent disease and I am very pleased with his salivary function and recovery.  We will continue to monitor him closely.  His next exam with ENT will be in May which will be when he has his next endoscopic exam.  I will plan to see him back in 1 year.    RECOMMENDATIONS: Return to clinic in 1 year    I spent a total of 30 minutes on todays visit, with more than 20 minutes in direct face to face communication, and the remainder of the time spent in reviewing the relevant history, records, available imaging, and for documentation.    Return in about 1 year (around 3/14/2026) for Office Visit.    Brandt Zabmrano MD

## 2025-03-14 NOTE — PAT
An arrival time for procedure was not provided during PAT visit. If patient had any questions or concerns about their arrival time, they were instructed to contact their surgeon/physician.  Additionally, if the patient referred to an arrival time that was acquired from their my chart account, patient was encouraged to verify that time with their surgeon/physician. Arrival times are NOT provided in Pre Admission Testing Department.    Patient viewed general PAT education video as instructed in their preoperative information received from their surgeon.  Patient stated the general PAT education video was viewed in its entirety and survey completed.  Copies of PAT general education handouts (Incentive Spirometry, Meds to Beds Program, Patient Belongings, Pre-op skin preparation instructions, Blood Glucose testing, Visitor policy, Surgery FAQ, Code H) distributed to patient if not printed. Education related to the PAT pass and skin preparation for surgery (if applicable) completed in PAT as a reinforcement to PAT education video. Patient instructed to return PAT pass provided today as well as completed skin preparation sheet (if applicable) on the day of procedure.     Additionally if patient had not viewed video yet but intended to view it at home or in our waiting area, then referred them to the handout with QR code/link provided during PAT visit.  Encouraged patient/family to read PAT general education handouts thoroughly and notify PAT staff with any questions or concerns. Patient verbalized understanding of all information and priority content.    EKG IN CHART      Patient instructed to drink 20 ounces of Gatorade or Gatorlyte (if diabetic) and it needs to be completed 1 hour (for [facility] patients) or 2 hours (scheduled  section & [facility] patients) before given arrival time for procedure (NO RED Gatorade and NO Gatorade Zero).    Patient verbalized understanding.    Patient to apply Chlorhexadine wipes  to  surgical area (as instructed) the night before procedure and the AM of procedure. Wipes provided.

## 2025-03-20 ENCOUNTER — PRE-PROCEDURE SCREENING (OUTPATIENT)
Dept: ORTHOPEDIC SURGERY | Facility: CLINIC | Age: 60
End: 2025-03-20
Payer: COMMERCIAL

## 2025-03-20 NOTE — TELEPHONE ENCOUNTER
TOTAL JOINT REPLACEMENT CARE NAVIGATION INITIAL ASSESSMENT    PATIENT INFORMATION:     Patient: Nicholas Renee       YOB: 1965         Age/Gender: 59 y.o. male     Medical Record Number: [id number]     Surgery:    L TKA        Surgery Date: 3/27/25     Surgeon: Dr. Britt    Physical Therapy Location: [facility]    PT Date & Time: 3/28/25 @ 1:15pm    DVT PPX: ASA 81mg BID    DISCHARGE PLAN: Outpatient       LIVING SITUATION:     [x]Private Residence      Who lives in the home?    Wife           []Nursing Home:                                     []Assisted Living  []Rehabilitation Facility:                          []Live Alone  []Homeless    HOUSING STYLE:     []Ranch Style   []Multi-level   []Split level   []Townhouse   []Apartment    Do you have steps to navigate in the home? Will be able to stay on one level  Do you have steps to navigate outside the home?      ADL:     [x]Independent   []Independent with difficulty   []Partially Dependent   []Dependent    Do you require bathing/showering?   Do you require assistance with grooming (brushing hair, shaving, etc)?  Do you require assistance dressing?  Do you require assistance with walking?  Do you require assistive devices while walking (cane, walker, wheelchair)?  Do you require assistance with transfers (moving from bed to chair, wheelchair, etc)?  Do you require assistance preparing meals?  Do you require assistance when eating meals?  Do you require assistance to use the toilet?  Do you have any issues with bowel or bladder incontinence?  Do you require assistance with household chores (cleaning, laundry, etc)?  Have you had any recent falls? No    CURRENT SERVICES:    []Home Health (PT, OT, Skilled Nursing)  Agency:    County:  []Palliative Care  []Meals on Wheels  []Daily Caregiver  [x]None    CURRENT DME:    []Walker   []Cane   []Wheelchair   []Crutches   []Bedside Commode   []Shower Chair  []Hospital Bed   []Nebulizer    []Oxygen  []Other:    MEDICATIONS: Losartan-will hold AM of sx    Are you currently on any blood thinners? No  Are you currently on any anti-inflammatory medications? Meloxicam-will hold 7 days prior  Are you currently on any medications for rheumatoid arthritis? No  Are you currently taking any herbal supplements? No      Post-op Pharmacy Name:      [facility]             Phone Number:     Does anyone assist you filling medication boxes or remind you that medication is due?   Are you currently on a mail order prescription plan?  What pharmacy do you use to fill your short-term prescriptions? [facility]  Do you have prescription insurance coverage?   Can you afford your medications/co-pays?    CURRENT TRANSPORTATION:    Which services do you rely on? []Taxi   []Public Transportation   [x]Private Vehicle     []Ambulance   []Tack (Transportation Assistance [facility])    Do you have a way to get home when you are discharged? Yes-wife, Soniya    POTENTIAL NEEDS:    []Rehabilitation   []Home Health PT   []SNF  []Meals on Wheels   []Department of    []Palliative Care  []Nursing Home   []Hospice   [x]Durable Medical Equipment-already has a walker; educated on all other DMEs  []Caregiver Services   []Financial Services   []Pre-op In Home PT Evaluation    CLEARANCES NEEDED FOR SURGERY:    []Dental   []Cardiology   []Pulmonology   []Medical (PCP)   []Rheumatology  []Endocrinology   []Hematology/Oncology   []Neurology   []Neurosurgery   []CT Vascular      INITIAL DISCHARGE PLAN: Plan for same day discharge; Patient's wife, Soniya, will be surgery , as well as, post-op care giver. Patient already has walker; educated on all other DMEs. Patient informed about [facility] program. Patient okay with taking oxycodone and Meloxicam post-op; Instructed to have 500mg Tylenol, 81mg aspirin, and OTC stool softener on hand. Post-op PT scheduled at [facility] on  3/28/25 @ 1:15pm.

## 2025-03-27 ENCOUNTER — OUTSIDE FACILITY SERVICE (OUTPATIENT)
Dept: ORTHOPEDIC SURGERY | Facility: CLINIC | Age: 60
End: 2025-03-27
Payer: COMMERCIAL

## 2025-03-27 ENCOUNTER — DOCUMENTATION (OUTPATIENT)
Dept: ORTHOPEDIC SURGERY | Facility: CLINIC | Age: 60
End: 2025-03-27

## 2025-03-27 RX ORDER — MELOXICAM 15 MG/1
15 TABLET ORAL DAILY PRN
Qty: 30 TABLET | Refills: 0 | Status: SHIPPED | OUTPATIENT
Start: 2025-03-27

## 2025-03-27 RX ORDER — OXYCODONE HYDROCHLORIDE 5 MG/1
5 TABLET ORAL EVERY 4 HOURS PRN
Qty: 40 TABLET | Refills: 0 | Status: SHIPPED | OUTPATIENT
Start: 2025-03-27

## 2025-03-27 NOTE — PROGRESS NOTES
[facility] [place] [facility]    Fluoroscopy Report    [facility]  [address] [place] [address]    DATE OF PROCEDURE: 03/27/25    PREOPERATIVE DIAGNOSIS: Left knee arthritis    POSTOPERATIVE DIAGNOSIS:  Left knee arthritis    PROCEDURES PERFORMED:   Fluoroscopic imaging of the left knee following total knee arthroplasty    VIEWS:  AP and lateral views of the left knee    COMPARISON:  Pre-op imaging of the left knee    FINDINGS:  Appropriate alignment of the components with no obvious fracture or unusual bony features.    Gage Britt MD  03/27/25  13:14 EDT

## 2025-03-27 NOTE — PROGRESS NOTES
[facility] [place] [facility]    Operative Report    [facility]  [address] [place] [address]    DATE OF PROCEDURE: 03/27/25    PREOPERATIVE DIAGNOSIS: left knee arthritis    POSTOPERATIVE DIAGNOSIS:  left knee arthritis    PROCEDURES PERFORMED:   left total knee arthroplasty with [employer] components (# 8 cruciate retaining femur, # 7 tibia, 12 mm polyethylene, with 35 three peg patella) with CORI robotic assitance    Surgical Approach: Knee Medial Parapatellar    SURGEON: Gage Britt MD    ASSISTANT: Alyce Jennings PA-C  (Alyce Jennings PA-C was present and necessary for positioning, draping, retraction, instrumentation and closure.)    SPECIMENS: None    ANESTHESIA: General    TOURNIQUET TIME: 18 minutes    ESTIMATED BLOOD LOSS: 100 cc    COMPLICATIONS: None    PREOPERATIVE ANTIBIOTICS: Ancef 2 g    INDICATIONS: The patient is a 59 y.o. male with debilitating left knee pain secondary to osteoarthritis that failed to improve in spite of conservative treatment .  Options have been discussed at length with the patient and the patient has had an extended course of conservative treatment without long-term benefit. The patient has reached the point where the patient desires total knee arthroplasty surgery and understands the risks, benefits, and alternatives. Consent was obtained. Please see my office notes for details with regard to preoperative counseling and operative rationale.     DESCRIPTION OF PROCEDURE: The patient was positively identified in the preoperative holding area and brought to the operating suite and placed in a supine position. After adequate general anesthetic had been achieved (failed attempt at spinal anesthetic), the left lower extremity was prepped and draped in the usual sterile fashion.  After application of a tourniquet to the left upper thigh, which was used during the procedure for a total 18 minutes during the  cementation process only. Landmarks of the knee were identified and timeout procedure was performed to confirm the operative site, as well as other parameters. Following the sterile prep and drape, a skin incision was made just off the medial aspect of midline for a medial parapatellar approach. Following a sharp skin incision, dissection was carried down to the level of the extensor mechanism and a medial parapatellar arthrotomy was made and the patella was tucked into the lateral gutter.  Anterior horns of the medial and lateral menisci were removed, and ACL transected.  Osteophytes were also removed.  Description of arthritis: Bone-on-bone contact lateral compartment, medial compartment and patellofemoral joint, with large periarticular osteophytes in a tricompartmental fashion, valgus alignment.      [employer] robotic system was then employed to assist with preparation of the femoral and tibial bone surfaces.  Femoral and tibial arrays were placed, as well as markers in both the femur and tibia.  System was registered in a systematic fashion, and 3D models created of both the femoral and tibial aspects.  Range of motion and gap assessments were also performed, and implants were selected and appropriately sized and oriented virtually on both the femur and tibial aspects starting with the flexion gap, and then progressing to the extension gap.  Planning was made for a 8 cruciate retaining femoral component and a 7 tibial component with a 9 mm insert. The patella was prepared with the cutting guide for a 35 implant and a trial patella placed.  Once virtual balancing had been achieved, distal femoral surface was then prepared with the CORI kash, followed by preparation for the 4-in-1 cutting block.  Proximal tibial cut was then performed in a guided fashion, posterior condyles were freed of osteophytes, and trial implants placed, namely a 8 cruciate retaining femur with a 7 tibia and a 12 mm trial insert.  Range of  motion and gap assessments were again performed with excellent balancing noted and the patella had excellent tracking.      Therefore the trial components were removed, and preparations made for final placement, and final components were cemented in place with namely a # 7 tibia, # 8 cruciate retaining femur, and a 35 three peg patella with a trial 12 mm insert for cement compression. All the excess cement was removed from the bone implant interface and allowed to harden. Tourniquet was deflated. Hemostasis was obtained with electrocautery. There was no brisk bleeding noted in the popliteal fossa in particular. Therefore, the knee was copiously irrigated as it was between major steps, and the final 12 mm insert was placed as this was deemed appropriate for the patient's anatomy with full flexion and extension and no instability and attention was then directed towards closure. The medial parapatellar arthrotomy was closed with #1 Vicryl in an interrupted figure-of-eight fashion in 4 strategic locations followed by oversewing this from proximal to distal with a #1 StrataFix symmetric, which nicely sealed the joint, followed by closure of the deep fascial layer with #1 Vicryl in a buried interrupted fashion, followed by closure of the subcutaneous layer with 2-0 Vicryl and the skin with 3-0 Stratafix in a running subcuticular fashion.  Adhesive wound closure dressing was applied followed by a sterile dressing with 4 x 4's, abdominal pad, soft roll and Ace wrap. C-arm fluoroscopic images were obtained which showed appropriate alignment of the components.  The patient tolerated the procedure well and was brought to the recovery room in good condition.     PLAN:  1.  The patient will begin early range of motion and weight-bearing per the post total knee arthroplasty protocol.   2.  The patient has planned for discharge today when stable medically with continued rehabilitation outpatient physical therapy setting.  Follow  up in 3 weeks as planned in the office.  3.  Aspirin 81 mg p.o. twice daily for 1 month will be utilized for DVT prophylaxis.    Future Appointments   Date Time Provider Department Center   4/16/2025 10:20 AM Alyce Jennings PA-C [facility] OS K[facility]IT KAIT   5/13/2025  1:00 PM Georgiana Yeh APRN [facility] APM KAIT KAIT   6/18/2025  9:30 AM Georgiana Buck MD [facility] ONC KAIT KAIT   3/20/2026 11:00 AM Brandt Zambrano MD [facility] RAON KAIT None       Gage Britt MD  03/27/25  13:13 EDT

## 2025-04-03 ENCOUNTER — TELEPHONE (OUTPATIENT)
Dept: ORTHOPEDIC SURGERY | Facility: CLINIC | Age: 60
End: 2025-04-03
Payer: COMMERCIAL

## 2025-04-03 NOTE — TELEPHONE ENCOUNTER
Called pt to see how he was doing 7 days s/p TKA. Had to LVM; Requested a call back.    Izabella LOCKE CMA ([facility]), ROT

## 2025-04-03 NOTE — TELEPHONE ENCOUNTER
Patient returned my call; He reports pain and swelling is manageable with current modalities; Is still taking the 81mg ASA BID. Is going to PT at [facility] and reports that is going well.  Denies any drainage from incision.     No further action needed at this time. Encouraged pt to call back if he had any questions or concerns prior to his 1st post-op appt.     Izabella LOCKE CMA ([facility]), ROT

## 2025-04-04 ENCOUNTER — TELEPHONE (OUTPATIENT)
Dept: ORTHOPEDIC SURGERY | Facility: CLINIC | Age: 60
End: 2025-04-04
Payer: COMMERCIAL

## 2025-04-04 NOTE — TELEPHONE ENCOUNTER
Carole,    This is a Dr. Britt Total knee arthroplasty from 3/27. His employer is wanting to know if he can attend a CPR training on 4/8/25, he will be sitting and will have a . Ok to do so?  Danae Grimaldo RT (R), ROT

## 2025-04-04 NOTE — TELEPHONE ENCOUNTER
Caller: HOUSTON SIMON    Relationship: PATIENT    Best call back number: [phone]    What form or medical record are you requesting:  PATIENT HAD LEFT KNEE SURGERY ON 3/27/25. HIS EMPLOYER IS ASKING IF PATIENT CAN ATTEND A CPR TRAINING COURSE ON 4/8/25. PATIENT WOULD ONLY BE SITTING AND HAS A . WOULD NEED A LETTER TO OK FAXED TO EMPLOYER.    Who is requesting this form or medical record from you: EMPLOYER    How would you like to receive the form or medical records (pick-up, mail, fax): FAX  If fax, what is the fax number: [phone]    Additional notes:  FAX TO [facility] [employer]-ATTN: ELEANOR @ [phone]

## 2025-04-16 ENCOUNTER — OFFICE VISIT (OUTPATIENT)
Dept: ORTHOPEDIC SURGERY | Facility: CLINIC | Age: 60
End: 2025-04-16
Payer: COMMERCIAL

## 2025-04-16 VITALS — TEMPERATURE: 97.2 F

## 2025-04-16 DIAGNOSIS — Z96.652 S/P TKR (TOTAL KNEE REPLACEMENT), LEFT: Primary | ICD-10-CM

## 2025-04-16 PROCEDURE — 99024 POSTOP FOLLOW-UP VISIT: CPT | Performed by: PHYSICIAN ASSISTANT

## 2025-04-16 NOTE — PROGRESS NOTES
[facility] [place] [facility] [place]      Subjective     CC: Post-op (3 weeks status post left total knee arthroplasty 03/27/2025)      JOSHUA Renee is a 60 y.o. male.  Patient returns today 3 weeks status post left total knee arthroplasty with Dr. Britt.  He has been happy with his progress with improved motion and pain.  Doing outpatient physical therapy.  Reports this knee is a little slower to rehab than the right.      ROS:    Constiutional:Pt denies fever, chills, nausea, or vomiting.  MSK:as above        Objective      Past Medical History  Past Medical History:   Diagnosis Date    Acromioclavicular separation 11/16/22    Arthritis of back     Arthritis of neck 12/1/22    C. 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    Patient is well nourished and well developed.        Ortho Exam  Exam: Anterior knee incision healing well.  Range of motion 5-95.  Ligament stable.  Neurovascular intact distally.  Imaging/Labs/EMG Reviewed:  XR Knee 3+ View With [place] Left  Order date: 4/16/2025  Authorizing: Gage Britt MD  Ordered by Gage Britt MD on 4/16/2025.     Narrative & Impression    Left Knee Radiographs  Indication: status-post left total knee arthroplasty  Views:  AP, lateral, and sunrise views of the left knee     Comparison: None available     Findings:   The components are well aligned, with no signs of loosening or failure.          Assessment    Assessment:  1. S/P TKR (total knee replacement), left        Plan:  Recommend over the counter anti-inflammatories for pain and/or swelling  Doing well status post left total knee arthroplasty with Dr. Britt on 3/27/2025.  I reassured the patient is doing well.  He will continue PT.  Return to see Dr. Britt in 6 weeks, sooner if needed.    Patient history, diagnosis and treatment plan discussed with Dr. Britt.        Alyce Jennigns PA-C  04/21/25  09:31 EDT      Dragon disclaimer:  Much of this encounter note is an electronic transcription/translation of spoken language to printed text. The electronic translation of spoken language may permit erroneous, or at times, nonsensical words or phrases to be inadvertently transcribed; Although I have reviewed the note for such errors, some may still exist.      Answers submitted by the patient for this visit:  Post Operative Visit (Submitted on 4/14/2025)  Chief Complaint: Follow-up  Pain Control: not requiring pain medication  Fever: unmeasured  Diet: adequate intake  Activity: returning to normal  Operative Site Issues: No

## 2025-04-22 ENCOUNTER — TELEPHONE (OUTPATIENT)
Dept: ORTHOPEDIC SURGERY | Facility: CLINIC | Age: 60
End: 2025-04-22
Payer: COMMERCIAL

## 2025-05-06 RX ORDER — MELOXICAM 15 MG/1
15 TABLET ORAL DAILY PRN
Qty: 30 TABLET | Refills: 0 | Status: SHIPPED | OUTPATIENT
Start: 2025-05-06

## 2025-05-13 ENCOUNTER — OFFICE VISIT (OUTPATIENT)
Dept: PAIN MEDICINE | Facility: CLINIC | Age: 60
End: 2025-05-13
Payer: COMMERCIAL

## 2025-05-13 VITALS — HEIGHT: 73 IN | BODY MASS INDEX: 34.33 KG/M2 | WEIGHT: 259 LBS

## 2025-05-13 DIAGNOSIS — M50.122 CERVICAL DISC DISORDER AT C5-C6 LEVEL WITH RADICULOPATHY: ICD-10-CM

## 2025-05-13 DIAGNOSIS — M48.062 LUMBAR STENOSIS WITH NEUROGENIC CLAUDICATION: ICD-10-CM

## 2025-05-13 DIAGNOSIS — R53.81 PHYSICAL DECONDITIONING: ICD-10-CM

## 2025-05-13 DIAGNOSIS — M24.28 LIGAMENTUM FLAVUM HYPERTROPHY: ICD-10-CM

## 2025-05-13 DIAGNOSIS — M43.16 SPONDYLOLISTHESIS OF LUMBAR REGION: ICD-10-CM

## 2025-05-13 PROCEDURE — 99214 OFFICE O/P EST MOD 30 MIN: CPT | Performed by: NURSE PRACTITIONER

## 2025-05-13 NOTE — PROGRESS NOTES
"Chief Complaint: \" Lower back and gluteal pain.\"        History of Present Illness:  Mr. Nicholas Renee, 60 y.o. male originally referred by Dr Fermín Acosta in consultation for chronic intractable lower back pain and chronic neck pain. On 09/27/2023, he underwent diagnostic and therapeutic left L5-S1 and left S1 transforaminal epidural steroid injection, from which he experienced almost 100% pain relief and functional improvement that lasted for more than 3 weeks followed by 50% for 2 additional weeks. He underwent follow up neurosurgical consultation with Dr Acosta on 10/09/2023 and reported that he has been doing well after his lumbar epidural steroid injections, and therefore, Dr. Acosta encouraged to continue conservatism.  He also has a history of neck difficulties, and on June 27, 2022 he underwent a cervical epidural steroid injection that provided him with 75% pain relief and functional improvement.  He also experiences mechanical component to his lower back pain.  Therefore on 3/27/2024, he underwent bilateral lumbar medial branch rhizotomies at L3, L4 and L5.  When I last evaluated him, he reported he experiencing no real difference in his pain, although upon further conversation it appeared as though he was able to tolerate activities, with less pain.  He tells me he is better throughout the day.  He was experiencing more symptoms radiating into his gluteal region, and extending into his right posterior thigh.  He experienced similar symptoms last year although on his left side predominantly.  He was last seen on 12/11/2024, when he underwent diagnostic and therapeutic bilateral L5-S1 transforaminal epidural steroid injections, for which he reports experiencing some pain relief, he just feels they do not last. He is also undergone a recent left total knee arthroplasty with Dr. Britt.  Prior to surgery, he felt his lower back pain increased significantly.  Although, he decided to move forward with " surgery.  He returns today for postprocedure follow-up and evaluation.    Problem #1: Chronic Lower Back Pain  Pain History: He reports a longstanding history of chronic intractable lower back pain, buttock pain, hip pain and left leg pain associated with neurogenic claudication,. He denies bowel or bladder problems. He was evaluated by Dr. Acosta on 07/06/2023, and was found not to be a surgical candidate.  Dr. Acosta recommended conservative management along with interventional pain management measures. MRI of the lumbar spine w/o contrast on 05/25/2023 revealed multilevel spondylosis. Grade 1 anterolisthesis of L4 on L5.  At L3-L4: Disc bulge, ligamentum flavum hypertrophy, facet arthropathy. Moderate to severe spinal canal stenosis. Mild bilateral neuroforaminal stenosis. At L4-L5: Disc bulge, ligamentum flavum hypertrophy, facet arthropathy. Moderate spinal canal stenosis. Mild to moderate bilateral neuroforaminal stenosis. At L5-S1: Disc bulge, ligamentum flavum hypertrophy, facet arthropathy. Mild spinal canal stenosis. Moderate to severe left and mild to moderate right neuroforaminal stenosis. Nicholas Renee failed to obtain pain relief with conservative measures including oral analgesics, topical analgesics, ice, heat, physical therapy, physical therapist directed home exercise program HEP (ongoing), chiropractic therapy, independent exercise program, to name a few. Since last visit patient underwent flexion and extension X-rays of the lumbar spine on 09/12/2023: Severe degenerative changes. Grade 1 spondylolisthesis of L3 on L4 which appears similar with flexion and extension.   Pain Description: Constant lower back pain with intermittent exacerbation, described as aching, dull, sharp, throbbing, and burning sensation.   Radiation of Pain: The pain radiates into the gluteal region and into the right posterior thigh  Pain intensity today: 8/10   Average pain intensity last week: 6/10  Pain intensity ranges  from: 6/10 to 9/10  Aggravating factors: Pain increases with extension, twisting, walking. Patient denies neurogenic claudication.    Alleviating factors: Pain decreases with sitting down, sitting on a recliner, lying down  Associated Symptoms:   Patient reports pain (LLE), and numbness RT>LT, but denies weakness in the lower extremities  Patient denies any new bladder or bowel problems.   Patient denies difficulties with his balance or recent falls.   Pain interferes with general activities (ability to walk, stand, transition from different positions), and affects patient's quality of life  Pain interferes with sleep falling asleep and causing sleep fragmentation   Stiffness        Problem #2: Chronic Posterior Neck Pain  Pain History: He reports a longstanding history of chronic intractable posterior neck pain. He was referred by Dr. Fermín Acosta in consultation for chronic intractable neck and left shoulder pain, which began after doing yard work while weed eating. He underwent consultation with Dr. Souza in orthopedics on 09/20/2021 for left shoulder pain and underwent left subacromial injections without relief. MRI of the cervical spine revealed multilevel spondylitic changes most significant at C5-C6, with a large disc osteophyte complex contributing to lateral recess stenosis and central spinal canal stenosis. He underwent neurosurgical consultation with Dr. Fermín Acosta and was found not to be surgical a candidate.  On June 27, 2022, he underwent a cervical epidural steroid injection, from which he experienced 75% ongoing pain relief and functional improvement.  He is reporting some return of the symptoms as well.  Pain Description: Constant low grade posterior neck pain with intermittent exacerbation, described as a stiffness sensation.   Radiation of Pain: The pain does not radiate   Pain intensity today: 2/10   Average pain intensity last week: 3/10  Pain intensity ranges from: 0/10 to  4/10  Aggravating factors: Pain increases with extension, rotation of the cervical spine   Alleviating factors: Pain decreases with rest, sitting down, lying down, ice  Associated Symptoms:   Patient denies pain, numbness, or weakness in the upper extremities.   Stiffness: Neck        Review of previous therapies and additional medical records:  Nicholas Renee has already failed the following measures, including:   Conservative Measures: Oral analgesics, topical analgesics, ice, heat, chiropractic therapy, physical therapy   Interventional Measures:   09/27/2023: Diagnostic and therapeutic left L5-S1 and left S1 TFESI  6/27/2022: ABEL  03/27/2024: Bilateral lumbar RFA  12/11/2024: DxTx bilateral L5-S1 TESI  Left shoulder injections with Dr Malone  Left knee injections with Dr. Britt  Surgical Measures: No history of previous cervical spine or shoulder surgery. No history of previous lumbar spine or hip surgery. 03/28/2023: Right total knee arthroplasty. Left TKA with Dr. Britt on 03/27/2025   Nicholas Renee underwent neurosurgical consultation with Dr. Fermín Acosta on 07/06/2023, and was found not to be a surgical candidate  Nicholas Renee underwent orthopedic consultation with Dr. Souza on 09/20/2021 and was found not to be a surgical candidate for his shoulder issues.  Nicholas Renee presents with significant comorbidities including GERD, hypertension, prediabetes, history of oropharynx cancer (12/01/2021) status postradiation  In terms of current analgesics, Nicholas Renee takes:  acetaminophen, meloxicam, Norco  I have reviewed Pasquale Report consistent with medication reconciliation.  SOAPP/ORT: Low Risk       Global Pain Scale 09-12 2023 01-30 2024               Pain 18  15               Feelings 2  0               Clinical outcomes 10  5               Activities 8  0               GPS Total: 38  20                  NECK PAIN DISABILITY INDEX QUESTIONNAIRE  DATE 09-12 2023                  Pain intensity  0:  No pain  1: Mild  2: Moderate  3: Fairly severe  4: very severe  5: Worst imaginable 2                    Personal Care   0: I can look after myself normally without causing extra pain.  1: I can look after myself normally, but it causes extra pain.  2: It is painful to look after myself and I am slow and careful.  3: I need some help, but manage most of my personal care.  4: I need help every day in most aspects of self-care.  5: I do not get dressed; I wash with difficulty and stay in bed. 1                   Lifting  0: I can lift heavy weights without extra pain.  1: I can lift heavy weights, but it gives extra pain.  2: Pain prevents me from lifting heavy weights off the floor but I can manage if they are conveniently positioned, for example, on a table.  3: Pain prevents me from lifting heavy weights, but I can manage light to medium weights if they are conveniently positioned.  4: I can lift very light weights.  5: I cannot lift or carry anything at all. 1                   Reading  0: I can read as much as I want to with no pain in my neck.  1: I can read as much as I want to with slight pain in my neck.  2: I can read as much as I want to with moderate pain in my neck.  3: I cannot read as much as I want because of moderate pain in my neck.  4: I cannot read as much as I want because of severe pain in my neck.  5: I cannot read at all.  0                   Headaches  0: I have no headaches at all.  1: I have slight headaches which come infrequently.  2: I have moderate headaches which come infrequently.  3: I have moderate headaches which come frequently.  4: I have severe headaches which come frequently.  5: I have headaches almost all the time.  0                   Concentration  0: I can concentrate fully when I want to with no difficulty.  1: I can concentrate fully when I want to with slight difficulty.  2: I have a fair degree of difficulty in concentrating when I want to.  3: I have a lot of  difficulty in concentrating when I want to.  4: I have a great deal of difficulty in concentrating when I want to.  5: I cannot concentrate at all.  0                   Work  0: I can do as much work as I want to.  1: I can only do my usual work, but no more.  2: I can do most of my usual work, but no more.  3: I cannot do my usual work.  4: I can hardly do any work at all.  5: I cannot do any work at all.  1                   Driving  0: I can drive my car without any neck pain.  1: I can drive my car as long as I want with slight pain in my neck.  2: I can drive my car as long as I want with moderate pain in my   neck.  3: I cannot drive my car as long as I want because of moderate pain   in my neck.  4: I can hardly drive at all because of severe pain in my neck.  5: I cannot drive my car at all.  1                   Sleeping  0: I have no trouble sleeping.  1: My sleep is slightly disturbed (less than 1 hour sleepless).  2: My sleep is mildly disturbed (1-2 hours sleepless).  3: My sleep is moderately disturbed (2-3 hours sleepless).  4: My sleep is greatly disturbed (3-5 hours sleepless).  5: My sleep is completely disturbed (5-7 hours)  1                   Recreation  0: I am able to engage in all of my recreational activities with no neck pain at all.  1: I am able to engage in all of my recreational activities with some pain in my neck.  2: I am able to engage in most, but not all of my recreational activities because of pain in my neck.  3: I am able to engage in a few of my recreational activities because of pain in my neck.  4: I can hardly do any recreational activities because of pain in my neck.  5: I cannot do any recreational activities at all.  1                   TOTAL SCORE 8/50                            The [place] Back Pain Disability Scale   DATE 09-12 2023 01-30 2024               Sleep through the night 2 3                Turn over in bed 2  2               Get out of bed 2  2                Make your bed 1  1               Put on socks (pantyhose) 2  2               Ride in a car 1  1               Sit in a chair for several hours 2  1               Stand up for 20-30 minutes 1  1               Climb one flight of stairs 1  1               Walk a few blocks (200-300 yards)  2  2               Walk several miles 3  3               Run one block (about 50 yards) 3  3               Take food out of the refrigerator 1  0               Reach up to high shelves 1  0               Move a chair 1  0               Pull or push heavy doors 1  0               Bend over to clean the bathtub 2  0               Throw a ball 2  0               Carry two bags of groceries 1  0               Lift and carry a heavy suitcase 2  0               Total score 33  22                       Review of Diagnostic Studies:  Bilateral knee X-rays on 09/12/2023:   Right knee: Total knee arthroplasty in normal anatomic alignment. No hardware complications.   Left knee: Severe degenerative tricompartmental osteoarthritis with severe joint narrowing. Large osteophytes from the posterior aspect of the patella. Small calcifications in the suprapatellar bursa. Slight lateral subluxation of the distal femur in relation to the proximal tibia.  Flexion and extension X-rays of the lumbar spine on 09/12/2023: Severe narrowing of the L4-5 disc with vacuum phenomenon and spurring. Severe narrowing of the L5-S1 disc. Moderate narrowing of the L3-L4 disc with slight grade 1 spondylolisthesis of L3 on L4 which appears similar with flexion and extension. Mild narrowing of the L2-3 disc with mild spurring.   MRI LUMBAR SPINE WO CONTRAST 05/25/2023 revealed multilevel spondylosis. Grade 1 anterolisthesis of L4 on L5. Transitional anatomy with a lumbarized S1 and well-formed S1-S2 intervertebral disc. The conus medullaris and cauda equina nerve roots are satisfactory in appearance. Axial imaging:  L1-L2, L2-L3: No significant canal or neuroforaminnal  stenosis  L3-L4: Disc bulge, ligamentum flavum hypertrophy, facet arthropathy. Moderate to severe spinal canal stenosis. Mild bilateral neuroforaminal stenosis.  L4-L5: Disc bulge, ligamentum flavum hypertrophy, facet arthropathy. Moderate spinal canal stenosis. Mild to moderate bilateral neuroforaminal stenosis.  L5-S1: Disc bulge, ligamentum flavum hypertrophy, facet arthropathy. Mild spinal canal stenosis. Moderate to severe left and mild to moderate right neuroforaminal stenosis.  MRI of the cervical spine without contrast 9/27/2021: Vertebral body heights are well-maintained without evidence of malalignment.  The spinal cord appears normal throughout.    C2-C3: Disc osteophyte complex with left lateralizing features, correlating to mild left paracentral spinal canal stenosis and left foraminal stenosis  C3-C4: Disc osteophyte complex, uncovertebral hypertrophy contributing to mild canal stenosis and mild to moderate left foraminal stenosis  C4-C5: Disc osteophyte complex, uncovertebral hypertrophy contributing to mild canal stenosis and mild to moderate left neuroforaminal stenosis  C5-C6: Large disc osteophyte complex, uncovertebral hypertrophy contributing to moderate canal stenosis greatest in lateral recess and left paracentral region with there is mild indentation of the left hemicord without cord edema.  Uncovertebral hypertrophy contributing to moderate to severe right and moderate left foraminal stenosis  C6-C7: Disc osteophyte complex, facet hypertrophy contributing to mild canal stenosis and foraminal stenosis  C7-T1: No significant canal or foraminal stenosis  MRI of the left shoulder without contrast 9/27/2021: moderate to severe degenerative joint disease of the AC joint, evidence of subdeltoid bursitis.  Moderate degenerative joint disease of the glenohumeral joint with joint space narrowing.  Increased signal of the articular sided fibers of the supraspinatus with partial tear and or tendinopathy  without retraction.  Infraspinatus has minimal partial-thickness tearing without retraction.  The subscapularis is normal.  Long head of the biceps well seated within the bicipital groove.  Teres minor is intact.  Mild to moderate inferior capsular thickening.    The following portions of the patient's history were reviewed and updated as appropriate: problem list, past medical history, past surgery history, social history, family history, medication reconciliation, and allergies    Review of Systems   Constitutional:  Positive for activity change.   Musculoskeletal:  Positive for arthralgias, back pain, joint swelling, neck pain and neck stiffness.   Allergic/Immunologic: Positive for environmental allergies.   Neurological:  Positive for numbness.   All other systems reviewed and are negative.        Patient Active Problem List   Diagnosis    HTN (hypertension)    Prediabetes    Diverticulitis    S/P low anterior colon resection    Acute blood loss anemia, mild, asymptomatic    Acute postoperative pain    Primary osteoarthritis of right knee    Oropharynx cancer    Cervical disc disorder at C5-C6 level with radiculopathy    Connective tissue stenosis of neural canal of cervical region    Primary osteoarthritis of left shoulder    Rotator cuff tendinitis, right    Primary osteoarthritis of both knees    Cervical spondylosis    S/P total knee replacement, right    Obesity    GERD (gastroesophageal reflux disease)    Status post right knee replacement    Lumbar stenosis with neurogenic claudication    Spondylosis of lumbar region without myelopathy or radiculopathy    Degeneration of lumbar or lumbosacral intervertebral disc    Ligamentum flavum hypertrophy    Spondylolisthesis of lumbar region    Gait disturbance    Physical deconditioning    Primary osteoarthritis of left knee    Lateral collateral ligament deficiency of left knee    Degenerative arthritis of left knee       Past Medical History:   Diagnosis Date     Acromioclavicular separation 11/16/22    Arthritis of back     Arthritis of neck 12/1/22    C. difficile diarrhea     Cervical disc disorder     Diverticulitis     Esophagitis     GERD (gastroesophageal reflux disease)     Hip arthrosis 3/5/23    History of radiation therapy 02/09/2022    oropharynx, bilateral necks    Hypertension     Joint pain Both knees    Knee sprain     Low back pain Lower back    Low back strain     Lumbar stenosis with neurogenic claudication 09/08/2023    Lumbosacral disc disease     Neck strain     Oropharynx cancer 12/01/2021    Osteoarthritis of both knees     Periarthritis of shoulder Left shoulder    Rotator cuff syndrome 11/16/22    Tear of meniscus of knee     Tennis elbow 9/1/1999    Thoracic disc disorder     Wears glasses          Past Surgical History:   Procedure Laterality Date    ABDOMINAL SURGERY      INFANT (6wk)- STOMACH BLOCKAGE     COLON RESECTION N/A 07/02/2019    Procedure: COLON RESECTION LAPAROSCOPIC LOW ANTERIOR;  Surgeon: Magan Blum MD;  Location:  [facility] OR;  Service: General    COLONOSCOPY N/A 07/11/2017    Procedure: COLONOSCOPY;  Surgeon: Magan Blum MD;  Location:  [facility] ENDOSCOPY;  Service:     ENDOSCOPY      FOOT SURGERY      BILAT     JOINT REPLACEMENT  3/28/23    KNEE SURGERY Bilateral     x 5    ORTHOPEDIC SURGERY  Both knees    PERIPHERALLY INSERTED CENTRAL CATHETER INSERTION  06/13/2019    right subclavian now removed    TOTAL KNEE ARTHROPLASTY Right 03/28/2023    Procedure: TOTAL KNEE ARTHROPLASTY WITH CORI ROBOT - RIGHT;  Surgeon: Gage Britt MD;  Location:  [facility] OR;  Service: Robotics - Ortho;  Laterality: Right;    TRIGGER POINT INJECTION  6/1/22         Family History   Problem Relation Age of Onset    Diabetes Father     Heart disease Father     Hypertension Father     Heart attack Father     Arthritis Father     Rheum arthritis Other     No Known Problems Mother          Social History     Socioeconomic History    Marital status:  "   Tobacco Use    Smoking status: Former     Current packs/day: 0.00     Average packs/day: 0.5 packs/day for 24.9 years (12.5 ttl pk-yrs)     Types: Cigarettes     Start date: 1978     Quit date: 2003     Years since quittin.0    Smokeless tobacco: Former     Types: Snuff    Tobacco comments:     Quit dipping 10/25/2021   Vaping Use    Vaping status: Never Used   Substance and Sexual Activity    Alcohol use: Not Currently     Comment: socially, \"rarely\"    Drug use: Never    Sexual activity: Yes     Partners: Female     Birth control/protection: None           Current Outpatient Medications:     acetaminophen (TYLENOL) 500 MG tablet, Take 2 tablets by mouth Every 4 (Four) Hours As Needed., Disp: , Rfl:     cetirizine (zyrTEC) 10 MG tablet, Take 1 tablet by mouth Daily As Needed., Disp: , Rfl:     cholecalciferol 25 MCG (1000 UT) tablet, Take 2 tablets every day by oral route., Disp: , Rfl:     gemfibrozil (LOPID) 600 MG tablet, Take 1 tablet by mouth 2 (Two) Times a Day., Disp: 180 tablet, Rfl: 0    losartan (COZAAR) 100 MG tablet, Take 1 tablet by mouth Daily., Disp: 90 tablet, Rfl: 3    meloxicam (MOBIC) 15 MG tablet, Take 1 tablet by mouth Daily With Food As Needed., Disp: 30 tablet, Rfl: 0    METAMUCIL FIBER PO, , Disp: , Rfl:     methocarbamol (ROBAXIN) 750 MG tablet, Take 1 tablet by mouth 3 times a day as needed for back pain., Disp: 90 tablet, Rfl: 1    Multiple Vitamins-Minerals (MULTIVITAMIN ADULT PO), Take 1 tablet by mouth Daily., Disp: , Rfl:     omega-3 acid ethyl esters (LOVAZA) 1 g capsule, Take 2 capsules by mouth 2 (Two) Times a Day With Meals., Disp: 120 capsule, Rfl: 12    pantoprazole (PROTONIX) 40 MG EC tablet, Take 1 tablet by mouth 2 (Two) Times a Day., Disp: 180 tablet, Rfl: 4    rosuvastatin (CRESTOR) 10 MG tablet, Take 1 tablet by mouth Daily., Disp: 90 tablet, Rfl: 4    vitamin B-12 (CYANOCOBALAMIN) 1000 MCG tablet, Take 2 tablets by mouth 1 (One) Time Per Week., " "Disp: , Rfl:     Chlorhexidine Gluconate 4 % solution, Apply 1 Application topically to the appropriate area as directed Daily. Shower with hibiclens solution as directed for 5 days prior to surgery, Disp: 237 mL, Rfl: 0    gemfibrozil (LOPID) 600 MG tablet, Take 1 tablet by mouth 2 (Two) Times a Day., Disp: 180 tablet, Rfl: 0      Allergies   Allergen Reactions    Alpha-Lipoic Acid Rash    Levaquin [Levofloxacin] Myalgia    Sulfa Antibiotics Hives and Rash         Ht 185.4 cm (73\")   Wt 117 kg (259 lb)   BMI 34.17 kg/m²       Physical Exam:  Constitutional: Patient appears well-developed, well-nourished, well-hydrated, appears younger than stated age  HEENT: Head: Normocephalic and atraumatic  Eyes: Conjunctivae and lids are normal  Pupils: Equal, round, reactive to light  Neck: Trachea normal. Neck supple. No JVD present.   Lymphatic: No cervical adenopathy  Peripheral vascular exam: Posterior tibialis: right 2+ and left 2+. Dorsalis pedis: right 2+ and left 2+. No edema.   Musculoskeletal   Gait and station: Gait evaluation demonstrated a normal gait.   Cervical Spine: Passive and active range of motion are somewhat limited with pain. Extension, flexion, lateral flexion, rotation of the cervical spine increased and reproduced pain. Cervical facet joint loading maneuvers are equivocal.  Muscles: Presence of active trigger points at the levator scapulae   Shoulders: The range of motion of the glenohumeral joints is almost full and without pain. Rotator cuff strength is 5/5.   Lumbar Spine: Passive and active range of motion are limited secondary to pain. Extension, rotation of the lumbar spine increased and reproduced pain. Lumbar facet joint loading maneuvers are positive.  Sacroiliac Joints: Rohit's test; Gaenslen's test; thigh thrust test; SI compression test; posterior shear test; SI distraction test; pelvic rock test; Yeoman's test: Negative   Piriformis maneuvers: Negative   Hip Joints: The range of " motion of the hip joints is limited to flexion and internal rotation but without pain   Palpation of the bilateral ischial tuberosities: Unrevealing   Palpation of the bilateral psoas tendons and iliopsoas bursas: Unrevealing   Palpation of the bilateral greater trochanters: Unrevealing   Examination of the Iliotibial band: Unrevealing   Neurological:   Patient is alert and oriented to person, place, and time.   Speech: Normal.   Cortical function: Normal mental status.   Reflex Scores:  Right brachioradialis: 2+  Left brachioradialis: 2+  Right biceps: 2+  Left biceps: 2+  Right triceps: 2+  Left triceps: 2+  Right patellar: 2+  Left patellar: 1+  Right Achilles: 1+  Left Achilles: 1+  Motor strength: 5/5  Motor Tone: Normal  Involuntary movements: None.   Superficial/Primitive Reflexes: Primitive reflexes were absent.   Right Casas: Absent  Left Casas: Absent  Right ankle clonus: Absent  Left ankle clonus: Absent   Babinsky: Absent  Spurling sign: Negative. Neck tornado test: Negative. Lhermitte sign: Negative. Long tract signs: Negative. Straight leg raising test: Negative. Femoral stretch sign: Negative.   Sensory exam: Intact to light touch, intact pain and temperature sensation, intact vibration sensation and normal proprioception  Coordination: Finger to nose: Normal. Balance: Normal Romberg's sign: Negative   Skin and subcutaneous tissue: Skin is warm and intact. No rash noted. No cyanosis.   Psychiatric: Judgment and insight: Normal. Recent and remote memory: Intact. Mood and affect: Normal.     ASSESSMENT:   1. Lumbar stenosis with neurogenic claudication    2. Spondylolisthesis of lumbar region    3. Ligamentum flavum hypertrophy    4. Cervical disc disorder at C5-C6 level with radiculopathy    5. Physical deconditioning              PLAN/MEDICAL DECISION MAKING:  Mr. Nicholas Renee, 60 y.o. male originally referred by Dr Fermín Acosta in consultation for chronic intractable lower back pain and  chronic neck pain.  On 09/27/2023, he underwent diagnostic and therapeutic left L5-S1 and left S1 TFESI, from which he experienced almost 100% pain relief and functional improvement that lasted for more than 3 weeks followed by 50% for 2 additional weeks. Nicholas Renee underwent follow up neurosurgical consultation with Dr Acosta on 10/09/2023 and reported that he has been doing well after his lumbar epidural steroid injections, and therefore, Dr. Acosta encouraged to continue conservatism. MRI of the lumbar spine w/o contrast on 05/25/2023 revealed multilevel spondylosis. Grade 1 anterolisthesis of L4 on L5.  At L3-L4: Disc bulge, ligamentum flavum hypertrophy, facet arthropathy. Moderate to severe spinal canal stenosis. Mild bilateral neuroforaminal stenosis. At L4-L5: Disc bulge, ligamentum flavum hypertrophy, facet arthropathy. Moderate spinal canal stenosis. Mild to moderate bilateral neuroforaminal stenosis. At L5-S1: Disc bulge, ligamentum flavum hypertrophy, facet arthropathy. Mild spinal canal stenosis. Moderate to severe left and mild to moderate right neuroforaminal stenosis. Nicholas Renee failed to obtain pain relief with conservative measures including oral analgesics, topical analgesics, ice, heat, physical therapy, physical therapist directed home exercise program HEP (ongoing), chiropractic therapy, independent exercise program, to name a few. Since last visit patient underwent flexion and extension X-rays of the lumbar spine on 09/12/2023: Severe degenerative changes. Grade 1 spondylolisthesis of L3 on L4 which appears similar with flexion and extension.  Regarding his neck pain, MRI of the cervical spine revealed multilevel spondylitic changes most significant at C5-C6, with a large disc osteophyte complex contributing to lateral recess stenosis and central spinal canal stenosis. He underwent neurosurgical consultation with Dr. Fermín Acosta and was found not to be surgical a candidate.  On June  27, 2022, he underwent a cervical epidural steroid injection, from which he experienced 75% ongoing pain relief and functional improvement, he is reporting some return of the symptoms. When he last presented for follow-up consultation, he was primarily complaining of mechanical/axial spine pain. Therefore on 3/27/2024, he underwent bilateral lumbar medial branch rhizotomies at L3, L4 and L5.  When I last evaluated him, he reported he experiencing no real difference in his pain, although upon further conversation it appeared as though he was able to tolerate activities, with less pain.  He tells me he is better throughout the day.  He was experiencing more symptoms radiating into his gluteal region, and extending into his right posterior thigh.  He experienced similar symptoms last year although on his left side predominantly.  He was last seen on 12/11/2024, when he underwent diagnostic and therapeutic bilateral L5-S1 transforaminal epidural steroid injections, for which he reports experiencing some pain relief, he just feels they do not last. He is also undergone a recent left total knee arthroplasty with Dr. Britt.  Prior to surgery, he felt his lower back pain increased significantly.  Although, he decided to move forward with surgery.  He would like to continue his exercises for his knee, and add some for his back.  He will call me when he is ready to return.  I had a lengthy conversation with Mr. Nicholas Renee regarding his chronic pain condition and potential therapeutic options including risks, benefits, alternative therapies, to name a few. We have discussed using a stepwise approach starting with the least intense level of care as determined by the extent required to diagnose and or treat a patient's condition. The proposed treatments are consistent with the patient's medical condition and known to be safe and effective by current guidelines and the standard of care. The duration and frequency proposed are  considered appropriate for the service in accordance with accepted standards of medical practice for the diagnosis and treatment of the patient's condition and intended to improve the patient's level of function. These services will be furnished in a setting appropriate to the patient's medical needs and condition. Therefore, I have proposed the following plan:  1.  Interventional pain management measures: Patient does not take blood thinners   A. Treatment of Chronic Lower Back Pain: None indicated at this time.  He is going to follow-up with me as needed, he will call when he is ready to return.  I did discuss with him the possibility of updating diagnostic studies prior to consideration of further interventions. Other options include MILD procedure as ligamentum flavum hypertrophy is the main component of central canal stenosis, vs [place] vs PNS vs a spinal cord stimulator trial. Patient has received formal education including risks, benefits, and alternative treatments, as well as detailed information regarding the procedure and specific goals for the trial. Patient has also received didactic materials including educational booklets and DVDs on spinal cord stimulation therapies.  B. Treatment of Chronic Posterior Neck Pain: None indicated at this time. We have discussed repeating a cervical epidural steroid injection by interlaminar approach using the lowest effective dose of steroids, under C-arm fluoroscopic guidance, with the use of contrast dye to confirm appropriate needle placement and spread of contrast dye. We may repeat cervical epidural steroid injection by interlaminar approach depending on patient's outcome.  As per current guidelines, epidurals will be limited to a maximum of 4 sessions per spinal region in a rolling twelve (12) month period. Continuation of epidural steroid injections over 12 months would only be considered under the following provisions;  · Patient is a high-risk surgical  candidate, or the patient does not desire surgery, or recurrence of pain in the same location relieved with ESIs for at least three months and epidural provides at least 50% sustained improvement of pain and/or 50% objective improvement in function (using same scale as baseline)  · Pain is severe enough to cause a significant degree of functional disability or vocational disability  · The primary care provider will be notified regarding continuation of procedures and repeat steroid use   Other options for treatment of patient's pain would include PNS Sprint, SCS  2. Diagnostic studies: None indicated at this time.  Prior to consideration of advanced pain therapies, I would recommend a new MRI of the lumbar spine without contrast.  A. CBC, PT, PTT prior to SCS trial  B. Prior to spinal cord stimulator trial and implant: MRI of the thoracic spine without contrast to assess capacity and patency of the spinal canal and epidural space    3. Pharmacological measures: Reviewed and discussed; Patient takes Acetaminophen, meloxicam    4. Long-term rehabilitation efforts:  A. The patient does not have a history of falls.   B. Patient will start a comprehensive physical therapy program for Alter-G, gait and balance training, neurodynamics, core strengthening, gluteal and abductor strengthening, VMO/quadriceps/hamstrings strengthening, ultrasound, ASTYM, E-STIM, myofascial release, cupping, dry needling, home exercise program,  C. Continue contrast therapy: Apply ice-packs for 15-20 minutes, followed by heating pads for 15-20 minutes to affected area   D. Start a low impact exercise program such as water therapy, swimming, yoga, Pilates, Manuel Chi  E. Prior to SCS: Referral to Dr. Brandt Alvarez for psychological screening for spinal cord stimulation and intrathecal therapies.  F. Patient's Body mass index is 33.48 kg/m². Patient counseled on the importance of weight loss to help with overall health and pain control.   5. The  patient has been instructed to contact my office with any questions or difficulties. The patient understands the plan and agrees to proceed accordingly.      Pain Medications              acetaminophen (TYLENOL) 500 MG tablet Take 2 tablets by mouth Every 4 (Four) Hours As Needed.    meloxicam (MOBIC) 15 MG tablet Take 1 tablet by mouth Daily With Food As Needed.    methocarbamol (ROBAXIN) 750 MG tablet Take 1 tablet by mouth 3 times a day as needed for back pain.             No orders of the defined types were placed in this encounter.       Please note that portions of this note were completed with a voice recognition program.   Any copied data in any portion of my note has been reviewed by myself and accurate.     The 21st Century Cures Act makes medical notes like this available to patients in the interest of transparency. This is a medical document intended as peer to peer communication. It is written in medical language and may contain abbreviations or verbiage that are unfamiliar. It may appear blunt or direct. Medical documents are intended to carry relevant information, facts as evident, and the clinical opinion of the practitioner.     REBEKAH Ojeda    Patient Care Team:  Jack Gibson MD as PCP - General  Jack Gibson MD as PCP - Family Medicine  Nicholas Manuel MD as Referring Physician (Otolaryngology)  Brandt Zambrano MD as Consulting Physician (Radiation Oncology)  Georgiana Buck MD as Consulting Physician (Hematology and Oncology)  Dimitri Mesa MD as Consulting Physician (Pain Medicine)  Georgiana Yeh APRN as Nurse Practitioner (Pain Medicine)     No orders of the defined types were placed in this encounter.        Future Appointments   Date Time Provider Department Center   6/9/2025  1:50 PM Gage Britt MD [facility]   6/18/2025  9:30 AM Georgiana Buck MD [facility]   3/20/2026 11:00 AM Brandt Zambrano MD [facility] None

## 2025-06-09 ENCOUNTER — OFFICE VISIT (OUTPATIENT)
Dept: ORTHOPEDIC SURGERY | Facility: CLINIC | Age: 60
End: 2025-06-09
Payer: COMMERCIAL

## 2025-06-09 DIAGNOSIS — Z47.89 ORTHOPEDIC AFTERCARE: ICD-10-CM

## 2025-06-09 DIAGNOSIS — Z96.652 S/P TKR (TOTAL KNEE REPLACEMENT), LEFT: Primary | ICD-10-CM

## 2025-06-09 PROCEDURE — 99024 POSTOP FOLLOW-UP VISIT: CPT | Performed by: ORTHOPAEDIC SURGERY

## 2025-06-09 NOTE — PROGRESS NOTES
[facility] Note    Subjective     Chief Complaint   Patient presents with    Post-op Follow-up     7.5 week follow up - 10.5 weeks S/P left total knee arthroplasty (3/27/25)        HPI    It has been 7.5  week(s) since Mr. Renee's last visit. He returns to clinic today for postoperative follow-up of left knee arthroplasty. The issue has been ongoing for 10.5 week(s). He rates his pain a 3/10 on the pain scale. Previous/current treatments: cane/walker and physical therapy. Current symptoms: pain, swelling, and stiffness. The pain is worse with leisure and going down stairs; resting, ice, pain medication and/or NSAID, and elevating the extremity improve the pain. Overall, he is doing better.  100% improvement compared to his preoperative symptoms.  Fully ambulatory without external aids.      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    Primary osteoarthritis of left shoulder    Rotator cuff tendinitis, right    Primary osteoarthritis of both knees    Cervical spondylosis    S/P total knee replacement, right    Obesity    GERD (gastroesophageal reflux disease)    Status post right knee replacement    Lumbar stenosis with neurogenic claudication    Spondylosis of lumbar region without myelopathy or radiculopathy    Degeneration of lumbar or lumbosacral intervertebral disc    Ligamentum flavum hypertrophy    Spondylolisthesis of lumbar region    Gait disturbance    Physical deconditioning    Primary osteoarthritis of left  knee    Lateral collateral ligament deficiency of left knee    Degenerative arthritis of left knee     Past Medical History:   Diagnosis Date    Acromioclavicular separation 11/16/22    Arthritis of back     Arthritis of neck 12/1/22    C. difficile diarrhea     Cervical disc disorder     Diverticulitis     Esophagitis     GERD (gastroesophageal reflux disease)     Hip arthrosis 3/5/23    History of radiation therapy 02/09/2022    oropharynx, bilateral necks    Hypertension     Joint pain Both knees    Knee sprain     Low back pain Lower back    Low back strain     Lumbar stenosis with neurogenic claudication 09/08/2023    Lumbosacral disc disease     Neck strain     Oropharynx cancer 12/01/2021    Osteoarthritis of both knees     Periarthritis of shoulder Left shoulder    Rotator cuff syndrome 11/16/22    Tear of meniscus of knee     Tennis elbow 9/1/1999    Thoracic disc disorder     Wears glasses       Past Surgical History:   Procedure Laterality Date    ABDOMINAL SURGERY      INFANT (6wk)- STOMACH BLOCKAGE     COLON RESECTION N/A 07/02/2019    Procedure: COLON RESECTION LAPAROSCOPIC LOW ANTERIOR;  Surgeon: Magan Blum MD;  Location:  [facility];  Service: General    COLONOSCOPY N/A 07/11/2017    Procedure: COLONOSCOPY;  Surgeon: Magan Blum MD;  Location:  [facility];  Service:     ENDOSCOPY      FOOT SURGERY      BILAT     JOINT REPLACEMENT  3/28/23    Left knee replaced 3/27/25    KNEE SURGERY Bilateral     x 5    ORTHOPEDIC SURGERY  Both knees    PERIPHERALLY INSERTED CENTRAL CATHETER INSERTION  06/13/2019    right subclavian now removed    TOTAL KNEE ARTHROPLASTY Right 03/28/2023    Procedure: TOTAL KNEE ARTHROPLASTY WITH CORI ROBOT - RIGHT;  Surgeon: Gage Britt MD;  Location:  [facility];  Service: Robotics - Ortho;  Laterality: Right;    TRIGGER POINT INJECTION  6/1/22      Family History   Problem Relation Age of Onset    Diabetes Father     Heart disease Father     Hypertension Father      "Heart attack Father     Arthritis Father     Rheum arthritis Other     No Known Problems Mother      Social History     Socioeconomic History    Marital status:    Tobacco Use    Smoking status: Former     Current packs/day: 0.00     Average packs/day: 0.5 packs/day for 24.9 years (12.5 ttl pk-yrs)     Types: Cigarettes     Start date: 1978     Quit date: 2003     Years since quittin.1    Smokeless tobacco: Former     Types: Snuff    Tobacco comments:     Quit dipping 10/25/2021   Vaping Use    Vaping status: Never Used   Substance and Sexual Activity    Alcohol use: Not Currently     Comment: socially, \"rarely\"    Drug use: Never    Sexual activity: Yes     Partners: Female     Birth control/protection: None      Current Outpatient Medications on File Prior to Visit   Medication Sig Dispense Refill    acetaminophen (TYLENOL) 500 MG tablet Take 2 tablets by mouth Every 4 (Four) Hours As Needed.      cetirizine (zyrTEC) 10 MG tablet Take 1 tablet by mouth Daily As Needed.      cholecalciferol 25 MCG (1000 UT) tablet Take 2 tablets every day by oral route.      gemfibrozil (LOPID) 600 MG tablet Take 1 tablet by mouth 2 (Two) Times a Day. 180 tablet 0    losartan (COZAAR) 100 MG tablet Take 1 tablet by mouth Daily. 90 tablet 3    meloxicam (MOBIC) 15 MG tablet Take 1 tablet by mouth Daily With Food As Needed. 30 tablet 0    METAMUCIL FIBER PO       methocarbamol (ROBAXIN) 750 MG tablet Take 1 tablet by mouth 3 times a day as needed for back pain. 90 tablet 1    Multiple Vitamins-Minerals (MULTIVITAMIN ADULT PO) Take 1 tablet by mouth Daily.      omega-3 acid ethyl esters (LOVAZA) 1 g capsule Take 2 capsules by mouth 2 (Two) Times a Day With Meals. 120 capsule 12    pantoprazole (PROTONIX) 40 MG EC tablet Take 1 tablet by mouth 2 (Two) Times a Day. 180 tablet 4    rosuvastatin (CRESTOR) 10 MG tablet Take 1 tablet by mouth Daily. 90 tablet 4    vitamin B-12 (CYANOCOBALAMIN) 1000 MCG tablet Take 2 " tablets by mouth 1 (One) Time Per Week.      [DISCONTINUED] Chlorhexidine Gluconate 4 % solution Apply 1 Application topically to the appropriate area as directed Daily. Shower with hibiclens solution as directed for 5 days prior to surgery 237 mL 0    [DISCONTINUED] gemfibrozil (LOPID) 600 MG tablet Take 1 tablet by mouth 2 (Two) Times a Day. 180 tablet 0     No current facility-administered medications on file prior to visit.      Allergies   Allergen Reactions    Alpha-Lipoic Acid Rash    Levaquin [Levofloxacin] Myalgia    Sulfa Antibiotics Hives and Rash        Review of Systems   Constitutional:  Negative for activity change, appetite change, chills, diaphoresis, fatigue, fever and unexpected weight change.   HENT:  Negative for congestion, dental problem, drooling, ear discharge, ear pain, facial swelling, hearing loss, mouth sores, nosebleeds, postnasal drip, rhinorrhea, sinus pressure, sneezing, sore throat, tinnitus, trouble swallowing and voice change.    Eyes:  Negative for photophobia, pain, discharge, redness, itching and visual disturbance.   Respiratory:  Negative for apnea, cough, choking, chest tightness, shortness of breath, wheezing and stridor.    Cardiovascular:  Negative for chest pain, palpitations and leg swelling.   Gastrointestinal:  Negative for abdominal distention, abdominal pain, anal bleeding, blood in stool, constipation, diarrhea, nausea, rectal pain and vomiting.   Endocrine: Negative for cold intolerance, heat intolerance, polydipsia, polyphagia and polyuria.   Genitourinary:  Negative for decreased urine volume, difficulty urinating, dysuria, enuresis, flank pain, frequency, genital sores, hematuria and urgency.   Musculoskeletal:  Positive for arthralgias. Negative for back pain, gait problem, joint swelling, myalgias, neck pain and neck stiffness.   Skin:  Negative for color change, pallor, rash and wound.   Allergic/Immunologic: Negative for environmental allergies, food  allergies and immunocompromised state.   Neurological:  Negative for dizziness, tremors, seizures, syncope, facial asymmetry, speech difficulty, weakness, light-headedness, numbness and headaches.   Hematological:  Negative for adenopathy. Does not bruise/bleed easily.   Psychiatric/Behavioral:  Negative for agitation, behavioral problems, confusion, decreased concentration, dysphoric mood, hallucinations, self-injury, sleep disturbance and suicidal ideas. The patient is not nervous/anxious and is not hyperactive.         Objective      Physical Exam  There were no vitals taken for this visit.    There is no height or weight on file to calculate BMI.         General:   Mental Status:  Alert   Appearance: Cooperative, in no acute distress   Build and Nutrition: Well-nourished well-developed male   Orientation: Alert and oriented to person, place and time   Posture: Normal   Gait: Nonantalgic    Integument:   Left knee: Wound is well-healed with no signs of infection    Lower Extremities:   Left Knee:    Tenderness:  None    Effusion:  1-2+    Swelling: None    Crepitus:  None    Range of motion:  Extension: 0°       Flexion: 130°  Instability:  No varus laxity, no valgus laxity, negative anterior drawer  Deformities:  None      Imaging/Studies  Imaging Results (Last 24 Hours)       ** No results found for the last 24 hours. **              Assessment and Plan     Diagnoses and all orders for this visit:    1. S/P TKR (total knee replacement), left (Primary)    2. Orthopedic aftercare        1. S/P TKR (total knee replacement), left    2. Orthopedic aftercare          I reviewed my findings with the patient.  His left total knee arthroplasty is functioning well and he is pleased with results.  I will see him back on the anniversary of his replacement in March 2026, but I will be happy to see him back sooner for any problems.    Return in about 9 months (around 3/25/2026) for recheck with x-rays.      Gage Britt,  MD  06/09/25  14:07 EDT    Dictated Utilizing Dragon Dictation

## 2025-06-10 RX ORDER — MELOXICAM 15 MG/1
15 TABLET ORAL DAILY PRN
Qty: 30 TABLET | Refills: 0 | Status: SHIPPED | OUTPATIENT
Start: 2025-06-10

## 2025-07-01 NOTE — TELEPHONE ENCOUNTER
Slides requested and navigator will follow up to ensure slides are received and processed.    Telephone with Gayathri Jackson MA (12/12/2024)

## 2025-07-09 ENCOUNTER — OFFICE VISIT (OUTPATIENT)
Dept: ONCOLOGY | Facility: CLINIC | Age: 60
End: 2025-07-09
Payer: COMMERCIAL

## 2025-07-09 ENCOUNTER — LAB (OUTPATIENT)
Dept: LAB | Facility: HOSPITAL | Age: 60
End: 2025-07-09
Payer: COMMERCIAL

## 2025-07-09 VITALS
TEMPERATURE: 98.4 F | RESPIRATION RATE: 18 BRPM | HEIGHT: 73 IN | DIASTOLIC BLOOD PRESSURE: 97 MMHG | BODY MASS INDEX: 34.59 KG/M2 | SYSTOLIC BLOOD PRESSURE: 149 MMHG | HEART RATE: 76 BPM | WEIGHT: 261 LBS | OXYGEN SATURATION: 96 %

## 2025-07-09 DIAGNOSIS — C10.9 OROPHARYNX CANCER: Primary | ICD-10-CM

## 2025-07-09 DIAGNOSIS — C10.9 OROPHARYNX CANCER: ICD-10-CM

## 2025-07-09 LAB
ALBUMIN SERPL-MCNC: 4.4 G/DL (ref 3.5–5.2)
ALBUMIN/GLOB SERPL: 1.8 G/DL
ALP SERPL-CCNC: 72 U/L (ref 39–117)
ALT SERPL W P-5'-P-CCNC: 21 U/L (ref 1–41)
ANION GAP SERPL CALCULATED.3IONS-SCNC: 10.7 MMOL/L (ref 5–15)
AST SERPL-CCNC: 22 U/L (ref 1–40)
BASOPHILS # BLD AUTO: 0.01 10*3/MM3 (ref 0–0.2)
BASOPHILS NFR BLD AUTO: 0.2 % (ref 0–1.5)
BILIRUB SERPL-MCNC: 0.5 MG/DL (ref 0–1.2)
BUN SERPL-MCNC: 21 MG/DL (ref 8–23)
BUN/CREAT SERPL: 18.4 (ref 7–25)
CALCIUM SPEC-SCNC: 9.4 MG/DL (ref 8.6–10.5)
CHLORIDE SERPL-SCNC: 104 MMOL/L (ref 98–107)
CO2 SERPL-SCNC: 25.3 MMOL/L (ref 22–29)
CREAT SERPL-MCNC: 1.14 MG/DL (ref 0.76–1.27)
DEPRECATED RDW RBC AUTO: 42.5 FL (ref 37–54)
EGFRCR SERPLBLD CKD-EPI 2021: 72.7 ML/MIN/1.73
EOSINOPHIL # BLD AUTO: 0.13 10*3/MM3 (ref 0–0.4)
EOSINOPHIL NFR BLD AUTO: 2.7 % (ref 0.3–6.2)
ERYTHROCYTE [DISTWIDTH] IN BLOOD BY AUTOMATED COUNT: 12.3 % (ref 12.3–15.4)
GLOBULIN UR ELPH-MCNC: 2.5 GM/DL
GLUCOSE SERPL-MCNC: 104 MG/DL (ref 65–99)
HCT VFR BLD AUTO: 37.2 % (ref 37.5–51)
HGB BLD-MCNC: 12.8 G/DL (ref 13–17.7)
IMM GRANULOCYTES # BLD AUTO: 0.01 10*3/MM3 (ref 0–0.05)
IMM GRANULOCYTES NFR BLD AUTO: 0.2 % (ref 0–0.5)
LYMPHOCYTES # BLD AUTO: 1.24 10*3/MM3 (ref 0.7–3.1)
LYMPHOCYTES NFR BLD AUTO: 26.1 % (ref 19.6–45.3)
MCH RBC QN AUTO: 31.6 PG (ref 26.6–33)
MCHC RBC AUTO-ENTMCNC: 34.4 G/DL (ref 31.5–35.7)
MCV RBC AUTO: 91.9 FL (ref 79–97)
MONOCYTES # BLD AUTO: 0.39 10*3/MM3 (ref 0.1–0.9)
MONOCYTES NFR BLD AUTO: 8.2 % (ref 5–12)
NEUTROPHILS NFR BLD AUTO: 2.98 10*3/MM3 (ref 1.7–7)
NEUTROPHILS NFR BLD AUTO: 62.6 % (ref 42.7–76)
PLATELET # BLD AUTO: 179 10*3/MM3 (ref 140–450)
PMV BLD AUTO: 8.8 FL (ref 6–12)
POTASSIUM SERPL-SCNC: 4.1 MMOL/L (ref 3.5–5.2)
PROT SERPL-MCNC: 6.9 G/DL (ref 6–8.5)
RBC # BLD AUTO: 4.05 10*6/MM3 (ref 4.14–5.8)
SODIUM SERPL-SCNC: 140 MMOL/L (ref 136–145)
TSH SERPL DL<=0.05 MIU/L-ACNC: 2.2 UIU/ML (ref 0.27–4.2)
WBC NRBC COR # BLD AUTO: 4.76 10*3/MM3 (ref 3.4–10.8)

## 2025-07-09 PROCEDURE — 36415 COLL VENOUS BLD VENIPUNCTURE: CPT

## 2025-07-09 PROCEDURE — 80050 GENERAL HEALTH PANEL: CPT

## 2025-07-09 PROCEDURE — 99213 OFFICE O/P EST LOW 20 MIN: CPT | Performed by: INTERNAL MEDICINE

## 2025-07-09 NOTE — PROGRESS NOTES
"      PROBLEM LIST:  1. 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  Biopsy of left neck mass on 11/8/2021 showed squamous cell carcinoma.  P16 stain inconclusive.  Repeat biopsy done 11/24/21 - excision of left lingual tonsil, showed SCC, p16+  B) begin weekly cisplatin with radiation 12/23/2021. Completed cisplatin 2/9/2022.   2. Hypertension  3. GERd  4. Hyperlipidemia    Subjective     CHIEF COMPLAINT: p16+ oropharynx cancer    HISTORY OF PRESENT ILLNESS:   Nicholas Renee returns for follow-up.  He is doing okay.  He has been having trouble with his back for the past few months and is planning on calling Dr. Mesa to schedule an appointment.  He had a knee replacement in the spring.  He says he has noticed that he size more frequently recently.  He also has been watching his blood pressure and it has been running 140s over 90s at home.  He made an appointment with his PCP to discuss this.        Objective       /97   Pulse 76   Temp 98.4 °F (36.9 °C)   Resp 18   Ht 185.4 cm (73\")   Wt 118 kg (261 lb)   SpO2 96%   BMI 34.43 kg/m²    Vitals:    07/09/25 1020   PainSc: 0-No pain  Comment: No new pain. Old back pain = 8                     ECOG score: 0   General: well appearing male in no acute distress  Neuro: alert and oriented  HEENT: sclerae anicteric.    Lymphatics:  No palpable cervical adenopathy bilaterally.  Cardiovascular: regular rate and rhythm, no murmurs  Lungs: clear to auscultation bilaterally  Abdomen: soft, nontender, nondistended.  No palpable organomegaly  Extremities: no lower extremity edema  Skin: no rashes, lesions, bruising, or petechiae  Psych: mood and affect appropriate      RECENT LABS:  Lab Results   Component Value Date    WBC 4.76 07/09/2025    HGB 12.8 (L) 07/09/2025    HCT 37.2 (L) 07/09/2025    MCV 91.9 07/09/2025     " 07/09/2025       Lab Results   Component Value Date    GLUCOSE 97 03/14/2025    BUN 22 03/14/2025    CREATININE 0.98 03/14/2025    EGFRIFNONA 74 02/08/2022    BCR 22.4 03/14/2025    K 4.6 03/14/2025    CO2 25.0 03/14/2025    CALCIUM 9.1 03/14/2025    ALBUMIN 4.3 12/04/2024    AST 19 12/04/2024    ALT 16 12/04/2024                 Assessment & Plan                Nicholas Renee is a 60 y.o. male with a cT1-2N1M0 p16+ SCC of the left base of tongue, with > 3 cm cervical LN.    He is doing well with no evidence of disease recurrence.  He saw Dr. Manuel last month who is now retiring and he will follow with Dr. Sims going forward.    His labs today show mild anemia with a hemoglobin of 12.8 which is stable.  CMP and TSH are pending and we will contact him with any abnormal results.    Follow-up in 6 months with CBC, CMP and TSH on return.  We will continue to follow him until 5 years from diagnosis.        Georgiana Buck MD  [facility]    7/9/2025

## 2025-08-05 DIAGNOSIS — M48.062 LUMBAR STENOSIS WITH NEUROGENIC CLAUDICATION: Primary | ICD-10-CM

## 2025-08-05 RX ORDER — MELOXICAM 15 MG/1
15 TABLET ORAL DAILY PRN
Qty: 90 TABLET | Refills: 0 | Status: SHIPPED | OUTPATIENT
Start: 2025-08-05

## 2025-08-20 ENCOUNTER — HOSPITAL ENCOUNTER (OUTPATIENT)
Dept: MRI IMAGING | Facility: HOSPITAL | Age: 60
Discharge: HOME | End: 2025-08-20
Admitting: NURSE PRACTITIONER
Payer: COMMERCIAL

## 2025-08-20 PROCEDURE — 72148 MRI LUMBAR SPINE W/O DYE: CPT

## (undated) DEVICE — SKIN AFFIX SURG ADHESIVE 72/CS 0.55ML: Brand: MEDLINE

## (undated) DEVICE — BLANKT WARM UPPR/BDY ARM/OUT 57X196CM

## (undated) DEVICE — MEDI-VAC NON-CONDUCTIVE SUCTION TUBING: Brand: CARDINAL HEALTH

## (undated) DEVICE — TRY EPID SFTY 18G 3.5IN 1T7680

## (undated) DEVICE — PK KN TOTL 10

## (undated) DEVICE — "MH-438 A/W VLVE F/140 EVIS-140": Brand: AIR/WATER VALVE

## (undated) DEVICE — ENDOPATH XCEL BLADELESS TROCARS WITH STABILITY SLEEVES: Brand: ENDOPATH XCEL

## (undated) DEVICE — SUT PROLN 2/0 PC3 8833H

## (undated) DEVICE — ANTIBACTERIAL UNDYED BRAIDED (POLYGLACTIN 910), SYNTHETIC ABSORBABLE SUTURE: Brand: COATED VICRYL

## (undated) DEVICE — SUT VIC 0 CTD BR 18IN UNDYE VCP724D

## (undated) DEVICE — SUT MONOCRYL PLS ANTIB UND 3/0  PS1 27IN

## (undated) DEVICE — LEGGINGS, PAIR, 29X43, STERILE: Brand: MEDLINE

## (undated) DEVICE — THE SINGLE USE ETRAP – POLYP TRAP IS USED FOR SUCTION RETRIEVAL OF ENDOSCOPICALLY REMOVED POLYPS.: Brand: ETRAP

## (undated) DEVICE — KT PUMP INFUBLOCK MDL 2100 PMKITSOLIS

## (undated) DEVICE — Device

## (undated) DEVICE — ENDOPATH XCEL UNIVERSAL TROCAR STABLILITY SLEEVES: Brand: ENDOPATH XCEL

## (undated) DEVICE — TRAP FLD MINIVAC MEGADYNE 100ML

## (undated) DEVICE — PK LAP LASR CHOLE 10

## (undated) DEVICE — COVER,LIGHT HANDLE,FLX,1/PK: Brand: MEDLINE INDUSTRIES, INC.

## (undated) DEVICE — GLV SURG TRIUMPH ORTHO W/ALOE PF LTX 7.0

## (undated) DEVICE — LAP PORT CLOSURE GUIDES 5MM AND 10/12MM: Brand: LAP PORT CLOSURE GUIDES 5MM AND 10/12MM

## (undated) DEVICE — CONTN GRAD MEAS TRIANG 32OZ BLK

## (undated) DEVICE — VISUALIZATION SYSTEM: Brand: CLEARIFY

## (undated) DEVICE — PENCL E/S HNDSWCH ROCKRBTN HOLSTR 10FT

## (undated) DEVICE — SPNG LAP PREWSH SFTPK 18X18IN STRL PK/5

## (undated) DEVICE — ENDOGATOR HYBRID TUBING KIT FOR USE WITH ENDOGATOR IRRIGATION PUMP, OLYMPUS PUMP, GI4000 ESU, AND TORRENT IRRIGATION PUMP.: Brand: ENDOGATOR KIT

## (undated) DEVICE — [HIGH FLOW INSUFFLATOR,  DO NOT USE IF PACKAGE IS DAMAGED,  KEEP DRY,  KEEP AWAY FROM SUNLIGHT,  PROTECT FROM HEAT AND RADIOACTIVE SOURCES.]: Brand: PNEUMOSURE

## (undated) DEVICE — BNDG ELAS W/CLIP 6IN 10YD LF STRL

## (undated) DEVICE — TUBING, SUCTION, 1/4" X 10', STRAIGHT: Brand: MEDLINE

## (undated) DEVICE — ADHS SKIN PREMIERPRO EXOFIN TOPICAL HI/VISC .5ML

## (undated) DEVICE — SUT PROLN 2/0 KS 30IN 8623H

## (undated) DEVICE — SUT MNCRYL PLS ANTIB UD 4/0 PS2 18IN

## (undated) DEVICE — INTRO ACCSR BLNT TP

## (undated) DEVICE — SYR LUERLOK 50ML

## (undated) DEVICE — THE ECHELON FLEX POWERED PLUS ARTICULATING ENDOSCOPIC LINEAR CUTTERS ARE STERILE, SINGLE PATIENT USE INSTRUMENTS THAT SIMULTANEOUSLYCUT AND STAPLE TISSUE. THERE ARE SIX STAGGERED ROWS OF STAPLES, THREE ON EITHER SIDE OF THE CUT LINE. THE ECHELON FLEX 45 POWERED PLUSINSTRUMENTS HAVE A STAPLE LINE THAT IS APPROXIMATELY 45 MM LONG AND A CUT LINE THAT IS APPROXIMATELY 42 MM LONG. THE SHAFT CAN ROTATE FREELYIN BOTH DIRECTIONS AND AN ARTICULATION MECHANISM ENABLES THE DISTAL PORTION OF THE SHAFT TO PIVOT TO FACILITATE LATERAL ACCESS TO THE OPERATIVESITE.THE INSTRUMENTS ARE PACKAGED WITH A PRIMARY LITHIUM BATTERY PACK THAT MUST BE INSTALLED PRIOR TO USE. THERE ARE SPECIFIC REQUIREMENTS FORDISPOSING OF THE BATTERY PACK. REFER TO THE BATTERY PACK DISPOSAL SECTION.THE INSTRUMENTS ARE PACKAGED WITHOUT A RELOAD AND MUST BE LOADED PRIOR TO USE. A STAPLE RETAINING CAP ON THE RELOAD PROTECTS THE STAPLE LEGPOINTS DURING SHIPPING AND TRANSPORTATION. THE INSTRUMENTS’ LOCK-OUT FEATURE IS DESIGNED TO PREVENT A USED OR IMPROPERLY INSTALLED RELOADFROM BEING REFIRED OR AN INSTRUMENT FROM BEING FIRED WITHOUT A RELOAD.: Brand: ECHELON FLEX

## (undated) DEVICE — TRY SKINPREP DRYPREP

## (undated) DEVICE — KT ANTI FOG W/FLD AND SPNG

## (undated) DEVICE — PATIENT RETURN ELECTRODE, SINGLE-USE, CONTACT QUALITY MONITORING, ADULT, WITH 9FT CORD, FOR PATIENTS WEIGING OVER 33LBS. (15KG): Brand: MEGADYNE

## (undated) DEVICE — GOWN,REINF,POLY,ECL,PP SLV,XL: Brand: MEDLINE

## (undated) DEVICE — ACCESS PLATFORM FOR MINIMALLY INVASIVE SURGERY.: Brand: GELPORT® LAPAROSCOPIC  SYSTEM

## (undated) DEVICE — SNAR POLYP SENSATION MICRO OVL 13 240X20

## (undated) DEVICE — SUT PDS 1 CTX 36IN VIO PDP371T

## (undated) DEVICE — 3M™ IOBAN™ 2 ANTIMICROBIAL INCISE DRAPE 6650EZ: Brand: IOBAN™ 2

## (undated) DEVICE — MEDI-VAC YANKAUER SUCTION HANDLE W/BULBOUS TIP: Brand: CARDINAL HEALTH

## (undated) DEVICE — APPL CHLORAPREP W/TINT 26ML BLU

## (undated) DEVICE — DRSNG PAD ABD 8X10IN STRL

## (undated) DEVICE — DRAPE,TOP,102X53,STERILE: Brand: MEDLINE

## (undated) DEVICE — TBG PENCL TELESCP MEGADYNE SMOKE EVAC 10FT

## (undated) DEVICE — SHEET,DRAPE,40X58,STERILE: Brand: MEDLINE

## (undated) DEVICE — UNDERCAST PADDING: Brand: DEROYAL

## (undated) DEVICE — STRYKER PERFORMANCE SERIES SAGITTAL BLADE: Brand: STRYKER PERFORMANCE SERIES

## (undated) DEVICE — GLV SURG SENSICARE MICRO PF LF 6.5 STRL

## (undated) DEVICE — KT POSTN TRENDELENBURG DLX WING PAD TABL STRAP HDRST XL 1P/U

## (undated) DEVICE — "MH-443 SUCTION VALVE F/EVIS140 EVIS160": Brand: SUCTION VALVE

## (undated) DEVICE — MARYLAND JAW LAPAROSCOPIC SEALER/DIVIDER COATED: Brand: LIGASURE

## (undated) DEVICE — TOWEL,OR,DSP,ST,BLUE,STD,4/PK,20PK/CS: Brand: MEDLINE

## (undated) DEVICE — CVR FTSWITCH UNIV

## (undated) DEVICE — CONN STD FOR/O2 TBG

## (undated) DEVICE — NDL HYPO ECLPS SFTY 18G 1 1/2IN

## (undated) DEVICE — GLV SURG SENSICARE W/ALOE PF LF 9 STRL

## (undated) DEVICE — DRAPE,UNDERBUTTOCKS,PCH,STERILE: Brand: MEDLINE

## (undated) DEVICE — INTENDED FOR TISSUE SEPARATION, AND OTHER PROCEDURES THAT REQUIRE A SHARP SURGICAL BLADE TO PUNCTURE OR CUT.: Brand: BARD-PARKER ® STAINLESS STEEL BLADES

## (undated) DEVICE — CEMENT MIXING SYSTEM WITH MIS FEMORAL BREAKAWAY NOZZLE: Brand: REVOLUTION

## (undated) DEVICE — SUT VIC 0 TIES 18IN J912G

## (undated) DEVICE — COVER,MAYO STAND,XL,STERILE: Brand: MEDLINE

## (undated) DEVICE — Device: Brand: ENDOGATOR

## (undated) DEVICE — CANN NASL CO2 DIVIDED A/

## (undated) DEVICE — PAD ARMBRD SURG CONVOL 7.5X20X2IN

## (undated) DEVICE — "MH-948 A/W CHANNEL CLEANING ADPTR -VIDEO": Brand: AW CHANNEL CLEANING ADAPTE

## (undated) DEVICE — CANNULA,ADULT,SOFT-TOUCH,7'TUBE,UC: Brand: PENDING

## (undated) DEVICE — SYS CLS SKIN PREMIERPRO EXOFINFUSION 22CM

## (undated) DEVICE — GLV SURG PREMIERPRO MIC LTX PF SZ8.5 BRN

## (undated) DEVICE — MEDI-VAC YANKAUER SUCTION HANDLE: Brand: CARDINAL HEALTH